# Patient Record
Sex: MALE | Race: WHITE | NOT HISPANIC OR LATINO | Employment: OTHER | URBAN - NONMETROPOLITAN AREA
[De-identification: names, ages, dates, MRNs, and addresses within clinical notes are randomized per-mention and may not be internally consistent; named-entity substitution may affect disease eponyms.]

---

## 2017-03-22 DIAGNOSIS — C61 PROSTATE CANCER (HCC): Primary | ICD-10-CM

## 2017-03-22 LAB — PSA SERPL-MCNC: <0.07 NG/ML (ref 0–4)

## 2017-03-29 ENCOUNTER — OFFICE VISIT (OUTPATIENT)
Dept: UROLOGY | Facility: CLINIC | Age: 77
End: 2017-03-29

## 2017-03-29 VITALS
DIASTOLIC BLOOD PRESSURE: 66 MMHG | BODY MASS INDEX: 18.42 KG/M2 | HEIGHT: 73 IN | SYSTOLIC BLOOD PRESSURE: 120 MMHG | TEMPERATURE: 98.6 F | WEIGHT: 139 LBS

## 2017-03-29 DIAGNOSIS — R35.1 NOCTURIA: ICD-10-CM

## 2017-03-29 DIAGNOSIS — C61 PROSTATE CANCER (HCC): Primary | ICD-10-CM

## 2017-03-29 LAB
BILIRUB BLD-MCNC: NEGATIVE MG/DL
CLARITY, POC: CLEAR
COLOR UR: YELLOW
GLUCOSE UR STRIP-MCNC: NEGATIVE MG/DL
KETONES UR QL: NEGATIVE
LEUKOCYTE EST, POC: NEGATIVE
NITRITE UR-MCNC: NEGATIVE MG/ML
PH UR: 6 [PH] (ref 5–8)
PROT UR STRIP-MCNC: NEGATIVE MG/DL
RBC # UR STRIP: NEGATIVE /UL
SP GR UR: 1.02 (ref 1–1.03)
UROBILINOGEN UR QL: NORMAL

## 2017-03-29 PROCEDURE — 81003 URINALYSIS AUTO W/O SCOPE: CPT | Performed by: UROLOGY

## 2017-03-29 PROCEDURE — 99213 OFFICE O/P EST LOW 20 MIN: CPT | Performed by: UROLOGY

## 2017-03-29 NOTE — PROGRESS NOTES
"  Mr. Urbina is 76 y.o. male    CHIEF COMPLAINT: I am here today for Prostate Cancer. My PSA is 0.070      HPI  Prostate cancer   He was initially diagnosed with prostate cancer about  6 year(s) ago. This was identified in the context of elevated psa.  Severity of the disease is best described as probable organ confined disease. Previous or current management includes robot assisted laparoscpic prostatectomy. Associated symptoms include no evidence of irritative or obstructive voiding symptoms, gross hematuria, lower extremity swelling or weight loss. . Currently his PSA is  <0.070 ng/ml.     Having some nocturia over last six months. Up to three times some nights.       The following portions of the patient's history were reviewed and updated as appropriate: allergies, current medications, past family history, past medical history, past social history, past surgical history and problem list.    Review of Systems   Constitutional: Negative for chills and fever.   Gastrointestinal: Negative for abdominal pain, anal bleeding and blood in stool.   Genitourinary: Negative for flank pain and hematuria.       No current outpatient prescriptions on file.    Past Medical History:   Diagnosis Date   • Prostate cancer 05/2011    t2c,[place] 3+3       Past Surgical History:   Procedure Laterality Date   • HERNIA REPAIR     • PROSTATECTOMY  05/2011    [facility]P       Social History     Social History   • Marital status: Single     Spouse name: N/A   • Number of children: N/A   • Years of education: N/A     Social History Main Topics   • Smoking status: Former Smoker     Types: Cigarettes     Quit date: 6/8/2002   • Smokeless tobacco: None   • Alcohol use No   • Drug use: None   • Sexual activity: Not Asked     Other Topics Concern   • None     Social History Narrative       Family History   Problem Relation Age of Onset   • Stroke Mother        /67  Temp 98.6 °F (37 °C)  Ht 73\" (185.4 cm)  Wt 139 lb (63 kg)  BMI 18.34 " kg/m2    Physical Exam  Constitutional: The patient  is oriented to person, place, and time. They  appear well-developed and well-nourished. No distress.   Pulmonary/Chest: Effort normal.   Abdominal: Soft. The patient exhibits no distension and no mass. There is no tenderness. There is no rebound and no guarding. No hernia.   Neurological: Patient is alert and oriented to person, place, and time.   Skin: Skin is warm and dry. Not diaphoretic.   Psychiatric:  normal mood and affect.   Vitals reviewed.        Results for orders placed or performed in visit on 03/29/17   POC Urinalysis Dipstick, Automated   Result Value Ref Range    Color Yellow Yellow, Straw, Dark Yellow, Page    Clarity, UA Clear Clear    Glucose, UA Negative Negative, 1000 mg/dL (3+) mg/dL    Bilirubin Negative Negative    Ketones, UA Negative Negative    Specific Gravity  1.025 1.005 - 1.030    Blood, UA Negative Negative    pH, Urine 6.0 5.0 - 8.0    Protein, POC Negative Negative mg/dL    Urobilinogen, UA Normal Normal    Leukocytes Negative Negative    Nitrite, UA Negative Negative       Imaging Results (last 7 days)     ** No results found for the last 168 hours. **          Assessment and Plan  Diagnoses and all orders for this visit:    Prostate cancer  -     POC Urinalysis Dipstick, Automated    Nocturia    The PSA is <0.07 which is undetectable suggesting no clinical evidence of prostate cancer   Nocturia is explained to the patient is a manifestation of one the following or a combination of voiding dysfunction, other medical conditions, or a sleep disorder.  Nocturia as a completely isolated symptommore often represents a non-urologic problem or medical condition.  It is explained that as patient's age, they often have a reverse Circadian rhythm voiding pattern in which up to two thirds of the urine in a 24-hour period can be excreted at night.  We also went over sleep disorders of the patient which may affect them.  Volume-related  disorders the cause mobilization of peripheral edema are also possible causes of nocturia including the medications used to treat them.  Therefore, treatment must be directed at the cause of the symptom.  I explained we will do our best to evaluate any urologic cause of symptoms, but oftentimes we find no abnormality in voiding dysfunction to correct.  Evaluation options are explained to the patient.      Yuriy Spears MD  03/29/17  12:12 PM    EMR Dragon/Transcription disclaimer:  Much of this encounter note is an electronic transcription/translation of spoken language to printed text. The electronic translation of spoken language may permit erroneous, or at times, nonsensical words or phrases to be inadvertently transcribed; although I have reviewed the note for such errors, some may still exist.       Cc:

## 2018-02-22 ENCOUNTER — OFFICE VISIT (OUTPATIENT)
Dept: RETAIL CLINIC | Facility: CLINIC | Age: 78
End: 2018-02-22

## 2018-02-22 VITALS
SYSTOLIC BLOOD PRESSURE: 136 MMHG | HEART RATE: 62 BPM | DIASTOLIC BLOOD PRESSURE: 72 MMHG | TEMPERATURE: 97.6 F | OXYGEN SATURATION: 98 %

## 2018-02-22 DIAGNOSIS — J01.90 ACUTE SINUSITIS, RECURRENCE NOT SPECIFIED, UNSPECIFIED LOCATION: Primary | ICD-10-CM

## 2018-02-22 DIAGNOSIS — R05.9 COUGHING: ICD-10-CM

## 2018-02-22 PROCEDURE — 99213 OFFICE O/P EST LOW 20 MIN: CPT | Performed by: NURSE PRACTITIONER

## 2018-02-22 RX ORDER — FLUTICASONE PROPIONATE 50 MCG
1 SPRAY, SUSPENSION (ML) NASAL DAILY
Qty: 1 BOTTLE | Refills: 0 | Status: SHIPPED | OUTPATIENT
Start: 2018-02-22 | End: 2019-06-03

## 2018-02-22 RX ORDER — AMOXICILLIN 500 MG/1
500 CAPSULE ORAL 3 TIMES DAILY
Qty: 30 CAPSULE | Refills: 0 | Status: SHIPPED | OUTPATIENT
Start: 2018-02-22 | End: 2018-03-04

## 2018-02-22 NOTE — PATIENT INSTRUCTIONS
Increase fluid intake  Warm salt water gargles as needed for sore throat  Do not over suppress cough  If no improvement over next 2-3 days or symptoms worsen, follow up with Urgent Care      Sinusitis, Adult  Sinusitis is soreness and inflammation of your sinuses. Sinuses are hollow spaces in the bones around your face. Your sinuses are located:  · Around your eyes.  · In the middle of your forehead.  · Behind your nose.  · In your cheekbones.  Your sinuses and nasal passages are lined with a stringy fluid (mucus). Mucus normally drains out of your sinuses. When your nasal tissues become inflamed or swollen, the mucus can become trapped or blocked so air cannot flow through your sinuses. This allows bacteria, viruses, and funguses to grow, which leads to infection.  Sinusitis can develop quickly and last for 7?10 days (acute) or for more than 12 weeks (chronic). Sinusitis often develops after a cold.  What are the causes?  This condition is caused by anything that creates swelling in the sinuses or stops mucus from draining, including:  · Allergies.  · Asthma.  · Bacterial or viral infection.  · Abnormally shaped bones between the nasal passages.  · Nasal growths that contain mucus (nasal polyps).  · Narrow sinus openings.  · Pollutants, such as chemicals or irritants in the air.  · A foreign object stuck in the nose.  · A fungal infection. This is rare.  What increases the risk?  The following factors may make you more likely to develop this condition:  · Having allergies or asthma.  · Having had a recent cold or respiratory tract infection.  · Having structural deformities or blockages in your nose or sinuses.  · Having a weak immune system.  · Doing a lot of swimming or diving.  · Overusing nasal sprays.  · Smoking.  What are the signs or symptoms?  The main symptoms of this condition are pain and a feeling of pressure around the affected sinuses. Other symptoms include:  · Upper  toothache.  · Earache.  · Headache.  · Bad breath.  · Decreased sense of smell and taste.  · A cough that may get worse at night.  · Fatigue.  · Fever.  · Thick drainage from your nose. The drainage is often green and it may contain pus (purulent).  · Stuffy nose or congestion.  · Postnasal drip. This is when extra mucus collects in the throat or back of the nose.  · Swelling and warmth over the affected sinuses.  · Sore throat.  · Sensitivity to light.  How is this diagnosed?  This condition is diagnosed based on symptoms, a medical history, and a physical exam. To find out if your condition is acute or chronic, your health care provider may:  · Look in your nose for signs of nasal polyps.  · Tap over the affected sinus to check for signs of infection.  · View the inside of your sinuses using an imaging device that has a light attached (endoscope).  If your health care provider suspects that you have chronic sinusitis, you may also:  · Be tested for allergies.  · Have a sample of mucus taken from your nose (nasal culture) and checked for bacteria.  · Have a mucus sample examined to see if your sinusitis is related to an allergy.  If your sinusitis does not respond to treatment and it lasts longer than 8 weeks, you may have an MRI or CT scan to check your sinuses. These scans also help to determine how severe your infection is.  In rare cases, a bone biopsy may be done to rule out more serious types of fungal sinus disease.  How is this treated?  Treatment for sinusitis depends on the cause and whether your condition is chronic or acute. If a virus is causing your sinusitis, your symptoms will go away on their own within 10 days. You may be given medicines to relieve your symptoms, including:  · Topical nasal decongestants. They shrink swollen nasal passages and let mucus drain from your sinuses.  · Antihistamines. These drugs block inflammation that is triggered by allergies. This can help to ease swelling in your  nose and sinuses.  · Topical nasal corticosteroids. These are nasal sprays that ease inflammation and swelling in your nose and sinuses.  · Nasal saline washes. These rinses can help to get rid of thick mucus in your nose.  If your condition is caused by bacteria, you will be given an antibiotic medicine. If your condition is caused by a fungus, you will be given an antifungal medicine.  Surgery may be needed to correct underlying conditions, such as narrow nasal passages. Surgery may also be needed to remove polyps.  Follow these instructions at home:  Medicines   · Take, use, or apply over-the-counter and prescription medicines only as told by your health care provider. These may include nasal sprays.  · If you were prescribed an antibiotic medicine, take it as told by your health care provider. Do not stop taking the antibiotic even if you start to feel better.  Hydrate and Humidify   · Drink enough water to keep your urine clear or pale yellow. Staying hydrated will help to thin your mucus.  · Use a cool mist humidifier to keep the humidity level in your home above 50%.  · Inhale steam for 10-15 minutes, 3-4 times a day or as told by your health care provider. You can do this in the bathroom while a hot shower is running.  · Limit your exposure to cool or dry air.  Rest   · Rest as much as possible.  · Sleep with your head raised (elevated).  · Make sure to get enough sleep each night.  General instructions   · Apply a warm, moist washcloth to your face 3-4 times a day or as told by your health care provider. This will help with discomfort.  · Wash your hands often with soap and water to reduce your exposure to viruses and other germs. If soap and water are not available, use hand .  · Do not smoke. Avoid being around people who are smoking (secondhand smoke).  · Keep all follow-up visits as told by your health care provider. This is important.  Contact a health care provider if:  · You have a  fever.  · Your symptoms get worse.  · Your symptoms do not improve within 10 days.  Get help right away if:  · You have a severe headache.  · You have persistent vomiting.  · You have pain or swelling around your face or eyes.  · You have vision problems.  · You develop confusion.  · Your neck is stiff.  · You have trouble breathing.  This information is not intended to replace advice given to you by your health care provider. Make sure you discuss any questions you have with your health care provider.  Document Released: 12/18/2006 Document Revised: 08/13/2017 Document Reviewed: 10/12/2016  [employer] Interactive Patient Education © 2017 [employer]

## 2018-02-22 NOTE — PROGRESS NOTES
Shannon Urbina is a 77 y.o. male.     URI    This is a new problem. The current episode started 1 to 4 weeks ago (2 1/2 weeks). The problem has been gradually worsening. There has been no fever. Associated symptoms include congestion (Chest), coughing and sneezing. Pertinent negatives include no diarrhea, nausea, vomiting or wheezing. He has tried decongestant for the symptoms. The treatment provided moderate (Helps him blow out mucus) relief.    Patient states he is still exercising as normal.      The following portions of the patient's history were reviewed and updated as appropriate: allergies, current medications, past family history, past medical history, past social history, past surgical history and problem list.    Review of Systems   Constitutional: Positive for fatigue (Feels lousy\). Negative for fever.   HENT: Positive for congestion (Chest), sinus pressure (Mild) and sneezing.    Respiratory: Positive for cough. Negative for chest tightness, shortness of breath and wheezing.    Gastrointestinal: Negative for diarrhea, nausea and vomiting.       Objective   Physical Exam   Constitutional: He appears well-developed and well-nourished. He does not appear ill. No distress.   HENT:   Right Ear: External ear normal. Tympanic membrane is not erythematous and not bulging.   Left Ear: External ear normal. Tympanic membrane is not erythematous and not bulging.   Nose: Right sinus exhibits no maxillary sinus tenderness and no frontal sinus tenderness. Left sinus exhibits no maxillary sinus tenderness and no frontal sinus tenderness.   Mouth/Throat: Posterior oropharyngeal erythema (Streaking with PND ) present. No oropharyngeal exudate. Tonsils are 0 on the right. Tonsils are 0 on the left.   Neck: Neck supple.   Cardiovascular: Normal rate, regular rhythm and normal heart sounds.  Exam reveals no gallop and no friction rub.    No murmur heard.  Pulmonary/Chest: Effort normal and breath sounds normal.  No respiratory distress. He has no decreased breath sounds. He has no wheezes. He has no rhonchi. He has no rales. He exhibits no tenderness.   Lymphadenopathy:     He has no cervical adenopathy.   Neurological: He is alert.   Skin: Skin is warm and dry. He is not diaphoretic.   Psychiatric: He has a normal mood and affect. His behavior is normal.       Assessment/Plan   Edy was seen today for uri.    Diagnoses and all orders for this visit:    Acute sinusitis, recurrence not specified, unspecified location  -     amoxicillin (AMOXIL) 500 MG capsule; Take 1 capsule by mouth 3 (Three) Times a Day for 10 days.  -     fluticasone (FLONASE) 50 MCG/ACT nasal spray; 1 spray into each nostril Daily.    Coughing    Discussed plain saline nasal spray to help thin mucus  Increase fluid intake  Warm salt water gargles as needed for sore throat  Do not over suppress cough  If no improvement over next 2-3 days or symptoms worsen, follow up with Urgent Care

## 2018-03-26 LAB — PSA SERPL-MCNC: <0.07 NG/ML (ref 0–4)

## 2018-03-29 ENCOUNTER — RESULTS ENCOUNTER (OUTPATIENT)
Dept: UROLOGY | Facility: CLINIC | Age: 78
End: 2018-03-29

## 2018-03-29 DIAGNOSIS — C61 PROSTATE CANCER (HCC): ICD-10-CM

## 2018-04-02 ENCOUNTER — OFFICE VISIT (OUTPATIENT)
Dept: UROLOGY | Facility: CLINIC | Age: 78
End: 2018-04-02

## 2018-04-02 VITALS — TEMPERATURE: 98 F | BODY MASS INDEX: 18.55 KG/M2 | HEIGHT: 73 IN | WEIGHT: 140 LBS

## 2018-04-02 DIAGNOSIS — C61 PROSTATE CANCER (HCC): Primary | ICD-10-CM

## 2018-04-02 DIAGNOSIS — R35.1 NOCTURIA: ICD-10-CM

## 2018-04-02 LAB
BILIRUB BLD-MCNC: NEGATIVE MG/DL
CLARITY, POC: CLEAR
COLOR UR: YELLOW
GLUCOSE UR STRIP-MCNC: NEGATIVE MG/DL
KETONES UR QL: NEGATIVE
LEUKOCYTE EST, POC: NEGATIVE
NITRITE UR-MCNC: NEGATIVE MG/ML
PH UR: 6 [PH] (ref 5–8)
PROT UR STRIP-MCNC: NEGATIVE MG/DL
RBC # UR STRIP: NEGATIVE /UL
SP GR UR: 1.01 (ref 1–1.03)
UROBILINOGEN UR QL: NORMAL

## 2018-04-02 PROCEDURE — 81003 URINALYSIS AUTO W/O SCOPE: CPT | Performed by: UROLOGY

## 2018-04-02 PROCEDURE — 99213 OFFICE O/P EST LOW 20 MIN: CPT | Performed by: UROLOGY

## 2019-03-28 ENCOUNTER — RESULTS ENCOUNTER (OUTPATIENT)
Dept: UROLOGY | Facility: CLINIC | Age: 79
End: 2019-03-28

## 2019-03-28 DIAGNOSIS — C61 PROSTATE CANCER (HCC): ICD-10-CM

## 2019-04-02 ENCOUNTER — RESULTS ENCOUNTER (OUTPATIENT)
Dept: UROLOGY | Facility: CLINIC | Age: 79
End: 2019-04-02

## 2019-04-02 DIAGNOSIS — C61 PROSTATE CANCER (HCC): Primary | ICD-10-CM

## 2019-04-02 DIAGNOSIS — C61 PROSTATE CANCER (HCC): ICD-10-CM

## 2019-04-03 ENCOUNTER — TELEPHONE (OUTPATIENT)
Dept: UROLOGY | Facility: CLINIC | Age: 79
End: 2019-04-03

## 2019-04-03 NOTE — TELEPHONE ENCOUNTER
Tried to call the pt several times to see if he had psa done and the number listed just gives a busy signal.

## 2019-04-05 LAB — PSA SERPL-MCNC: 0.03 NG/ML (ref 0–4)

## 2019-04-10 NOTE — PROGRESS NOTES
Mr. Urbina is 78 y.o. male    CHIEF COMPLAINT: I am here for follow up on prostate cancer. My PSA is 0.028    HPI  Follow up Prostate cancer  Location: Prostate gland  Quality:  Adenocarcinoma  Severity: Low risk disease  Duration: Diagnosed in 2011  Context: This was identified when he had a biopsy to evaluate elevated PSA which was obtained as part of prostate cancer screening.  Modifying factors: Robot-assisted laparoscopic prostatectomy resulted biochemical remission  Associated signs or symptoms: From a voiding standpoint 0 ppd. Good stream.   Patient denies any possible systemic symptoms of prostate cancer such as weight loss, lower extremity edema, or skeletal pain that could be worrisome for metastases.  History related to this issue:   - 05/2011: Robot Assisted Laparoscopic prostatectomy  Final Diagnosis  Amended Report  Radical prostatectomy:              A.     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       B.     Tumor involvement is seen in the posterior mid gland mainly on the  left with focal extension to the right mid gland.         C.     No capsular invasion and no extracapsular extension.         D.     All margins free of malignant infiltrate.         E.     No pathologic diagnosis, bilateral seminal vesicles.      Pathologic classification: pT2c pNX pMX. AJCC stage group IIB.      Lab Results   Component Value Date    PSA 0.028 04/05/2019    PSA <0.070 03/26/2018    PSA <0.070 03/22/2017       The following portions of the patient's history were reviewed and updated as appropriate: allergies, current medications, past family history, past medical history, past social history, past surgical history and problem list.      Review of Systems   Constitutional: Negative for chills and fever.   Gastrointestinal: Negative for abdominal pain, anal bleeding and blood in stool.   Genitourinary: Negative for dysuria, frequency,  "hematuria and urgency.           Current Outpatient Medications:   •  fluticasone (FLONASE) 50 MCG/ACT nasal spray, 1 spray into each nostril Daily., Disp: 1 bottle, Rfl: 0    Past Medical History:   Diagnosis Date   • Prostate cancer (CMS/formerly [facility]) 2011    t2c,[place] 3+3       Past Surgical History:   Procedure Laterality Date   • HERNIA REPAIR     • PROSTATECTOMY  2011    RALP       Social History     Socioeconomic History   • Marital status: Single     Spouse name: Not on file   • Number of children: Not on file   • Years of education: Not on file   • Highest education level: Not on file   Tobacco Use   • Smoking status: Former Smoker     Types: Cigarettes     Last attempt to quit: 2002     Years since quittin.8   • Smokeless tobacco: Never Used   Substance and Sexual Activity   • Alcohol use: No       Family History   Problem Relation Age of Onset   • Stroke Mother          Temp 98.3 °F (36.8 °C)   Ht 182.9 cm (72\")   Wt 64.9 kg (143 lb)   BMI 19.39 kg/m²       Physical Exam  Constitutional:  They  appear well-developed and well-nourished. There are no obvious deformities. No distress.   Pulmonary/Chest: Effort normal.   GI: Soft. The patient exhibits no distension and no mass. There is no tenderness. There is no rebound and no guarding. No hernia.   Neurological: Patient is alert and oriented to person, place, and time.   Skin: Skin is warm and dry. Not diaphoretic.   Psychiatric:  normal mood and affect. Not agitated.     r    Data  Results for orders placed or performed in visit on 19   POC Urinalysis Dipstick, Multipro   Result Value Ref Range    Color Yellow Yellow, Straw, Dark Yellow, Page    Clarity, UA Clear Clear    Glucose, UA Negative Negative, 1000 mg/dL (3+) mg/dL    Bilirubin Negative Negative    Ketones, UA Negative Negative    Specific Gravity  1.020 1.005 - 1.030    Blood, UA Trace (A) Negative    pH, Urine 5.5 5.0 - 8.0    Protein, POC Negative Negative mg/dL    " Urobilinogen, UA Normal Normal    Nitrite, UA Negative Negative    Leukocytes Negative Negative         Imaging Results (last 7 days)     ** No results found for the last 168 hours. **        Patient's Body mass index is 19.39 kg/m². BMI is within normal parameters. No follow-up required..      Assessment and Plan  Diagnoses and all orders for this visit:    Prostate cancer (CMS/[facility])  -     POC Urinalysis Dipstick, Multipro      His PSA is 0.028. Doing well now eight years post op .   Needs annual PSA done until he is ten years post op, then biannual afterwards.         (Please note that portions of this note were completed with a voice recognition program.)  Yuriy Spears MD  04/19/19  10:33 AM

## 2019-04-19 ENCOUNTER — OFFICE VISIT (OUTPATIENT)
Dept: UROLOGY | Facility: CLINIC | Age: 79
End: 2019-04-19

## 2019-04-19 VITALS — TEMPERATURE: 98.3 F | HEIGHT: 72 IN | WEIGHT: 143 LBS | BODY MASS INDEX: 19.37 KG/M2

## 2019-04-19 DIAGNOSIS — C61 PROSTATE CANCER (HCC): Primary | ICD-10-CM

## 2019-04-19 LAB
BILIRUB BLD-MCNC: NEGATIVE MG/DL
CLARITY, POC: CLEAR
COLOR UR: YELLOW
GLUCOSE UR STRIP-MCNC: NEGATIVE MG/DL
KETONES UR QL: NEGATIVE
LEUKOCYTE EST, POC: NEGATIVE
NITRITE UR-MCNC: NEGATIVE MG/ML
PH UR: 5.5 [PH] (ref 5–8)
PROT UR STRIP-MCNC: NEGATIVE MG/DL
RBC # UR STRIP: ABNORMAL /UL
SP GR UR: 1.02 (ref 1–1.03)
UROBILINOGEN UR QL: NORMAL

## 2019-04-19 PROCEDURE — 81003 URINALYSIS AUTO W/O SCOPE: CPT | Performed by: UROLOGY

## 2019-04-19 PROCEDURE — 99212 OFFICE O/P EST SF 10 MIN: CPT | Performed by: UROLOGY

## 2019-06-03 ENCOUNTER — HOSPITAL ENCOUNTER (OUTPATIENT)
Dept: ULTRASOUND IMAGING | Facility: HOSPITAL | Age: 79
Discharge: HOME | End: 2019-06-03
Admitting: NURSE PRACTITIONER

## 2019-06-03 PROCEDURE — 93971 EXTREMITY STUDY: CPT | Performed by: SURGERY

## 2019-06-03 PROCEDURE — 93971 EXTREMITY STUDY: CPT

## 2019-06-05 ENCOUNTER — OFFICE VISIT (OUTPATIENT)
Dept: INTERNAL MEDICINE | Facility: CLINIC | Age: 79
End: 2019-06-05

## 2019-06-05 ENCOUNTER — RESULTS ENCOUNTER (OUTPATIENT)
Dept: INTERNAL MEDICINE | Facility: CLINIC | Age: 79
End: 2019-06-05

## 2019-06-05 VITALS
TEMPERATURE: 97.7 F | OXYGEN SATURATION: 96 % | WEIGHT: 135.4 LBS | HEIGHT: 72 IN | HEART RATE: 65 BPM | DIASTOLIC BLOOD PRESSURE: 63 MMHG | RESPIRATION RATE: 16 BRPM | BODY MASS INDEX: 18.34 KG/M2 | SYSTOLIC BLOOD PRESSURE: 120 MMHG

## 2019-06-05 DIAGNOSIS — Z00.00 MEDICARE ANNUAL WELLNESS VISIT, INITIAL: Primary | ICD-10-CM

## 2019-06-05 DIAGNOSIS — Z12.11 SCREENING FOR COLON CANCER: ICD-10-CM

## 2019-06-05 DIAGNOSIS — Z23 NEED FOR PNEUMOCOCCAL VACCINE: ICD-10-CM

## 2019-06-05 DIAGNOSIS — I83.11 VARICOSE VEINS OF RIGHT LOWER EXTREMITY WITH INFLAMMATION: ICD-10-CM

## 2019-06-05 DIAGNOSIS — Z00.00 ANNUAL VISIT FOR GENERAL ADULT MEDICAL EXAMINATION WITHOUT ABNORMAL FINDINGS: ICD-10-CM

## 2019-06-05 PROBLEM — I83.90 VARICOSE VEIN OF LEG: Status: ACTIVE | Noted: 2019-06-05

## 2019-06-05 PROCEDURE — 96160 PT-FOCUSED HLTH RISK ASSMT: CPT | Performed by: NURSE PRACTITIONER

## 2019-06-05 PROCEDURE — 90670 PCV13 VACCINE IM: CPT | Performed by: NURSE PRACTITIONER

## 2019-06-05 PROCEDURE — G0009 ADMIN PNEUMOCOCCAL VACCINE: HCPCS | Performed by: NURSE PRACTITIONER

## 2019-06-05 PROCEDURE — G0438 PPPS, INITIAL VISIT: HCPCS | Performed by: NURSE PRACTITIONER

## 2019-06-05 NOTE — PROGRESS NOTES
QUICK REFERENCE INFORMATION:  The ABCs of the Annual Wellness Visit    Initial Medicare Wellness Visit    HEALTH RISK ASSESSMENT    : 1940    Recent Hospitalizations:  No hospitalization(s) within the last year..  ccc      Current Medical Providers:  Patient Care Team:  Torey Amato DO as PCP - General (Internal Medicine)  Yuriy Spears MD as Consulting Physician (Urology)        Smoking Status:  Social History     Tobacco Use   Smoking Status Former Smoker   • Years: 50.00   • Types: Cigarettes   • Last attempt to quit: 2002   • Years since quittin.0   Smokeless Tobacco Never Used       Alcohol Consumption:  Social History     Substance and Sexual Activity   Alcohol Use No       Depression Screen:   PHQ-2/PHQ-9 Depression Screening 2019   Little interest or pleasure in doing things 0   Feeling down, depressed, or hopeless 0   Total Score 0       Health Habits and Functional and Cognitive Screening:  Functional & Cognitive Status 2019   Do you have difficulty preparing food and eating? No   Do you have difficulty bathing yourself, getting dressed or grooming yourself? No   Do you have difficulty using the toilet? No   Do you have difficulty moving around from place to place? No   Do you have trouble with steps or getting out of a bed or a chair? No   In the past year have you fallen or experienced a near fall? No   Current Diet Well Balanced Diet   Dental Exam Not up to date   Eye Exam Up to date   Exercise (times per week) 3 times per week   Current Exercise Activities Include Cardiovasular Workout on Exercise Equipment   Do you need help using the phone?  No   Are you deaf or do you have serious difficulty hearing?  No   Do you need help with transportation? No   Do you need help shopping? No   Do you need help preparing meals?  No   Do you need help with housework?  No   Do you need help with laundry? No   Do you need help taking your medications? No   Do you need help  managing money? No   Do you ever drive or ride in a car without wearing a seat belt? No   Have you felt unusual stress, anger or loneliness in the last month? No   Who do you live with? Alone   If you need help, do you have trouble finding someone available to you? No   Have you been bothered in the last four weeks by sexual problems? No   Do you have difficulty concentrating, remembering or making decisions? No           Does the patient have evidence of cognitive impairment? No    Asiprin use counseling: Does not need ASA (and currently is not on it)      Recent Lab Results:                   Age-appropriate Screening Schedule:  Refer to the list below for future screening recommendations based on patient's age, sex and/or medical conditions. Orders for these recommended tests are listed in the plan section. The patient has been provided with a written plan.    Health Maintenance   Topic Date Due   • TDAP/TD VACCINES (1 - Tdap) 09/30/1960   • ZOSTER VACCINE (1 of 2) 09/30/1991   • PNEUMOCOCCAL VACCINES (65+ LOW/MEDIUM RISK) (1 of 2 - PCV13) 09/30/2006   • COLONOSCOPY  03/29/2017   • INFLUENZA VACCINE  08/01/2019        Subjective   History of Present Illness    Edy Urbina is a 78 y.o. male who presents for an Annual Wellness Visit.    The following portions of the patient's history were reviewed and updated as appropriate: allergies, current medications, past family history, past medical history, past social history, past surgical history and problem list.    Outpatient Medications Prior to Visit   Medication Sig Dispense Refill   • Multiple Vitamins-Minerals (MULTIVITAMIN PO) Take 1 tablet by mouth Daily.       No facility-administered medications prior to visit.        Patient Active Problem List   Diagnosis   • Varicose vein of leg   • BMI less than 19,adult       Advance Care Planning:  Patient does not have an advance directive - information provided to the patient today    Identification of Risk  "Factors:  Risk factors include: inadequate social support.  Patient lives alone though he reports his children are available to help him if he needs help.     Review of Systems   Constitutional: Negative for activity change, appetite change, fatigue, fever and unexpected weight change.   HENT: Negative.    Respiratory: Negative for cough, chest tightness, shortness of breath and wheezing.    Cardiovascular: Positive for leg swelling (right). Negative for chest pain and palpitations.   Gastrointestinal: Negative.    Endocrine: Negative.    Genitourinary: Negative.    Musculoskeletal: Positive for myalgias.   Skin: Negative.    Neurological: Negative for dizziness and headaches.   Psychiatric/Behavioral: Negative.        Compared to one year ago, the patient feels his physical health is worse.  Compared to one year ago, the patient feels his mental health is the same.    Objective     Physical Exam   Constitutional: He is oriented to person, place, and time. He appears well-developed and well-nourished.   HENT:   Head: Normocephalic and atraumatic.   Eyes: Conjunctivae and EOM are normal. Pupils are equal, round, and reactive to light.   Neck: Normal range of motion. Neck supple.   Cardiovascular: Normal rate, regular rhythm and normal heart sounds.   Pulmonary/Chest: Effort normal and breath sounds normal.   Abdominal: Soft. Bowel sounds are normal.   Musculoskeletal:        Legs:  Neurological: He is alert and oriented to person, place, and time. He has normal reflexes.   Skin: Skin is warm and dry.   Psychiatric: He has a normal mood and affect.   Vitals reviewed.      Vitals:    06/05/19 1015   BP: 120/63   BP Location: Left arm   Patient Position: Sitting   Pulse: 65   Resp: 16   Temp: 97.7 °F (36.5 °C)   TempSrc: Oral   SpO2: 96%   Weight: 61.4 kg (135 lb 6.4 oz)   Height: 182.9 cm (72\")       Patient's Body mass index is 18.36 kg/m². BMI is below normal parameters. Recommendations include: none (medical " contraindication). Patient exercises frequently and maintains regular cardiovascular exercise.       Assessment/Plan   Patient Self-Management and Personalized Health Advice  The patient has been provided with information about: diet and weight management and preventive services including:   · Colorectal cancer screening, cologuard test ordered, Pneumococcal vaccine .    Visit Diagnoses:    ICD-10-CM ICD-9-CM   1. Medicare annual wellness visit, initial Z00.00 V70.0   2. Annual visit for general adult medical examination without abnormal findings Z00.00 V70.0   3. Varicose veins of right lower extremity with inflammation I83.11 454.1   4. Need for pneumococcal vaccine Z23 V03.82   5. Screening for colon cancer Z12.11 V76.51   6. BMI less than 19,adult Z68.1 V85.0       Orders Placed This Encounter   Procedures   • Pneumococcal Conjugate Vaccine 13-Valent All (PCV13)   • CBC No Differential     Standing Status:   Future     Standing Expiration Date:   6/5/2020   • Comprehensive metabolic panel     Standing Status:   Future     Standing Expiration Date:   6/5/2020   • Lipid panel     Standing Status:   Future     Standing Expiration Date:   6/5/2020   • Cologuard - Stool, Per Rectum     Standing Status:   Future     Standing Expiration Date:   6/5/2020   • Ambulatory Referral to Vascular Surgery     Referral Priority:   Routine     Referral Type:   Consultation     Referral Reason:   Specialty Services Required     Requested Specialty:   Vascular Surgery     Number of Visits Requested:   1       Outpatient Encounter Medications as of 6/5/2019   Medication Sig Dispense Refill   • Multiple Vitamins-Minerals (MULTIVITAMIN PO) Take 1 tablet by mouth Daily.       No facility-administered encounter medications on file as of 6/5/2019.        Reviewed use of high risk medication in the elderly: not applicable  Reviewed for potential of harmful drug interactions in the elderly: not applicable    Follow Up:  Return in about 3  months (around 9/5/2019) for dr mata.     An After Visit Summary and PPPS with all of these plans were given to the patient.

## 2019-06-05 NOTE — PATIENT INSTRUCTIONS
Medicare Wellness  Personal Prevention Plan of Service     Date of Office Visit:  2019  Encounter Provider:  DARLING Meyers  Place of Service:  [facility]  Patient Name: Edy Urbina  :  1940    As part of the Medicare Wellness portion of your visit today, we are providing you with this personalized preventive plan of services (PPPS). This plan is based upon recommendations of the [place] Preventive Services Task Force (USPSTF) and the Advisory Committee on Immunization Practices (ACIP).    This lists the preventive care services that should be considered, and provides dates of when you are due. Items listed as completed are up-to-date and do not require any further intervention.    Health Maintenance   Topic Date Due   • TDAP/TD VACCINES (1 - Tdap) 1960   • ZOSTER VACCINE (1 of 2) 1991   • PNEUMOCOCCAL VACCINES (65+ LOW/MEDIUM RISK) (1 of 2 - PCV13) 2006   • MEDICARE ANNUAL WELLNESS  2017   • COLONOSCOPY  2017   • INFLUENZA VACCINE  2019       Orders Placed This Encounter   Procedures   • Pneumococcal Conjugate Vaccine 13-Valent All (PCV13)   • CBC No Differential     Standing Status:   Future     Standing Expiration Date:   2020   • Comprehensive metabolic panel     Standing Status:   Future     Standing Expiration Date:   2020   • Lipid panel     Standing Status:   Future     Standing Expiration Date:   2020   • Cologuard - Stool, Per Rectum     Standing Status:   Future     Standing Expiration Date:   2020   • Ambulatory Referral to Vascular Surgery     Referral Priority:   Routine     Referral Type:   Consultation     Referral Reason:   Specialty Services Required     Requested Specialty:   Vascular Surgery     Number of Visits Requested:   1       Return in about 3 months (around 2019) for dr mata.

## 2019-06-05 NOTE — PROGRESS NOTES
CC:establish care, varicose veins.     History:  Edy Urbina is a 78 y.o. male who presents today for evaluation of the above problems.    Patient presents today to establish care. He is referred from urgent care. He reports he has been feeling well without acute illness.  Patient scheduled to see Dr. Mendiola on 6/10 for hernia evaluation. His main concern today is the varicose veins that are present in his right leg. They extend from his thigh to his calf. He reports that they swell and with exercise become tender and cause his right leg to feel fatigue. He does not currently wear compression socks.       ROS:  Review of Systems   Constitutional: Negative for activity change, appetite change, fatigue, fever and unexpected weight change.   HENT: Negative.    Respiratory: Negative for cough, chest tightness, shortness of breath and wheezing.    Cardiovascular: Positive for leg swelling (right). Negative for chest pain and palpitations.   Gastrointestinal: Negative.    Endocrine: Negative.    Genitourinary: Negative.    Musculoskeletal: Negative.    Skin: Negative.    Neurological: Negative for dizziness and headaches.   Psychiatric/Behavioral: Negative.        No Known Allergies  Past Medical History:   Diagnosis Date   • Prostate cancer (CMS/[facility]) 05/2011    t2c,Sommer 3+3     Past Surgical History:   Procedure Laterality Date   • HERNIA REPAIR     • PROSTATECTOMY  05/2011    RALP     Family History   Problem Relation Age of Onset   • Stroke Mother       reports that he quit smoking about 17 years ago. His smoking use included cigarettes. He quit after 50.00 years of use. He has never used smokeless tobacco. He reports that he does not drink alcohol.      Current Outpatient Medications:   •  Multiple Vitamins-Minerals (MULTIVITAMIN PO), Take 1 tablet by mouth Daily., Disp: , Rfl:     OBJECTIVE:  /63 (BP Location: Left arm, Patient Position: Sitting)   Pulse 65   Temp 97.7 °F (36.5 °C) (Oral)   Resp 16    "Ht 182.9 cm (72\")   Wt 61.4 kg (135 lb 6.4 oz)   SpO2 96%   BMI 18.36 kg/m²    Physical Exam   Constitutional: He is oriented to person, place, and time. He appears well-developed and well-nourished.   HENT:   Head: Normocephalic and atraumatic.   Eyes: Conjunctivae and EOM are normal. Pupils are equal, round, and reactive to light.   Neck: Normal range of motion. Neck supple.   Cardiovascular: Normal rate, regular rhythm and normal heart sounds.   Pulmonary/Chest: Effort normal and breath sounds normal.   Abdominal: Soft. Bowel sounds are normal.   Musculoskeletal:        Right knee: He exhibits swelling (mild). He exhibits no erythema.        Legs:  Neurological: He is alert and oriented to person, place, and time. He has normal reflexes.   Skin: Skin is warm and dry.   Psychiatric: He has a normal mood and affect.   Vitals reviewed.      Assessment/Plan    Edy was seen today for \[facility] [place] [facility]\"" care.    Diagnoses and all orders for this visit:    Medicare annual wellness visit, initial  Annual visit for general adult medical examination without abnormal findings  -     CBC No Differential; Future  -     Comprehensive metabolic panel; Future  -     Lipid panel; Future  Immunizations:      - Tetanus: Unknown or >10 years ago. Recommend to have at pharmacy or on injury.      - Influenza: Recommend yearly.      - Pneumovax: Once after age 65      - Prevnar: Once after age 65      - Zostavax: Once after age 60  Colonoscopy: Discuss at 50  Prostate: Discuss at 55 or on symptoms.  Varicose veins of right lower extremity with inflammation  -     Ambulatory Referral to Vascular Surgery    Need for pneumococcal vaccine  -     Pneumococcal Conjugate Vaccine 13-Valent All (PCV13)    Screening for colon cancer  -     Cologuard - Stool, Per Rectum; Future    BMI less than 19,adult  Recommended attention to weight and ensuring frequent meals with protein rich sources.       An After Visit Summary was printed and given to the " patient at discharge.  Return in about 3 months (around 9/5/2019) for dr mata.         Shawna Cabrera APRN 6/5/2019

## 2019-06-06 ENCOUNTER — TELEPHONE (OUTPATIENT)
Dept: VASCULAR SURGERY | Facility: CLINIC | Age: 79
End: 2019-06-06

## 2019-06-20 ENCOUNTER — TELEPHONE (OUTPATIENT)
Dept: VASCULAR SURGERY | Facility: CLINIC | Age: 79
End: 2019-06-20

## 2019-06-20 NOTE — TELEPHONE ENCOUNTER
I called pt to explain appt change. Pt expressed confusion about this. pt mentioned several different providers. I tried explaining it was Dr. Montague several times. Also explained reasoning for his appt. I told pt the date and time about 5 or so times. I also told the pt I was mailing a reminder, which I completed and put in mail tray. Pt seemed extremely confused by all of this after repeating myself several times.

## 2019-06-21 ENCOUNTER — APPOINTMENT (OUTPATIENT)
Dept: PREADMISSION TESTING | Facility: HOSPITAL | Age: 79
End: 2019-06-21

## 2019-06-21 VITALS
HEART RATE: 66 BPM | BODY MASS INDEX: 18.4 KG/M2 | RESPIRATION RATE: 16 BRPM | DIASTOLIC BLOOD PRESSURE: 55 MMHG | HEIGHT: 71 IN | SYSTOLIC BLOOD PRESSURE: 121 MMHG | WEIGHT: 131.39 LBS | OXYGEN SATURATION: 97 %

## 2019-06-21 LAB
ALBUMIN SERPL-MCNC: 4.3 G/DL (ref 3.5–5)
ALBUMIN/GLOB SERPL: 1.5 G/DL (ref 1.1–2.5)
ALP SERPL-CCNC: 59 U/L (ref 24–120)
ALT SERPL W P-5'-P-CCNC: 23 U/L (ref 0–54)
ANION GAP SERPL CALCULATED.3IONS-SCNC: 9 MMOL/L (ref 4–13)
AST SERPL-CCNC: 28 U/L (ref 7–45)
BASOPHILS # BLD AUTO: 0.06 10*3/MM3 (ref 0–0.2)
BASOPHILS NFR BLD AUTO: 0.9 % (ref 0–2)
BILIRUB SERPL-MCNC: 0.8 MG/DL (ref 0.1–1)
BUN BLD-MCNC: 13 MG/DL (ref 5–21)
BUN/CREAT SERPL: 17.6 (ref 7–25)
CALCIUM SPEC-SCNC: 9 MG/DL (ref 8.4–10.4)
CHLORIDE SERPL-SCNC: 99 MMOL/L (ref 98–110)
CO2 SERPL-SCNC: 29 MMOL/L (ref 24–31)
CREAT BLD-MCNC: 0.74 MG/DL (ref 0.5–1.4)
DEPRECATED RDW RBC AUTO: 40.1 FL (ref 40–54)
EOSINOPHIL # BLD AUTO: 0.1 10*3/MM3 (ref 0–0.7)
EOSINOPHIL NFR BLD AUTO: 1.5 % (ref 0–4)
ERYTHROCYTE [DISTWIDTH] IN BLOOD BY AUTOMATED COUNT: 13.1 % (ref 12–15)
GFR SERPL CREATININE-BSD FRML MDRD: 103 ML/MIN/1.73
GLOBULIN UR ELPH-MCNC: 2.8 GM/DL
GLUCOSE BLD-MCNC: 86 MG/DL (ref 70–100)
HCT VFR BLD AUTO: 39.6 % (ref 40–52)
HGB BLD-MCNC: 13.1 G/DL (ref 14–18)
IMM GRANULOCYTES # BLD AUTO: 0.02 10*3/MM3 (ref 0–0.05)
IMM GRANULOCYTES NFR BLD AUTO: 0.3 % (ref 0–5)
LYMPHOCYTES # BLD AUTO: 1.37 10*3/MM3 (ref 0.72–4.86)
LYMPHOCYTES NFR BLD AUTO: 20.3 % (ref 15–45)
MCH RBC QN AUTO: 27.9 PG (ref 28–32)
MCHC RBC AUTO-ENTMCNC: 33.1 G/DL (ref 33–36)
MCV RBC AUTO: 84.3 FL (ref 82–95)
MONOCYTES # BLD AUTO: 0.55 10*3/MM3 (ref 0.19–1.3)
MONOCYTES NFR BLD AUTO: 8.1 % (ref 4–12)
NEUTROPHILS # BLD AUTO: 4.65 10*3/MM3 (ref 1.87–8.4)
NEUTROPHILS NFR BLD AUTO: 68.9 % (ref 39–78)
NRBC BLD AUTO-RTO: 0 /100 WBC (ref 0–0.2)
PLATELET # BLD AUTO: 213 10*3/MM3 (ref 130–400)
PMV BLD AUTO: 10 FL (ref 6–12)
POTASSIUM BLD-SCNC: 4.1 MMOL/L (ref 3.5–5.3)
PROT SERPL-MCNC: 7.1 G/DL (ref 6.3–8.7)
RBC # BLD AUTO: 4.7 10*6/MM3 (ref 4.8–5.9)
SODIUM BLD-SCNC: 137 MMOL/L (ref 135–145)
WBC NRBC COR # BLD: 6.75 10*3/MM3 (ref 4.8–10.8)

## 2019-06-21 PROCEDURE — 93005 ELECTROCARDIOGRAM TRACING: CPT

## 2019-06-21 PROCEDURE — 93010 ELECTROCARDIOGRAM REPORT: CPT | Performed by: INTERNAL MEDICINE

## 2019-06-21 PROCEDURE — 85025 COMPLETE CBC W/AUTO DIFF WBC: CPT | Performed by: SPECIALIST

## 2019-06-21 PROCEDURE — 80053 COMPREHEN METABOLIC PANEL: CPT | Performed by: SPECIALIST

## 2019-06-21 PROCEDURE — 36415 COLL VENOUS BLD VENIPUNCTURE: CPT

## 2019-06-21 RX ORDER — CHLORAL HYDRATE 500 MG
1000 CAPSULE ORAL
COMMUNITY
End: 2019-08-01 | Stop reason: ALTCHOICE

## 2019-06-21 NOTE — DISCHARGE INSTRUCTIONS
DAY OF SURGERY INSTRUCTIONS        YOUR SURGEON: ***JACOB RAGLAND'    PROCEDURE: ***OPEN RIGHT INGUINAL HERNIA REPAIR WITH MESH    DATE OF SURGERY: ***6/28/2019    ARRIVAL TIME: AS DIRECTED BY OFFICE      ALL OTHER HOME MEDICATION CHECK WITH YOUR PHYSICIAN                MANAGING PAIN AFTER SURGERY    We know you are probably wondering what your pain will be like after surgery.  Following surgery it is unrealistic to expect you will not have pain.   Pain is how our bodies let us know that something is wrong or cautions us to be careful.  That said, our goal is to make your pain tolerable.    Methods we may use to treat your pain include (oral or IV medications, PCAs, epidurals, nerve blocks, etc.)   While some procedures require IV pain medications for a short time after surgery, transitioning to pain medications by mouth allows for better management of pain.   Your nurse will encourage you to take oral pain medications whenever possible.  IV medications work almost immediately, but only last a short while.  Taking medications by mouth allows for a more constant level of medication in your blood stream for a longer period of time.      Once your pain is out of control it is harder to get back under control.  It is important you are aware when your next dose of pain medication is due.  If you are admitted, your nurse may write the time of your next dose on the white board in your room to help you remember.      We are interested in your pain and encourage you to inform us about aggravating factors during your visit.   Many times a simple repositioning every few hours can make a big difference.    If your physician says it is okay, do not let your pain prevent you from getting out of bed. Be sure to call your nurse for assistance prior to getting up so you do not fall.      Before surgery, please decide your tolerable pain goal.  These faces help describe the pain ratings we use on a 0-10 scale.   Be prepared to tell  us your goal and whether or not you take pain or anxiety medications at home.          BEFORE YOU COME TO THE HOSPITAL  (Pre-op instructions)  • Do not eat, drink, smoke or chew gum after midnight the night before surgery.  This also includes no mints.  • Morning of surgery take only the medicines you have been instructed with a sip of water unless otherwise instructed  by your physician.  • Do not shave, wear makeup or dark nail polish.  • Remove all jewelry including rings.  • Leave anything you consider valuable at home.  • Leave your suitcase in the car until after your surgery.  • Bring the following with you if applicable:  o Picture ID and insurance, Medicare or Medicaid cards  o Co-pay/deductible required by insurance (cash, check, credit card)  o Copy of advance directive, living will or power-of- documents if not brought to PAT  o CPAP or BIPAP mask and tubing  o Relaxation aids (MP3 player, book, magazine)  • On the day of surgery check in at registration located at the main entrance of the hospital.       Outpatient Surgery Guidelines, Adult  Outpatient procedures are those for which the person having the procedure is allowed to go home the same day as the procedure. Various procedures are done on an outpatient basis. You should follow some general guidelines if you will be having an outpatient procedure.  LET YOUR HEALTH CARE PROVIDER KNOW ABOUT:  · Any allergies you have.  · All medicines you are taking, including vitamins, herbs, eye drops, creams, and over-the-counter medicines.  · Previous problems you or members of your family have had with the use of anesthetics.  · Any blood disorders you have.  · Previous surgeries you have had.  · Medical conditions you have.  RISKS AND COMPLICATIONS  Your health care provider will discuss possible risks and complications with you before surgery. Common risks and complications include:    · Problems due to the use of anesthetics.  · Blood loss and  replacement (does not apply to minor surgical procedures).  · Temporary increase in pain due to surgery.  · Uncorrected pain or problems that the surgery was meant to correct.  · Infection.  · New damage.  BEFORE THE PROCEDURE  · Ask your health care provider about changing or stopping your regular medicines. You may need to stop taking certain medicines in the days or weeks before the procedure.  · Stop smoking at least 2 weeks before surgery. This lowers your risk for complications during and after surgery. Ask your health care provider for help with this if needed.  · Eat your usual meals and a light supper the day before surgery. Continue fluid intake. Do not drink alcohol.  · Do not eat or drink after midnight the night before your surgery.   · Arrange for someone to take you home and to stay with you for 24 hours after the procedure. Medicine given for your procedure may affect your ability to drive or to care for yourself.  · Call your health care provider's office if you develop an illness or problem that may prevent you from safely having your procedure.  AFTER THE PROCEDURE  After surgery, you will be taken to a recovery area, where your progress will be monitored. If there are no complications, you will be allowed to go home when you are awake, stable, and taking fluids well. You may have numbness around the surgical site. Healing will take some time. You will have tenderness at the surgical site and may have some swelling and bruising. You may also have some nausea.  HOME CARE INSTRUCTIONS  · Do not drive for 24 hours, or as directed by your health care provider. Do not drive while taking prescription pain medicines.  · Do not drink alcohol for 24 hours.  · Do not make important decisions or sign legal documents for 24 hours.  · You may resume a normal diet and activities as directed.  · Do not lift anything heavier than 10 pounds (4.5 kg) or play contact sports until your health care provider says it is  okay.  · Change your bandages (dressings) as directed.  · Only take over-the-counter or prescription medicines as directed by your health care provider.  · Follow up with your health care provider as directed.  SEEK MEDICAL CARE IF:  · You have increased bleeding (more than a small spot) from the surgical site.  · You have redness, swelling, or increasing pain in the wound.  · You see pus coming from the wound.  · You have a fever.  · You notice a bad smell coming from the wound or dressing.  · You feel lightheaded or faint.  · You develop a rash.  · You have trouble breathing.  · You develop allergies.  MAKE SURE YOU:  · Understand these instructions.  · Will watch your condition.  · Will get help right away if you are not doing well or get worse.     This information is not intended to replace advice given to you by your health care provider. Make sure you discuss any questions you have with your health care provider.     Document Released: 09/12/2002 Document Revised: 05/03/2016 Document Reviewed: 05/22/2014  [employer] Interactive Patient Education ©2016 [employer]       Fall Prevention in Hospitals, Adult  As a hospital patient, your condition and the treatments you receive can increase your risk for falls. Some additional risk factors for falls in a hospital include:  · Being in an unfamiliar environment.  · Being on bed rest.  · Your surgery.  · Taking certain medicines.  · Your tubing requirements, such as intravenous (IV) therapy or catheters.  It is important that you learn how to decrease fall risks while at the hospital. Below are important tips that can help prevent falls.  SAFETY TIPS FOR PREVENTING FALLS  Talk about your risk of falling.  · Ask your health care provider why you are at risk for falling. Is it your medicine, illness, tubing placement, or something else?  · Make a plan with your health care provider to keep you safe from falls.  · Ask your health care provider or pharmacist about side  effects of your medicines. Some medicines can make you dizzy or affect your coordination.  Ask for help.  · Ask for help before getting out of bed. You may need to press your call button.  · Ask for assistance in getting safely to the toilet.  · Ask for a walker or cane to be put at your bedside. Ask that most of the side rails on your bed be placed up before your health care provider leaves the room.  · Ask family or friends to sit with you.  · Ask for things that are out of your reach, such as your glasses, hearing aids, telephone, bedside table, or call button.  Follow these tips to avoid falling:  · Stay lying or seated, rather than standing, while waiting for help.  · Wear rubber-soled slippers or shoes whenever you walk in the hospital.  · Avoid quick, sudden movements.  ¨ Change positions slowly.  ¨ Sit on the side of your bed before standing.  ¨ Stand up slowly and wait before you start to walk.  · Let your health care provider know if there is a spill on the floor.  · Pay careful attention to the medical equipment, electrical cords, and tubes around you.  · When you need help, use your call button by your bed or in the bathroom. Wait for one of your health care providers to help you.  · If you feel dizzy or unsure of your footing, return to bed and wait for assistance.  · Avoid being distracted by the TV, telephone, or another person in your room.  · Do not lean or support yourself on rolling objects, such as IV poles or bedside tables.     This information is not intended to replace advice given to you by your health care provider. Make sure you discuss any questions you have with your health care provider.     Document Released: 12/15/2001 Document Revised: 01/08/2016 Document Reviewed: 08/25/2013  [employer] Interactive Patient Education ©2016 [employer]       Surgical Site Infections FAQs  What is a Surgical Site Infection (SSI)?  A surgical site infection is an infection that occurs after surgery in  the part of the body where the surgery took place. Most patients who have surgery do not develop an infection. However, infections develop in about 1 to 3 out of every 100 patients who have surgery.  Some of the common symptoms of a surgical site infection are:  · Redness and pain around the area where you had surgery  · Drainage of cloudy fluid from your surgical wound  · Fever  Can SSIs be treated?  Yes. Most surgical site infections can be treated with antibiotics. The antibiotic given to you depends on the bacteria (germs) causing the infection. Sometimes patients with SSIs also need another surgery to treat the infection.  What are some of the things that hospitals are doing to prevent SSIs?  To prevent SSIs, doctors, nurses, and other healthcare providers:  · Clean their hands and arms up to their elbows with an antiseptic agent just before the surgery.  · Clean their hands with soap and water or an alcohol-based hand rub before and after caring for each patient.  · May remove some of your hair immediately before your surgery using electric clippers if the hair is in the same area where the procedure will occur. They should not shave you with a razor.  · Wear special hair covers, masks, gowns, and gloves during surgery to keep the surgery area clean.  · Give you antibiotics before your surgery starts. In most cases, you should get antibiotics within 60 minutes before the surgery starts and the antibiotics should be stopped within 24 hours after surgery.  · Clean the skin at the site of your surgery with a special soap that kills germs.  What can I do to help prevent SSIs?  Before your surgery:  · Tell your doctor about other medical problems you may have. Health problems such as allergies, diabetes, and obesity could affect your surgery and your treatment.  · Quit smoking. Patients who smoke get more infections. Talk to your doctor about how you can quit before your surgery.  · Do not shave near where you will  have surgery. Shaving with a razor can irritate your skin and make it easier to develop an infection.  At the time of your surgery:  · Speak up if someone tries to shave you with a razor before surgery. Ask why you need to be shaved and talk with your surgeon if you have any concerns.  · Ask if you will get antibiotics before surgery.  After your surgery:  · Make sure that your healthcare providers clean their hands before examining you, either with soap and water or an alcohol-based hand rub.  · If you do not see your providers clean their hands, please ask them to do so.  · Family and friends who visit you should not touch the surgical wound or dressings.  · Family and friends should clean their hands with soap and water or an alcohol-based hand rub before and after visiting you. If you do not see them clean their hands, ask them to clean their hands.  What do I need to do when I go home from the hospital?  · Before you go home, your doctor or nurse should explain everything you need to know about taking care of your wound. Make sure you understand how to care for your wound before you leave the hospital.  · Always clean your hands before and after caring for your wound.  · Before you go home, make sure you know who to contact if you have questions or problems after you get home.  · If you have any symptoms of an infection, such as redness and pain at the surgery site, drainage, or fever, call your doctor immediately.  If you have additional questions, please ask your doctor or nurse.  Developed and co-sponsored by The Society for Healthcare Epidemiology of [place] (SHEA); Infectious Diseases Society of [place] (IDSA); American Hospital Association; Association for Professionals in Infection Control and Epidemiology (APIC); Centers for Disease Control and Prevention (CDC); and The Joint Commission.     This information is not intended to replace advice given to you by your health care provider. Make sure you  discuss any questions you have with your health care provider.     Document Released: 12/23/2014 Document Revised: 01/08/2016 Document Reviewed: 03/02/2016  [employer] Interactive Patient Education ©2016 [employer].       [facility]  CHG 4% Patient Instruction Sheet    Preparing the Skin Before Surgery  Preparing or “prepping” skin before surgery can reduce the risk of infection at the surgical site. To make the process easier,[facility] has chosen 4% Chlorhexidine Gluconate (CHG) antiseptic solution.   The steps below outline the prepping process and should be carefully followed.                                                                                                                                                      Prep the skin at the following time(s):                                                      We recommend you shower the night before surgery, and again the morning of surgery with the 4% CHG antiseptic solution using half of the bottle and a cloth each time.  Dress in clean clothes/sleepwear after showering.  See instructions below for application.          Do not apply any lotions or moisturizers.       Do not shave the area to be prepped for at least 2 days prior to surgery.    Clipping the hair may be done immediately prior to your surgery at the hospital    if needed.    Directions:  Thoroughly rinse your body with water.  Apply 4% CHG to a cloth and wash skin gently, paying special attention to the operative site.  Rinse again thoroughly.  Once you have begun using this product do not apply anything else to your skin. If itching or redness persists, rinse affected areas and discontinue use.    When using this product:  • Keep out of eyes, ears, and mouth.  • If solution should contact these areas, rinse out promptly and thoroughly with water.  • For external use only.  • Do not use in genital area, on your face or head.      PATIENT/FAMILY/RESPONSIBLE PARTY VERBALIZES UNDERSTANDING  OF ABOVE EDUCATION.  COPY OF PAIN SCALE GIVEN AND REVIEWED WITH VERBALIZED UNDERSTANDING.

## 2019-06-28 ENCOUNTER — ANESTHESIA EVENT (OUTPATIENT)
Dept: PERIOP | Facility: HOSPITAL | Age: 79
End: 2019-06-28

## 2019-06-28 ENCOUNTER — ANESTHESIA (OUTPATIENT)
Dept: PERIOP | Facility: HOSPITAL | Age: 79
End: 2019-06-28

## 2019-06-28 ENCOUNTER — HOSPITAL ENCOUNTER (OUTPATIENT)
Facility: HOSPITAL | Age: 79
Setting detail: HOSPITAL OUTPATIENT SURGERY
Discharge: HOME | End: 2019-06-28
Attending: SPECIALIST | Admitting: SPECIALIST

## 2019-06-28 VITALS
RESPIRATION RATE: 16 BRPM | SYSTOLIC BLOOD PRESSURE: 138 MMHG | HEART RATE: 58 BPM | TEMPERATURE: 97.5 F | DIASTOLIC BLOOD PRESSURE: 62 MMHG | OXYGEN SATURATION: 98 %

## 2019-06-28 PROCEDURE — 25010000002 ROPIVACAINE PER 1 MG: Performed by: NURSE ANESTHETIST, CERTIFIED REGISTERED

## 2019-06-28 PROCEDURE — 25010000003 CEFAZOLIN PER 500 MG: Performed by: SPECIALIST

## 2019-06-28 PROCEDURE — C1781 MESH (IMPLANTABLE): HCPCS | Performed by: SPECIALIST

## 2019-06-28 PROCEDURE — 25010000002 MIDAZOLAM PER 1 MG: Performed by: ANESTHESIOLOGY

## 2019-06-28 PROCEDURE — 25010000002 KETOROLAC TROMETHAMINE PER 15 MG: Performed by: NURSE ANESTHETIST, CERTIFIED REGISTERED

## 2019-06-28 PROCEDURE — 25010000002 ONDANSETRON PER 1 MG: Performed by: NURSE ANESTHETIST, CERTIFIED REGISTERED

## 2019-06-28 PROCEDURE — 25010000002 DEXAMETHASONE PER 1 MG: Performed by: NURSE ANESTHETIST, CERTIFIED REGISTERED

## 2019-06-28 PROCEDURE — 25010000002 PROPOFOL 10 MG/ML EMULSION: Performed by: NURSE ANESTHETIST, CERTIFIED REGISTERED

## 2019-06-28 PROCEDURE — 25010000002 FENTANYL CITRATE (PF) 100 MCG/2ML SOLUTION: Performed by: NURSE ANESTHETIST, CERTIFIED REGISTERED

## 2019-06-28 DEVICE — BARD MESH
Type: IMPLANTABLE DEVICE | Status: FUNCTIONAL
Brand: BARD MESH

## 2019-06-28 RX ORDER — KETOROLAC TROMETHAMINE 30 MG/ML
INJECTION, SOLUTION INTRAMUSCULAR; INTRAVENOUS AS NEEDED
Status: DISCONTINUED | OUTPATIENT
Start: 2019-06-28 | End: 2019-06-28 | Stop reason: SURG

## 2019-06-28 RX ORDER — LABETALOL HYDROCHLORIDE 5 MG/ML
5 INJECTION, SOLUTION INTRAVENOUS
Status: DISCONTINUED | OUTPATIENT
Start: 2019-06-28 | End: 2019-06-28 | Stop reason: HOSPADM

## 2019-06-28 RX ORDER — SODIUM CHLORIDE, SODIUM LACTATE, POTASSIUM CHLORIDE, CALCIUM CHLORIDE 600; 310; 30; 20 MG/100ML; MG/100ML; MG/100ML; MG/100ML
100 INJECTION, SOLUTION INTRAVENOUS CONTINUOUS
Status: DISCONTINUED | OUTPATIENT
Start: 2019-06-28 | End: 2019-06-28 | Stop reason: HOSPADM

## 2019-06-28 RX ORDER — SODIUM CHLORIDE 0.9 % (FLUSH) 0.9 %
3 SYRINGE (ML) INJECTION AS NEEDED
Status: DISCONTINUED | OUTPATIENT
Start: 2019-06-28 | End: 2019-06-28 | Stop reason: HOSPADM

## 2019-06-28 RX ORDER — FENTANYL CITRATE 50 UG/ML
25 INJECTION, SOLUTION INTRAMUSCULAR; INTRAVENOUS
Status: DISCONTINUED | OUTPATIENT
Start: 2019-06-28 | End: 2019-06-28 | Stop reason: HOSPADM

## 2019-06-28 RX ORDER — PHENYLEPHRINE HCL IN 0.9% NACL 0.8MG/10ML
SYRINGE (ML) INTRAVENOUS AS NEEDED
Status: DISCONTINUED | OUTPATIENT
Start: 2019-06-28 | End: 2019-06-28 | Stop reason: SURG

## 2019-06-28 RX ORDER — NALOXONE HCL 0.4 MG/ML
0.4 VIAL (ML) INJECTION AS NEEDED
Status: DISCONTINUED | OUTPATIENT
Start: 2019-06-28 | End: 2019-06-28 | Stop reason: HOSPADM

## 2019-06-28 RX ORDER — OXYCODONE HYDROCHLORIDE AND ACETAMINOPHEN 5; 325 MG/1; MG/1
1 TABLET ORAL ONCE AS NEEDED
Status: COMPLETED | OUTPATIENT
Start: 2019-06-28 | End: 2019-06-28

## 2019-06-28 RX ORDER — LIDOCAINE HYDROCHLORIDE 20 MG/ML
INJECTION, SOLUTION INFILTRATION; PERINEURAL AS NEEDED
Status: DISCONTINUED | OUTPATIENT
Start: 2019-06-28 | End: 2019-06-28 | Stop reason: SURG

## 2019-06-28 RX ORDER — HYDROCODONE BITARTRATE AND ACETAMINOPHEN 7.5; 325 MG/1; MG/1
1-2 TABLET ORAL EVERY 4 HOURS PRN
Qty: 30 TABLET | Refills: 0 | Status: SHIPPED | OUTPATIENT
Start: 2019-06-28 | End: 2019-07-12 | Stop reason: HOSPADM

## 2019-06-28 RX ORDER — FENTANYL CITRATE 50 UG/ML
25 INJECTION, SOLUTION INTRAMUSCULAR; INTRAVENOUS AS NEEDED
Status: DISCONTINUED | OUTPATIENT
Start: 2019-06-28 | End: 2019-06-28 | Stop reason: HOSPADM

## 2019-06-28 RX ORDER — METOCLOPRAMIDE HYDROCHLORIDE 5 MG/ML
5 INJECTION INTRAMUSCULAR; INTRAVENOUS
Status: DISCONTINUED | OUTPATIENT
Start: 2019-06-28 | End: 2019-06-28 | Stop reason: HOSPADM

## 2019-06-28 RX ORDER — OXYCODONE AND ACETAMINOPHEN 7.5; 325 MG/1; MG/1
1 TABLET ORAL EVERY 4 HOURS PRN
Status: DISCONTINUED | OUTPATIENT
Start: 2019-06-28 | End: 2019-06-28 | Stop reason: HOSPADM

## 2019-06-28 RX ORDER — IPRATROPIUM BROMIDE AND ALBUTEROL SULFATE 2.5; .5 MG/3ML; MG/3ML
3 SOLUTION RESPIRATORY (INHALATION) ONCE AS NEEDED
Status: DISCONTINUED | OUTPATIENT
Start: 2019-06-28 | End: 2019-06-28 | Stop reason: HOSPADM

## 2019-06-28 RX ORDER — ACETAMINOPHEN 500 MG
1000 TABLET ORAL ONCE
Status: COMPLETED | OUTPATIENT
Start: 2019-06-28 | End: 2019-06-28

## 2019-06-28 RX ORDER — SODIUM CHLORIDE 0.9 % (FLUSH) 0.9 %
3 SYRINGE (ML) INJECTION EVERY 12 HOURS SCHEDULED
Status: DISCONTINUED | OUTPATIENT
Start: 2019-06-28 | End: 2019-06-28 | Stop reason: HOSPADM

## 2019-06-28 RX ORDER — VASOPRESSIN 20 U/ML
INJECTION PARENTERAL AS NEEDED
Status: DISCONTINUED | OUTPATIENT
Start: 2019-06-28 | End: 2019-06-28 | Stop reason: SURG

## 2019-06-28 RX ORDER — SODIUM CHLORIDE, SODIUM LACTATE, POTASSIUM CHLORIDE, CALCIUM CHLORIDE 600; 310; 30; 20 MG/100ML; MG/100ML; MG/100ML; MG/100ML
1000 INJECTION, SOLUTION INTRAVENOUS CONTINUOUS
Status: DISCONTINUED | OUTPATIENT
Start: 2019-06-28 | End: 2019-06-28 | Stop reason: HOSPADM

## 2019-06-28 RX ORDER — MIDAZOLAM HYDROCHLORIDE 1 MG/ML
1 INJECTION INTRAMUSCULAR; INTRAVENOUS
Status: DISCONTINUED | OUTPATIENT
Start: 2019-06-28 | End: 2019-06-28 | Stop reason: HOSPADM

## 2019-06-28 RX ORDER — FENTANYL CITRATE 50 UG/ML
INJECTION, SOLUTION INTRAMUSCULAR; INTRAVENOUS AS NEEDED
Status: DISCONTINUED | OUTPATIENT
Start: 2019-06-28 | End: 2019-06-28 | Stop reason: SURG

## 2019-06-28 RX ORDER — MIDAZOLAM HYDROCHLORIDE 1 MG/ML
2 INJECTION INTRAMUSCULAR; INTRAVENOUS
Status: DISCONTINUED | OUTPATIENT
Start: 2019-06-28 | End: 2019-06-28 | Stop reason: HOSPADM

## 2019-06-28 RX ORDER — SODIUM CHLORIDE 0.9 % (FLUSH) 0.9 %
1-10 SYRINGE (ML) INJECTION AS NEEDED
Status: DISCONTINUED | OUTPATIENT
Start: 2019-06-28 | End: 2019-06-28 | Stop reason: HOSPADM

## 2019-06-28 RX ORDER — ROPIVACAINE HYDROCHLORIDE 5 MG/ML
INJECTION, SOLUTION EPIDURAL; INFILTRATION; PERINEURAL
Status: COMPLETED | OUTPATIENT
Start: 2019-06-28 | End: 2019-06-28

## 2019-06-28 RX ORDER — ONDANSETRON 2 MG/ML
4 INJECTION INTRAMUSCULAR; INTRAVENOUS ONCE AS NEEDED
Status: DISCONTINUED | OUTPATIENT
Start: 2019-06-28 | End: 2019-06-28 | Stop reason: HOSPADM

## 2019-06-28 RX ORDER — DEXAMETHASONE SODIUM PHOSPHATE 4 MG/ML
INJECTION, SOLUTION INTRA-ARTICULAR; INTRALESIONAL; INTRAMUSCULAR; INTRAVENOUS; SOFT TISSUE AS NEEDED
Status: DISCONTINUED | OUTPATIENT
Start: 2019-06-28 | End: 2019-06-28 | Stop reason: SURG

## 2019-06-28 RX ORDER — ONDANSETRON 2 MG/ML
INJECTION INTRAMUSCULAR; INTRAVENOUS AS NEEDED
Status: DISCONTINUED | OUTPATIENT
Start: 2019-06-28 | End: 2019-06-28 | Stop reason: SURG

## 2019-06-28 RX ADMIN — ROPIVACAINE HYDROCHLORIDE 20 ML: 5 INJECTION, SOLUTION EPIDURAL; INFILTRATION; PERINEURAL at 11:38

## 2019-06-28 RX ADMIN — ONDANSETRON HYDROCHLORIDE 4 MG: 2 SOLUTION INTRAMUSCULAR; INTRAVENOUS at 13:05

## 2019-06-28 RX ADMIN — FENTANYL CITRATE 50 MCG: 50 INJECTION, SOLUTION INTRAMUSCULAR; INTRAVENOUS at 12:11

## 2019-06-28 RX ADMIN — FENTANYL CITRATE 25 MCG: 50 INJECTION, SOLUTION INTRAMUSCULAR; INTRAVENOUS at 12:41

## 2019-06-28 RX ADMIN — KETOROLAC TROMETHAMINE 15 MG: 30 INJECTION, SOLUTION INTRAMUSCULAR at 13:05

## 2019-06-28 RX ADMIN — CEFAZOLIN 1 G: 1 INJECTION, POWDER, FOR SOLUTION INTRAMUSCULAR; INTRAVENOUS; PARENTERAL at 12:15

## 2019-06-28 RX ADMIN — PROPOFOL 150 MG: 10 INJECTION, EMULSION INTRAVENOUS at 12:13

## 2019-06-28 RX ADMIN — FENTANYL CITRATE 25 MCG: 50 INJECTION, SOLUTION INTRAMUSCULAR; INTRAVENOUS at 12:40

## 2019-06-28 RX ADMIN — ACETAMINOPHEN 1000 MG: 500 TABLET, FILM COATED ORAL at 11:30

## 2019-06-28 RX ADMIN — SODIUM CHLORIDE, POTASSIUM CHLORIDE, SODIUM LACTATE AND CALCIUM CHLORIDE 1000 ML: 600; 310; 30; 20 INJECTION, SOLUTION INTRAVENOUS at 09:33

## 2019-06-28 RX ADMIN — OXYCODONE HYDROCHLORIDE AND ACETAMINOPHEN 1 TABLET: 5; 325 TABLET ORAL at 13:35

## 2019-06-28 RX ADMIN — VASOPRESSIN 1 UNITS: 20 INJECTION INTRAVENOUS at 12:28

## 2019-06-28 RX ADMIN — MIDAZOLAM HYDROCHLORIDE 1 MG: 1 INJECTION, SOLUTION INTRAMUSCULAR; INTRAVENOUS at 11:30

## 2019-06-28 RX ADMIN — LIDOCAINE HYDROCHLORIDE 100 MG: 20 INJECTION, SOLUTION INFILTRATION; PERINEURAL at 12:13

## 2019-06-28 RX ADMIN — DEXAMETHASONE SODIUM PHOSPHATE 8 MG: 4 INJECTION, SOLUTION INTRAMUSCULAR; INTRAVENOUS at 12:38

## 2019-06-28 RX ADMIN — Medication 80 MCG: at 12:20

## 2019-06-28 NOTE — ANESTHESIA PREPROCEDURE EVALUATION
Anesthesia Evaluation     Patient summary reviewed and Nursing notes reviewed   no history of anesthetic complications:  NPO Solid Status: > 8 hours  NPO Liquid Status: > 2 hours           Airway   Mallampati: I  TM distance: >3 FB  Neck ROM: full  No difficulty expected  Dental    (+) upper dentures        Pulmonary    (+) a smoker Former,   Cardiovascular   Exercise tolerance: good (4-7 METS)    ECG reviewed    PVD: varicose veins.      Neuro/Psych  (+) TIA, tremors,     GI/Hepatic/Renal/Endo - negative ROS     Musculoskeletal     Abdominal    Substance History      OB/GYN          Other      history of cancer (prostate cancer) active                  Anesthesia Plan    ASA 2     general with block     intravenous induction   Anesthetic plan, all risks, benefits, and alternatives have been provided, discussed and informed consent has been obtained with: patient.

## 2019-06-28 NOTE — ANESTHESIA POSTPROCEDURE EVALUATION
Patient: Edy Urbina    Procedure Summary     Date:  06/28/19 Room / Location:  [facility] OR  /  [facility] OR    Anesthesia Start:  1209 Anesthesia Stop:  1313    Procedure:  OPEN RIGHT INGUINAL HERNIA REPAIR WITH MESH (Right Abdomen) Diagnosis:  (RIGHT INGUINAL HERNIA)    Surgeon:  Alesia Mora MD Provider:  Gin Krishnan CRNA    Anesthesia Type:  general with block ASA Status:  2          Anesthesia Type: general with block  Last vitals  BP   138/62 (06/28/19 1430)   Temp   97.5 °F (36.4 °C) (06/28/19 1345)   Pulse   58 (06/28/19 1430)   Resp   16 (06/28/19 1430)     SpO2   98 % (06/28/19 1430)     Post Anesthesia Care and Evaluation    Patient location during evaluation: PACU  Patient participation: complete - patient participated  Level of consciousness: awake and alert  Pain management: adequate  Airway patency: patent  Anesthetic complications: No anesthetic complications  PONV Status: none  Cardiovascular status: acceptable and hemodynamically stable  Respiratory status: acceptable  Hydration status: acceptable    Comments: Blood pressure 138/62, pulse 58, temperature 97.5 °F (36.4 °C), resp. rate 16, SpO2 98 %.    Patient discharged from PACU based upon Kt score. Please see RN notes for further details

## 2019-06-28 NOTE — ANESTHESIA PROCEDURE NOTES
Peripheral Block    Pre-sedation assessment completed: 6/28/2019 11:34 AM    Patient reassessed immediately prior to procedure    Patient location during procedure: holding area  Start time: 6/28/2019 11:35 AM  Stop time: 6/28/2019 11:38 AM  Reason for block: procedure for pain, at surgeon's request, post-op pain management and Requested by Dr. Mercado  Performed by  Anesthesiologist: Kim Hernandez MD  Preanesthetic Checklist  Completed: patient identified, site marked, surgical consent, pre-op evaluation, timeout performed, IV checked, risks and benefits discussed and monitors and equipment checked  Prep:  Pt Position: supine  Sterile barriers:gloves  Prep: ChloraPrep  Patient monitoring: blood pressure monitoring, continuous pulse oximetry and EKG  Procedure  Sedation:yes    Guidance:ultrasound guided and Fascial plane identified and local anesthetic seen dissecting between IOM and transvers abdominus  ULTRASOUND INTERPRETATION. Using ultrasound guidance a 20 G gauge needle was placed in close proximity to the nerve, at which point, under ultrasound guidance anesthetic was injected in the area of the nerve and spread of the anesthesia was seen on ultrasound in close proximity thereto.  There were no abnormalities seen on ultrasound; a digital image was taken; and the patient tolerated the procedure with no complications. Images:still images obtained, printed/placed on chart (picture printed and placed in patients chart)    Laterality:left  Block Type:ilioinguinal  Injection Technique:single-shot  Needle Type:echogenic  Needle Gauge:20 G  Resistance on Injection: none    Medications Used: ropivacaine (NAROPIN) injection 0.5 %, 20 mL  Med admintered at 6/28/2019 11:38 AM      Post Assessment  Injection Assessment: negative aspiration for heme, no paresthesia on injection and incremental injection  Patient Tolerance:comfortable throughout block  Complications:no

## 2019-06-28 NOTE — ANESTHESIA PROCEDURE NOTES
Airway  Urgency: elective    Airway not difficult    General Information and Staff    Patient location during procedure: OR  CRNA: Gin Krishnan CRNA    Indications and Patient Condition  Indications for airway management: airway protection    Preoxygenated: yes  Mask difficulty assessment: 1 - vent by mask    Final Airway Details  Final airway type: supraglottic airway      Successful airway: I-gel  Size 4    Number of attempts at approach: 1    Additional Comments  Easy, atraumatic LMA placement without disturbing dentures

## 2019-07-02 RX ORDER — PROPOFOL 10 MG/ML
VIAL (ML) INTRAVENOUS AS NEEDED
Status: DISCONTINUED | OUTPATIENT
Start: 2019-06-28 | End: 2019-07-02 | Stop reason: SURG

## 2019-07-08 ENCOUNTER — APPOINTMENT (OUTPATIENT)
Dept: CT IMAGING | Facility: HOSPITAL | Age: 79
End: 2019-07-08

## 2019-07-08 ENCOUNTER — APPOINTMENT (OUTPATIENT)
Dept: GENERAL RADIOLOGY | Facility: HOSPITAL | Age: 79
End: 2019-07-08

## 2019-07-08 ENCOUNTER — HOSPITAL ENCOUNTER (INPATIENT)
Facility: HOSPITAL | Age: 79
LOS: 4 days | Discharge: PSYCHIATRIC OR SUBSTANCE USE FACILITY | End: 2019-07-12
Attending: FAMILY MEDICINE | Admitting: INTERNAL MEDICINE

## 2019-07-08 DIAGNOSIS — Z78.9 DECREASED ACTIVITIES OF DAILY LIVING (ADL): ICD-10-CM

## 2019-07-08 DIAGNOSIS — R41.0 CONFUSION: Primary | ICD-10-CM

## 2019-07-08 DIAGNOSIS — R41.841 COGNITIVE COMMUNICATION DEFICIT: ICD-10-CM

## 2019-07-08 PROBLEM — R63.4 WEIGHT LOSS: Status: ACTIVE | Noted: 2019-07-08

## 2019-07-08 PROBLEM — K40.90 INGUINAL HERNIA: Status: ACTIVE | Noted: 2019-07-08

## 2019-07-08 PROBLEM — R63.6 UNDERWEIGHT: Status: ACTIVE | Noted: 2019-07-08

## 2019-07-08 LAB
ALBUMIN SERPL-MCNC: 4 G/DL (ref 3.5–5)
ALBUMIN/GLOB SERPL: 1.4 G/DL (ref 1.1–2.5)
ALP SERPL-CCNC: 56 U/L (ref 24–120)
ALT SERPL W P-5'-P-CCNC: 40 U/L (ref 0–54)
AMPHET+METHAMPHET UR QL: NEGATIVE
ANION GAP SERPL CALCULATED.3IONS-SCNC: 10 MMOL/L (ref 4–13)
AST SERPL-CCNC: 63 U/L (ref 7–45)
BACTERIA UR QL AUTO: ABNORMAL /HPF
BARBITURATES UR QL SCN: NEGATIVE
BASOPHILS # BLD AUTO: 0.05 10*3/MM3 (ref 0–0.2)
BASOPHILS NFR BLD AUTO: 0.8 % (ref 0–2)
BENZODIAZ UR QL SCN: POSITIVE
BILIRUB SERPL-MCNC: 0.7 MG/DL (ref 0.1–1)
BILIRUB UR QL STRIP: ABNORMAL
BUN BLD-MCNC: 25 MG/DL (ref 5–21)
BUN/CREAT SERPL: 30.5 (ref 7–25)
CALCIUM SPEC-SCNC: 9.8 MG/DL (ref 8.4–10.4)
CANNABINOIDS SERPL QL: NEGATIVE
CHLORIDE SERPL-SCNC: 108 MMOL/L (ref 98–110)
CLARITY UR: CLEAR
CO2 SERPL-SCNC: 26 MMOL/L (ref 24–31)
COCAINE UR QL: NEGATIVE
COLOR UR: YELLOW
CREAT BLD-MCNC: 0.82 MG/DL (ref 0.5–1.4)
DEPRECATED RDW RBC AUTO: 41.4 FL (ref 40–54)
EOSINOPHIL # BLD AUTO: 0.05 10*3/MM3 (ref 0–0.7)
EOSINOPHIL NFR BLD AUTO: 0.8 % (ref 0–4)
ERYTHROCYTE [DISTWIDTH] IN BLOOD BY AUTOMATED COUNT: 13.3 % (ref 12–15)
GFR SERPL CREATININE-BSD FRML MDRD: 91 ML/MIN/1.73
GLOBULIN UR ELPH-MCNC: 2.9 GM/DL
GLUCOSE BLD-MCNC: 132 MG/DL (ref 70–100)
GLUCOSE UR STRIP-MCNC: NEGATIVE MG/DL
HCT VFR BLD AUTO: 37.7 % (ref 40–52)
HGB BLD-MCNC: 12.4 G/DL (ref 14–18)
HGB UR QL STRIP.AUTO: NEGATIVE
HOLD SPECIMEN: NORMAL
HOLD SPECIMEN: NORMAL
HYALINE CASTS UR QL AUTO: ABNORMAL /LPF
IMM GRANULOCYTES # BLD AUTO: 0.01 10*3/MM3 (ref 0–0.05)
IMM GRANULOCYTES NFR BLD AUTO: 0.2 % (ref 0–5)
KETONES UR QL STRIP: ABNORMAL
LEUKOCYTE ESTERASE UR QL STRIP.AUTO: NEGATIVE
LYMPHOCYTES # BLD AUTO: 1.04 10*3/MM3 (ref 0.72–4.86)
LYMPHOCYTES NFR BLD AUTO: 16 % (ref 15–45)
MCH RBC QN AUTO: 27.7 PG (ref 28–32)
MCHC RBC AUTO-ENTMCNC: 32.9 G/DL (ref 33–36)
MCV RBC AUTO: 84.3 FL (ref 82–95)
METHADONE UR QL SCN: NEGATIVE
MONOCYTES # BLD AUTO: 0.46 10*3/MM3 (ref 0.19–1.3)
MONOCYTES NFR BLD AUTO: 7.1 % (ref 4–12)
MUCOUS THREADS URNS QL MICRO: ABNORMAL /HPF
NEUTROPHILS # BLD AUTO: 4.91 10*3/MM3 (ref 1.87–8.4)
NEUTROPHILS NFR BLD AUTO: 75.1 % (ref 39–78)
NITRITE UR QL STRIP: NEGATIVE
NRBC BLD AUTO-RTO: 0 /100 WBC (ref 0–0.2)
OPIATES UR QL: POSITIVE
PCP UR QL SCN: NEGATIVE
PH UR STRIP.AUTO: 5.5 [PH] (ref 5–8)
PLATELET # BLD AUTO: 243 10*3/MM3 (ref 130–400)
PMV BLD AUTO: 9.4 FL (ref 6–12)
POTASSIUM BLD-SCNC: 3.8 MMOL/L (ref 3.5–5.3)
PROT SERPL-MCNC: 6.9 G/DL (ref 6.3–8.7)
PROT UR QL STRIP: ABNORMAL
RBC # BLD AUTO: 4.47 10*6/MM3 (ref 4.8–5.9)
RBC # UR: ABNORMAL /HPF
REF LAB TEST METHOD: ABNORMAL
SODIUM BLD-SCNC: 144 MMOL/L (ref 135–145)
SP GR UR STRIP: >=1.03 (ref 1–1.03)
SQUAMOUS #/AREA URNS HPF: ABNORMAL /HPF
TSH SERPL DL<=0.05 MIU/L-ACNC: 1.29 MIU/ML (ref 0.47–4.68)
UROBILINOGEN UR QL STRIP: ABNORMAL
WBC NRBC COR # BLD: 6.52 10*3/MM3 (ref 4.8–10.8)
WBC UR QL AUTO: ABNORMAL /HPF
WHOLE BLOOD HOLD SPECIMEN: NORMAL
WHOLE BLOOD HOLD SPECIMEN: NORMAL

## 2019-07-08 PROCEDURE — 80307 DRUG TEST PRSMV CHEM ANLYZR: CPT | Performed by: FAMILY MEDICINE

## 2019-07-08 PROCEDURE — P9612 CATHETERIZE FOR URINE SPEC: HCPCS

## 2019-07-08 PROCEDURE — 93010 ELECTROCARDIOGRAM REPORT: CPT | Performed by: INTERNAL MEDICINE

## 2019-07-08 PROCEDURE — 25010000002 THIAMINE PER 100 MG: Performed by: INTERNAL MEDICINE

## 2019-07-08 PROCEDURE — 71045 X-RAY EXAM CHEST 1 VIEW: CPT

## 2019-07-08 PROCEDURE — 85025 COMPLETE CBC W/AUTO DIFF WBC: CPT | Performed by: FAMILY MEDICINE

## 2019-07-08 PROCEDURE — 70450 CT HEAD/BRAIN W/O DYE: CPT

## 2019-07-08 PROCEDURE — 86666 EHRLICHIA ANTIBODY: CPT | Performed by: INTERNAL MEDICINE

## 2019-07-08 PROCEDURE — 81001 URINALYSIS AUTO W/SCOPE: CPT | Performed by: FAMILY MEDICINE

## 2019-07-08 PROCEDURE — 99284 EMERGENCY DEPT VISIT MOD MDM: CPT

## 2019-07-08 PROCEDURE — 84443 ASSAY THYROID STIM HORMONE: CPT | Performed by: FAMILY MEDICINE

## 2019-07-08 PROCEDURE — 93005 ELECTROCARDIOGRAM TRACING: CPT | Performed by: FAMILY MEDICINE

## 2019-07-08 PROCEDURE — 80053 COMPREHEN METABOLIC PANEL: CPT | Performed by: FAMILY MEDICINE

## 2019-07-08 RX ORDER — SODIUM CHLORIDE 0.9 % (FLUSH) 0.9 %
3 SYRINGE (ML) INJECTION EVERY 12 HOURS SCHEDULED
Status: DISCONTINUED | OUTPATIENT
Start: 2019-07-08 | End: 2019-07-12 | Stop reason: HOSPADM

## 2019-07-08 RX ORDER — MULTIVIT,CALC,MINS/IRON/FOLIC 9MG-400MCG
1 TABLET ORAL DAILY
Status: DISCONTINUED | OUTPATIENT
Start: 2019-07-09 | End: 2019-07-09

## 2019-07-08 RX ORDER — SODIUM CHLORIDE 0.9 % (FLUSH) 0.9 %
3-10 SYRINGE (ML) INJECTION AS NEEDED
Status: DISCONTINUED | OUTPATIENT
Start: 2019-07-08 | End: 2019-07-12 | Stop reason: HOSPADM

## 2019-07-08 RX ORDER — SODIUM CHLORIDE, SODIUM LACTATE, POTASSIUM CHLORIDE, CALCIUM CHLORIDE 600; 310; 30; 20 MG/100ML; MG/100ML; MG/100ML; MG/100ML
100 INJECTION, SOLUTION INTRAVENOUS CONTINUOUS
Status: DISCONTINUED | OUTPATIENT
Start: 2019-07-08 | End: 2019-07-11

## 2019-07-08 RX ORDER — ONDANSETRON 2 MG/ML
4 INJECTION INTRAMUSCULAR; INTRAVENOUS EVERY 6 HOURS PRN
Status: DISCONTINUED | OUTPATIENT
Start: 2019-07-08 | End: 2019-07-12 | Stop reason: HOSPADM

## 2019-07-08 RX ORDER — ACETAMINOPHEN 325 MG/1
650 TABLET ORAL EVERY 4 HOURS PRN
Status: DISCONTINUED | OUTPATIENT
Start: 2019-07-08 | End: 2019-07-12 | Stop reason: HOSPADM

## 2019-07-08 RX ADMIN — FOLIC ACID 125 ML/HR: 5 INJECTION, SOLUTION INTRAMUSCULAR; INTRAVENOUS; SUBCUTANEOUS at 20:21

## 2019-07-08 RX ADMIN — SODIUM CHLORIDE, PRESERVATIVE FREE 3 ML: 5 INJECTION INTRAVENOUS at 20:20

## 2019-07-08 NOTE — ED PROVIDER NOTES
Subjective   She is a 78-year-old gentleman who is been well until recently when he had a hernia repair done little over week ago.  He has been on pain medicine since then and during the procedure he did have a No catheter.  Today he went to the drugstore, he drove himself that.  When he was there he was found to be acutely confused however and ultimately the police were called.  They called his daughter who brought him to the ED.  They say he has been a little bit confused over the last few days but has been able to take care of himself at home.  He denies fever or other symptoms.            Review of Systems   Psychiatric/Behavioral: Positive for confusion.   All other systems reviewed and are negative.      Past Medical History:   Diagnosis Date   • Cataracts, bilateral    • Occasional tremors    • Prostate cancer (CMS/[facility]) 2011    t2c,Sommer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    • Varicose veins of legs        No Known Allergies    Past Surgical History:   Procedure Laterality Date   • EYE SURGERY     • HERNIA REPAIR Left    • INGUINAL HERNIA REPAIR Right 2019    Procedure: OPEN RIGHT INGUINAL HERNIA REPAIR WITH MESH;  Surgeon: Alesia Mora MD;  Location: [facility] OR;  Service: General   • PROSTATECTOMY  2011    RALP       Family History   Problem Relation Age of Onset   • Stroke Mother        Social History     Socioeconomic History   • Marital status:      Spouse name: Not on file   • Number of children: Not on file   • Years of education: Not on file   • Highest education level: Not on file   Tobacco Use   • Smoking status: Former Smoker     Years: 50.00     Types: Cigarettes     Last attempt to quit: 2002     Years since quittin.0   • Smokeless tobacco: Never Used   Substance and Sexual Activity   • Alcohol use: No     Comment: QUIT DRINKING IN    • Drug use: No   • Sexual activity: Defer           Objective   Physical Exam   Constitutional: He appears  well-developed.   HENT:   Head: Normocephalic and atraumatic.   Mouth/Throat: Oropharynx is clear and moist.   Eyes: EOM are normal.   Neck: Normal range of motion. Neck supple.   Cardiovascular: Normal rate, regular rhythm, normal heart sounds and intact distal pulses.   Pulmonary/Chest: Effort normal and breath sounds normal.   Abdominal: Soft.       There is a hernia repair wound in his right lower quadrant which looks uninfected   Neurological: He is alert. He has normal strength. No cranial nerve deficit. GCS eye subscore is 4. GCS verbal subscore is 5. GCS motor subscore is 6.   There is no focal neurologic deficit however the patient does demonstrate some confusion.  He was eventually oriented to time but took a couple of minutes to figure out what year it was.  Once he did though he knew the exact date.  When asked about what happened to the drugstore today he started perseverating about the [facility]  elections.   Nursing note and vitals reviewed.      Procedures           ED Course                  MDM  Number of Diagnoses or Management Options     Amount and/or Complexity of Data Reviewed  Clinical lab tests: reviewed  Tests in the radiology section of CPT®: reviewed  Tests in the medicine section of CPT®: reviewed          Final diagnoses:   Confusion     Discussed the case with Dr. Landa who agreed to the admission.  We both think that perhaps the medications the patient was prescribed could be causing the confusion, the urine drug screen does show benzodiazepines and opiates on board.  In an elderly man this could well be the source.  The other alternative is that his urine although a dirty sample does show some right red blood cells and white blood cells Dr. Adams advised just observing this in the hospital rather than starting antibiotics immediately.  Patient is currently stable in the ED       Kenneth Alcocer MD  07/08/19 8954

## 2019-07-08 NOTE — H&P
[facility] Medicine Services  HISTORY AND PHYSICAL    Date of Admission: 7/8/2019  Primary Care Physician: Torey Amato DO    Subjective     Chief Complaint: Confusion    History of Present Illness  Patient is a 78-year-old  male with past medical history significant for prostate cancer and recent inguinal hernia repair the presented to the hospital today secondary to confusion.  Family at bedside provided additional history.  They report the patient went to [employer] earlier today to  some medication, was acting confused while inside the store, this was noticed by an employee, and they subsequently called police when he left [employer] and got in his car and was starting to drive away.  They were concerned about his safety.  Family at bedside reports that he is having intermittent confusion over the past couple of weeks, but is also been worse since his recent surgery.  He recently had open inguinal hernia repair performed by Dr. Mora of general surgery.  He reports that the only new medication he is taking are the pain pills prescribed from his recent surgery, in addition to an OTC sleep agent which he has been taking at night.  His urine drug screen did return positive for benzodiazepines, and patient is adamant that he does not take any of those medications.  He denies any chest pain, shortness of breath, palpitations, nausea, vomiting, diarrhea, new pain, dysuria, urinary frequency.  He does report having darker urine as compared to his norm.  His appetite is been poor recently.  He does admit to weight loss, but is unsure on the amount of weight loss he has experienced.  He has been living independently, denies any focal motor or sensory deficits.  No presyncope or syncopal symptoms.  No recent history of falls.  During questioning, patient indicates that he feels like he is doing fine, however family reports that they have noticed worsening  confusion intermittently over the course of the past week, worse since surgery.    Review of Systems     Otherwise complete ROS reviewed and negative except as mentioned in the HPI.    Past Medical History:   Past Medical History:   Diagnosis Date   • Cataracts, bilateral    • Occasional tremors    • Prostate cancer (CMS/HCC) 05/2011    t2c,Sommer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    • Varicose veins of legs      Past Surgical History:  Past Surgical History:   Procedure Laterality Date   • EYE SURGERY     • HERNIA REPAIR Left    • INGUINAL HERNIA REPAIR Right 6/28/2019    Procedure: OPEN RIGHT INGUINAL HERNIA REPAIR WITH MESH;  Surgeon: Alesia Mora MD;  Location: [facility] OR;  Service: General   • PROSTATECTOMY  05/2011    RALP     Social History:  reports that he quit smoking about 17 years ago. His smoking use included cigarettes. He quit after 50.00 years of use. He has never used smokeless tobacco. He reports that he does not drink alcohol or use drugs.    Family History: family history includes Stroke in his mother.   Heart disease and lung cancer also run in the family    Allergies:  No Known Allergies  Medications:  Prior to Admission medications    Medication Sig Start Date End Date Taking? Authorizing Provider   Cinnamon 500 MG tablet Take 500 mg by mouth Daily.    Xi Paul MD   Doxylamine Succinate, Sleep, (EQ SLEEP AID PO) Take 1 tablet by mouth As Needed.    Xi Paul MD   HYDROcodone-acetaminophen (NORCO) 7.5-325 MG per tablet Take 1-2 tablets by mouth Every 4 (Four) Hours As Needed for Moderate Pain  (Pain). 6/28/19   Alesia Mora MD   Multiple Vitamins-Minerals (MULTIVITAMIN PO) Take 1 tablet by mouth Daily.    Xi Paul MD   Omega-3 Fatty Acids (FISH OIL) 1000 MG capsule capsule Take 1,000 mg by mouth Daily With Breakfast.    Xi Paul MD     Objective     Vital Signs: /74   Pulse 84   Temp 97.6 °F (36.4 °C)   Resp  "16   Ht 182.9 cm (72\")   Wt 61.2 kg (135 lb)   SpO2 96%   BMI 18.31 kg/m²   Physical Exam   Constitutional: No distress.   Thin and cachectic appearing   HENT:   Head: Normocephalic.   Mouth/Throat: No oropharyngeal exudate (MM dry).   Eyes: Pupils are equal, round, and reactive to light. No scleral icterus.   Neck: No tracheal deviation present.   Cardiovascular: Normal rate and regular rhythm.   Pulmonary/Chest: Effort normal. No respiratory distress.   Requiring no supplemental 02   Abdominal: Soft. He exhibits no distension. There is no tenderness. There is no rebound and no guarding.   Thin; right inguinal region with incision from recent surgery that is C/D/I; no fluctuance, induration; no purulent discharge   Musculoskeletal: He exhibits no edema.   Neurological: He is alert.   Moves all extremities spontaneously; speech clear; no word finding difficulty; face symmetrical.  I did not get patient OOB to ambulate him.   Skin: Skin is warm. He is not diaphoretic.   Psychiatric: He has a normal mood and affect. His behavior is normal.   Vitals reviewed.    Results Reviewed:  Lab Results (last 24 hours)     Procedure Component Value Units Date/Time    Urine Drug Screen - Urine, Catheter In/Out [[id number]]  (Abnormal) Collected:  07/08/19 1527    Specimen:  Urine, Catheter In/Out Updated:  07/08/19 1557     Amphetamine Screen, Urine Negative     Barbiturates Screen, Urine Negative     Benzodiazepine Screen, Urine Positive     Cocaine Screen, Urine Negative     Methadone Screen, Urine Negative     Opiate Screen Positive     Phencyclidine (PCP), Urine Negative     THC, Screen, Urine Negative    Narrative:       Negative Thresholds For Drugs Screened in Urine:    Amphetamines          500 ng/ml  Barbiturates          200 ng/ml  Benzodiazepines       200 ng/ml  Cocaine               150 ng/ml  Methadone             150 ng/ml  Opiates               300 ng/ml  Phencyclidine         25 ng/ml  THC                      " 50 ng/ml    The normal value for all drugs tested is negative. This report includes final unconfirmed screening results.  A positive result by this assay can be, at your request, sent to the Reference Lab for confirmation by gas chromatography. Unconfirmed results must not be used for non-medical purposes, such as employment or legal testing. Clinical consideration should be applied to any drug of abuse test result, particularly when unconfirmed results are used.    Urinalysis, Microscopic Only - Urine, Catheter In/Out [[id number]]  (Abnormal) Collected:  07/08/19 1454    Specimen:  Urine, Catheter In/Out Updated:  07/08/19 1532     RBC, UA 3-5 /HPF      WBC, UA 3-5 /HPF      Bacteria, UA 1+ /HPF      Squamous Epithelial Cells, UA 3-6 /HPF      Hyaline Casts, UA None Seen /LPF      Mucus, UA Large/3+ /HPF      Methodology Manual Light Microscopy    Urinalysis With Culture If Indicated - Urine, Catheter In/Out [[id number]]  (Abnormal) Collected:  07/08/19 1454    Specimen:  Urine, Catheter In/Out Updated:  07/08/19 1513     Color, UA Yellow     Appearance, UA Clear     pH, UA 5.5     Specific Gravity, UA >=1.030     Glucose, UA Negative     Ketones, UA Trace     Bilirubin, UA Small (1+)     Blood, UA Negative     Protein, UA 30 mg/dL (1+)     Leuk Esterase, UA Negative     Nitrite, UA Negative     Urobilinogen, UA 1.0 E.U./dL    [place] Draw [[id number]] Collected:  07/08/19 1326    Specimen:  Blood Updated:  07/08/19 1431    Narrative:       The following orders were created for panel order [place] Draw.  Procedure                               Abnormality         Status                     ---------                               -----------         ------                     Light Blue Top[[id number]]                                   Final result               Green Top (Gel)[[id number]]                                  Final result               Lavender Top[[id number]]                                     Final result                Red Top[[id number]]                                          Final result                 Please view results for these tests on the individual orders.    Light Blue Top [[id number]] Collected:  07/08/19 1326    Specimen:  Blood Updated:  07/08/19 1431     Extra Tube hold for add-on     Comment: Auto resulted       Lavender Top [[id number]] Collected:  07/08/19 1326    Specimen:  Blood Updated:  07/08/19 1431     Extra Tube hold for add-on     Comment: Auto resulted       Green Top (Gel) [[id number]] Collected:  07/08/19 1326    Specimen:  Blood Updated:  07/08/19 1431     Extra Tube Hold for add-ons.     Comment: Auto resulted.       Red Top [[id number]] Collected:  07/08/19 1326    Specimen:  Blood Updated:  07/08/19 1431     Extra Tube Hold for add-ons.     Comment: Auto resulted.       TSH [[id number]]  (Normal) Collected:  07/08/19 1326    Specimen:  Blood Updated:  07/08/19 1424     TSH 1.290 mIU/mL     Comprehensive Metabolic Panel [[id number]]  (Abnormal) Collected:  07/08/19 1326    Specimen:  Blood Updated:  07/08/19 1410     Glucose 132 mg/dL      BUN 25 mg/dL      Creatinine 0.82 mg/dL      Sodium 144 mmol/L      Potassium 3.8 mmol/L      Chloride 108 mmol/L      CO2 26.0 mmol/L      Calcium 9.8 mg/dL      Total Protein 6.9 g/dL      Albumin 4.00 g/dL      ALT (SGPT) 40 U/L      AST (SGOT) 63 U/L      Alkaline Phosphatase 56 U/L      Total Bilirubin 0.7 mg/dL      eGFR Non African Amer 91 mL/min/1.73      Globulin 2.9 gm/dL      A/G Ratio 1.4 g/dL      BUN/Creatinine Ratio 30.5     Anion Gap 10.0 mmol/L     Narrative:       GFR Normal >60  Chronic Kidney Disease <60  Kidney Failure <15    CBC Auto Differential [[id number]]  (Abnormal) Collected:  07/08/19 1326    Specimen:  Blood Updated:  07/08/19 1359     WBC 6.52 10*3/mm3      RBC 4.47 10*6/mm3      Hemoglobin 12.4 g/dL      Hematocrit 37.7 %      MCV 84.3 fL      MCH 27.7 pg      MCHC 32.9 g/dL      RDW 13.3 %      RDW-SD 41.4 fl      MPV 9.4 fL       Platelets 243 10*3/mm3      Neutrophil % 75.1 %      Lymphocyte % 16.0 %      Monocyte % 7.1 %      Eosinophil % 0.8 %      Basophil % 0.8 %      Immature Grans % 0.2 %      Neutrophils, Absolute 4.91 10*3/mm3      Lymphocytes, Absolute 1.04 10*3/mm3      Monocytes, Absolute 0.46 10*3/mm3      Eosinophils, Absolute 0.05 10*3/mm3      Basophils, Absolute 0.05 10*3/mm3      Immature Grans, Absolute 0.01 10*3/mm3      nRBC 0.0 /100 WBC         Imaging Results (last 24 hours)     Procedure Component Value Units Date/Time    XR Chest 1 View [[id number]] Collected:  07/08/19 1433     Updated:  07/08/19 1437    Narrative:       EXAMINATION: XR CHEST 1 VW- 7/8/2019 2:33 PM CDT     HISTORY: Altered mental status, weight loss     COMPARISON: None     FINDINGS:  Heart appears normal in size. Atherosclerotic calcifications are seen in  the aorta, which appears tortuous. The lungs are hyperinflated. There is  no focal consolidation or effusion. No pneumothorax is appreciated.  Pulmonary vasculature appears grossly normal.       Impression:       Chronic lung changes with associated hyperinflation. No  evidence of acute cardiopulmonary process.  This report was finalized on 07/08/2019 14:34 by Dr. Tim Ellsworth MD.    CT Head Without Contrast [[id number]] Collected:  07/08/19 1413     Updated:  07/08/19 1418    Narrative:       EXAMINATION: CT HEAD WO CONTRAST- 7/8/2019 2:13 PM CDT     HISTORY: Confusion, altered level of consciousness     COMPARISON: None     DOSE: 635 mGy-cm     TECHNIQUE: Sequential imaging was performed from the vertex through the  base of the skull without the use of IV contrast.  Sagittal and coronal  reformations were made from the original source data and reviewed.  Automated exposure control was also utilized to decrease patient  radiation dose.     FINDINGS:   There is no evidence of acute intracranial hemorrhage, mass, or mass  effect.  The corticomedullary differentiation is normal without  evidence  of acute infarct. There are periventricular and subcortical white matter  changes, a nonspecific finding most often seen as sequela of chronic  microvascular ischemia. The ventricles appear normal in configuration.  The basilar cisterns are patent. The posterior fossa is grossly normal  in appearance. No depressed skull fracture is identified. The paranasal  sinuses and mastoid air cells appear clear. Calcifications are seen in  the cavernous carotid arteries.           Impression:       No acute intracranial findings.     This report was finalized on 07/08/2019 14:15 by Dr. Tim Ellsworth MD.        I have personally reviewed and interpreted the radiology studies and ECG obtained at time of admission.     Assessment / Plan     Assessment:   Active Hospital Problems    Diagnosis   • Confusion   • Weight loss   • Underweight   • Inguinal hernia     S/p repair by Dr. Mora 6/2019       Plan:   1.  Will d/c Norco and monitor; patient states his pain has been well controlled, however he continues to take his pain medication multiple times per day  2.  Talked with patient/family about UDS positive for benzos.  He is adamant that he has not been taking any medication in this class.    3.  CT Head with no acute findings  4.  IVFs with banana bag  5.  Nutrition consult for malnutrition severity assessment  6.  D/C Doxylamine  7.  Neuro checks  8.  PT and OT eval  9.  Telemetry  10.  TSH ok; check B12, vitamin D, iron profile, prealbumin  11.  Workup ongoing.  Dispo: patient currently lives at home independently.  Pending results of ongoing workup will determine most appropriate dispo plan moving forward  12.  Patient/family do request evaluation by Dr. Mora citing that he was supposed to have appointment with him tomorrow in the outpatient clinic for 1 week post-op evaluation.      Lenard Landa MD   07/08/19   6:01 PM

## 2019-07-09 ENCOUNTER — APPOINTMENT (OUTPATIENT)
Dept: MRI IMAGING | Facility: HOSPITAL | Age: 79
End: 2019-07-09

## 2019-07-09 PROBLEM — D64.9 ANEMIA: Status: ACTIVE | Noted: 2019-07-09

## 2019-07-09 PROBLEM — R41.82 ALTERED MENTAL STATUS: Status: ACTIVE | Noted: 2019-07-09

## 2019-07-09 LAB
25(OH)D3 SERPL-MCNC: 43.5 NG/ML (ref 30–100)
ALBUMIN SERPL-MCNC: 3.3 G/DL (ref 3.5–5)
ALBUMIN/GLOB SERPL: 1.3 G/DL (ref 1.1–2.5)
ALP SERPL-CCNC: 48 U/L (ref 24–120)
ALT SERPL W P-5'-P-CCNC: 39 U/L (ref 0–54)
ANION GAP SERPL CALCULATED.3IONS-SCNC: 3 MMOL/L (ref 4–13)
AST SERPL-CCNC: 49 U/L (ref 7–45)
BASOPHILS # BLD AUTO: 0.04 10*3/MM3 (ref 0–0.2)
BASOPHILS NFR BLD AUTO: 0.7 % (ref 0–2)
BILIRUB SERPL-MCNC: 0.5 MG/DL (ref 0.1–1)
BUN BLD-MCNC: 21 MG/DL (ref 5–21)
BUN/CREAT SERPL: 32.3 (ref 7–25)
CALCIUM SPEC-SCNC: 8.7 MG/DL (ref 8.4–10.4)
CHLORIDE SERPL-SCNC: 107 MMOL/L (ref 98–110)
CO2 SERPL-SCNC: 29 MMOL/L (ref 24–31)
CREAT BLD-MCNC: 0.65 MG/DL (ref 0.5–1.4)
DEPRECATED RDW RBC AUTO: 42.2 FL (ref 40–54)
EOSINOPHIL # BLD AUTO: 0.22 10*3/MM3 (ref 0–0.7)
EOSINOPHIL NFR BLD AUTO: 3.8 % (ref 0–4)
ERYTHROCYTE [DISTWIDTH] IN BLOOD BY AUTOMATED COUNT: 13.5 % (ref 12–15)
GFR SERPL CREATININE-BSD FRML MDRD: 119 ML/MIN/1.73
GLOBULIN UR ELPH-MCNC: 2.5 GM/DL
GLUCOSE BLD-MCNC: 90 MG/DL (ref 70–100)
HCT VFR BLD AUTO: 36.3 % (ref 40–52)
HGB BLD-MCNC: 11.8 G/DL (ref 14–18)
IMM GRANULOCYTES # BLD AUTO: 0.01 10*3/MM3 (ref 0–0.05)
IMM GRANULOCYTES NFR BLD AUTO: 0.2 % (ref 0–5)
IRON 24H UR-MRATE: 66 MCG/DL (ref 42–180)
IRON SATN MFR SERPL: 24 % (ref 20–45)
LYMPHOCYTES # BLD AUTO: 1.5 10*3/MM3 (ref 0.72–4.86)
LYMPHOCYTES NFR BLD AUTO: 25.9 % (ref 15–45)
MCH RBC QN AUTO: 27.6 PG (ref 28–32)
MCHC RBC AUTO-ENTMCNC: 32.5 G/DL (ref 33–36)
MCV RBC AUTO: 85 FL (ref 82–95)
MONOCYTES # BLD AUTO: 0.51 10*3/MM3 (ref 0.19–1.3)
MONOCYTES NFR BLD AUTO: 8.8 % (ref 4–12)
NEUTROPHILS # BLD AUTO: 3.51 10*3/MM3 (ref 1.87–8.4)
NEUTROPHILS NFR BLD AUTO: 60.6 % (ref 39–78)
NRBC BLD AUTO-RTO: 0 /100 WBC (ref 0–0.2)
PLATELET # BLD AUTO: 214 10*3/MM3 (ref 130–400)
PMV BLD AUTO: 9.4 FL (ref 6–12)
POTASSIUM BLD-SCNC: 4.1 MMOL/L (ref 3.5–5.3)
PREALB SERPL-MCNC: 14.8 MG/DL (ref 18–36)
PROT SERPL-MCNC: 5.8 G/DL (ref 6.3–8.7)
RBC # BLD AUTO: 4.27 10*6/MM3 (ref 4.8–5.9)
SODIUM BLD-SCNC: 139 MMOL/L (ref 135–145)
TIBC SERPL-MCNC: 280 MCG/DL (ref 225–420)
VIT B12 BLD-MCNC: 425 PG/ML (ref 239–931)
WBC NRBC COR # BLD: 5.79 10*3/MM3 (ref 4.8–10.8)

## 2019-07-09 PROCEDURE — 83540 ASSAY OF IRON: CPT | Performed by: INTERNAL MEDICINE

## 2019-07-09 PROCEDURE — 0 GADOBENATE DIMEGLUMINE 529 MG/ML SOLUTION: Performed by: INTERNAL MEDICINE

## 2019-07-09 PROCEDURE — 80053 COMPREHEN METABOLIC PANEL: CPT | Performed by: INTERNAL MEDICINE

## 2019-07-09 PROCEDURE — 85025 COMPLETE CBC W/AUTO DIFF WBC: CPT | Performed by: INTERNAL MEDICINE

## 2019-07-09 PROCEDURE — 25010000002 THIAMINE PER 100 MG: Performed by: INTERNAL MEDICINE

## 2019-07-09 PROCEDURE — 25010000002 LORAZEPAM PER 2 MG: Performed by: INTERNAL MEDICINE

## 2019-07-09 PROCEDURE — 70553 MRI BRAIN STEM W/O & W/DYE: CPT

## 2019-07-09 PROCEDURE — 82607 VITAMIN B-12: CPT | Performed by: INTERNAL MEDICINE

## 2019-07-09 PROCEDURE — 97166 OT EVAL MOD COMPLEX 45 MIN: CPT | Performed by: OCCUPATIONAL THERAPIST

## 2019-07-09 PROCEDURE — 84134 ASSAY OF PREALBUMIN: CPT | Performed by: INTERNAL MEDICINE

## 2019-07-09 PROCEDURE — 86757 RICKETTSIA ANTIBODY: CPT | Performed by: INTERNAL MEDICINE

## 2019-07-09 PROCEDURE — 97161 PT EVAL LOW COMPLEX 20 MIN: CPT | Performed by: PHYSICAL THERAPIST

## 2019-07-09 PROCEDURE — 82306 VITAMIN D 25 HYDROXY: CPT | Performed by: INTERNAL MEDICINE

## 2019-07-09 PROCEDURE — A9577 INJ MULTIHANCE: HCPCS | Performed by: INTERNAL MEDICINE

## 2019-07-09 PROCEDURE — 83550 IRON BINDING TEST: CPT | Performed by: INTERNAL MEDICINE

## 2019-07-09 RX ORDER — LORAZEPAM 2 MG/ML
1 INJECTION INTRAMUSCULAR ONCE
Status: COMPLETED | OUTPATIENT
Start: 2019-07-09 | End: 2019-07-09

## 2019-07-09 RX ADMIN — DOXYCYCLINE 100 MG: 100 INJECTION, POWDER, LYOPHILIZED, FOR SOLUTION INTRAVENOUS at 12:06

## 2019-07-09 RX ADMIN — GADOBENATE DIMEGLUMINE 12 ML: 529 INJECTION, SOLUTION INTRAVENOUS at 19:00

## 2019-07-09 RX ADMIN — LORAZEPAM 1 MG: 2 INJECTION INTRAMUSCULAR; INTRAVENOUS at 17:41

## 2019-07-09 RX ADMIN — FOLIC ACID 125 ML/HR: 5 INJECTION, SOLUTION INTRAMUSCULAR; INTRAVENOUS; SUBCUTANEOUS at 20:50

## 2019-07-09 RX ADMIN — SODIUM CHLORIDE, PRESERVATIVE FREE 3 ML: 5 INJECTION INTRAVENOUS at 08:15

## 2019-07-09 RX ADMIN — SODIUM CHLORIDE, PRESERVATIVE FREE 3 ML: 5 INJECTION INTRAVENOUS at 20:50

## 2019-07-09 RX ADMIN — SODIUM CHLORIDE, POTASSIUM CHLORIDE, SODIUM LACTATE AND CALCIUM CHLORIDE 100 ML/HR: 600; 310; 30; 20 INJECTION, SOLUTION INTRAVENOUS at 04:07

## 2019-07-09 NOTE — PLAN OF CARE
Problem: Patient Care Overview  Goal: Plan of Care Review  Outcome: Ongoing (interventions implemented as appropriate)   07/09/19 4561   Coping/Psychosocial   Plan of Care Reviewed With patient;family   Plan of Care Review   Progress no change   OTHER   Outcome Summary Pt confused, easily agitated at time of RD visit. Dtr reports pt eats 2 freezer meals daily, she also states she has started to give him Ensure at home. Ordered Boost BID. Noted 5lb wt loss over 3 weeks. Family reports pts UBW to be 160# unsure of the last time he weighed that. MSA submitted to MD. Will continue to follow.

## 2019-07-09 NOTE — PLAN OF CARE
Problem: Patient Care Overview  Goal: Plan of Care Review  Outcome: Ongoing (interventions implemented as appropriate)   07/09/19 9548   Coping/Psychosocial   Plan of Care Reviewed With patient   Plan of Care Review   Progress no change   OTHER   Outcome Summary Pt A&O X3, disoriented to situation. Pt frequently agitated. Refuses SCDs, Continues to put them under scrotum saying an MD told him to do that. MRI scheduled today. Will continue to monitor.        Problem: Fall Risk (Adult)  Goal: Absence of Fall  Outcome: Ongoing (interventions implemented as appropriate)      Problem: Confusion, Acute (Adult)  Goal: Cognitive/Functional Impairments Minimized  Outcome: Ongoing (interventions implemented as appropriate)    Goal: Safety  Outcome: Ongoing (interventions implemented as appropriate)

## 2019-07-09 NOTE — PLAN OF CARE
Problem: Patient Care Overview  Goal: Plan of Care Review  Outcome: Ongoing (interventions implemented as appropriate)   07/09/19 1034   Coping/Psychosocial   Plan of Care Reviewed With patient;daughter;grandchild(lakia)   Plan of Care Review   Progress improving   OTHER   Outcome Summary OT eval completed. Pt a&o x4, but conversationally confused. pt had decreased command following, processing, and completion of new tasks. Pt required min A to kylah gown d/t multiple wires present. Pt was supervision for safety concerns for all mobility. Pt's daughter reports her infant that is present in room has RSV, notified RN and MD. SKilled OT recommended to address adls, functional mobility and safety. Recommended d/c home with 24/7 assist 2' confusion.

## 2019-07-09 NOTE — CONSULTS
I was supposed to see him in the office today from his right inguinal hernia repair.  He is admitted with confusion and does appear somewhat combative and confused.  He is naked as I walk in the room and is rubbing lotion on his incision.  His wound looks good there is no swelling no other evidence of infection.  I doubt that his hernia is the cause of any of this confusion.  I will defer to the primary care team.

## 2019-07-09 NOTE — NURSING NOTE
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 Staff and I noticed a mod amount of blood on his sheets and when asked about it he showed us a quarter-sized raw spot where he stated he scratched because of a tick. Transport came to get him for MRI, ativan given on the stretcher in the room. Charge called MD for restraints.

## 2019-07-09 NOTE — PAYOR COMM NOTE
"FROM: DON GODWIN  PHONE: [phone]  FAX: [phone]    PENDIN      Edy Ambriz (78 y.o. Male)     Date of Birth Social Security Number Address Home Phone MRN    1940  [address] [facility] [address] [phone] [id number]    Nondenominational Marital Status          P[employer]        Admission Date Admission Type Admitting Provider Attending Provider Department, Room/Bed    19 Emergency Lenard Landa MD Thompson, Benjamin H, MD [facility] 3A, 349/1    Discharge Date Discharge Disposition Discharge Destination                       Attending Provider:  Lenard Landa MD    Allergies:  No Known Allergies    Isolation:  None   Infection:  None   Code Status:  CPR    Ht:  182.9 cm (72\")   Wt:  61.2 kg (135 lb)    Admission Cmt:  None   Principal Problem:  None                Active Insurance as of 2019     Primary Coverage     Payor Plan Insurance Group Employer/Plan Group    HUMANA MEDICARE REPLACEMENT HUMANA MEDICARE REPL [id number]     Payor Plan Address Payor Plan Phone Number Payor Plan Fax Number Effective Dates    [address] [address] [phone]  2018 - None Entered    [facility] [id number]       Subscriber Name Subscriber Birth Date Member ID       EDY AMBRIZ 1940 [id number]                 Emergency Contacts      (Rel.) Home Phone Work Phone Mobile Phone    OLEGARIO AMBRIZ (Son) -- -- [phone]    Ciara(Emerg Only)Jennyfer (Daughter) -- -- [phone]               History & Physical      Lenard Landa MD at 2019  5:53 PM              [facility] Medicine Services  HISTORY AND PHYSICAL    Date of Admission: 2019  Primary Care Physician: Torey Amato DO    Subjective     Chief Complaint: Confusion    History of Present Illness  Patient is a 78-year-old  male with past medical history significant for prostate cancer and recent inguinal hernia " repair the presented to the hospital today secondary to confusion.  Family at bedside provided additional history.  They report the patient went to [employer] earlier today to  some medication, was acting confused while inside the store, this was noticed by an employee, and they subsequently called police when he left [employer] and got in his car and was starting to drive away.  They were concerned about his safety.  Family at bedside reports that he is having intermittent confusion over the past couple of weeks, but is also been worse since his recent surgery.  He recently had open inguinal hernia repair performed by Dr. Mora of general surgery.  He reports that the only new medication he is taking are the pain pills prescribed from his recent surgery, in addition to an OTC sleep agent which he has been taking at night.  His urine drug screen did return positive for benzodiazepines, and patient is adamant that he does not take any of those medications.  He denies any chest pain, shortness of breath, palpitations, nausea, vomiting, diarrhea, new pain, dysuria, urinary frequency.  He does report having darker urine as compared to his norm.  His appetite is been poor recently.  He does admit to weight loss, but is unsure on the amount of weight loss he has experienced.  He has been living independently, denies any focal motor or sensory deficits.  No presyncope or syncopal symptoms.  No recent history of falls.  During questioning, patient indicates that he feels like he is doing fine, however family reports that they have noticed worsening confusion intermittently over the course of the past week, worse since surgery.    Review of Systems     Otherwise complete ROS reviewed and negative except as mentioned in the HPI.    Past Medical History:   Past Medical History:   Diagnosis Date   • Cataracts, bilateral    • Occasional tremors    • Prostate cancer (CMS/[facility]) 05/2011    t2c,[place] 3+3   • TIA (transient  "ischemic attack)     NOT DX.    • Varicose veins of legs      Past Surgical History:  Past Surgical History:   Procedure Laterality Date   • EYE SURGERY     • HERNIA REPAIR Left    • INGUINAL HERNIA REPAIR Right 6/28/2019    Procedure: OPEN RIGHT INGUINAL HERNIA REPAIR WITH MESH;  Surgeon: Alesia Mora MD;  Location: [facility];  Service: General   • PROSTATECTOMY  05/2011    RALP     Social History:  reports that he quit smoking about 17 years ago. His smoking use included cigarettes. He quit after 50.00 years of use. He has never used smokeless tobacco. He reports that he does not drink alcohol or use drugs.    Family History: family history includes Stroke in his mother.   Heart disease and lung cancer also run in the family    Allergies:  No Known Allergies  Medications:  Prior to Admission medications    Medication Sig Start Date End Date Taking? Authorizing Provider   Cinnamon 500 MG tablet Take 500 mg by mouth Daily.    ProviderXi MD   Doxylamine Succinate, Sleep, (EQ SLEEP AID PO) Take 1 tablet by mouth As Needed.    Xi Paul MD   HYDROcodone-acetaminophen (NORCO) 7.5-325 MG per tablet Take 1-2 tablets by mouth Every 4 (Four) Hours As Needed for Moderate Pain  (Pain). 6/28/19   Alesia Mora MD   Multiple Vitamins-Minerals (MULTIVITAMIN PO) Take 1 tablet by mouth Daily.    ProviderXi MD   Omega-3 Fatty Acids (FISH OIL) 1000 MG capsule capsule Take 1,000 mg by mouth Daily With Breakfast.    Xi Paul MD     Objective     Vital Signs: /74   Pulse 84   Temp 97.6 °F (36.4 °C)   Resp 16   Ht 182.9 cm (72\")   Wt 61.2 kg (135 lb)   SpO2 96%   BMI 18.31 kg/m²    Physical Exam   Constitutional: No distress.   Thin and cachectic appearing   HENT:   Head: Normocephalic.   Mouth/Throat: No oropharyngeal exudate (MM dry).   Eyes: Pupils are equal, round, and reactive to light. No scleral icterus.   Neck: No tracheal deviation present.   Cardiovascular: " Normal rate and regular rhythm.   Pulmonary/Chest: Effort normal. No respiratory distress.   Requiring no supplemental 02   Abdominal: Soft. He exhibits no distension. There is no tenderness. There is no rebound and no guarding.   Thin; right inguinal region with incision from recent surgery that is C/D/I; no fluctuance, induration; no purulent discharge   Musculoskeletal: He exhibits no edema.   Neurological: He is alert.   Moves all extremities spontaneously; speech clear; no word finding difficulty; face symmetrical.  I did not get patient OOB to ambulate him.   Skin: Skin is warm. He is not diaphoretic.   Psychiatric: He has a normal mood and affect. His behavior is normal.   Vitals reviewed.    Results Reviewed:  Lab Results (last 24 hours)     Procedure Component Value Units Date/Time    Urine Drug Screen - Urine, Catheter In/Out [[id number]]  (Abnormal) Collected:  07/08/19 1527    Specimen:  Urine, Catheter In/Out Updated:  07/08/19 1557     Amphetamine Screen, Urine Negative     Barbiturates Screen, Urine Negative     Benzodiazepine Screen, Urine Positive     Cocaine Screen, Urine Negative     Methadone Screen, Urine Negative     Opiate Screen Positive     Phencyclidine (PCP), Urine Negative     THC, Screen, Urine Negative    Narrative:       Negative Thresholds For Drugs Screened in Urine:    Amphetamines          500 ng/ml  Barbiturates          200 ng/ml  Benzodiazepines       200 ng/ml  Cocaine               150 ng/ml  Methadone             150 ng/ml  Opiates               300 ng/ml  Phencyclidine         25 ng/ml  THC                      50 ng/ml    The normal value for all drugs tested is negative. This report includes final unconfirmed screening results.  A positive result by this assay can be, at your request, sent to the Reference Lab for confirmation by gas chromatography. Unconfirmed results must not be used for non-medical purposes, such as employment or legal testing. Clinical consideration  should be applied to any drug of abuse test result, particularly when unconfirmed results are used.    Urinalysis, Microscopic Only - Urine, Catheter In/Out [[id number]]  (Abnormal) Collected:  07/08/19 1454    Specimen:  Urine, Catheter In/Out Updated:  07/08/19 1532     RBC, UA 3-5 /HPF      WBC, UA 3-5 /HPF      Bacteria, UA 1+ /HPF      Squamous Epithelial Cells, UA 3-6 /HPF      Hyaline Casts, UA None Seen /LPF      Mucus, UA Large/3+ /HPF      Methodology Manual Light Microscopy    Urinalysis With Culture If Indicated - Urine, Catheter In/Out [[id number]]  (Abnormal) Collected:  07/08/19 1454    Specimen:  Urine, Catheter In/Out Updated:  07/08/19 1513     Color, UA Yellow     Appearance, UA Clear     pH, UA 5.5     Specific Gravity, UA >=1.030     Glucose, UA Negative     Ketones, UA Trace     Bilirubin, UA Small (1+)     Blood, UA Negative     Protein, UA 30 mg/dL (1+)     Leuk Esterase, UA Negative     Nitrite, UA Negative     Urobilinogen, UA 1.0 E.U./dL    [place] Draw [[id number]] Collected:  07/08/19 1326    Specimen:  Blood Updated:  07/08/19 1431    Narrative:       The following orders were created for panel order [place] Draw.  Procedure                               Abnormality         Status                     ---------                               -----------         ------                     Light Blue Top[[id number]]                                   Final result               Green Top (Gel)[[id number]]                                  Final result               Lavender Top[[id number]]                                     Final result               Red Top[[id number]]                                          Final result                 Please view results for these tests on the individual orders.    Light Blue Top [[id number]] Collected:  07/08/19 1326    Specimen:  Blood Updated:  07/08/19 1431     Extra Tube hold for add-on     Comment: Auto resulted       Lavender Top [[id number]] Collected:   07/08/19 1326    Specimen:  Blood Updated:  07/08/19 1431     Extra Tube hold for add-on     Comment: Auto resulted       Green Top (Gel) [[id number]] Collected:  07/08/19 1326    Specimen:  Blood Updated:  07/08/19 1431     Extra Tube Hold for add-ons.     Comment: Auto resulted.       Red Top [[id number]] Collected:  07/08/19 1326    Specimen:  Blood Updated:  07/08/19 1431     Extra Tube Hold for add-ons.     Comment: Auto resulted.       TSH [[id number]]  (Normal) Collected:  07/08/19 1326    Specimen:  Blood Updated:  07/08/19 1424     TSH 1.290 mIU/mL     Comprehensive Metabolic Panel [[id number]]  (Abnormal) Collected:  07/08/19 1326    Specimen:  Blood Updated:  07/08/19 1410     Glucose 132 mg/dL      BUN 25 mg/dL      Creatinine 0.82 mg/dL      Sodium 144 mmol/L      Potassium 3.8 mmol/L      Chloride 108 mmol/L      CO2 26.0 mmol/L      Calcium 9.8 mg/dL      Total Protein 6.9 g/dL      Albumin 4.00 g/dL      ALT (SGPT) 40 U/L      AST (SGOT) 63 U/L      Alkaline Phosphatase 56 U/L      Total Bilirubin 0.7 mg/dL      eGFR Non African Amer 91 mL/min/1.73      Globulin 2.9 gm/dL      A/G Ratio 1.4 g/dL      BUN/Creatinine Ratio 30.5     Anion Gap 10.0 mmol/L     Narrative:       GFR Normal >60  Chronic Kidney Disease <60  Kidney Failure <15    CBC Auto Differential [[id number]]  (Abnormal) Collected:  07/08/19 1326    Specimen:  Blood Updated:  07/08/19 1359     WBC 6.52 10*3/mm3      RBC 4.47 10*6/mm3      Hemoglobin 12.4 g/dL      Hematocrit 37.7 %      MCV 84.3 fL      MCH 27.7 pg      MCHC 32.9 g/dL      RDW 13.3 %      RDW-SD 41.4 fl      MPV 9.4 fL      Platelets 243 10*3/mm3      Neutrophil % 75.1 %      Lymphocyte % 16.0 %      Monocyte % 7.1 %      Eosinophil % 0.8 %      Basophil % 0.8 %      Immature Grans % 0.2 %      Neutrophils, Absolute 4.91 10*3/mm3      Lymphocytes, Absolute 1.04 10*3/mm3      Monocytes, Absolute 0.46 10*3/mm3      Eosinophils, Absolute 0.05 10*3/mm3      Basophils, Absolute  0.05 10*3/mm3      Immature Grans, Absolute 0.01 10*3/mm3      nRBC 0.0 /100 WBC         Imaging Results (last 24 hours)     Procedure Component Value Units Date/Time    XR Chest 1 View [[id number]] Collected:  07/08/19 1433     Updated:  07/08/19 1437    Narrative:       EXAMINATION: XR CHEST 1 VW- 7/8/2019 2:33 PM CDT     HISTORY: Altered mental status, weight loss     COMPARISON: None     FINDINGS:  Heart appears normal in size. Atherosclerotic calcifications are seen in  the aorta, which appears tortuous. The lungs are hyperinflated. There is  no focal consolidation or effusion. No pneumothorax is appreciated.  Pulmonary vasculature appears grossly normal.       Impression:       Chronic lung changes with associated hyperinflation. No  evidence of acute cardiopulmonary process.  This report was finalized on 07/08/2019 14:34 by Dr. Tim Ellsworth MD.    CT Head Without Contrast [[id number]] Collected:  07/08/19 1413     Updated:  07/08/19 1418    Narrative:       EXAMINATION: CT HEAD WO CONTRAST- 7/8/2019 2:13 PM CDT     HISTORY: Confusion, altered level of consciousness     COMPARISON: None     DOSE: 635 mGy-cm     TECHNIQUE: Sequential imaging was performed from the vertex through the  base of the skull without the use of IV contrast.  Sagittal and coronal  reformations were made from the original source data and reviewed.  Automated exposure control was also utilized to decrease patient  radiation dose.     FINDINGS:   There is no evidence of acute intracranial hemorrhage, mass, or mass  effect.  The corticomedullary differentiation is normal without evidence  of acute infarct. There are periventricular and subcortical white matter  changes, a nonspecific finding most often seen as sequela of chronic  microvascular ischemia. The ventricles appear normal in configuration.  The basilar cisterns are patent. The posterior fossa is grossly normal  in appearance. No depressed skull fracture is identified. The  paranasal  sinuses and mastoid air cells appear clear. Calcifications are seen in  the cavernous carotid arteries.           Impression:       No acute intracranial findings.     This report was finalized on 07/08/2019 14:15 by Dr. Tim Ellsworth MD.        I have personally reviewed and interpreted the radiology studies and ECG obtained at time of admission.     Assessment / Plan     Assessment:   Active Hospital Problems    Diagnosis   • Confusion   • Weight loss   • Underweight   • Inguinal hernia     S/p repair by Dr. Mora 6/2019       Plan:   1.  Will d/c Norco and monitor; patient states his pain has been well controlled, however he continues to take his pain medication multiple times per day  2.  Talked with patient/family about UDS positive for benzos.  He is adamant that he has not been taking any medication in this class.    3.  CT Head with no acute findings  4.  IVFs with banana bag  5.  Nutrition consult for malnutrition severity assessment  6.  D/C Doxylamine  7.  Neuro checks  8.  PT and OT eval  9.  Telemetry  10.  TSH ok; check B12, vitamin D, iron profile, prealbumin  11.  Workup ongoing.  Dispo: patient currently lives at home independently.  Pending results of ongoing workup will determine most appropriate dispo plan moving forward  12.  Patient/family do request evaluation by Dr. Mora citing that he was supposed to have appointment with him tomorrow in the outpatient clinic for 1 week post-op evaluation.      Lenard Landa MD   07/08/19   6:01 PM            Electronically signed by Lenard Landa MD at 7/8/2019  6:07 PM          Emergency Department Notes      Kenneth Alcocer MD at 7/8/2019  5:47 PM          Subjective   She is a 78-year-old gentleman who is been well until recently when he had a hernia repair done little over week ago.  He has been on pain medicine since then and during the procedure he did have a No catheter.  Today he went to the drugstore, he  drove himself that.  When he was there he was found to be acutely confused however and ultimately the police were called.  They called his daughter who brought him to the ED.  They say he has been a little bit confused over the last few days but has been able to take care of himself at home.  He denies fever or other symptoms.            Review of Systems   Psychiatric/Behavioral: Positive for confusion.   All other systems reviewed and are negative.      Past Medical History:   Diagnosis Date   • Cataracts, bilateral    • Occasional tremors    • Prostate cancer (CMS/[facility]) 2011    t2c,[place]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    • Varicose veins of legs        No Known Allergies    Past Surgical History:   Procedure Laterality Date   • EYE SURGERY     • HERNIA REPAIR Left    • INGUINAL HERNIA REPAIR Right 2019    Procedure: OPEN RIGHT INGUINAL HERNIA REPAIR WITH MESH;  Surgeon: Alesia Mora MD;  Location: [facility] OR;  Service: General   • PROSTATECTOMY  2011    RALP       Family History   Problem Relation Age of Onset   • Stroke Mother        Social History     Socioeconomic History   • Marital status:      Spouse name: Not on file   • Number of children: Not on file   • Years of education: Not on file   • Highest education level: Not on file   Tobacco Use   • Smoking status: Former Smoker     Years: 50.00     Types: Cigarettes     Last attempt to quit: 2002     Years since quittin.0   • Smokeless tobacco: Never Used   Substance and Sexual Activity   • Alcohol use: No     Comment: QUIT DRINKING IN    • Drug use: No   • Sexual activity: Defer           Objective   Physical Exam   Constitutional: He appears well-developed.   HENT:   Head: Normocephalic and atraumatic.   Mouth/Throat: Oropharynx is clear and moist.   Eyes: EOM are normal.   Neck: Normal range of motion. Neck supple.   Cardiovascular: Normal rate, regular rhythm, normal heart sounds and intact distal  pulses.   Pulmonary/Chest: Effort normal and breath sounds normal.   Abdominal: Soft.       There is a hernia repair wound in his right lower quadrant which looks uninfected   Neurological: He is alert. He has normal strength. No cranial nerve deficit. GCS eye subscore is 4. GCS verbal subscore is 5. GCS motor subscore is 6.   There is no focal neurologic deficit however the patient does demonstrate some confusion.  He was eventually oriented to time but took a couple of minutes to figure out what year it was.  Once he did though he knew the exact date.  When asked about what happened to the drugstore today he started perseverating about the [facility]  elections.   Nursing note and vitals reviewed.      Procedures          ED Course                  MDM  Number of Diagnoses or Management Options     Amount and/or Complexity of Data Reviewed  Clinical lab tests: reviewed  Tests in the radiology section of CPT®:  reviewed  Tests in the medicine section of CPT®:  reviewed          Final diagnoses:   Confusion     Discussed the case with Dr. Landa who agreed to the admission.  We both think that perhaps the medications the patient was prescribed could be causing the confusion, the urine drug screen does show benzodiazepines and opiates on board.  In an elderly man this could well be the source.  The other alternative is that his urine although a dirty sample does show some right red blood cells and white blood cells Dr. Adams advised just observing this in the hospital rather than starting antibiotics immediately.  Patient is currently stable in the ED       Kenneth Alcocer MD  07/08/19 4392      Electronically signed by Kenneth Alcocer MD at 7/8/2019  5:53 PM       ICU Vital Signs     Row Name 07/09/19 1201 07/09/19 0812 07/09/19 0802 07/09/19 0327 07/09/19 0037       Vitals    Temp  98.4 °F (36.9 °C)  --  98.2 °F (36.8 °C)  97.5 °F (36.4 °C)  98 °F (36.7 °C)    Temp src  Oral  --  Oral  Oral   Oral    Pulse  64  --  85  65  65    Heart Rate Source  Monitor  --  Monitor  Monitor  Monitor    Resp  17  --  17  18  18    Resp Rate Source  Visual  --  Visual  Visual  Visual    BP  114/60  --  133/63  132/66  121/61    Noninvasive MAP (mmHg)  72  --  78  --  --    BP Location  Left arm  --  Left arm  Left arm  Left arm    BP Method  Automatic  --  Automatic  Automatic  Automatic    Patient Position  Lying  --  Lying  Lying  Lying       Oxygen Therapy    SpO2  99 %  --  99 %  98 %  94 %    Pulse Oximetry Type  Continuous  --  Continuous  Continuous  Continuous    Device (Oxygen Therapy)  room air  room air   room air  room air  room air    Row Name 07/08/19 2026 07/08/19 2018 07/08/19 1902 07/08/19 18:46:57          Vitals    Temp  --  --  98.3 °F (36.8 °C)  97.9 °F (36.6 °C)     Temp src  --  --  Oral  --     Pulse  --  --  59  76     Heart Rate Source  --  --  Monitor  --     Resp  --  --  18  17     Resp Rate Source  --  --  Visual  --     BP  --  --  125/71  119/69     BP Location  --  --  Left arm  --     BP Method  --  --  Automatic  --     Patient Position  --  --  Lying  --        Oxygen Therapy    SpO2  --  --  94 %  96 %     Pulse Oximetry Type  Continuous  --  Intermittent  --     Device (Oxygen Therapy)  room air  room air  room air  --         Hospital Medications (all)       Dose Frequency Start End    acetaminophen (TYLENOL) tablet 650 mg 650 mg Every 4 Hours PRN 7/8/2019     Sig - Route: Take 2 tablets by mouth Every 4 (Four) Hours As Needed for Mild Pain . - Oral    doxycycline (VIBRAMYCIN) 100 mg/100 mL 0.9% NS  mg Every 12 Hours 7/9/2019 7/16/2019    Sig - Route: Infuse 100 mL into a venous catheter Every 12 (Twelve) Hours. - Intravenous    lactated ringers infusion 100 mL/hr Continuous 7/8/2019     Sig - Route: Infuse 100 mL/hr into a venous catheter Continuous. - Intravenous    LORazepam (ATIVAN) injection 1 mg 1 mg Once 7/9/2019     Sig - Route: Infuse 0.5 mL into a venous  catheter 1 (One) Time. - Intravenous    multiple vitamin (M.V.I. Adult) 10 mL, thiamine (B-1) 100 mg, folic acid 1 mg in lactated ringers 1,000 mL infusion 125 mL/hr Every 24 Hours 7/8/2019     Sig - Route: Infuse 125 mL/hr into a venous catheter Daily. - Intravenous    ondansetron (ZOFRAN) injection 4 mg 4 mg Every 6 Hours PRN 7/8/2019     Sig - Route: Infuse 2 mL into a venous catheter Every 6 (Six) Hours As Needed for Nausea or Vomiting. - Intravenous    sodium chloride 0.9 % flush 3 mL 3 mL Every 12 Hours Scheduled 7/8/2019     Sig - Route: Infuse 3 mL into a venous catheter Every 12 (Twelve) Hours. - Intravenous    sodium chloride 0.9 % flush 3-10 mL 3-10 mL As Needed 7/8/2019     Sig - Route: Infuse 3-10 mL into a venous catheter As Needed for Line Care. - Intravenous    multivitamin w/minerals tablet 1 tablet (Discontinued) 1 tablet Daily 7/9/2019 7/9/2019    Sig - Route: Take 1 tablet by mouth Daily. - Oral            Lab Results (last 24 hours)     Procedure Component Value Units Date/Time    [place] Spotted Fever, IgG [[id number]] Collected:  07/09/19 0442    Specimen:  Blood Updated:  07/09/19 1123    [place] Spotted Fever, IgM [[id number]] Collected:  07/09/19 0442    Specimen:  Blood Updated:  07/09/19 1123    Ehrlichia Antibody Panel [[id number]] Collected:  07/08/19 1326    Specimen:  Blood Updated:  07/09/19 1123    Vitamin B12 [[id number]]  (Normal) Collected:  07/09/19 0442    Specimen:  Blood Updated:  07/09/19 0557     Vitamin B-12 425 pg/mL     Vitamin D 25 Hydroxy [[id number]]  (Normal) Collected:  07/09/19 0442    Specimen:  Blood Updated:  07/09/19 0525     25 Hydroxy, Vitamin D 43.5 ng/ml     Iron Profile [[id number]]  (Normal) Collected:  07/09/19 0442    Specimen:  Blood Updated:  07/09/19 0517     Iron 66 mcg/dL      TIBC 280 mcg/dL      Iron Saturation 24 %     Prealbumin [[id number]]  (Abnormal) Collected:  07/09/19 0442    Specimen:  Blood Updated:  07/09/19 0515     Prealbumin 14.8  mg/dL     Comprehensive Metabolic Panel [[id number]]  (Abnormal) Collected:  07/09/19 0442    Specimen:  Blood Updated:  07/09/19 0510     Glucose 90 mg/dL      BUN 21 mg/dL      Creatinine 0.65 mg/dL      Sodium 139 mmol/L      Potassium 4.1 mmol/L      Chloride 107 mmol/L      CO2 29.0 mmol/L      Calcium 8.7 mg/dL      Total Protein 5.8 g/dL      Albumin 3.30 g/dL      ALT (SGPT) 39 U/L      AST (SGOT) 49 U/L      Alkaline Phosphatase 48 U/L      Total Bilirubin 0.5 mg/dL      eGFR Non African Amer 119 mL/min/1.73      Globulin 2.5 gm/dL      A/G Ratio 1.3 g/dL      BUN/Creatinine Ratio 32.3     Anion Gap 3.0 mmol/L     Narrative:       GFR Normal >60  Chronic Kidney Disease <60  Kidney Failure <15    CBC & Differential [[id number]] Collected:  07/09/19 0442    Specimen:  Blood Updated:  07/09/19 0453    Narrative:       The following orders were created for panel order CBC & Differential.  Procedure                               Abnormality         Status                     ---------                               -----------         ------                     CBC Auto Differential[[id number]]        Abnormal            Final result                 Please view results for these tests on the individual orders.    CBC Auto Differential [[id number]]  (Abnormal) Collected:  07/09/19 0442    Specimen:  Blood Updated:  07/09/19 0453     WBC 5.79 10*3/mm3      RBC 4.27 10*6/mm3      Hemoglobin 11.8 g/dL      Hematocrit 36.3 %      MCV 85.0 fL      MCH 27.6 pg      MCHC 32.5 g/dL      RDW 13.5 %      RDW-SD 42.2 fl      MPV 9.4 fL      Platelets 214 10*3/mm3      Neutrophil % 60.6 %      Lymphocyte % 25.9 %      Monocyte % 8.8 %      Eosinophil % 3.8 %      Basophil % 0.7 %      Immature Grans % 0.2 %      Neutrophils, Absolute 3.51 10*3/mm3      Lymphocytes, Absolute 1.50 10*3/mm3      Monocytes, Absolute 0.51 10*3/mm3      Eosinophils, Absolute 0.22 10*3/mm3      Basophils, Absolute 0.04 10*3/mm3      Immature Grans,  Absolute 0.01 10*3/mm3      nRBC 0.0 /100 WBC     Urine Drug Screen - Urine, Catheter In/Out [[id number]]  (Abnormal) Collected:  07/08/19 1527    Specimen:  Urine, Catheter In/Out Updated:  07/08/19 1557     Amphetamine Screen, Urine Negative     Barbiturates Screen, Urine Negative     Benzodiazepine Screen, Urine Positive     Cocaine Screen, Urine Negative     Methadone Screen, Urine Negative     Opiate Screen Positive     Phencyclidine (PCP), Urine Negative     THC, Screen, Urine Negative    Narrative:       Negative Thresholds For Drugs Screened in Urine:    Amphetamines          500 ng/ml  Barbiturates          200 ng/ml  Benzodiazepines       200 ng/ml  Cocaine               150 ng/ml  Methadone             150 ng/ml  Opiates               300 ng/ml  Phencyclidine         25 ng/ml  THC                      50 ng/ml    The normal value for all drugs tested is negative. This report includes final unconfirmed screening results.  A positive result by this assay can be, at your request, sent to the Reference Lab for confirmation by gas chromatography. Unconfirmed results must not be used for non-medical purposes, such as employment or legal testing. Clinical consideration should be applied to any drug of abuse test result, particularly when unconfirmed results are used.    Urinalysis, Microscopic Only - Urine, Catheter In/Out [[id number]]  (Abnormal) Collected:  07/08/19 1454    Specimen:  Urine, Catheter In/Out Updated:  07/08/19 1532     RBC, UA 3-5 /HPF      WBC, UA 3-5 /HPF      Bacteria, UA 1+ /HPF      Squamous Epithelial Cells, UA 3-6 /HPF      Hyaline Casts, UA None Seen /LPF      Mucus, UA Large/3+ /HPF      Methodology Manual Light Microscopy    Urinalysis With Culture If Indicated - Urine, Catheter In/Out [[id number]]  (Abnormal) Collected:  07/08/19 1454    Specimen:  Urine, Catheter In/Out Updated:  07/08/19 1513     Color, UA Yellow     Appearance, UA Clear     pH, UA 5.5     Specific Gravity, UA >=1.030      Glucose, UA Negative     Ketones, UA Trace     Bilirubin, UA Small (1+)     Blood, UA Negative     Protein, UA 30 mg/dL (1+)     Leuk Esterase, UA Negative     Nitrite, UA Negative     Urobilinogen, UA 1.0 E.U./dL    [place] Draw [[id number]] Collected:  07/08/19 1326    Specimen:  Blood Updated:  07/08/19 1431    Narrative:       The following orders were created for panel order [place] Draw.  Procedure                               Abnormality         Status                     ---------                               -----------         ------                     Light Blue Top[[id number]]                                   Final result               Green Top (Gel)[[id number]]                                  Final result               Lavender Top[[id number]]                                     Final result               Red Top[[id number]]                                          Final result                 Please view results for these tests on the individual orders.    Light Blue Top [[id number]] Collected:  07/08/19 1326    Specimen:  Blood Updated:  07/08/19 1431     Extra Tube hold for add-on     Comment: Auto resulted       Lavender Top [[id number]] Collected:  07/08/19 1326    Specimen:  Blood Updated:  07/08/19 1431     Extra Tube hold for add-on     Comment: Auto resulted       Green Top (Gel) [[id number]] Collected:  07/08/19 1326    Specimen:  Blood Updated:  07/08/19 1431     Extra Tube Hold for add-ons.     Comment: Auto resulted.       Red Top [[id number]] Collected:  07/08/19 1326    Specimen:  Blood Updated:  07/08/19 1431     Extra Tube Hold for add-ons.     Comment: Auto resulted.       TSH [[id number]]  (Normal) Collected:  07/08/19 1326    Specimen:  Blood Updated:  07/08/19 1424     TSH 1.290 mIU/mL     Comprehensive Metabolic Panel [[id number]]  (Abnormal) Collected:  07/08/19 1326    Specimen:  Blood Updated:  07/08/19 1410     Glucose 132 mg/dL      BUN 25 mg/dL      Creatinine 0.82 mg/dL       Sodium 144 mmol/L      Potassium 3.8 mmol/L      Chloride 108 mmol/L      CO2 26.0 mmol/L      Calcium 9.8 mg/dL      Total Protein 6.9 g/dL      Albumin 4.00 g/dL      ALT (SGPT) 40 U/L      AST (SGOT) 63 U/L      Alkaline Phosphatase 56 U/L      Total Bilirubin 0.7 mg/dL      eGFR Non African Amer 91 mL/min/1.73      Globulin 2.9 gm/dL      A/G Ratio 1.4 g/dL      BUN/Creatinine Ratio 30.5     Anion Gap 10.0 mmol/L     Narrative:       GFR Normal >60  Chronic Kidney Disease <60  Kidney Failure <15    CBC Auto Differential [[id number]]  (Abnormal) Collected:  07/08/19 1326    Specimen:  Blood Updated:  07/08/19 1359     WBC 6.52 10*3/mm3      RBC 4.47 10*6/mm3      Hemoglobin 12.4 g/dL      Hematocrit 37.7 %      MCV 84.3 fL      MCH 27.7 pg      MCHC 32.9 g/dL      RDW 13.3 %      RDW-SD 41.4 fl      MPV 9.4 fL      Platelets 243 10*3/mm3      Neutrophil % 75.1 %      Lymphocyte % 16.0 %      Monocyte % 7.1 %      Eosinophil % 0.8 %      Basophil % 0.8 %      Immature Grans % 0.2 %      Neutrophils, Absolute 4.91 10*3/mm3      Lymphocytes, Absolute 1.04 10*3/mm3      Monocytes, Absolute 0.46 10*3/mm3      Eosinophils, Absolute 0.05 10*3/mm3      Basophils, Absolute 0.05 10*3/mm3      Immature Grans, Absolute 0.01 10*3/mm3      nRBC 0.0 /100 WBC         Imaging Results (last 24 hours)     Procedure Component Value Units Date/Time    XR Chest 1 View [[id number]] Collected:  07/08/19 1433     Updated:  07/08/19 1437    Narrative:       EXAMINATION: XR CHEST 1 VW- 7/8/2019 2:33 PM CDT     HISTORY: Altered mental status, weight loss     COMPARISON: None     FINDINGS:  Heart appears normal in size. Atherosclerotic calcifications are seen in  the aorta, which appears tortuous. The lungs are hyperinflated. There is  no focal consolidation or effusion. No pneumothorax is appreciated.  Pulmonary vasculature appears grossly normal.       Impression:       Chronic lung changes with associated hyperinflation. No  evidence of  acute cardiopulmonary process.  This report was finalized on 07/08/2019 14:34 by Dr. Tim Ellsworth MD.    CT Head Without Contrast [[id number]] Collected:  07/08/19 1413     Updated:  07/08/19 1418    Narrative:       EXAMINATION: CT HEAD WO CONTRAST- 7/8/2019 2:13 PM CDT     HISTORY: Confusion, altered level of consciousness     COMPARISON: None     DOSE: 635 mGy-cm     TECHNIQUE: Sequential imaging was performed from the vertex through the  base of the skull without the use of IV contrast.  Sagittal and coronal  reformations were made from the original source data and reviewed.  Automated exposure control was also utilized to decrease patient  radiation dose.     FINDINGS:   There is no evidence of acute intracranial hemorrhage, mass, or mass  effect.  The corticomedullary differentiation is normal without evidence  of acute infarct. There are periventricular and subcortical white matter  changes, a nonspecific finding most often seen as sequela of chronic  microvascular ischemia. The ventricles appear normal in configuration.  The basilar cisterns are patent. The posterior fossa is grossly normal  in appearance. No depressed skull fracture is identified. The paranasal  sinuses and mastoid air cells appear clear. Calcifications are seen in  the cavernous carotid arteries.           Impression:       No acute intracranial findings.     This report was finalized on 07/08/2019 14:15 by Dr. Tim Ellsworth MD.        ECG/EMG Results (last 24 hours)     Procedure Component Value Units Date/Time    ECG 12 Lead [[id number]] Collected:  07/08/19 1311     Updated:  07/08/19 1311          Orders (last 24 hrs)     Start     Ordered    07/09/19 1300  multivitamin w/minerals tablet 1 tablet  Daily,   Status:  Discontinued      07/08/19 1857    07/09/19 1213  PT Plan of Care Cert / Re-Cert  Once     Comments:  Physical Therapy Plan of Care  Initial Certification  Certification Period: 7/9/2019 - 10/7/2019    Patient Name: Edy  Ciara  : 1940    (R41.0) Confusion  (primary encounter diagnosis)  (R68.89) Decreased activities of daily living (ADL)                  Assessment  PT Assessment  Criteria for Skilled Interventions Met (PT Clinical Impression): no problems identified which require skilled intervention        Rehab Goal Summary     Row Name 19 1034             Occupational Therapy Goals    Bathing Goal Selection (OT)  bathing, OT goal 1  -MM      Dressing Goal Selection (OT)  dressing, OT goal 1  -MM         Bathing Goal 1 (OT)    Activity/Assistive Device (Bathing Goal 1, OT)  bathing skills, all  -MM        [place] Level/Cues Needed (Bathing Goal 1, OT)  independent  -MM      Time Frame (Bathing Goal 1, OT)  10 days  -MM      Progress/Outcomes (Bathing Goal 1, OT)  goal ongoing  -MM         Dressing Goal 1 (OT)    Activity/Assistive Device (Dressing Goal 1, OT)  dressing skills, all    -MM      [place]/Cues Needed (Dressing Goal 1, OT)  independent  -MM      Time Frame (Dressing Goal 1, OT)  10 days  -MM      Progress/Outcome (Dressing Goal 1, OT)  goal ongoing  -MM         Cognitive Goals (OT)    Directions Goal Selection (OT)  direction following, OT goal 1  -MM         Direction Following Goal 1 (OT)    Activity (Direction Following Goal 1, OT)  follow one step directions    -MM      [place]/Cues/Accuracy (Direction Following Goal 1, OT)    independently;with 90% accuracy  -MM      Time Frame (Direction Following Goal 1, OT)  10 days  -MM      Progress/Outcome (Direction Following Goal 1, OT)  goal ongoing  -MM        User Key  (r) = Recorded By, (t) = Taken By, (c) = Cosigned By    Initials Name Provider Type Discipline    Rick Villalobos, OTR/L Occupational Therapist OT              Plan  PT Plan  Therapy Frequency (PT Clinical Impression): evaluation only  Outcome Summary: PT evaluation completed. The patient is up with supervision due to his confusion and poor judgement making. He is  oriented x4 but is confused conversationally. He was able to perform all high level gait tasks without difficulty. No further PT warranted, but recommend continued supervision until confusion improves.         Henrietta Hooks, PT, DPT, NCS  2019            By cosigning this order, either electronically or physically, I certify that the therapy services are furnished while this patient is under my care, the services outline above are required by this patient, and will be reviewed every 90 days.        M.D.:__________________________________________ Date: ______________    19 1212    19 1200  doxycycline (VIBRAMYCIN) 100 mg/100 mL 0.9% NS MBP  Every 12 Hours      19 1105    19 1200  LORazepam (ATIVAN) injection 1 mg  Once      19 1105    19 1200  OT Plan of Care Cert / Re-Cert  Once     Comments:  Occupational Therapy Plan of Care  Initial Certification  Certification Period: 2019 - 10/7/2019    Patient Name: Edy Urbina  : 1940    (R41.0) Confusion  (primary encounter diagnosis)  (R68.89) Decreased activities of daily living (ADL)                Assessment  OT Assessment  Functional Level at Time of Evaluation (OT Eval): good/fair  OT Diagnosis: decreased adl's and confusion  Rehab Potential (OT Eval): good, to achieve stated therapy goals  Criteria for Skilled Therapeutic Interventions Met (OT Eval): yes, treatment indicated        Rehab Goal Summary     Row Name 19 1034             Occupational Therapy Goals    Bathing Goal Selection (OT)  bathing, OT goal 1  -MM      Dressing Goal Selection (OT)  dressing, OT goal 1  -MM         Bathing Goal 1 (OT)    Activity/Assistive Device (Bathing Goal 1, OT)  bathing skills, all  -MM        [place] Level/Cues Needed (Bathing Goal 1, OT)  independent  -MM      Time Frame (Bathing Goal 1, OT)  10 days  -MM      Progress/Outcomes (Bathing Goal 1, OT)  goal ongoing  -MM         Dressing Goal 1 (OT)     Activity/Assistive Device (Dressing Goal 1, OT)  dressing skills, all    -MM      [place]/Cues Needed (Dressing Goal 1, OT)  independent  -MM      Time Frame (Dressing Goal 1, OT)  10 days  -MM      Progress/Outcome (Dressing Goal 1, OT)  goal ongoing  -MM         Cognitive Goals (OT)    Directions Goal Selection (OT)  direction following, OT goal 1  -MM         Direction Following Goal 1 (OT)    Activity (Direction Following Goal 1, OT)  follow one step directions    -MM      [place]/Cues/Accuracy (Direction Following Goal 1, OT)    independently;with 90% accuracy  -MM      Time Frame (Direction Following Goal 1, OT)  10 days  -MM      Progress/Outcome (Direction Following Goal 1, OT)  goal ongoing  -MM        User Key  (r) = Recorded By, (t) = Taken By, (c) = Cosigned By    Initials Name Provider Type Discipline    Rick Villalobos, OTR/L Occupational Therapist OT        OT Goals     Row Name 07/09/19 1034          Time Calculation    OT Goal Re-Cert Due Date  07/19/19  -MM       User Key  (r) = Recorded By, (t) = Taken By, (c) = Cosigned By    Initials Name Provider Type    Rick Villalobos, OTR/L Occupational Therapist          Plan    OT Plan  Therapy Frequency (OT Eval): 3 times/wk  Predicted Duration of Therapy Intervention (Therapy Eval): until d/c  Outcome Summary: OT eval completed. Pt a&o x4, but conversationally confused. pt had decreased command following, processing, and completion of new tasks. Pt required min A to kylah gown d/t multiple wires present. Pt was supervision for safety concerns for all mobility. Pt's daughter reports her infant that is present in room has RSV, notified RN and MD. SKilled OT recommended to address adls, functional mobility and safety. Recommended d/c home with 24/7 assist 2'  confusion.      Rick Herbert OTR/L  7/9/2019        By cosigning this order, either electronically or physically, I certify that the therapy services are furnished while this  patient is under my care, the services outline above are required by this patient, and will be reviewed every 90 days.        M.D.:__________________________________________ Date: ______________    07/09/19 1159    07/09/19 1106  [place] Spotted Fever, IgG  Once      07/09/19 1105    07/09/19 1106  [place] Spotted Fever, IgM  Once      07/09/19 1105    07/09/19 1106  Ehrlichia Antibody Panel  Once      07/09/19 1105    07/09/19 1055  MRI Brain With & Without Contrast  1 Time Imaging      07/09/19 1105    07/09/19 0700  Inpatient General Surgery Consult  Once     Specialty:  General Surgery  Provider:  Alesia Mora MD    07/08/19 1857 07/09/19 0600  CBC & Differential  [place] Draw      07/08/19 1857 07/09/19 0600  Comprehensive Metabolic Panel  [place] Draw      07/08/19 1857 07/09/19 0600  Prealbumin  Morning Draw      07/08/19 1857 07/09/19 0600  Vitamin B12  Mor[place] Draw      07/08/19 1857 07/09/19 0600  Vitamin D 25 Hydroxy  Mor[place] Draw      07/08/19 1857 07/09/19 0600  Iron Profile  Mor[place] Draw      07/08/19 1857 07/09/19 0600  CBC Auto Differential  PROCEDURE ONCE      07/09/19 0001    07/08/19 2100  sodium chloride 0.9 % flush 3 mL  Every 12 Hours Scheduled      07/08/19 1856 07/08/19 2000  Vital Signs  Every 4 Hours      07/08/19 1856 07/08/19 2000  Neuro Checks  Every 4 Hours      07/08/19 1856 07/08/19 2000  multiple vitamin (M.V.I. Adult) 10 mL, thiamine (B-1) 100 mg, folic acid 1 mg in lactated ringers 1,000 mL infusion  Every 24 Hours      07/08/19 1857 07/08/19 1945  lactated ringers infusion  Continuous      07/08/19 1856 07/08/19 1857  Intake & Output  Every Shift      07/08/19 1856 07/08/19 1857  Weigh Patient  Once      07/08/19 1856 07/08/19 1857  Insert Peripheral IV  Once      07/08/19 1856 07/08/19 1857  Saline Lock & Maintain IV Access  Continuous      07/08/19 1856 07/08/19 1857  Place Sequential Compression Device  Once       07/08/19 1856 07/08/19 1857  Maintain Sequential Compression Device  Continuous      07/08/19 1856 07/08/19 1857  Cardiac Monitoring  Continuous      07/08/19 1856 07/08/19 1857  Diet Regular  Diet Effective Now      07/08/19 1856    07/08/19 1857  Nutrition Consult  Once     Provider:  (Not yet assigned)    07/08/19 1857 07/08/19 1857  PT Consult: Eval & Treat weakness  Once      07/08/19 1857 07/08/19 1857  OT Consult: Eval & Treat weakness  Once      07/08/19 1857 07/08/19 1856  sodium chloride 0.9 % flush 3-10 mL  As Needed      07/08/19 1856 07/08/19 1856  acetaminophen (TYLENOL) tablet 650 mg  Every 4 Hours PRN      07/08/19 1856 07/08/19 1856  ondansetron (ZOFRAN) injection 4 mg  Every 6 Hours PRN      07/08/19 1856 07/08/19 1753  Inpatient Admission  Once      07/08/19 1753    07/08/19 1748  Code Status and Medical Interventions:  Continuous      07/08/19 1753    07/08/19 1748  Initiate Observation Status  Once      07/08/19 1753    07/08/19 1523  Urine Drug Screen - Urine, Clean Catch  STAT      07/08/19 1522    07/08/19 1514  Urinalysis, Microscopic Only - Urine, Clean Catch  Once      07/08/19 1513    07/08/19 1352  CBC & Differential  STAT,   Status:  Canceled      07/08/19 1351    07/08/19 1352  CBC Auto Differential  STAT      07/08/19 1351    07/08/19 1352  Comprehensive Metabolic Panel  STAT      07/08/19 1351    07/08/19 1352  CBC Auto Differential  PROCEDURE ONCE,   Status:  Canceled      07/08/19 1359    07/08/19 1330  CT Head Without Contrast  1 Time Imaging      07/08/19 1330    07/08/19 1329  XR Chest 1 View  1 Time Imaging      07/08/19 1330    07/08/19 1328  Urinalysis With Culture If Indicated - Urine, Clean Catch  Once      07/08/19 1330    07/08/19 1328  TSH  Once      07/08/19 1330    07/08/19 1327  CBC & Differential  Once,   Status:  Canceled      07/08/19 1330    07/08/19 1327  Comprehensive Metabolic Panel  Once,   Status:  Canceled      07/08/19 1330     "07/08/19 1327  CBC Auto Differential  PROCEDURE ONCE,   Status:  Canceled      07/08/19 1330    07/08/19 1326  [place] Draw  STAT      07/08/19 1325    07/08/19 1326  Light Blue Top  PROCEDURE ONCE      07/08/19 1325    07/08/19 1326  Green Top (Gel)  PROCEDURE ONCE      07/08/19 1325    07/08/19 1326  Lavender Top  PROCEDURE ONCE      07/08/19 1325    07/08/19 1326  Red Top  PROCEDURE ONCE      07/08/19 1325    07/08/19 1310  ECG 12 Lead  STAT      07/08/19 1310    Unscheduled  Up With Assistance  As Needed      07/08/19 1856    --  SCANNED - TELEMETRY        07/08/19 0000             Physician Progress Notes (last 24 hours) (Notes from 7/8/2019  1:02 PM through 7/9/2019  1:02 PM)      Lenard Landa MD at 7/9/2019 10:52 AM              [facility] Medicine Services  INPATIENT PROGRESS NOTE    Patient Name: Edy Urbina  Date of Admission: 7/8/2019  Today's Date: 07/09/19  Length of Stay: 1  Primary Care Physician: Torey Amato DO    Subjective   Chief Complaint: Confusion  HPI   Confusion persists.  Nursing notes from overnight reviewed.  I did ask him if he has had tick exposure recently, and he reported yes.  He stated this occurred approximately 2 weeks ago.  He then will state that he is already treated himself for tick exposure, and holds up some body lotion that he is holding in his hand citing that this is the treatment that he has been utilizing.  At times he appears frustrated and argumentative.  He states that everything that is wrong with \"my brain\" is related to the recent hernia surgery that he had.  He repeatedly sits up and down in the bed, and appears to be able to move around without problem.  Daughter is at bedside.    Review of Systems     All pertinent negatives and positives are as above. All other systems have been reviewed and are negative unless otherwise stated.     Objective    Temp:  [97.5 °F (36.4 °C)-98.3 °F (36.8 °C)] 98.2 °F (36.8 " °C)  Heart Rate:  [59-85] 85  Resp:  [16-18] 17  BP: (119-133)/(61-74) 133/63  Physical Exam   Constitutional: No distress.   Confused; thin and cachectic appearing   HENT:   Head: Normocephalic.   Mouth/Throat: No oropharyngeal exudate.   Eyes: No scleral icterus.   Neck: No tracheal deviation present.   Pulmonary/Chest: Effort normal. No respiratory distress.   Musculoskeletal: He exhibits no edema.   Neurological:   Confused; moves all extremities and is able to sit up in bed without assistance.  Speech clear and fluent, but confusion persists - worse this afternoon as compared to when I saw him yesterday in ED.  No focal motor or sensory deficits appreciated   Skin: He is not diaphoretic.   Two areas of abrasion (about nickel-sized) on both shoulder blades; no fluctuance or purulent discharge   Psychiatric:   Appears frustrated and argumentative   Vitals reviewed.    Results Review:  I have reviewed the labs, radiology results, and diagnostic studies.    Laboratory Data:   Results from last 7 days   Lab Units 07/09/19  0442 07/08/19  1326   WBC 10*3/mm3 5.79 6.52   HEMOGLOBIN g/dL 11.8* 12.4*   HEMATOCRIT % 36.3* 37.7*   PLATELETS 10*3/mm3 214 243        Results from last 7 days   Lab Units 07/09/19  0442 07/08/19  1326   SODIUM mmol/L 139 144   POTASSIUM mmol/L 4.1 3.8   CHLORIDE mmol/L 107 108   CO2 mmol/L 29.0 26.0   BUN mg/dL 21 25*   CREATININE mg/dL 0.65 0.82   CALCIUM mg/dL 8.7 9.8   BILIRUBIN mg/dL 0.5 0.7   ALK PHOS U/L 48 56   ALT (SGPT) U/L 39 40   AST (SGOT) U/L 49* 63*   GLUCOSE mg/dL 90 132*       Culture Data:        Radiology Data:   Imaging Results (last 24 hours)     Procedure Component Value Units Date/Time    XR Chest 1 View [[id number]] Collected:  07/08/19 1433     Updated:  07/08/19 1437    Narrative:       EXAMINATION: XR CHEST 1 VW- 7/8/2019 2:33 PM CDT     HISTORY: Altered mental status, weight loss     COMPARISON: None     FINDINGS:  Heart appears normal in size. Atherosclerotic  calcifications are seen in  the aorta, which appears tortuous. The lungs are hyperinflated. There is  no focal consolidation or effusion. No pneumothorax is appreciated.  Pulmonary vasculature appears grossly normal.       Impression:       Chronic lung changes with associated hyperinflation. No  evidence of acute cardiopulmonary process.  This report was finalized on 07/08/2019 14:34 by Dr. Tim Ellsworth MD.    CT Head Without Contrast [[id number]] Collected:  07/08/19 1413     Updated:  07/08/19 1418    Narrative:       EXAMINATION: CT HEAD WO CONTRAST- 7/8/2019 2:13 PM CDT     HISTORY: Confusion, altered level of consciousness     COMPARISON: None     DOSE: 635 mGy-cm     TECHNIQUE: Sequential imaging was performed from the vertex through the  base of the skull without the use of IV contrast.  Sagittal and coronal  reformations were made from the original source data and reviewed.  Automated exposure control was also utilized to decrease patient  radiation dose.     FINDINGS:   There is no evidence of acute intracranial hemorrhage, mass, or mass  effect.  The corticomedullary differentiation is normal without evidence  of acute infarct. There are periventricular and subcortical white matter  changes, a nonspecific finding most often seen as sequela of chronic  microvascular ischemia. The ventricles appear normal in configuration.  The basilar cisterns are patent. The posterior fossa is grossly normal  in appearance. No depressed skull fracture is identified. The paranasal  sinuses and mastoid air cells appear clear. Calcifications are seen in  the cavernous carotid arteries.           Impression:       No acute intracranial findings.     This report was finalized on 07/08/2019 14:15 by Dr. Tim Ellsworth MD.          I have reviewed the patient's current medications.     Assessment/Plan     Active Hospital Problems    Diagnosis   • Anemia   • Altered mental status   • Confusion   • Weight loss   • Underweight   •  Inguinal hernia     S/p repair by Dr. Mora 6/2019       Plan:  1.  Plan for MRI Brain today  2.  Send Ehrlichia and RMSF serologies  3.  Start empiric Doxycycline  4.  Continue IVFs  5.  Received banana bag X 1 on admission  6.  Nutrition consult for malnutrition severity assessment  7.  May ultimately require Neurology consultation  8.  Norco stopped  9.  Neuro checks  10.  PT and OT assessment  11.  Labs reviewed; stable  12.  Workup continues; AMS and confusion persists, nursing notes from overnight reviewed.      Lenard Landa MD   07/09/19   11:06 AM    Electronically signed by Lenard Landa MD at 7/9/2019 11:17 AM       Consult Notes (last 24 hours) (Notes from 7/8/2019  1:02 PM through 7/9/2019  1:02 PM)     No notes of this type exist for this encounter.

## 2019-07-09 NOTE — PROGRESS NOTES
Malnutrition Severity Assessment    Patient Name:  Edy Urbina  YOB: 1940  MRN: [id number]  Admit Date:  7/8/2019    Patient meets criteria for : Moderate (non-severe) Malnutrition    Comments:  If in agreement with malnutrition assessment, please attest documentation. Thanks.     Malnutrition Severity Assessment  Malnutrition Type: Acute Disease or Injury - Related Malnutrition     Malnutrition Type (last 8 hours)      Malnutrition Severity Assessment     Row Name 07/09/19 1524       Malnutrition Severity Assessment    Malnutrition Type  Acute Disease or Injury - Related Malnutrition    Row Name 07/09/19 1524       Insufficient Energy Intake     Insufficient Energy Intake   <75% of est. energy requirement for > or equal to 1 month    Row Name 07/09/19 1524       Unintentional Weight Loss     Unintentional Weight Loss   -- 4% over 3 weeks    Row Name 07/09/19 1524       Muscle Loss    Loss of Muscle Mass Findings  Moderate    Latter-day Region  Moderate - slight depression    Clavicle Bone Region  Severe - protruding prominent bone    Acromion Bone Region  Moderate - acromion may slightly protrude    Row Name 07/09/19 1524       Fat Loss    Subcutaneous Fat Loss Findings  Moderate    Orbital Region   Moderate -  somewhat hollowness, slightly dark circles    Upper Arm Region  Moderate - some fat tissue, not ample    Thoracic & Lumbar Region  Severe - ribs visible with prominent depressions, iliac crest very prominent    Row Name 07/09/19 1524       Criteria Met (Must meet criteria for severity in at least 2 of these categories: M Wasting, Fat Loss, Fluid, Secondary Signs, Wt. Status, Intake)    Patient meets criteria for   Moderate (non-severe) Malnutrition          Electronically signed by:  TEZ Turpin RDN  07/09/19 3:31 PM

## 2019-07-09 NOTE — NURSING NOTE
Patient got very confused this morning. At 0630 had gotten him up to void. He was having a hard time voiding. Wanting water turned on. Then he said he was told by his doctor to rock back and forth on toilet. He then asked to have his shoulders pushed back. I told him if he was having trouble urinating that I need to do a bladder scan on him. Helped him back to bed. Then he proceed to take his gown off. He said he likes to be naked and that it was good to air out things. He then started to get water and rub all over him, said he was told by doctor that this was good medicine to rub on him, especially on the back of his shoulder blades. ( There is a spot on each shoulder blade, that he had said each had a tic, that doctor had put medicine on it. Almost look like friction burn.) I proceeded to put scanner gel on his lower abdomen, which he then rub his hands in it and spread it all over his body. He also took a towel and started rubbing his back with it, causing the left spot on his left shoulder blade to bleed. He refused a bandaid. Said they are to be aired out. Bladder scan was performed, 190ml. He laid in his bed, bringing his legs up, said he has to keep it aired out down there. He also was getting upset that he didn't have his paperwork to show doctor. He said he is to see Dr Alesia Mercado today. I told him that Dr Mercado will see him sometime today. I had charge nurse Nuzhat DESAI and aid Alexandru Wright in room with me.

## 2019-07-09 NOTE — THERAPY EVALUATION
Acute Care - Occupational Therapy Initial Evaluation  [facility]     Patient Name: Edy Urbina  : 1940  MRN: [id number]  Today's Date: 2019  Onset of Illness/Injury or Date of Surgery: 19  Date of Referral to OT: 19  Referring Physician: Dr. Landa    Admit Date: 2019       ICD-10-CM ICD-9-CM   1. Confusion R41.0 298.9   2. Decreased activities of daily living (ADL) R68.89 780.99     Patient Active Problem List   Diagnosis   • Varicose vein of leg   • BMI less than 19,adult   • Confusion   • Weight loss   • Underweight   • Inguinal hernia   • Anemia   • Altered mental status     Past Medical History:   Diagnosis Date   • Cataracts, bilateral    • Occasional tremors    • Prostate cancer (CMS/HCC) 2011    t2c,Sommer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    • Varicose veins of legs      Past Surgical History:   Procedure Laterality Date   • EYE SURGERY     • HERNIA REPAIR Left    • INGUINAL HERNIA REPAIR Right 2019    Procedure: OPEN RIGHT INGUINAL HERNIA REPAIR WITH MESH;  Surgeon: Alesia Mora MD;  Location: [facility] OR;  Service: General   • PROSTATECTOMY  2011    RALP          OT ASSESSMENT FLOWSHEET (last 12 hours)      Occupational Therapy Evaluation     Row Name 19 1034                   OT Evaluation Time/Intention    Subjective Information  complains of;fatigue  -MM        Document Type  evaluation  -MM        Mode of Treatment  occupational therapy  -MM           General Information    Patient Profile Reviewed?  yes  -MM        Onset of Illness/Injury or Date of Surgery  19  -MM        Referring Physician  Dr. Landa  -MM        Patient Observations  alert;cooperative;agree to therapy  -MM        Patient/Family Observations  daughter and her 2 young sons present. Daughter reports her 7 week old infant has RSV. RN and MD notified.  -MM        General Observations of Patient  fowlers, no apparent distress, IV, telemetry  -MM        Prior  Level of Function  independent:;all household mobility;community mobility;ADL's;driving  -MM        Equipment Currently Used at Home  none  -MM        Pertinent History of Current Functional Problem  increased confusion. Recent hernia repair 1 week prior  -MM        Existing Precautions/Restrictions  fall;other (see comments) confusion  -MM        Risks Reviewed  patient:;LOB;dizziness;increased discomfort  -MM        Benefits Reviewed  patient:;improve function;increase knowledge  -MM        Barriers to Rehab  cognitive status  -MM           Relationship/Environment    Primary Source of Support/Comfort  child(lakia)  -MM        Lives With  alone  -MM        Family Caregiver if Needed  child(lakia), adult  -MM           Resource/Environmental Concerns    Current Living Arrangements  home/apartment/condo  -MM           Home Main Entrance    Number of Stairs, Main Entrance  three  -MM        Stair Railings, Main Entrance  none  -MM           Cognitive Assessment/Interventions    Additional Documentation  Cognitive Assessment/Intervention (Group)  -MM           Cognitive Assessment/Intervention- PT/OT    Affect/Mental Status (Cognitive)  confused  -MM        Orientation Status (Cognition)  oriented x 4 conversationally confused  -MM        Follows Commands (Cognition)  follows one step commands;25-49% accuracy;other (see comments);verbal cues/prompting required;repetition of directions required increased difficulty with new tasks, slight improvement   -MM        Safety Deficit (Cognitive)  moderate deficit;ability to follow commands;at risk behavior observed;awareness of need for assistance;impulsivity;insight into deficits/self awareness;judgment;problem solving;safety precautions awareness;safety precautions follow-through/compliance  -MM        Personal Safety Interventions  elopement precautions initiated;fall prevention program maintained;gait belt;muscle strengthening facilitated;nonskid shoes/slippers when out of  bed;supervised activity  -MM           Safety Issues, Functional Mobility    Safety Issues Affecting Function (Mobility)  ability to follow commands;at risk behavior observed;impulsivity;judgment;problem solving  -MM        Impairments Affecting Function (Mobility)  cognition  -MM           Bed Mobility Assessment/Treatment    Bed Mobility Assessment/Treatment  bed mobility (all) activities  -MM        [place] Level (Bed Mobility)  supervision  -MM           Functional Mobility    Functional Mobility- Ind. Level  supervision required;verbal cues required  -MM        Functional Mobility- Comment  down [place].   -MM           Transfer Assessment/Treatment    Transfer Assessment/Treatment  sit-stand transfer;stand-sit transfer  -MM        Comment (Transfers)  supervision for safety  -MM           Sit-Stand Transfer    Sit-Stand Rains (Transfers)  supervision  -MM           Stand-Sit Transfer    Stand-Sit Rains (Transfers)  supervision  -MM           ADL Assessment/Intervention    BADL Assessment/Intervention  upper body dressing  -MM           Upper Body Dressing Assessment/Training    Upper Body Dressing Rains Level  don;doff;minimum assist (75% patient effort);verbal cues hospital gown  -MM        Upper Body Dressing Position  edge of bed sitting  -MM        Comment (Upper Body Dressing)  d/t multiple lines  -MM           BADL Safety/Performance    Impairments, BADL Safety/Performance  cognition  -MM           General ROM    GENERAL ROM COMMENTS  WFL  -MM           MMT (Manual Muscle Testing)    General MMT Comments  WFL 5/5 grossly  -MM           Motor Assessment/Interventions    Additional Documentation  Fine Motor Testing & Training (Group);Gross Motor Coordination (Group)  -MM           Gross Motor Coordination    Gross Motor Skill, Impairments Detail  mild impairment BUE, could be d/t impaired command following  -MM           Fine Motor Testing & Training    Comment, Fine Motor  Coordination  mild impairment BUE, could be d/t impaired command following  -MM           Sensory Assessment/Intervention    Sensory General Assessment  other (see comments) difficult to formally assess  -MM           Vision Assessment/Intervention    Vision Assessment Comment  pt reports not impairments, has glasses  -MM           Positioning and Restraints    Pre-Treatment Position  in bed  -MM        Post Treatment Position  bed  -MM        In Bed  notified nsg;fowlers;call light within reach;encouraged to call for assist;exit alarm on;with family/caregiver;side rails up x2  -MM           Pain Scale: Numbers Pre/Post-Treatment    Pain Scale: Numbers, Pretreatment  0/10 - no pain  -MM        Pain Scale: Numbers, Post-Treatment  0/10 - no pain  -MM           Plan of Care Review    Plan of Care Reviewed With  patient;daughter;grandchild(lakia)  -MM           Clinical Impression (OT)    Date of Referral to OT  07/08/19  -MM        OT Diagnosis  decreased adl's and confusion  -MM        Functional Level at Time of Evaluation (OT Eval)  good/fair  -MM        Patient/Family Goals Statement (OT Eval)  return home  -MM        Criteria for Skilled Therapeutic Interventions Met (OT Eval)  yes;treatment indicated  -MM        Rehab Potential (OT Eval)  good, to achieve stated therapy goals  -MM        Therapy Frequency (OT Eval)  3 times/wk  -MM        Predicted Duration of Therapy Intervention (Therapy Eval)  until d/c  -MM        Care Plan Review (OT)  evaluation/treatment results reviewed;care plan/treatment goals reviewed;risks/benefits reviewed;current/potential barriers reviewed;patient/other agree to care plan  -MM        Anticipated Discharge Disposition (OT)  home with 24/7 care  -MM           Planned OT Interventions    Planned Therapy Interventions (OT Eval)  activity tolerance training;adaptive equipment training;BADL retraining;cognitive/visual perception retraining;functional balance retraining;neuromuscular  control/coordination retraining;occupation/activity based interventions;patient/caregiver education/training;ROM/therapeutic exercise;strengthening exercise;transfer/mobility retraining  -MM           OT Goals    Bathing Goal Selection (OT)  bathing, OT goal 1  -MM        Dressing Goal Selection (OT)  dressing, OT goal 1  -MM           Bathing Goal 1 (OT)    Activity/Assistive Device (Bathing Goal 1, OT)  bathing skills, all  -MM        [place] Level/Cues Needed (Bathing Goal 1, OT)  independent  -MM        Time Frame (Bathing Goal 1, OT)  10 days  -MM        Progress/Outcomes (Bathing Goal 1, OT)  goal ongoing  -MM           Dressing Goal 1 (OT)    Activity/Assistive Device (Dressing Goal 1, OT)  dressing skills, all  -MM        [place]/Cues Needed (Dressing Goal 1, OT)  independent  -MM        Time Frame (Dressing Goal 1, OT)  10 days  -MM        Progress/Outcome (Dressing Goal 1, OT)  goal ongoing  -MM           OT Cognitive Goals    Directions Goal Selection (OT)  direction following, OT goal 1  -MM        Additional Documentation  Directions Goal Selection (OT) (Row)  -MM           Direction Following Goal 1 (OT)    Activity (Direction Following Goal 1, OT)  follow one step directions  -MM        [place]/Cues/Accuracy (Direction Following Goal 1, OT)  independently;with 90% accuracy  -MM        Time Frame (Direction Following Goal 1, OT)  10 days  -MM        Progress/Outcome (Direction Following Goal 1, OT)  goal ongoing  -MM           Living Environment    Home Accessibility  stairs to enter home;tub/shower is not walk in  -MM          User Key  (r) = Recorded By, (t) = Taken By, (c) = Cosigned By    Initials Name Effective Dates    MM Rick Herbert, KINGR/KURTIS 04/03/18 -          Occupational Therapy Education     Title: PT OT SLP Therapies (In Progress)     Topic: Occupational Therapy (In Progress)     Point: ADL training (Done)     Description: Instruct learner(s) on proper safety adaptation  and remediation techniques during self care or transfers.   Instruct in proper use of assistive devices.    Learning Progress Summary           Patient Acceptance, E, VU by CHRISTOS at 7/9/2019 11:52 AM    Comment:  OT role, benefits of therapy and medical care, POC   Family Acceptance, E, VU by MM at 7/9/2019 11:52 AM    Comment:  OT role, benefits of therapy and medical care, POC                   Point: Precautions (Done)     Description: Instruct learner(s) on prescribed precautions during self-care and functional transfers.    Learning Progress Summary           Patient Acceptance, E, VU by MM at 7/9/2019 11:52 AM    Comment:  OT role, benefits of therapy and medical care, POC   Family Acceptance, E, VU by MM at 7/9/2019 11:52 AM    Comment:  OT role, benefits of therapy and medical care, POC                               User Key     Initials Effective Dates Name Provider Type Discipline    CHRISTOS 04/03/18 -  Rick Herbert, OTR/L Occupational Therapist OT                  OT Recommendation and Plan  Outcome Summary/Treatment Plan (OT)  Anticipated Discharge Disposition (OT): home with 24/7 care  Planned Therapy Interventions (OT Eval): activity tolerance training, adaptive equipment training, BADL retraining, cognitive/visual perception retraining, functional balance retraining, neuromuscular control/coordination retraining, occupation/activity based interventions, patient/caregiver education/training, ROM/therapeutic exercise, strengthening exercise, transfer/mobility retraining  Therapy Frequency (OT Eval): 3 times/wk  Plan of Care Review  Plan of Care Reviewed With: patient, daughter, grandchild(lakia)  Plan of Care Reviewed With: patient, daughter, grandchild(lakia)  Outcome Summary: OT eval completed. Pt a&o x4, but conversationally confused. pt had decreased command following, processing, and completion of new tasks. Pt required min A to kylah gown d/t multiple wires present. Pt was supervision for safety concerns  for all mobility. Pt's daughter reports her infant that is present in room has RSV, notified RN and MD. SKilled OT recommended to address adls, functional mobility and safety. Recommended d/c home with 24/7 assist 2'  confusion.    Outcome Measures     Row Name 07/09/19 1100             How much help from another is currently needed...    Putting on and taking off regular lower body clothing?  3  -MM      Bathing (including washing, rinsing, and drying)  3  -MM      Toileting (which includes using toilet bed pan or urinal)  3  -MM      Putting on and taking off regular upper body clothing  3  -MM      Taking care of personal grooming (such as brushing teeth)  4  -MM      Eating meals  4  -MM      AM-PAC 6 Clicks Score (OT)  20  -MM         Functional Assessment    Outcome Measure Options  AM-PAC 6 Clicks Daily Activity (OT)  -MM        User Key  (r) = Recorded By, (t) = Taken By, (c) = Cosigned By    Initials Name Provider Type    Rick Villalobos, OTR/L Occupational Therapist          Time Calculation:   Time Calculation- OT     Row Name 07/09/19 1034             Time Calculation- OT    OT Start Time  1034  -MM      OT Stop Time  1134  -MM      OT Time Calculation (min)  60 min  -MM      OT Received On  07/09/19  -MM      OT Goal Re-Cert Due Date  07/19/19  -MM        User Key  (r) = Recorded By, (t) = Taken By, (c) = Cosigned By    Initials Name Provider Type    Rick Villalobos, OTR/L Occupational Therapist        Therapy Charges for Today     Code Description Service Date Service Provider Modifiers Qty    [id number]  OT EVAL MOD COMPLEXITY 4 7/9/2019 Rick Herbert, OTR/L GO 1               UDAY Mahoney/KURTIS  7/9/2019

## 2019-07-09 NOTE — PROGRESS NOTES
"    [facility] Medicine Services  INPATIENT PROGRESS NOTE    Patient Name: Edy Urbina  Date of Admission: 7/8/2019  Today's Date: 07/09/19  Length of Stay: 1  Primary Care Physician: Torey Amato DO    Subjective   Chief Complaint: Confusion  HPI   Confusion persists.  Nursing notes from overnight reviewed.  I did ask him if he has had tick exposure recently, and he reported yes.  He stated this occurred approximately 2 weeks ago.  He then will state that he is already treated himself for tick exposure, and holds up some body lotion that he is holding in his hand citing that this is the treatment that he has been utilizing.  At times he appears frustrated and argumentative.  He states that everything that is wrong with \"my brain\" is related to the recent hernia surgery that he had.  He repeatedly sits up and down in the bed, and appears to be able to move around without problem.  Daughter is at bedside.    Review of Systems     All pertinent negatives and positives are as above. All other systems have been reviewed and are negative unless otherwise stated.     Objective    Temp:  [97.5 °F (36.4 °C)-98.3 °F (36.8 °C)] 98.2 °F (36.8 °C)  Heart Rate:  [59-85] 85  Resp:  [16-18] 17  BP: (119-133)/(61-74) 133/63  Physical Exam   Constitutional: No distress.   Confused; thin and cachectic appearing   HENT:   Head: Normocephalic.   Mouth/Throat: No oropharyngeal exudate.   Eyes: No scleral icterus.   Neck: No tracheal deviation present.   Pulmonary/Chest: Effort normal. No respiratory distress.   Musculoskeletal: He exhibits no edema.   Neurological:   Confused; moves all extremities and is able to sit up in bed without assistance.  Speech clear and fluent, but confusion persists - worse this afternoon as compared to when I saw him yesterday in ED.  No focal motor or sensory deficits appreciated   Skin: He is not diaphoretic.   Two areas of abrasion (about nickel-sized) on both " shoulder blades; no fluctuance or purulent discharge   Psychiatric:   Appears frustrated and argumentative   Vitals reviewed.    Results Review:  I have reviewed the labs, radiology results, and diagnostic studies.    Laboratory Data:   Results from last 7 days   Lab Units 07/09/19  0442 07/08/19  1326   WBC 10*3/mm3 5.79 6.52   HEMOGLOBIN g/dL 11.8* 12.4*   HEMATOCRIT % 36.3* 37.7*   PLATELETS 10*3/mm3 214 243        Results from last 7 days   Lab Units 07/09/19  0442 07/08/19  1326   SODIUM mmol/L 139 144   POTASSIUM mmol/L 4.1 3.8   CHLORIDE mmol/L 107 108   CO2 mmol/L 29.0 26.0   BUN mg/dL 21 25*   CREATININE mg/dL 0.65 0.82   CALCIUM mg/dL 8.7 9.8   BILIRUBIN mg/dL 0.5 0.7   ALK PHOS U/L 48 56   ALT (SGPT) U/L 39 40   AST (SGOT) U/L 49* 63*   GLUCOSE mg/dL 90 132*       Culture Data:        Radiology Data:   Imaging Results (last 24 hours)     Procedure Component Value Units Date/Time    XR Chest 1 View [[id number]] Collected:  07/08/19 1433     Updated:  07/08/19 1437    Narrative:       EXAMINATION: XR CHEST 1 VW- 7/8/2019 2:33 PM CDT     HISTORY: Altered mental status, weight loss     COMPARISON: None     FINDINGS:  Heart appears normal in size. Atherosclerotic calcifications are seen in  the aorta, which appears tortuous. The lungs are hyperinflated. There is  no focal consolidation or effusion. No pneumothorax is appreciated.  Pulmonary vasculature appears grossly normal.       Impression:       Chronic lung changes with associated hyperinflation. No  evidence of acute cardiopulmonary process.  This report was finalized on 07/08/2019 14:34 by Dr. Tim Ellsworth MD.    CT Head Without Contrast [[id number]] Collected:  07/08/19 1413     Updated:  07/08/19 1418    Narrative:       EXAMINATION: CT HEAD WO CONTRAST- 7/8/2019 2:13 PM CDT     HISTORY: Confusion, altered level of consciousness     COMPARISON: None     DOSE: 635 mGy-cm     TECHNIQUE: Sequential imaging was performed from the vertex through the  base  of the skull without the use of IV contrast.  Sagittal and coronal  reformations were made from the original source data and reviewed.  Automated exposure control was also utilized to decrease patient  radiation dose.     FINDINGS:   There is no evidence of acute intracranial hemorrhage, mass, or mass  effect.  The corticomedullary differentiation is normal without evidence  of acute infarct. There are periventricular and subcortical white matter  changes, a nonspecific finding most often seen as sequela of chronic  microvascular ischemia. The ventricles appear normal in configuration.  The basilar cisterns are patent. The posterior fossa is grossly normal  in appearance. No depressed skull fracture is identified. The paranasal  sinuses and mastoid air cells appear clear. Calcifications are seen in  the cavernous carotid arteries.           Impression:       No acute intracranial findings.     This report was finalized on 07/08/2019 14:15 by Dr. Tim Ellsworth MD.          I have reviewed the patient's current medications.     Assessment/Plan     Active Hospital Problems    Diagnosis   • Anemia   • Altered mental status   • Confusion   • Weight loss   • Underweight   • Inguinal hernia     S/p repair by Dr. Mora 6/2019       Plan:  1.  Plan for MRI Brain today  2.  Send Ehrlichia and RMSF serologies  3.  Start empiric Doxycycline  4.  Continue IVFs  5.  Received banana bag X 1 on admission  6.  Nutrition consult for malnutrition severity assessment  7.  May ultimately require Neurology consultation  8.  Norco stopped  9.  Neuro checks  10.  PT and OT assessment  11.  Labs reviewed; stable  12.  Workup continues; AMS and confusion persists, nursing notes from overnight reviewed.      Lenard Landa MD   07/09/19   11:06 AM

## 2019-07-09 NOTE — PLAN OF CARE
Problem: Patient Care Overview  Goal: Plan of Care Review  Outcome: Ongoing (interventions implemented as appropriate)   07/09/19 0456   Coping/Psychosocial   Plan of Care Reviewed With patient   Plan of Care Review   Progress no change   OTHER   Outcome Summary Patient is alert and oriented x 4. At times seem confused. Unsteady gait. Denies any pain. VSS. Has healed incision to R groin ( had Inguinal hernia repair, has an outpt appointment with Dr Mercado today, but Dr Mercado is consulted. VSS. On tele- NSR. Received banana bag last night. Safety maintained.      Goal: Individualization and Mutuality  Outcome: Ongoing (interventions implemented as appropriate)      Problem: Fall Risk (Adult)  Goal: Identify Related Risk Factors and Signs and Symptoms  Outcome: Ongoing (interventions implemented as appropriate)    Goal: Absence of Fall  Outcome: Ongoing (interventions implemented as appropriate)      Problem: Confusion, Acute (Adult)  Goal: Identify Related Risk Factors and Signs and Symptoms  Outcome: Ongoing (interventions implemented as appropriate)    Goal: Cognitive/Functional Impairments Minimized  Outcome: Ongoing (interventions implemented as appropriate)    Goal: Safety  Outcome: Ongoing (interventions implemented as appropriate)

## 2019-07-09 NOTE — PLAN OF CARE
Problem: Patient Care Overview  Goal: Plan of Care Review  Outcome: Ongoing (interventions implemented as appropriate)   07/09/19 1209   Coping/Psychosocial   Plan of Care Reviewed With patient   Plan of Care Review   Progress improving   OTHER   Outcome Summary PT evaluation completed. The patient is up with supervision due to his confusion and poor judgement making. He is oriented x4 but is confused conversationally. He was able to perform all high level gait tasks without difficulty. No further PT warranted, but recommend continued supervision until confusion improves.

## 2019-07-10 PROBLEM — E44.0 PROTEIN-CALORIE MALNUTRITION, MODERATE: Status: ACTIVE | Noted: 2019-07-10

## 2019-07-10 LAB
AMMONIA BLD-SCNC: <9 UMOL/L (ref 9–33)
FOLATE SERPL-MCNC: >20 NG/ML (ref 4.78–24.2)
R RICKETTSI IGG SER QL IA: NEGATIVE

## 2019-07-10 PROCEDURE — 25010000002 THIAMINE PER 100 MG: Performed by: INTERNAL MEDICINE

## 2019-07-10 PROCEDURE — 82140 ASSAY OF AMMONIA: CPT | Performed by: PSYCHIATRY & NEUROLOGY

## 2019-07-10 PROCEDURE — 82746 ASSAY OF FOLIC ACID SERUM: CPT | Performed by: PSYCHIATRY & NEUROLOGY

## 2019-07-10 PROCEDURE — 84630 ASSAY OF ZINC: CPT | Performed by: PSYCHIATRY & NEUROLOGY

## 2019-07-10 PROCEDURE — 84425 ASSAY OF VITAMIN B-1: CPT | Performed by: PSYCHIATRY & NEUROLOGY

## 2019-07-10 PROCEDURE — 99222 1ST HOSP IP/OBS MODERATE 55: CPT | Performed by: PSYCHIATRY & NEUROLOGY

## 2019-07-10 PROCEDURE — 97535 SELF CARE MNGMENT TRAINING: CPT

## 2019-07-10 PROCEDURE — 82390 ASSAY OF CERULOPLASMIN: CPT | Performed by: PSYCHIATRY & NEUROLOGY

## 2019-07-10 RX ORDER — QUETIAPINE FUMARATE 25 MG/1
25 TABLET, FILM COATED ORAL EVERY 12 HOURS SCHEDULED
Status: DISCONTINUED | OUTPATIENT
Start: 2019-07-10 | End: 2019-07-12

## 2019-07-10 RX ADMIN — QUETIAPINE FUMARATE 25 MG: 25 TABLET, FILM COATED ORAL at 12:51

## 2019-07-10 RX ADMIN — DOXYCYCLINE 100 MG: 100 INJECTION, POWDER, LYOPHILIZED, FOR SOLUTION INTRAVENOUS at 00:33

## 2019-07-10 RX ADMIN — FOLIC ACID 125 ML/HR: 5 INJECTION, SOLUTION INTRAMUSCULAR; INTRAVENOUS; SUBCUTANEOUS at 21:21

## 2019-07-10 RX ADMIN — DOXYCYCLINE 100 MG: 100 INJECTION, POWDER, LYOPHILIZED, FOR SOLUTION INTRAVENOUS at 11:24

## 2019-07-10 RX ADMIN — SODIUM CHLORIDE, POTASSIUM CHLORIDE, SODIUM LACTATE AND CALCIUM CHLORIDE 100 ML/HR: 600; 310; 30; 20 INJECTION, SOLUTION INTRAVENOUS at 07:45

## 2019-07-10 RX ADMIN — QUETIAPINE FUMARATE 25 MG: 25 TABLET, FILM COATED ORAL at 20:16

## 2019-07-10 NOTE — PLAN OF CARE
Problem: Patient Care Overview  Goal: Plan of Care Review  Outcome: Ongoing (interventions implemented as appropriate)   07/10/19 1102   Coping/Psychosocial   Plan of Care Reviewed With patient   Plan of Care Review   Progress improving   OTHER   Outcome Summary Pt oriented to self, time and place but not oriented to situation. Pt followed all commands given during treatment. Pt transfers and ambulated with S and donned undergarment with set up. Pt plans to discharge home with family assist. Continue OT POC

## 2019-07-10 NOTE — PLAN OF CARE
Problem: Patient Care Overview  Goal: Plan of Care Review  Outcome: Ongoing (interventions implemented as appropriate)   07/10/19 0342   Coping/Psychosocial   Plan of Care Reviewed With patient   Plan of Care Review   Progress no change   OTHER   Outcome Summary VSS. No change in neuro status. Pt is a/o x 4. Then becomes very agitated/confused about where he is and what he is doing. Continue in restraints, remove q 2 and prn. Pt will try to pull out iv and scratch at self when not. Became very upset when iv restarted. Family did not stay during the night. No c/o pain voided. Safety maintained.       Problem: Fall Risk (Adult)  Goal: Identify Related Risk Factors and Signs and Symptoms  Outcome: Ongoing (interventions implemented as appropriate)    Goal: Absence of Fall  Outcome: Outcome(s) achieved Date Met: 07/10/19      Problem: Confusion, Acute (Adult)  Goal: Identify Related Risk Factors and Signs and Symptoms  Outcome: Ongoing (interventions implemented as appropriate)    Goal: Cognitive/Functional Impairments Minimized  Outcome: Ongoing (interventions implemented as appropriate)    Goal: Safety  Outcome: Ongoing (interventions implemented as appropriate)      Problem: Restraint, Nonbehavioral (Nonviolent)  Goal: Rationale and Justification  Outcome: Ongoing (interventions implemented as appropriate)    Goal: Nonbehavioral (Nonviolent) Restraint: Absence of Injury/Harm  Outcome: Ongoing (interventions implemented as appropriate)    Goal: Nonbehavioral (Nonviolent) Restraint: Achievement of Discontinuation Criteria  Outcome: Ongoing (interventions implemented as appropriate)    Goal: Nonbehavioral (Nonviolent) Restraint: Preservation of Dignity and Wellbeing  Outcome: Ongoing (interventions implemented as appropriate)

## 2019-07-10 NOTE — CONSULTS
Neurology Consult Note    Referring Provider: Dr Momo Landa  Reason for Consultation: altered mentation      History of present illness:      This is a 78-year-old male.  He is a poor historian.  His son and daughter are at the bedside and assist in the history.  They tell me that for the better part of a year they have been somewhat concerned about his mentation.  Specifically they have noted things over the phone.  They have noticed hallucinations and paranoia.  They give examples that he will see bugs on the ceiling of the wall.  He becomes fixated on objects.  He lives alone and therefore it is difficult to say the exact initiation of the symptoms.  They do give examples that even as distant as 1 year ago he was having memory issues.  He will have to make a list of things to do that day including writing down that he will need to eat.  Otherwise he would forget it.  On June 28 he had a hernia surgery.  Since that time his mentation has worsened drastically.  He was found after driving to [facility] to be extremely confused and combative.  The police were called and he was delivered to our emergency department.  He has had continued atypical behavior since admission.  He has visual and auditory hallucinations.  He has episodes of extreme confusion followed by a complete return to baseline.  The duration of the episodes is greatly varied.  He is moving all extremities.  He has no difficulties with ambulation.  He has very bizarre behavior.  An example is that yesterday he believed that some hand lotion actually was the key to internal life.  He also has attempted multiple times to shove objects up his rectum.  They have noticed an occasional resting tremor.      Past Medical History:   Diagnosis Date   • Cataracts, bilateral    • Occasional tremors    • Prostate cancer (CMS/[facility]) 05/2011    t2c,Sommer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    • Varicose veins of legs        No Known Allergies  No  current facility-administered medications on file prior to encounter.      Current Outpatient Medications on File Prior to Encounter   Medication Sig   • Cinnamon 500 MG tablet Take 500 mg by mouth Daily.   • Doxylamine Succinate, Sleep, (EQ SLEEP AID PO) Take 1 tablet by mouth As Needed.   • HYDROcodone-acetaminophen (NORCO) 7.5-325 MG per tablet Take 1-2 tablets by mouth Every 4 (Four) Hours As Needed for Moderate Pain  (Pain).   • Multiple Vitamins-Minerals (MULTIVITAMIN PO) Take 1 tablet by mouth Daily.   • Omega-3 Fatty Acids (FISH OIL) 1000 MG capsule capsule Take 1,000 mg by mouth Daily With Breakfast.       Social History     Socioeconomic History   • Marital status:      Spouse name: Not on file   • Number of children: Not on file   • Years of education: Not on file   • Highest education level: Not on file   Tobacco Use   • Smoking status: Former Smoker     Years: 50.00     Types: Cigarettes     Last attempt to quit: 2002     Years since quittin.0   • Smokeless tobacco: Never Used   Substance and Sexual Activity   • Alcohol use: No     Comment: QUIT DRINKING IN    • Drug use: No   • Sexual activity: Defer     Family History   Problem Relation Age of Onset   • Stroke Mother        Review of Systems  A 14 point review of systems was reviewed and was negative except for confusion    Vital Signs   Temp:  [97.8 °F (36.6 °C)-98.8 °F (37.1 °C)] 98.2 °F (36.8 °C)  Heart Rate:  [59-74] 74  Resp:  [16-17] 16  BP: (114-156)/(60-80) 156/80    General Exam:  Head:  Normal cephalic, atraumatic  HEENT:  Neck supple  Fundoscopic Exam:  No signs of disc edema  CVS:  Regular rate and rhythm.  No murmurs  Carotid Examination:  No bruits  Lungs:  Clear to auscultation  Abdomen:  Non-tender, Non-distended  Extremities:  No signs of peripheral edema  Skin:  No rashes    Neurologic Exam:    Mental Status:    -Awake, Alert, Oriented X 3  -No word finding difficulties  -No aphasia  -No dysarthria  -Follows  simple and complex commands    CN II:  Visual fields full.  Pupils equally reactive to light  CN III, IV, VI:  Extraocular Muscles full with no signs of nystagmus  CN V:  Facial sensory is symmetric with no asymmetries.  CN VII:  Facial motor symmetric.  Somewhat of a flat face  CN VIII:  Gross hearing intact bilaterally  CN IX:  Palate elevates symmetrically  CN X:  Palate elevates symmetrically  CN XI:  Shoulder shrug symmetric  CN XII:  Tongue is midline on protrusion    Motor: (strength out of 5:  1= minimal movement, 2 = movement in plane of gravity, 3 = movement against gravity, 4 = movement against some resistance, 5 = full strength)    -Right Upper Ext: Proximal: 5 Distal: 5  -Left Upper Ext: Proximal: 5 Distal: 5    -Right Lower Ext: Proximal: 5 Distal: 5  -Left Lower Ext: Proximal: 5 Distal: 5    With distraction on the contralateral side I noticed mild cogwheel rigidity with rotation of the wrist.    DTR:  -Right   Bicep: 2+ Tricep: 2+ Brachoradialis: 2+   Patella: 2+ Ankle: 2+ Neg Babinski  -Left   Bicep: 2+ Tricep: 2+ Brachoradialis: 2+   Patella: 2+ Ankle: 2+ Neg Babinski    Sensory:  -Intact to light touch, pinprick, temperature, pain, and proprioception    Coordination:  -Finger to nose intact  -Heel to shin intact  -No ataxia    Gait  -No signs of ataxia  -ambulates unassisted      Results Review:  Lab Results (last 24 hours)     Procedure Component Value Units Date/Time    [facility] Spotted Fever, IgG [[id number]] Collected:  07/09/19 0442    Specimen:  Blood Updated:  07/09/19 1123    [place] Spotted Fever, IgM [[id number]] Collected:  07/09/19 0442    Specimen:  Blood Updated:  07/09/19 1123    Ehrlichia Antibody Panel [[id number]] Collected:  07/08/19 1326    Specimen:  Blood Updated:  07/09/19 1123          .  Imaging Results (last 24 hours)     Procedure Component Value Units Date/Time    MRI Brain With & Without Contrast [[id number]] Collected:  07/09/19 1840     Updated:  07/09/19 1846     Narrative:       EXAMINATION:  MRI BRAIN WITHOUT AND WITH CONTRAST-  7/9/2019 6:06 PM CDT     HISTORY: Confusion/delirium, altered LOC, unexplained;  R41.0-Disorientation, unspecified; R68.89-Other general symptoms and  signs.     TECHNIQUE: Multiplanar imaging was performed in a high field magnet  before and after gadolinium contrast administration.     COMPARISON: No comparison study.     FINDINGS: There is no evidence of acute infarct on the  diffusion-weighted sequence. There are scattered areas of T2 high signal  within the hemispheric white matter. There is mild to moderate atrophy  with associated ventricular prominence. There are no structural  abnormalities. There are no areas of abnormal contrast enhancement.  There is some motion artifact on some of the sequences.       Impression:       1. No evidence of acute infarct.  2. Mild to moderate atrophy with associated ventricular prominence.  3. T2 high signal within the hemispheric white matter is nonspecific and  most likely due to chronic small vessel disease.     This report was finalized on 07/09/2019 18:43 by Dr. Esa Mcqueen MD.               MRI brain reviewed by me.  Flair sequencing is unremarkable.  There is some mild atrophy noted.  It is my opinion that the midbrain is somewhat thin best seen on the midline in the sagittal sequencing almost consistent with a hummingbird sign.    [place] spotted fever pending   iron profile normal  Vitamin D normal  Vitamin B12 normal at 425  TSH normal at 1.29  CBC reviewed and unremarkable  Comprehensive metabolic panel reviewed and unremarkable    Impression    78-year-old male with a subacute progression over the course of at least 1 year of a progressive dementia.  Concerning features is severe and rapid fluctuations in mental status along with a strong component of hallucinations.  In addition to this I believe that he has some thinning of the midbrain on the MRI along with some cogwheel rigidity on  his exam.  This would be consistent with a parkinsonism.  Most concerning would be a Lewy body dementia.  I had a prolonged discussion with the family about my concerns and shared with him that this is a diagnosis that is made over multiple visits as opposed to an initial visit in the hospital.  That being the case I do have concerns about him returning home alone.  In addition to this will need a way to control his hallucinations and fluctuations in mental status.  If this is Lewy body dementia than he may have increased sensitivity to typical antipsychotics and therefore these need to be avoided.    Plan    · Copper level  · Ceruplasmin  · B1 level  · Ammonia level  · Folate level  · Seroquel 25mg QHS  · Avoid typical antipsychotics  · Will likely start on Nuplazid at discharge ( not on inpatient formulary)  · Driving cessation  · D/C planning - not a candidate to live alone    I discussed the patients findings and my recommendations with patient and family    Bashir Patel MD  07/10/19  11:17 AM

## 2019-07-10 NOTE — THERAPY TREATMENT NOTE
Acute Care - Occupational Therapy Treatment Note  [facility]     Patient Name: Edy Urbina  : 1940  MRN: [id number]  Today's Date: 7/10/2019  Onset of Illness/Injury or Date of Surgery: 19  Date of Referral to OT: 19  Referring Physician: Dr. Landa    Admit Date: 2019       ICD-10-CM ICD-9-CM   1. Confusion R41.0 298.9   2. Decreased activities of daily living (ADL) R68.89 780.99     Patient Active Problem List   Diagnosis   • Varicose vein of leg   • BMI less than 19,adult   • Confusion   • Weight loss   • Underweight   • Inguinal hernia   • Anemia   • Altered mental status   • Protein-calorie malnutrition, moderate (CMS/HCC)     Past Medical History:   Diagnosis Date   • Cataracts, bilateral    • Occasional tremors    • Prostate cancer (CMS/HCC) 2011    t2c,Sommer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    • Varicose veins of legs      Past Surgical History:   Procedure Laterality Date   • EYE SURGERY     • HERNIA REPAIR Left    • INGUINAL HERNIA REPAIR Right 2019    Procedure: OPEN RIGHT INGUINAL HERNIA REPAIR WITH MESH;  Surgeon: Alesia Mora MD;  Location: [facility];  Service: General   • PROSTATECTOMY  2011    RALP       Therapy Treatment    Rehabilitation Treatment Summary     Row Name 07/10/19 0931             Treatment Time/Intention    Discipline  occupational therapy assistant  -TS      Document Type  therapy note (daily note)  -TS      Subjective Information  no complaints  -TS2      Patient/Family Observations  family present  -TS2      Patient Effort  good  -TS2      Comment  Pt daughter has infant that has tested positive for RSV  -TS2      Existing Precautions/Restrictions  fall  -TS3      Treatment Considerations/Comments  confusion but oriented to person, place and time  -TS3      Recorded by [TS] Tere Power ABBOTT/L 07/10/19 0934  [TS2] Tere Power ABBOTT/L 07/10/19 1054  [TS3] Tere Power ABBOTT/L 07/10/19 1100       Row Name 07/10/19 0934             Cognitive Assessment/Intervention- PT/OT    Orientation Status (Cognition)  oriented to;person;place;time;disoriented to;situation  -TS      Follows Commands (Cognition)  follows one step commands;over 90% accuracy  -TS      Personal Safety Interventions  elopement precautions initiated;fall prevention program maintained;gait belt;nonskid shoes/slippers when out of bed  -TS      Recorded by [TS] Tere Power COTA/L 07/10/19 1106      Row Name 07/10/19 0934             Bed Mobility Assessment/Treatment    Bed Mobility Assessment/Treatment  supine-sit;sit-supine  -TS      Supine-Sit [place] (Bed Mobility)  supervision  -TS      Sit-Supine [place] (Bed Mobility)  supervision  -TS      Assistive Device (Bed Mobility)  head of bed elevated;bed rails  -TS      Recorded by [TS] Tere Power COTA/L 07/10/19 1106      Row Name 07/10/19 0934             Functional Mobility    Functional Mobility- Ind. Level  supervision required  -TS      Functional Mobility- Comment  in room, in BR  -TS      Recorded by [TS] Tere Power COTA/L 07/10/19 1106      Row Name 07/10/19 0934             Transfer Assessment/Treatment    Transfer Assessment/Treatment  sit-stand transfer;stand-sit transfer;toilet transfer  -TS      Recorded by [TS] Tere Power COTA/L 07/10/19 1106      Row Name 07/10/19 0934             Sit-Stand Transfer    Sit-Stand [place] (Transfers)  supervision  -TS      Recorded by [TS] Tere Power COTA/L 07/10/19 1106      Row Name 07/10/19 0934             Stand-Sit Transfer    Stand-Sit [place] (Transfers)  supervision  -TS      Recorded by [TS] Tere Power COTA/L 07/10/19 1106      Row Name 07/10/19 0934             Toilet Transfer    Type (Toilet Transfer)  sit-stand;stand-sit  -TS      [place] Level (Toilet Transfer)  supervision  -TS      Assistive Device (Toilet Transfer)  commode  -TS      Recorded by  [TS] Tere Power COTA/L 07/10/19 1106      Row Name 07/10/19 0934             ADL Assessment/Intervention    BADL Assessment/Intervention  lower body dressing  -TS      Recorded by [TS] Tere Power COTA/L 07/10/19 1106      Row Name 07/10/19 0934             Lower Body Dressing Assessment/Training    Lower Body Dressing [place] Level  don;doff;supervision  -TS      Lower Body Dressing Position  unsupported sitting;supported standing  -TS      Recorded by [TS] Tere Power COTA/L 07/10/19 1106      Row Name 07/10/19 0934             Positioning and Restraints    Pre-Treatment Position  in bed  -TS      Post Treatment Position  bed  -TS      In Bed  fowlers;call light within reach;encouraged to call for assist;with family/caregiver;side rails up x2;exit alarm on  -TS      Recorded by [TS] Tere Power COTA/L 07/10/19 1106      Row Name 07/10/19 0934             Pain Scale: Numbers Pre/Post-Treatment    Pain Scale: Numbers, Pretreatment  0/10 - no pain  -TS      Pain Scale: Numbers, Post-Treatment  0/10 - no pain  -TS      Recorded by [TS] Tere Power COTA/L 07/10/19 1106      Row Name 07/10/19 0934             Outcome Summary/Treatment Plan (OT)    Daily Summary of Progress (OT)  progress towards functional goals is fair  -TS      Recorded by [TS] Tere Power COTA/L 07/10/19 1106        User Key  (r) = Recorded By, (t) = Taken By, (c) = Cosigned By    Initials Name Effective Dates Discipline    TS Tere Power ABBOTT/L 08/02/16 -  OT             Occupational Therapy Education     Title: PT OT SLP Therapies (In Progress)     Topic: Occupational Therapy (In Progress)     Point: ADL training (Done)     Description: Instruct learner(s) on proper safety adaptation and remediation techniques during self care or transfers.   Instruct in proper use of assistive devices.    Learning Progress Summary           Patient Acceptance, E VU by CHRISTOS at 7/9/2019 11:52  AM    Comment:  OT role, benefits of therapy and medical care, POC   Family Acceptance, E, VU by  at 7/9/2019 11:52 AM    Comment:  OT role, benefits of therapy and medical care, POC                   Point: Precautions (Done)     Description: Instruct learner(s) on prescribed precautions during self-care and functional transfers.    Learning Progress Summary           Patient Acceptance, E, VU by MM at 7/9/2019 11:52 AM    Comment:  OT role, benefits of therapy and medical care, POC   Family Acceptance, E, VU by MM at 7/9/2019 11:52 AM    Comment:  OT role, benefits of therapy and medical care, POC                               User Key     Initials Effective Dates Name Provider Type Discipline     04/03/18 -  Rick Herbert, OTR/L Occupational Therapist OT                OT Recommendation and Plan  Outcome Summary/Treatment Plan (OT)  Daily Summary of Progress (OT): progress towards functional goals is fair  Daily Summary of Progress (OT): progress towards functional goals is fair  Outcome Measures     Row Name 07/10/19 1100 07/09/19 1101 07/09/19 1100       How much help from another person do you currently need...    Turning from your back to your side while in flat bed without using bedrails?  --  4  -MS  --    Moving from lying on back to sitting on the side of a flat bed without bedrails?  --  4  -MS  --    Moving to and from a bed to a chair (including a wheelchair)?  --  4  -MS  --    Standing up from a chair using your arms (e.g., wheelchair, bedside chair)?  --  4  -MS  --    Climbing 3-5 steps with a railing?  --  3  -MS  --    To walk in hospital room?  --  4  -MS  --    AM-PAC 6 Clicks Score (PT)  --  23  -MS  --       How much help from another is currently needed...    Putting on and taking off regular lower body clothing?  3  -TS  --  3  -MM    Bathing (including washing, rinsing, and drying)  3  -TS  --  3  -MM    Toileting (which includes using toilet bed pan or urinal)  4  -TS  --  3  -MM     Putting on and taking off regular upper body clothing  4  -TS  --  3  -MM    Taking care of personal grooming (such as brushing teeth)  4  -TS  --  4  -MM    Eating meals  4  -TS  --  4  -MM    AM-PAC 6 Clicks Score (OT)  22  -TS  --  20  -MM       Functional Assessment    Outcome Measure Options  AM-PAC 6 Clicks Daily Activity (OT)  -TS  AM-PAC 6 Clicks Basic Mobility (PT)  -MS  AM-PAC 6 Clicks Daily Activity (OT)  -MM      User Key  (r) = Recorded By, (t) = Taken By, (c) = Cosigned By    Initials Name Provider Type    TS Tere Power, ABBOTT/L Occupational Therapy Assistant    MS Henrietta Hooks, PT, DPT, NCS Physical Therapist    MM Rick Herbert, OTR/L Occupational Therapist           Time Calculation:   Time Calculation- OT     Row Name 07/10/19 1107             Time Calculation- OT    OT Start Time  0934  -TS      OT Stop Time  1000  -TS      OT Time Calculation (min)  26 min  -TS      Total Timed Code Minutes- OT  26 minute(s)  -TS      OT Received On  07/10/19  -TS         Timed Charges    85808 - OT Self Care/Mgmt Minutes  26  -TS        User Key  (r) = Recorded By, (t) = Taken By, (c) = Cosigned By    Initials Name Provider Type     Tere Power COTA/L Occupational Therapy Assistant        Therapy Charges for Today     Code Description Service Date Service Provider Modifiers Qty    98978817440 HC OT SELF CARE/MGMT/TRAIN EA 15 MIN 7/10/2019 Tere Power COTA/L GO 2               Tere GOLDSMITH. DIRK Power  7/10/2019

## 2019-07-10 NOTE — PLAN OF CARE
"Problem: Patient Care Overview  Goal: Plan of Care Review  Outcome: Ongoing (interventions implemented as appropriate)   07/10/19 0516   Coping/Psychosocial   Plan of Care Reviewed With patient;family   Plan of Care Review   Progress no change   OTHER   Outcome Summary Patient can follow commands but is confused to situation, patient repetatively states he has to \"spread my legs like this\" (strateling legs to sides) and \"put lube in my rectum\" because Alesia (Dr. Mercado) told him to- and even when redirected- he does not believe staff, bed alarm on at all times, family in room till around 1300 today- out of bed to have BM today (formed stool) and void- but sitting on the toilet he always folds his penis backwards and wants to lean forward thus squishing things, fixated on putting things on his recent inguinal hernia incision (water, lotions, soap, dirty hands from sticking fingers up rectum, etc),  IVF going, started on seroquel today and had small sleeping sessions lasting about 40 minutes, will continue to monitor and notify MD of any changes         "

## 2019-07-10 NOTE — PROGRESS NOTES
[facility] Medicine Services  INPATIENT PROGRESS NOTE    Patient Name: Edy Urbina  Date of Admission: 7/8/2019  Today's Date: 07/10/19  Length of Stay: 2  Primary Care Physician: Torey Amato DO    Subjective   Chief Complaint: Confusion  HPI   Patient remains confused, and it has been waxing and waning.  I talked with family out in the hallway and they do report that he is exhibited behavior of paranoia, and even occasionally hallucinations, dating back for a couple of months.  It seems like his confusion is really accelerated since his recent hernia surgery.  He has required restraints.  He has been found climbing in the bed, and even broke off a part of the hospital bed.  He has had similar behavior when he has been in the bathroom.  He will get fixated on certain objects as described by the nursing staff as well.  No new focal deficits, however confusion and change in mental status persist.    Review of Systems     All pertinent negatives and positives are as above. All other systems have been reviewed and are negative unless otherwise stated.     Objective    Temp:  [97.8 °F (36.6 °C)-98.8 °F (37.1 °C)] 98.2 °F (36.8 °C)  Heart Rate:  [59-74] 74  Resp:  [16-17] 16  BP: (114-156)/(60-80) 156/80  Physical Exam   Constitutional: No distress.   Calm and cooperative currently; thin and cachectic appearing   HENT:   Head: Normocephalic.   Mouth/Throat: No oropharyngeal exudate.   Eyes: No scleral icterus.   Neck: No tracheal deviation present.   Pulmonary/Chest: Effort normal. No respiratory distress.   Musculoskeletal: He exhibits no edema.   Neurological:   Remains confused but more calm and cooperative this AM as compared to yesterday; no new focal deficits   Skin: He is not diaphoretic.   Psychiatric:   More calm at this time; multiple members of family at bedside   Vitals reviewed.    Results Review:  I have reviewed the labs, radiology results, and diagnostic  studies.    Laboratory Data:   Results from last 7 days   Lab Units 07/09/19  0442 07/08/19  1326   WBC 10*3/mm3 5.79 6.52   HEMOGLOBIN g/dL 11.8* 12.4*   HEMATOCRIT % 36.3* 37.7*   PLATELETS 10*3/mm3 214 243        Results from last 7 days   Lab Units 07/09/19  0442 07/08/19  1326   SODIUM mmol/L 139 144   POTASSIUM mmol/L 4.1 3.8   CHLORIDE mmol/L 107 108   CO2 mmol/L 29.0 26.0   BUN mg/dL 21 25*   CREATININE mg/dL 0.65 0.82   CALCIUM mg/dL 8.7 9.8   BILIRUBIN mg/dL 0.5 0.7   ALK PHOS U/L 48 56   ALT (SGPT) U/L 39 40   AST (SGOT) U/L 49* 63*   GLUCOSE mg/dL 90 132*       Culture Data:        Radiology Data:   Imaging Results (last 24 hours)     Procedure Component Value Units Date/Time    MRI Brain With & Without Contrast [[id number]] Collected:  07/09/19 1840     Updated:  07/09/19 1846    Narrative:       EXAMINATION:  MRI BRAIN WITHOUT AND WITH CONTRAST-  7/9/2019 6:06 PM CDT     HISTORY: Confusion/delirium, altered LOC, unexplained;  R41.0-Disorientation, unspecified; R68.89-Other general symptoms and  signs.     TECHNIQUE: Multiplanar imaging was performed in a high field magnet  before and after gadolinium contrast administration.     COMPARISON: No comparison study.     FINDINGS: There is no evidence of acute infarct on the  diffusion-weighted sequence. There are scattered areas of T2 high signal  within the hemispheric white matter. There is mild to moderate atrophy  with associated ventricular prominence. There are no structural  abnormalities. There are no areas of abnormal contrast enhancement.  There is some motion artifact on some of the sequences.       Impression:       1. No evidence of acute infarct.  2. Mild to moderate atrophy with associated ventricular prominence.  3. T2 high signal within the hemispheric white matter is nonspecific and  most likely due to chronic small vessel disease.     This report was finalized on 07/09/2019 18:43 by Dr. Esa Mcqueen MD.          I have reviewed the  patient's current medications.     Assessment/Plan     Active Hospital Problems    Diagnosis   • Protein-calorie malnutrition, moderate (CMS/HCC)   • Anemia   • Altered mental status   • Confusion   • Weight loss   • Underweight   • Inguinal hernia     S/p repair by Dr. Mora 6/2019       Plan:  1.  MRI Brain with no acute findings  2.  Ehrlichia and RMSF serologies; I have a low suspicion that tick-illness is contributing to current symptoms but have started therapy with Doxycycline for the time being  3.  Nutrition supplements  4.  Patient has been received IVFs + banana bag   5.  Norco stopped  6.  Neuro consult today; case discussed with Dr. Patel  7.  Neuro checks  8.  PT and OT assessment  9.  Labs reviewed; stable  10.  Conference with multiple members of family at bedside  11.  Dispo: anticipate patient will need placement in this condition      Lenard Landa MD   07/10/19   11:08 AM

## 2019-07-10 NOTE — NURSING NOTE
Pt out of restraint from 1921 til 1945 per family request with family in room. Pt ate dinner at this time. Son stated that he did not like his father like this and wanted to know if family stayed with him  Could restraints be left off.  During this time the son left and the daughter, daughter in law, and small baby were left in the room with pt. Pt did eat his dinner, then proceeded to become confused, agitated, and restless. Spoke with daughter about behaviors and why restraints were being used for his safety and care. Pt placed back in restraints at 1950. When son returned spoke with him also and learned that father behavior has been gradually changing over the past 2 months and has gotten worse this past week. The son will return in the am to speak to the MD.

## 2019-07-11 PROBLEM — R44.3 HALLUCINATIONS: Status: ACTIVE | Noted: 2019-07-11

## 2019-07-11 PROBLEM — F03.90 DEMENTIA: Status: ACTIVE | Noted: 2019-07-11

## 2019-07-11 LAB
A PHAGOCYTOPH IGM TITR SER IF: NEGATIVE {TITER}
CERULOPLASMIN SERPL-MCNC: 20.7 MG/DL (ref 16–31)
CONV HGE IGG TITER: NEGATIVE
E CHAFFEENSIS IGG TITR SER IF: NEGATIVE {TITER}
E. CHAFFEENSIS (HME) IGM TITER: NEGATIVE
R RICKETTSI IGM TITR SER: 0.46 INDEX (ref 0–0.89)
ZINC BLD-MCNC: 572 UG/DL (ref 440–860)

## 2019-07-11 PROCEDURE — 99233 SBSQ HOSP IP/OBS HIGH 50: CPT | Performed by: PSYCHIATRY & NEUROLOGY

## 2019-07-11 PROCEDURE — 25010000002 LORAZEPAM PER 2 MG: Performed by: PSYCHIATRY & NEUROLOGY

## 2019-07-11 RX ORDER — LORAZEPAM 2 MG/ML
2 INJECTION INTRAMUSCULAR ONCE
Status: COMPLETED | OUTPATIENT
Start: 2019-07-11 | End: 2019-07-11

## 2019-07-11 RX ADMIN — LORAZEPAM 2 MG: 2 INJECTION INTRAMUSCULAR; INTRAVENOUS at 03:06

## 2019-07-11 RX ADMIN — SODIUM CHLORIDE, PRESERVATIVE FREE 3 ML: 5 INJECTION INTRAVENOUS at 08:55

## 2019-07-11 RX ADMIN — DOXYCYCLINE 100 MG: 100 INJECTION, POWDER, LYOPHILIZED, FOR SOLUTION INTRAVENOUS at 00:48

## 2019-07-11 RX ADMIN — DOXYCYCLINE 100 MG: 100 INJECTION, POWDER, LYOPHILIZED, FOR SOLUTION INTRAVENOUS at 11:51

## 2019-07-11 RX ADMIN — QUETIAPINE FUMARATE 25 MG: 25 TABLET, FILM COATED ORAL at 08:55

## 2019-07-11 NOTE — PROGRESS NOTES
Full assessment to follow.   Received consult from Анна Douglas RN that patient needs placement. Placement is not possible for this patient at this time. Patient has a medicare replacement which requires precert. Precert can only be initiated if patient has PT/OT needs and documentation. PT has signed off. OT is seeing patient but precert cannot be started with need for only one therapy (OT). Home health is an option for this patient.

## 2019-07-11 NOTE — PLAN OF CARE
Problem: Patient Care Overview  Goal: Plan of Care Review  Outcome: Ongoing (interventions implemented as appropriate)   07/11/19 0544   Coping/Psychosocial   Plan of Care Reviewed With patient   Plan of Care Review   Progress no change   OTHER   Outcome Summary VSS. No c/o pain voiced. Pt was very confused, agitated at begininning of shift, that continued to get worse. Call made and pt placed back in restraints at beginning of shift. As shift progressed things got worse pt was talking loudly about nothing, people,MD going to [facility]. Placing his privates in urinal and twisting them. Call placed later to MD received more orders. Pt has been resting w/eyes closed quietly since around 0330.       Problem: Fall Risk (Adult)  Goal: Identify Related Risk Factors and Signs and Symptoms  Outcome: Ongoing (interventions implemented as appropriate)    Goal: Absence of Fall  Outcome: Ongoing (interventions implemented as appropriate)      Problem: Confusion, Acute (Adult)  Goal: Identify Related Risk Factors and Signs and Symptoms  Outcome: Ongoing (interventions implemented as appropriate)    Goal: Cognitive/Functional Impairments Minimized  Outcome: Ongoing (interventions implemented as appropriate)    Goal: Safety  Outcome: Ongoing (interventions implemented as appropriate)      Problem: Restraint, Nonbehavioral (Nonviolent)  Goal: Rationale and Justification  Outcome: Ongoing (interventions implemented as appropriate)    Goal: Nonbehavioral (Nonviolent) Restraint: Absence of Injury/Harm  Outcome: Ongoing (interventions implemented as appropriate)    Goal: Nonbehavioral (Nonviolent) Restraint: Achievement of Discontinuation Criteria  Outcome: Ongoing (interventions implemented as appropriate)    Goal: Nonbehavioral (Nonviolent) Restraint: Preservation of Dignity and Wellbeing  Outcome: Ongoing (interventions implemented as appropriate)

## 2019-07-11 NOTE — NURSING NOTE
"Starting at approx 0200, pt was attempting to use urinal and was twisting his penis until it was turning colors stating that this was the \"way that Dr Mercado told him to urinate.\" Pt has been awake most of shift and behaviors have been on/off most of shift. Pt in restraints but he moves down in bed. He is very agitated. Hospitalist called, he stated to call Dr. Patel. Orders obtained.  "

## 2019-07-11 NOTE — PROGRESS NOTES
Discharge Planning Assessment  [facility]     Patient Name: Edy Urbina  MRN: [id number]  Today's Date: 7/11/2019    Admit Date: 7/8/2019    Discharge Needs Assessment     Row Name 07/11/19 1110       Living Environment    Lives With  alone    Current Living Arrangements  home/apartment/condo    Primary Care Provided by  self    Provides Primary Care For  no one, unable/limited ability to care for self    Family Caregiver if Needed  child(lakia), adult    Family Caregiver Names  Jennyfer, daughter, lives in [place], [place]/  Jb,, son, lives in [place], [place]    Quality of Family Relationships  helpful;involved;supportive    Able to Return to Prior Arrangements  no       Resource/Environmental Concerns    Resource/Environmental Concerns  none    Transportation Concerns  car, none       Transition Planning    Patient/Family Anticipates Transition to  inpatient rehabilitation facility;other (see comments) possilbe need for geriatric psyhc    Patient/Family Anticipated Services at Transition  mental health services;rehabilitation services;skilled nursing    Transportation Anticipated  family or friend will provide;other (see comments) could need EMS       Discharge Needs Assessment    Readmission Within the Last 30 Days  no previous admission in last 30 days    Concerns to be Addressed  no discharge needs identified    Equipment Currently Used at Home  none    Anticipated Changes Related to Illness  inability to care for self    Equipment Needed After Discharge  none    Discharge Facility/Level of Care Needs  rehabilitation facility;nursing facility, skilled geriactric psychEl    Offered/Gave Vendor List  yes    Discharge Coordination/Progress  Plan for Geriatric Psych referral, [facility]. Family willing for referrals to be made to surrounding facilities that will assist with patient's rehab/psyhc needs. Neuralogist feels geriatric psych would benefit. SNF facility preferred was [facility]  after dede psyhc for med adjustments for behaviour control.  Will need to be out of restraints for 24 hr. Precert will also be required with bed offer. Son and daughter here this morning. Oldest daughter from [place] is to arrive Monday.  7/15/19.  WIll follow for referrals/PT/OT evals.          Discharge Plan     Row Name 07/11/19 2933       Plan    Plan  Undetermined    Patient/Family in Agreement with Plan  yes    Plan Comments  Patient is confused and in restraints. Geriatric psych may be needed d/t behaviors. SW spoke to patient's son, Jb. Jb, and other siblings, agree to geriatric psych if patient can come out of restraints. Precert will still be needed for geriatric psych but PT and OT are not needed for a geriatric psych precert.         Destination      No service coordination in this encounter.      Durable Medical Equipment      No service coordination in this encounter.      Dialysis/Infusion      No service coordination in this encounter.      Home Medical Care      No service coordination in this encounter.      Therapy      No service coordination in this encounter.      Community Resources      No service coordination in this encounter.          Demographic Summary    No documentation.       Functional Status    No documentation.       Psychosocial    No documentation.       Abuse/Neglect    No documentation.       Legal    No documentation.       Substance Abuse    No documentation.       Patient Forms    No documentation.           Mara Perry RN

## 2019-07-11 NOTE — PROGRESS NOTES
Continued Stay Note   [facility]     Patient Name: Edy Urbina  MRN: [id number]  Today's Date: 7/11/2019    Admit Date: 7/8/2019    Discharge Plan     Row Name 07/11/19 1104       Plan    Plan  Undetermined    Patient/Family in Agreement with Plan  yes    Plan Comments  Patient is confused and in restraints. Geriatric psych may be needed d/t behaviors. SW spoke to patient's son, Jb. Jb, and other siblings, agree to geriatric psych if patient can come out of restraints. Precert will still be needed for geriatric psych but PT and OT are not needed for a geriatric psych precert.                 Mary Beth Maldonado, NICKW

## 2019-07-11 NOTE — PLAN OF CARE
Problem: Patient Care Overview  Goal: Plan of Care Review  Outcome: Ongoing (interventions implemented as appropriate)  Pt confused but per dr marques, was taken off restraints at 4pm.  Pt tolerated off restraints well during shift, ate dinner, pt safety maintained, alarms on, checked often, will update with any new info   07/11/19 1492   Coping/Psychosocial   Plan of Care Reviewed With patient

## 2019-07-11 NOTE — PROGRESS NOTES
[facility] Medicine Services  INPATIENT PROGRESS NOTE    Patient Name: Edy Urbina  Date of Admission: 7/8/2019  Today's Date: 07/11/19  Length of Stay: 3  Primary Care Physician: Torey Amato DO    Subjective   Chief Complaint: Confusion  Altered Mental Status     Patient has required upper extremity restraints.  Discussed with nursing, and it sounds like patient had a rough night.  He continues to become fixated on certain things - last night he continued to pick at his surgical wound from recent hernia surgery.  He also reported had ongoing hallucinations of people entering the room and trying to harm him.  He was trying to use a urinal, and evidently began squeezing and twisting his genitals during this process.  In short, he has required upper extremity restraints.  He is currently calm.  Thinks he is leaving the hospital today.  Family is at bedside.    Review of Systems     All pertinent negatives and positives are as above. All other systems have been reviewed and are negative unless otherwise stated.     Objective    Temp:  [97.5 °F (36.4 °C)-98.3 °F (36.8 °C)] 98.1 °F (36.7 °C)  Heart Rate:  [62-82] 72  Resp:  [15-20] 16  BP: (126-160)/(63-80) 138/77  Physical Exam   Constitutional: No distress.   Resting in bed; currently calm; has required upper extremity restraints   HENT:   Head: Normocephalic.   Mouth/Throat: No oropharyngeal exudate.   Eyes: No scleral icterus.   Neck: No tracheal deviation present.   Pulmonary/Chest: Effort normal. No respiratory distress.   Musculoskeletal: He exhibits no edema.   Neurological:   Remains confused.  No active hallucinations.  No new neuro deficits   Skin: He is not diaphoretic.   Psychiatric:   Currently calm; not combative or agitated at this time   Vitals reviewed.    Results Review:  I have reviewed the labs, radiology results, and diagnostic studies.    Laboratory Data:   Results from last 7 days   Lab Units  07/09/19  0442 07/08/19  1326   WBC 10*3/mm3 5.79 6.52   HEMOGLOBIN g/dL 11.8* 12.4*   HEMATOCRIT % 36.3* 37.7*   PLATELETS 10*3/mm3 214 243        Results from last 7 days   Lab Units 07/09/19  0442 07/08/19  1326   SODIUM mmol/L 139 144   POTASSIUM mmol/L 4.1 3.8   CHLORIDE mmol/L 107 108   CO2 mmol/L 29.0 26.0   BUN mg/dL 21 25*   CREATININE mg/dL 0.65 0.82   CALCIUM mg/dL 8.7 9.8   BILIRUBIN mg/dL 0.5 0.7   ALK PHOS U/L 48 56   ALT (SGPT) U/L 39 40   AST (SGOT) U/L 49* 63*   GLUCOSE mg/dL 90 132*       Culture Data:        Radiology Data:   Imaging Results (last 24 hours)     ** No results found for the last 24 hours. **          I have reviewed the patient's current medications.     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 Mora 6/2019       Plan:  1.  MRI Brain with no acute findings  2.  Ehrlichia and RMSF serologies pending; I have a low suspicion that tick-illness is contributing to current symptoms - I will go ahead and hold additional Doxycyline  3.  Long discussion with Dr. Patel yesterday - appreciate his assistance.  Talked with Dr. Armas today; trial of Nueplazid  4.  Trial of Seroquel    5.  Nutrition supplements  6.  D/C IVFs  7.  Labs reviewed; stable  8.  Dispo: anticipate patient will need placement in this condition; even looking into dede psych placement at this time given recent symptoms  9.  Updated family at bedside      Lenard Landa MD   07/11/19   2:01 PM

## 2019-07-11 NOTE — PROGRESS NOTES
Neurology Progress Note      Date of admission: 7/8/2019  1:05 PM  Date of visit: 7/11/2019    Chief Complaint:  F/u altered mentation    Subjective     Subjective:  Patient has been at times paranoid and hallucinating and then will be fine. Still attempting to put things up rectum and also is now needing restraints as he is picking at area of recent surgery.     Background Per son: 78 year old male with cognitive impairment that has progressed  for over the past year and that he has for at least > 2 years of paranoia, and hallucinations-- both  visual and auditory hallucinations.and variable duration of episodes of extreme confusion followed by complete baseline return   He will have bizarre behavior such as him attempting multiple times to shove objects up his rectum. There is occasional resting tremor. He has been started on Seroquel yesterday.   Patient considerably worsened after his right inguinal hernia repair but had been on opioids for this. These have now been held He was on Norco 7.5/325 1 to 2 tabs every 4 hours. His UDS was positive for benzo's.and opiates  Normal ceruloplasmin.    Medications:  Current Facility-Administered Medications   Medication Dose Route Frequency Provider Last Rate Last Dose   • acetaminophen (TYLENOL) tablet 650 mg  650 mg Oral Q4H PRN Lenard Landa MD       • doxycycline (VIBRAMYCIN) 100 mg/100 mL 0.9% NS MBP  100 mg Intravenous Q12H Lenard Landa MD   100 mg at 07/11/19 0048   • lactated ringers infusion  100 mL/hr Intravenous Continuous Lenard Landa  mL/hr at 07/10/19 0745 100 mL/hr at 07/10/19 0745   • multiple vitamin (M.V.I. Adult) 10 mL, thiamine (B-1) 100 mg, folic acid 1 mg in lactated ringers 1,000 mL infusion  125 mL/hr Intravenous Q24H Lenard Landa  mL/hr at 07/10/19 2121 125 mL/hr at 07/10/19 2121   • ondansetron (ZOFRAN) injection 4 mg  4 mg Intravenous Q6H PRN Lenard Landa MD       • QUEtiapine (SEROquel)  tablet 25 mg  25 mg Oral Q12H Bashir Patel MD   25 mg at 07/11/19 0855   • sodium chloride 0.9 % flush 3 mL  3 mL Intravenous Q12H Lenard Landa MD   3 mL at 07/11/19 0855   • sodium chloride 0.9 % flush 3-10 mL  3-10 mL Intravenous PRN Lenard Landa MD           Review of Systems:   -A 14 point review of systems is completed and is negative   Objective     Objective      Vital Signs  Temp:  [97.5 °F (36.4 °C)-98.3 °F (36.8 °C)] 97.8 °F (36.6 °C)  Heart Rate:  [62-82] 62  Resp:  [15-20] 16  BP: (126-160)/(63-80) 147/75    Physical Exam:    HEENT:  Neck supple  CVS:  Regular rate and rhythm.  No murmurs  Carotid Examination:  No bruits  Lungs:  Clear to auscultation  Abdomen:  Non-tender, Non-distended  Extremities:  No signs of peripheral edema    Neurologic Exam:    -Awake, Alert, Oriented X 2.5  Thinks date is July 16 and not the 11 th  Thinks it is Wednesday and not Thursday.  Knows the floor he is on  And knows the president.  Knows recent holiday.   -No word finding difficulties  -No aphasia  -No dysarthria  -Follows simple and complex commands    Cranial nerves II through XII intact.  CN II:  Visual fields full.  Pupils equally reactive to light  CN III, IV, VI:  Extraocular Muscles full with no signs of nystagmus  CN V:  Facial sensory is symmetric with no asymmetries.  CN VII:  Facial motor symmetric but there is hypomimia.    CN VIII:  Gross hearing intact bilaterally  CN IX/X:  Palate elevates symmetrically  CN XI:  Shoulder shrug symmetric  CN XII:  Tongue is midline on protrusion      Motor: (strength out of 5:  1= minimal movement, 2 = movement in plane of gravity, 3 = movement against gravity, 4 = movement against some resistance, 5 = full strength)    No cogwheel rigidity   Moves all extremities against gravity.     DTR:  2+ throughout in all four extremities    Sensory:  -Intact to light touch, pinprick, temperature, pain, and proprioception    Coordination/Gait:  -No  ataxia     Results Review:    I reviewed the patient's new clinical results.    Lab Results (last 24 hours)     Procedure Component Value Units Date/Time    Ceruloplasmin [[id number]] Collected:  07/10/19 1228    Specimen:  Blood Updated:  07/11/19 0715     Ceruloplasmin 20.7 mg/dL     Narrative:       Performed at:  [address] [place] [address]  [id number]  : Reagan Duran PhD, Phone:  [phone]    [place] Spotted Fever, IgM [[id number]] Collected:  07/09/19 0442    Specimen:  Blood Updated:  07/11/19 0236     RMSF IgM 0.46 index      Comment:                                  Negative        <0.90                                   Equivocal 0.90 - 1.10                                   Positive        >1.10       Narrative:       Performed at:  [address] [place] [address]  [id number]  : Jenise Sandra MD, Phone:  [phone]    [place] Spotted Fever, IgG [[id number]] Collected:  07/09/19 0442    Specimen:  Blood Updated:  07/10/19 2207     RMSF IgG Negative    Narrative:       Performed at:  [address] [place] [address]  [id number]  : Jenise Sandra MD, Phone:  [phone]    Folate [[id number]]  (Normal) Collected:  07/10/19 1228    Specimen:  Blood Updated:  07/10/19 1357     Folate >20.00 ng/mL     Ammonia [[id number]]  (Abnormal) Collected:  07/10/19 1228    Specimen:  Blood Updated:  07/10/19 1247     Ammonia <9 umol/L     Vitamin B1, Whole Blood [[id number]] Collected:  07/10/19 1228    Specimen:  Blood Updated:  07/10/19 1232    Zinc, Whole Blood [[id number]] Collected:  07/10/19 1228    Specimen:  Blood Updated:  07/10/19 1232        Imaging Results (last 24 hours)     ** No results found for the last 24 hours. **          Assessment/Plan     Hospital Problem List      Confusion    Weight loss    Underweight    Inguinal hernia    Anemia    Altered mental status    Protein-calorie  malnutrition, moderate (CMS/HCC)    Impression:  1. Underlying dementia with hallucinations  He has frontal lobe release signs of glabellar, snout.   2. Was on a considerable amount of opioids (7.5 Norco 1 to 2 tabs every 4 hours) at time of his discharge from hernia repair and this may be a considerable factor in his worsening cognition and hallucinations. However he had hallucinations prior to him taking opioids.   3. Cerebral microvascular disease.       Plan:  · Begin Nuplazid 34 mg daily  · Agree with Geriatric Psych placement but Nuplazid may help and will get samples of this to the Pharmacy.   · Speech therapy to perform cognitive evaluation.    · Nursing to get patient up as tolerates    Joanne Murphy MD  07/11/19  11:05 AM

## 2019-07-12 VITALS
RESPIRATION RATE: 16 BRPM | WEIGHT: 135 LBS | HEIGHT: 72 IN | DIASTOLIC BLOOD PRESSURE: 66 MMHG | BODY MASS INDEX: 18.28 KG/M2 | TEMPERATURE: 98 F | SYSTOLIC BLOOD PRESSURE: 126 MMHG | HEART RATE: 70 BPM | OXYGEN SATURATION: 98 %

## 2019-07-12 PROCEDURE — 97535 SELF CARE MNGMENT TRAINING: CPT

## 2019-07-12 PROCEDURE — 92523 SPEECH SOUND LANG COMPREHEN: CPT | Performed by: SPEECH-LANGUAGE PATHOLOGIST

## 2019-07-12 PROCEDURE — 99233 SBSQ HOSP IP/OBS HIGH 50: CPT | Performed by: PSYCHIATRY & NEUROLOGY

## 2019-07-12 RX ORDER — QUETIAPINE FUMARATE 25 MG/1
25 TABLET, FILM COATED ORAL DAILY
Qty: 30 TABLET | Refills: 0 | OUTPATIENT
Start: 2019-07-13 | End: 2019-09-06

## 2019-07-12 RX ORDER — QUETIAPINE FUMARATE 25 MG/1
25 TABLET, FILM COATED ORAL EVERY 12 HOURS SCHEDULED
Start: 2019-07-12 | End: 2019-07-12 | Stop reason: HOSPADM

## 2019-07-12 RX ORDER — QUETIAPINE FUMARATE 50 MG/1
50 TABLET, FILM COATED ORAL NIGHTLY
Qty: 30 TABLET | Refills: 0 | Status: SHIPPED | OUTPATIENT
Start: 2019-07-12 | End: 2019-09-06

## 2019-07-12 RX ORDER — QUETIAPINE FUMARATE 25 MG/1
25 TABLET, FILM COATED ORAL DAILY
Status: DISCONTINUED | OUTPATIENT
Start: 2019-07-13 | End: 2019-07-12 | Stop reason: HOSPADM

## 2019-07-12 RX ORDER — QUETIAPINE FUMARATE 25 MG/1
50 TABLET, FILM COATED ORAL NIGHTLY
Status: DISCONTINUED | OUTPATIENT
Start: 2019-07-12 | End: 2019-07-12 | Stop reason: HOSPADM

## 2019-07-12 RX ORDER — ACETAMINOPHEN 325 MG/1
650 TABLET ORAL EVERY 4 HOURS PRN
Start: 2019-07-12 | End: 2019-09-06

## 2019-07-12 RX ADMIN — QUETIAPINE FUMARATE 25 MG: 25 TABLET, FILM COATED ORAL at 14:14

## 2019-07-12 NOTE — PLAN OF CARE
Problem: Patient Care Overview  Goal: Plan of Care Review  Outcome: Ongoing (interventions implemented as appropriate)   07/12/19 1503   Plan of Care Review   Progress no change   OTHER   Outcome Summary PO intake is good, 100% of 4 documented meals. Pt is receiving Boost Plus TID in addition to meals. Discharge planning underway for [facility]. Will continue to follow.

## 2019-07-12 NOTE — PLAN OF CARE
Problem: Patient Care Overview  Goal: Plan of Care Review  Outcome: Ongoing (interventions implemented as appropriate)   07/12/19 2933   Coping/Psychosocial   Plan of Care Reviewed With patient;other (see comments)  (LIVAN Mejia and Tracey )   Plan of Care Review   Progress no change  (Initial Evaluation)   OTHER   Outcome Summary [place] Cognitive Assessment (MOCA) completed. Patient was alert and cooperative. Oriented to person, place, month, year, and city. Not able to state date or day of week. Unable to complete visuispatial tasks with accuracy. He attempted clock drawing and was able to place all numbers on clock. He was hyperaware of spacing and place tick marks for minutes as well. In doing so he had difficulty even still with planning. He indep. utilized clock on wall to complete the task. Unable to place timed hands on the correct time. No difficulty noted with identifying animals. Immediate recall of 3/5 stated unrelated words. Delayed memory 3/5 words, increased with use of category and choice cues. Attention to tasks were appropriate but during conversation his selective attention was observed to be impaired. Self correction observed on occasion. MOCA total score of 17/30 which indicates either a mild cognitive impairment or dementia. SLP will follow and treat to provide memory strategies.

## 2019-07-12 NOTE — THERAPY DISCHARGE NOTE
Acute Care - Occupational Therapy Treatment Note/Discharge  [facility]     Patient Name: Edy Urbina  : 1940  MRN: [id number]  Today's Date: 2019  Onset of Illness/Injury or Date of Surgery: 19  Date of Referral to OT: 19  Referring Physician: Dr. Landa      Admit Date: 2019    Visit Dx:     ICD-10-CM ICD-9-CM   1. Confusion R41.0 298.9   2. Decreased activities of daily living (ADL) R68.89 780.99     Patient Active Problem List   Diagnosis   • Varicose vein of leg   • BMI less than 19,adult   • Confusion   • Weight loss   • Underweight   • Inguinal hernia   • Anemia   • Altered mental status   • Protein-calorie malnutrition, moderate (CMS/HCC)   • Dementia   • Hallucinations       Therapy Treatment    Rehabilitation Treatment Summary     Row Name 19             Treatment Time/Intention    Discipline  occupational therapy assistant  -TS      Document Type  therapy note (daily note)  -TS      Subjective Information  no complaints  -TS2      Patient Effort  good  -TS2      Existing Precautions/Restrictions  fall  -TS2      Treatment Considerations/Comments  conversationally confused but oriented to place, time person and follows all directions provided during tx  -TS2      Recorded by [TS] Tere Power COTA/L 19  [TS2] Tere Power ABBOTT/L 1956      Row Name 19             Cognitive Assessment/Intervention- PT/OT    Personal Safety Interventions  fall prevention program maintained;elopement precautions initiated;nonskid shoes/slippers when out of bed  -TS      Recorded by [TS] Tere Power COTA/L 19 0957      Row Name 19             Bed Mobility Assessment/Treatment    Bed Mobility Assessment/Treatment  supine-sit  -TS      Supine-Sit [place] (Bed Mobility)  independent  -TS      Recorded by [TS] Tere Power COTA/L 1956      Row Name 19             Functional  Mobility    Functional Mobility- Ind. Level  independent  -TS      Recorded by [TS] Tere Power ABBOTT/L 07/12/19 0957      Row Name 07/12/19 0901             Transfer Assessment/Treatment    Transfer Assessment/Treatment  sit-stand transfer;stand-sit transfer  -TS      Recorded by [TS] Tere Power ABBOTT/L 07/12/19 0957      Row Name 07/12/19 0901             Sit-Stand Transfer    Sit-Stand [place] (Transfers)  independent  -TS      Recorded by [TS] Tere Power ABBOTT/L 07/12/19 0957      Row Name 07/12/19 0901             Stand-Sit Transfer    Stand-Sit [place] (Transfers)  independent  -TS      Recorded by [TS] Tere Power ABBOTT/L 07/12/19 0957      Row Name 07/12/19 0901             ADL Assessment/Intervention    BADL Assessment/Intervention  lower body dressing;feeding  -TS      Recorded by [TS] Tere Power ABBOTT/L 07/12/19 0957      Row Name 07/12/19 0901             Lower Body Dressing Assessment/Training    Lower Body Dressing [place] Level  don;doff;socks;independent  -TS      Lower Body Dressing Position  unsupported sitting;unsupported standing  -TS      Recorded by [TS] Tere Power ABBOTT/L 07/12/19 0957      Row Name 07/12/19 0901             Self-Feeding Assessment/Training    [place] Level (Feeding)  feeding skills;liquids to mouth;independent  -TS      Position (Self-Feeding)  edge of bed sitting  -TS      Recorded by [TS] Tere Power COTA/L 07/12/19 0957      Row Name 07/12/19 0901             Positioning and Restraints    Pre-Treatment Position  in bed  -TS      Post Treatment Position  bed  -TS      In Bed  sitting EOB;call light within reach;encouraged to call for assist;exit alarm on;side rails up x2  -TS      Recorded by [TS] Tere Power ABBOTT/L 07/12/19 0957      Row Name 07/12/19 0901             Pain Scale: Numbers Pre/Post-Treatment    Pain Scale: Numbers, Pretreatment  0/10 - no pain  -TS      Pain  Scale: Numbers, Post-Treatment  0/10 - no pain  -TS      Recorded by [TS] Tere Power RUPAL ABBOTT/L 07/12/19 0957        User Key  (r) = Recorded By, (t) = Taken By, (c) = Cosigned By    Initials Name Effective Dates Discipline    Tere Martin, ABBOTT/L 08/02/16 -  OT             Rehab Goal Summary     Row Name 07/12/19 0900             Bathing Goal 1 (OT)    Activity/Assistive Device (Bathing Goal 1, OT)  bathing skills, all  -TS      [place] Level/Cues Needed (Bathing Goal 1, OT)  independent  -TS      Time Frame (Bathing Goal 1, OT)  10 days  -TS      Progress/Outcomes (Bathing Goal 1, OT)  goal not met pt requires S due to cognition  -TS         Dressing Goal 1 (OT)    Activity/Assistive Device (Dressing Goal 1, OT)  dressing skills, all  -TS      [place]/Cues Needed (Dressing Goal 1, OT)  independent  -TS      Time Frame (Dressing Goal 1, OT)  10 days  -TS      Progress/Outcome (Dressing Goal 1, OT)  goal met  -TS         Direction Following Goal 1 (OT)    Activity (Direction Following Goal 1, OT)  follow one step directions  -TS      [place]/Cues/Accuracy (Direction Following Goal 1, OT)  independently;with 90% accuracy  -TS      Time Frame (Direction Following Goal 1, OT)  10 days  -TS      Progress/Outcome (Direction Following Goal 1, OT)  goal met  -TS        User Key  (r) = Recorded By, (t) = Taken By, (c) = Cosigned By    Initials Name Provider Type Discipline    Tere Martin SCAR GOLDSMITH/L Occupational Therapy Assistant OT          Occupational Therapy Education     Title: PT OT SLP Therapies (In Progress)     Topic: Occupational Therapy (In Progress)     Point: ADL training (Done)     Description: Instruct learner(s) on proper safety adaptation and remediation techniques during self care or transfers.   Instruct in proper use of assistive devices.    Learning Progress Summary           Patient Acceptance, E, VU by CHRISTOS at 7/9/2019 11:52 AM    Comment:  OT role, benefits of  therapy and medical care, POC   Family Acceptance, E, VU by MM at 7/9/2019 11:52 AM    Comment:  OT role, benefits of therapy and medical care, POC                   Point: Precautions (Done)     Description: Instruct learner(s) on prescribed precautions during self-care and functional transfers.    Learning Progress Summary           Patient Acceptance, E, VU by MM at 7/9/2019 11:52 AM    Comment:  OT role, benefits of therapy and medical care, POC   Family Acceptance, E, VU by MM at 7/9/2019 11:52 AM    Comment:  OT role, benefits of therapy and medical care, POC                               User Key     Initials Effective Dates Name Provider Type Discipline     04/03/18 -  Rick Herbert, OTR/L Occupational Therapist OT                OT Recommendation and Plan  Outcome Summary/Treatment Plan (OT)  Daily Summary of Progress (OT): progress towards functional goals is fair  Daily Summary of Progress (OT): progress towards functional goals is fair  Plan of Care Review  Plan of Care Reviewed With: patient  Plan of Care Reviewed With: patient  Outcome Summary: Pt currently independent with all aspects of ADLs but requires S for safety due to cognition. Pt is at highest level of function from OT standpoint. OT services no longer required. Pt would benefit from discharge to [facility] for medication management and assist with behaviors.     Outcome Measures     Row Name 07/12/19 0900 07/10/19 1100 07/09/19 1101       How much help from another person do you currently need...    Turning from your back to your side while in flat bed without using bedrails?  --  --  4  -MS    Moving from lying on back to sitting on the side of a flat bed without bedrails?  --  --  4  -MS    Moving to and from a bed to a chair (including a wheelchair)?  --  --  4  -MS    Standing up from a chair using your arms (e.g., wheelchair, bedside chair)?  --  --  4  -MS    Climbing 3-5 steps with a railing?  --  --  3  -MS    To walk in  hospital room?  --  --  4  -MS    AM-PAC 6 Clicks Score (PT)  --  --  23  -MS       How much help from another is currently needed...    Putting on and taking off regular lower body clothing?  3  -TS  3  -TS  --    Bathing (including washing, rinsing, and drying)  4  -TS  3  -TS  --    Toileting (which includes using toilet bed pan or urinal)  4  -TS  4  -TS  --    Putting on and taking off regular upper body clothing  4  -TS  4  -TS  --    Taking care of personal grooming (such as brushing teeth)  4  -TS  4  -TS  --    Eating meals  4  -TS  4  -TS  --    AM-PAC 6 Clicks Score (OT)  23  -TS  22  -TS  --       Functional Assessment    Outcome Measure Options  --  AM-PAC 6 Clicks Daily Activity (OT)  -TS  AM-PAC 6 Clicks Basic Mobility (PT)  -MS    Row Name 07/09/19 1100             How much help from another is currently needed...    Putting on and taking off regular lower body clothing?  3  -MM      Bathing (including washing, rinsing, and drying)  3  -MM      Toileting (which includes using toilet bed pan or urinal)  3  -MM      Putting on and taking off regular upper body clothing  3  -MM      Taking care of personal grooming (such as brushing teeth)  4  -MM      Eating meals  4  -MM      AM-PAC 6 Clicks Score (OT)  20  -MM         Functional Assessment    Outcome Measure Options  AM-PAC 6 Clicks Daily Activity (OT)  -MM        User Key  (r) = Recorded By, (t) = Taken By, (c) = Cosigned By    Initials Name Provider Type    TS Tere Power, ABBOTT/L Occupational Therapy Assistant    MS Henrietta Hooks, PT, DPT, NCS Physical Therapist    MM Rick Herbert, OTR/L Occupational Therapist           Time Calculation:    Time Calculation- OT     Row Name 07/12/19 1000             Time Calculation- OT    OT Start Time  0901  -TS      OT Stop Time  0924  -TS      OT Time Calculation (min)  23 min  -TS      Total Timed Code Minutes- OT  23 minute(s)  -TS      OT Received On  07/12/19  -TS         Timed Charges     01849 - OT Self Care/Mgmt Minutes  23  -TS        User Key  (r) = Recorded By, (t) = Taken By, (c) = Cosigned By    Initials Name Provider Type    TS Tere Power COTA/L Occupational Therapy Assistant        Therapy Suggested Charges     Code   Minutes Charges    78167 (CPT®) Hc Ot Neuromusc Re Education Ea 15 Min      88918 (CPT®) Hc Ot Ther Proc Ea 15 Min      04863 (CPT®) Hc Ot Therapeutic Act Ea 15 Min      15127 (CPT®) Hc Ot Manual Therapy Ea 15 Min      04056 (CPT®) Hc Ot Iontophoresis Ea 15 Min      81473 (CPT®) Hc Ot Elec Stim Ea-Per 15 Min      53428 (CPT®) Hc Ot Ultrasound Ea 15 Min      02799 (CPT®) Hc Ot Self Care/Mgmt/Train Ea 15 Min 23 2    Total  23 2        Therapy Charges for Today     Code Description Service Date Service Provider Modifiers Qty    [id number] HC OT SELF CARE/MGMT/TRAIN EA 15 MIN 7/12/2019 Tere Power COTA/L GO 2               OT Discharge Summary  Reason for Discharge: Maximum functional potential achieved, Discharge from facility  Outcomes Achieved: Refer to plan of care for updates on goals achieved  Discharge Destination: other (comment)(dede psych facility )    DIRK Patel  7/12/2019

## 2019-07-12 NOTE — PROGRESS NOTES
Neurology Progress Note      Date of admission: 7/8/2019  1:05 PM  Date of visit: 7/12/2019    Chief Complaint:  F/u     Subjective     Subjective:    Security had to be called this morning as he was outside room and yelling about people being wong.  He refused his medications.     There is waxing and waning of his cooperativeness and he underwent MoCA testing by speech therapy.     Total score of 17/30 which indicates either a mild cognitive impairment or dementia  See speech note from today.       Medications:  Current Facility-Administered Medications   Medication Dose Route Frequency Provider Last Rate Last Dose   • acetaminophen (TYLENOL) tablet 650 mg  650 mg Oral Q4H PRN Lenard Landa MD       • ondansetron (ZOFRAN) injection 4 mg  4 mg Intravenous Q6H PRN Lenard Landa MD       • Pimavanserin Tartrate capsule 34 mg  34 mg Oral Daily Joanne Freeman MD   34 mg at 07/11/19 1522   • QUEtiapine (SEROquel) tablet 25 mg  25 mg Oral Q12H Bashir Patel MD   25 mg at 07/11/19 0855   • sodium chloride 0.9 % flush 3 mL  3 mL Intravenous Q12H Lenard Landa MD   3 mL at 07/11/19 0855   • sodium chloride 0.9 % flush 3-10 mL  3-10 mL Intravenous PRN Lenard Landa MD           Review of Systems:   -A 14 point review of systems is completed and is negative   Objective     Objective      Vital Signs  Temp:  [98 °F (36.7 °C)-98.2 °F (36.8 °C)] 98 °F (36.7 °C)  Heart Rate:  [64-79] 65  Resp:  [16-18] 16  BP: (128-144)/(64-75) 130/64    Physical Exam:    HEENT:  Neck supple  CVS:  Regular rate and rhythm.  No murmurs  Carotid Examination:  No bruits  Lungs:  Clear to auscultation  Abdomen:  Non-tender, Non-distended  Extremities:  No signs of peripheral edema    Neurologic Exam:    -Awake, Alert, Oriented to person and place. Date is not accurate   -No word finding difficulties  -No aphasia  -No dysarthria  -Follows simple and complex commands    Cranial nerves II through XII  intact.  CN II:  Visual fields full.  Pupils equally reactive to light  CN III, IV, VI:  Extraocular Muscles full with no signs of nystagmus  CN V:  Facial sensory is symmetric with no asymmetries.  CN VII:  Facial motor symmetric but there is hypomimia.    CN VIII:  Gross hearing intact bilaterally  CN IX/X:  Palate elevates symmetrically  CN XI:  Shoulder shrug symmetric  CN XII:  Tongue is midline on protrusion        Motor: (strength out of 5:  1= minimal movement, 2 = movement in plane of gravity, 3 = movement against gravity, 4 = movement against some resistance, 5 = full strength)    -Right Upper Ext: Proximal: 5 Distal: 5  -Left Upper Ext: Proximal: 5 Distal: 5    -Right Lower Ext: Proximal: 5 Distal: 5  -Left Lower Ext: Proximal: 5 Distal: 5    DTR:  2+ throughout in all four extremities    Sensory:  -Intact to light touch, pinprick, temperature, pain, and proprioception    Coordination/Gait:  -Broad based gait with decreased left arm swing and stooped posture.  At times he will have turn en bloc.      Results Review:    I reviewed the patient's new clinical results.    Lab Results (last 24 hours)     Procedure Component Value Units Date/Time    Zinc, Whole Blood [[id number]] Collected:  07/10/19 1228    Specimen:  Blood Updated:  07/11/19 1608     Zinc, Whole Blood 572 ug/dL      Comment: This test was developed and its performance characteristics  determined by [facility]. It has not been cleared or approved  by the Food and Drug Administration.       Narrative:       Performed at:  [address]  [id number]  : Jenise Sandra MD, Phone:  [phone]    Ehrlichia Antibody Panel [[id number]] Collected:  07/08/19 1326    Specimen:  Blood Updated:  07/11/19 1410     E. chaffeensis (HME) IgG Titer Negative     E. chaffeensis (HME) IgM Titer Negative     Comment: IgG titers if 1:64 or greater indicate exposure or  acute and  convalescent samples showing a  four-fold increase, and/or the presence  of IgM indicate recent or current infection.        HGE IgG Titer Negative     Comment: HGE IgG levels are detectable 7 to 10 days post infection and persist  approximately one year.        HGE IgM Titer Negative     Comment: Due to a reagent backorder, this test was performed using a different  assay. The reference interval for this alternate assay is:                                         Negative      <1:64                                         Positive       1:64 or greater  IgM levels usually rise 3 to 5 days post infection and fall to normal  levels in approximately 30 to 60 days.       Narrative:       Performed at:  [address] [place] [address]  [id number]  : Jenise Sandra MD, Phone:  [phone]        Imaging Results (last 24 hours)     ** No results found for the last 24 hours. **          Assessment/Plan     Hospital Problem List      Confusion    Weight loss    Underweight    Inguinal hernia    Anemia    Altered mental status    Protein-calorie malnutrition, moderate (CMS/HCC)    Dementia    Hallucinations    Impression:  1.Dementia with MoCA of 17/30, frontal lobe release signs, and  with hallucinations, paranoia, bizarre behavior and parkinsonian gait and mild hypomimia. 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    · Seroquel may be used up to 75 mg a day and perhaps up to 100 mg/day  but should be done so slowly and the lowest dose used. At lower dose there are no extrapyramidal side effects.Will increase Seroquel  50 mg at bedtime and continue 25 mg in am   · Nuplazid samples have been given to Pharmacy and he has started on that dose.  · Will need to monitor QT interval with both meds on board--Will wait until he gets this dose before EKG checked.       He does need to be walked daily and does need an exercise regimen.    Joanne Murphy MD  07/12/19  12:35 PM

## 2019-07-12 NOTE — PLAN OF CARE
Problem: Patient Care Overview  Goal: Plan of Care Review  Outcome: Ongoing (interventions implemented as appropriate)   07/12/19 0553   Coping/Psychosocial   Plan of Care Reviewed With patient   Plan of Care Review   Progress no change   OTHER   Outcome Summary VSS. No c/o pain voiced this shift. Pt very agitated at the beginning of shift, out of bed. Redirected and back to bed. Pt did refuse hs meds, attempted several times. Pt is alert, answer all approp, yet will say things inapprop, but not as confused as previous shift. Neuro check wnl. No restraints used. Safety maintained. Pt does keep stating that he is going home today.       Problem: Fall Risk (Adult)  Goal: Identify Related Risk Factors and Signs and Symptoms  Outcome: Ongoing (interventions implemented as appropriate)    Goal: Absence of Fall  Outcome: Ongoing (interventions implemented as appropriate)      Problem: Confusion, Acute (Adult)  Goal: Identify Related Risk Factors and Signs and Symptoms  Outcome: Ongoing (interventions implemented as appropriate)    Goal: Cognitive/Functional Impairments Minimized  Outcome: Ongoing (interventions implemented as appropriate)    Goal: Safety  Outcome: Ongoing (interventions implemented as appropriate)      Problem: Restraint, Nonbehavioral (Nonviolent)  Goal: Rationale and Justification  Outcome: Outcome(s) achieved Date Met: 07/12/19

## 2019-07-12 NOTE — PROGRESS NOTES
Continued Stay Note  [facility]     Patient Name: Edy Urbina  MRN: [id number]  Today's Date: 7/12/2019    Admit Date: 7/8/2019    Discharge Plan     Row Name 07/12/19 7978       Plan    Plan Comments      Final Discharge Disposition Code  65 - psychiatric hospital or unit    Final Note MICHELLE spoke with Keshia from [facility] and Dr. Gutierrez Dubose will accept pt today. RN/MD aware of plan. Pt is being discharged to [facility].  Facility requesting discharge summary to be sent to [phone] and report be called to [phone]. SW will follow and assist with any discharge needs that may arise.     Row Name 07/12/19 9004       Plan    Plan Comments  MICHELLE spoke with Raul, son, in regards to dede psych placement. Raul states that dede psych would most likely be the best option and is requesting referral be sent to [facility]. MICHELLE spoke with Paolo at [facility] and facility is aware of referral. MICHELLE has faxed appropriate information to facility. SW will follow and await bed offer.         Discharge Codes    No documentation.       Expected Discharge Date and Time     Expected Discharge Date Expected Discharge Time    Jul 12, 2019             Maki Paul

## 2019-07-12 NOTE — PLAN OF CARE
Problem: Patient Care Overview  Goal: Plan of Care Review  Outcome: Ongoing (interventions implemented as appropriate)   07/12/19 4157   Coping/Psychosocial   Plan of Care Reviewed With patient   Plan of Care Review   Progress improving   OTHER   Outcome Summary Pt currently independent with all aspects of ADLs but requires S for safety due to cognition. Pt is at highest level of function from OT standpoint. OT services no longer required. Pt would benefit from discharge to [facility] for medication management and assist with behaviors.

## 2019-07-12 NOTE — NURSING NOTE
"Despite multiple attempts, pt is refusing morning medications. States \" My Doctor didn't order this\". Despite chair alarm pt has been in the hallway yelling multiple times this morning.  Assisted pt back to bed. Daughter is now at bedside.   "

## 2019-07-12 NOTE — DISCHARGE PLACEMENT REQUEST
"Edy Ambriz (78 y.o. Male)     Date of Birth Social Security Number Address Home Phone MRN    1940  [address]  [address] [facility] [address] [phone] [id number]    Jainism Marital Status          Episcopalian        Admission Date Admission Type Admitting Provider Attending Provider Department, Room/Bed    7/8/19 Emergency Lenard Landa MD Thompson, Benjamin H, MD [facility] 3A, 349/1    Discharge Date Discharge Disposition Discharge Destination                       Attending Provider:  Lenard Landa MD    Allergies:  No Known Allergies    Isolation:  None   Infection:  None   Code Status:  CPR    Ht:  182.9 cm (72\")   Wt:  61.2 kg (135 lb)    Admission Cmt:  None   Principal Problem:  None                Active Insurance as of 7/8/2019     Primary Coverage     Payor Plan Insurance Group Employer/Plan Group    HUMANA MEDICARE REPLACEMENT HUMANA MEDICARE REPL [id number]     Payor Plan Address Payor Plan Phone Number Payor Plan Fax Number Effective Dates    [address] [address] [phone]  1/1/2018 - None Entered    [facility] [id number]       Subscriber Name Subscriber Birth Date Member ID       EDY AMBRIZ 1940 [id number]                 Emergency Contacts      (Rel.) Home Phone Work Phone Mobile Phone    OLEGARIO AMBRIZ (Son) -- -- [phone]    Ciara(Emerg Only)Jennyfer (Daughter) -- -- [phone]               History & Physical      Lenard Landa MD at 7/8/2019  5:53 PM              [facility]  HISTORY AND PHYSICAL    Date of Admission: 7/8/2019  Primary Care Physician: Torey Amato DO    Subjective     Chief Complaint: Confusion    History of Present Illness  Patient is a 78-year-old  male with past medical history significant for prostate cancer and recent inguinal hernia repair the presented to the hospital today secondary to confusion.  Family at " bedside provided additional history.  They report the patient went to [employer] earlier today to  some medication, was acting confused while inside the store, this was noticed by an employee, and they subsequently called police when he left [employer] and got in his car and was starting to drive away.  They were concerned about his safety.  Family at bedside reports that he is having intermittent confusion over the past couple of weeks, but is also been worse since his recent surgery.  He recently had open inguinal hernia repair performed by Dr. Mora of general surgery.  He reports that the only new medication he is taking are the pain pills prescribed from his recent surgery, in addition to an OTC sleep agent which he has been taking at night.  His urine drug screen did return positive for benzodiazepines, and patient is adamant that he does not take any of those medications.  He denies any chest pain, shortness of breath, palpitations, nausea, vomiting, diarrhea, new pain, dysuria, urinary frequency.  He does report having darker urine as compared to his norm.  His appetite is been poor recently.  He does admit to weight loss, but is unsure on the amount of weight loss he has experienced.  He has been living independently, denies any focal motor or sensory deficits.  No presyncope or syncopal symptoms.  No recent history of falls.  During questioning, patient indicates that he feels like he is doing fine, however family reports that they have noticed worsening confusion intermittently over the course of the past week, worse since surgery.    Review of Systems     Otherwise complete ROS reviewed and negative except as mentioned in the HPI.    Past Medical History:   Past Medical History:   Diagnosis Date   • Cataracts, bilateral    • Occasional tremors    • Prostate cancer (CMS/[facility]) 05/2011    t2c,[place]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    • Varicose veins of legs      Past Surgical  "History:  Past Surgical History:   Procedure Laterality Date   • EYE SURGERY     • HERNIA REPAIR Left    • INGUINAL HERNIA REPAIR Right 6/28/2019    Procedure: OPEN RIGHT INGUINAL HERNIA REPAIR WITH MESH;  Surgeon: Alesia Mora MD;  Location: [facility] OR;  Service: General   • PROSTATECTOMY  05/2011    RALP     Social History:  reports that he quit smoking about 17 years ago. His smoking use included cigarettes. He quit after 50.00 years of use. He has never used smokeless tobacco. He reports that he does not drink alcohol or use drugs.    Family History: family history includes Stroke in his mother.   Heart disease and lung cancer also run in the family    Allergies:  No Known Allergies  Medications:  Prior to Admission medications    Medication Sig Start Date End Date Taking? Authorizing Provider   Cinnamon 500 MG tablet Take 500 mg by mouth Daily.    Xi Paul MD   Doxylamine Succinate, Sleep, (EQ SLEEP AID PO) Take 1 tablet by mouth As Needed.    Xi Paul MD   HYDROcodone-acetaminophen (NORCO) 7.5-325 MG per tablet Take 1-2 tablets by mouth Every 4 (Four) Hours As Needed for Moderate Pain  (Pain). 6/28/19   Alesia Mora MD   Multiple Vitamins-Minerals (MULTIVITAMIN PO) Take 1 tablet by mouth Daily.    Xi Paul MD   Omega-3 Fatty Acids (FISH OIL) 1000 MG capsule capsule Take 1,000 mg by mouth Daily With Breakfast.    Xi Paul MD     Objective     Vital Signs: /74   Pulse 84   Temp 97.6 °F (36.4 °C)   Resp 16   Ht 182.9 cm (72\")   Wt 61.2 kg (135 lb)   SpO2 96%   BMI 18.31 kg/m²    Physical Exam   Constitutional: No distress.   Thin and cachectic appearing   HENT:   Head: Normocephalic.   Mouth/Throat: No oropharyngeal exudate (MM dry).   Eyes: Pupils are equal, round, and reactive to light. No scleral icterus.   Neck: No tracheal deviation present.   Cardiovascular: Normal rate and regular rhythm.   Pulmonary/Chest: Effort normal. No " respiratory distress.   Requiring no supplemental 02   Abdominal: Soft. He exhibits no distension. There is no tenderness. There is no rebound and no guarding.   Thin; right inguinal region with incision from recent surgery that is C/D/I; no fluctuance, induration; no purulent discharge   Musculoskeletal: He exhibits no edema.   Neurological: He is alert.   Moves all extremities spontaneously; speech clear; no word finding difficulty; face symmetrical.  I did not get patient OOB to ambulate him.   Skin: Skin is warm. He is not diaphoretic.   Psychiatric: He has a normal mood and affect. His behavior is normal.   Vitals reviewed.    Results Reviewed:  Lab Results (last 24 hours)     Procedure Component Value Units Date/Time    Urine Drug Screen - Urine, Catheter In/Out [[id number]]  (Abnormal) Collected:  07/08/19 1527    Specimen:  Urine, Catheter In/Out Updated:  07/08/19 1557     Amphetamine Screen, Urine Negative     Barbiturates Screen, Urine Negative     Benzodiazepine Screen, Urine Positive     Cocaine Screen, Urine Negative     Methadone Screen, Urine Negative     Opiate Screen Positive     Phencyclidine (PCP), Urine Negative     THC, Screen, Urine Negative    Narrative:       Negative Thresholds For Drugs Screened in Urine:    Amphetamines          500 ng/ml  Barbiturates          200 ng/ml  Benzodiazepines       200 ng/ml  Cocaine               150 ng/ml  Methadone             150 ng/ml  Opiates               300 ng/ml  Phencyclidine         25 ng/ml  THC                      50 ng/ml    The normal value for all drugs tested is negative. This report includes final unconfirmed screening results.  A positive result by this assay can be, at your request, sent to the Reference Lab for confirmation by gas chromatography. Unconfirmed results must not be used for non-medical purposes, such as employment or legal testing. Clinical consideration should be applied to any drug of abuse test result, particularly when  unconfirmed results are used.    Urinalysis, Microscopic Only - Urine, Catheter In/Out [[id number]]  (Abnormal) Collected:  07/08/19 1454    Specimen:  Urine, Catheter In/Out Updated:  07/08/19 1532     RBC, UA 3-5 /HPF      WBC, UA 3-5 /HPF      Bacteria, UA 1+ /HPF      Squamous Epithelial Cells, UA 3-6 /HPF      Hyaline Casts, UA None Seen /LPF      Mucus, UA Large/3+ /HPF      Methodology Manual Light Microscopy    Urinalysis With Culture If Indicated - Urine, Catheter In/Out [[id number]]  (Abnormal) Collected:  07/08/19 1454    Specimen:  Urine, Catheter In/Out Updated:  07/08/19 1513     Color, UA Yellow     Appearance, UA Clear     pH, UA 5.5     Specific Gravity, UA >=1.030     Glucose, UA Negative     Ketones, UA Trace     Bilirubin, UA Small (1+)     Blood, UA Negative     Protein, UA 30 mg/dL (1+)     Leuk Esterase, UA Negative     Nitrite, UA Negative     Urobilinogen, UA 1.0 E.U./dL    [place] Draw [[id number]] Collected:  07/08/19 1326    Specimen:  Blood Updated:  07/08/19 1431    Narrative:       The following orders were created for panel order [place] Draw.  Procedure                               Abnormality         Status                     ---------                               -----------         ------                     Light Blue Top[[id number]]                                   Final result               Green Top (Gel)[[id number]]                                  Final result               Lavender Top[[id number]]                                     Final result               Red Top[[id number]]                                          Final result                 Please view results for these tests on the individual orders.    Light Blue Top [[id number]] Collected:  07/08/19 1326    Specimen:  Blood Updated:  07/08/19 1431     Extra Tube hold for add-on     Comment: Auto resulted       Lavender Top [[id number]] Collected:  07/08/19 1326    Specimen:  Blood Updated:  07/08/19 1431     Extra Tube  hold for add-on     Comment: Auto resulted       Green Top (Gel) [[id number]] Collected:  07/08/19 1326    Specimen:  Blood Updated:  07/08/19 1431     Extra Tube Hold for add-ons.     Comment: Auto resulted.       Red Top [[id number]] Collected:  07/08/19 1326    Specimen:  Blood Updated:  07/08/19 1431     Extra Tube Hold for add-ons.     Comment: Auto resulted.       TSH [[id number]]  (Normal) Collected:  07/08/19 1326    Specimen:  Blood Updated:  07/08/19 1424     TSH 1.290 mIU/mL     Comprehensive Metabolic Panel [[id number]]  (Abnormal) Collected:  07/08/19 1326    Specimen:  Blood Updated:  07/08/19 1410     Glucose 132 mg/dL      BUN 25 mg/dL      Creatinine 0.82 mg/dL      Sodium 144 mmol/L      Potassium 3.8 mmol/L      Chloride 108 mmol/L      CO2 26.0 mmol/L      Calcium 9.8 mg/dL      Total Protein 6.9 g/dL      Albumin 4.00 g/dL      ALT (SGPT) 40 U/L      AST (SGOT) 63 U/L      Alkaline Phosphatase 56 U/L      Total Bilirubin 0.7 mg/dL      eGFR Non African Amer 91 mL/min/1.73      Globulin 2.9 gm/dL      A/G Ratio 1.4 g/dL      BUN/Creatinine Ratio 30.5     Anion Gap 10.0 mmol/L     Narrative:       GFR Normal >60  Chronic Kidney Disease <60  Kidney Failure <15    CBC Auto Differential [[id number]]  (Abnormal) Collected:  07/08/19 1326    Specimen:  Blood Updated:  07/08/19 1359     WBC 6.52 10*3/mm3      RBC 4.47 10*6/mm3      Hemoglobin 12.4 g/dL      Hematocrit 37.7 %      MCV 84.3 fL      MCH 27.7 pg      MCHC 32.9 g/dL      RDW 13.3 %      RDW-SD 41.4 fl      MPV 9.4 fL      Platelets 243 10*3/mm3      Neutrophil % 75.1 %      Lymphocyte % 16.0 %      Monocyte % 7.1 %      Eosinophil % 0.8 %      Basophil % 0.8 %      Immature Grans % 0.2 %      Neutrophils, Absolute 4.91 10*3/mm3      Lymphocytes, Absolute 1.04 10*3/mm3      Monocytes, Absolute 0.46 10*3/mm3      Eosinophils, Absolute 0.05 10*3/mm3      Basophils, Absolute 0.05 10*3/mm3      Immature Grans, Absolute 0.01 10*3/mm3      nRBC 0.0 /100  WBC         Imaging Results (last 24 hours)     Procedure Component Value Units Date/Time    XR Chest 1 View [[id number]] Collected:  07/08/19 1433     Updated:  07/08/19 1437    Narrative:       EXAMINATION: XR CHEST 1 VW- 7/8/2019 2:33 PM CDT     HISTORY: Altered mental status, weight loss     COMPARISON: None     FINDINGS:  Heart appears normal in size. Atherosclerotic calcifications are seen in  the aorta, which appears tortuous. The lungs are hyperinflated. There is  no focal consolidation or effusion. No pneumothorax is appreciated.  Pulmonary vasculature appears grossly normal.       Impression:       Chronic lung changes with associated hyperinflation. No  evidence of acute cardiopulmonary process.  This report was finalized on 07/08/2019 14:34 by Dr. Tim Ellsworth MD.    CT Head Without Contrast [[id number]] Collected:  07/08/19 1413     Updated:  07/08/19 1418    Narrative:       EXAMINATION: CT HEAD WO CONTRAST- 7/8/2019 2:13 PM CDT     HISTORY: Confusion, altered level of consciousness     COMPARISON: None     DOSE: 635 mGy-cm     TECHNIQUE: Sequential imaging was performed from the vertex through the  base of the skull without the use of IV contrast.  Sagittal and coronal  reformations were made from the original source data and reviewed.  Automated exposure control was also utilized to decrease patient  radiation dose.     FINDINGS:   There is no evidence of acute intracranial hemorrhage, mass, or mass  effect.  The corticomedullary differentiation is normal without evidence  of acute infarct. There are periventricular and subcortical white matter  changes, a nonspecific finding most often seen as sequela of chronic  microvascular ischemia. The ventricles appear normal in configuration.  The basilar cisterns are patent. The posterior fossa is grossly normal  in appearance. No depressed skull fracture is identified. The paranasal  sinuses and mastoid air cells appear clear. Calcifications are seen  in  the cavernous carotid arteries.           Impression:       No acute intracranial findings.     This report was finalized on 07/08/2019 14:15 by Dr. Tim Ellsworth MD.        I have personally reviewed and interpreted the radiology studies and ECG obtained at time of admission.     Assessment / Plan     Assessment:   Active Hospital Problems    Diagnosis   • Confusion   • Weight loss   • Underweight   • Inguinal hernia     S/p repair by Dr. Mora 6/2019       Plan:   1.  Will d/c Norco and monitor; patient states his pain has been well controlled, however he continues to take his pain medication multiple times per day  2.  Talked with patient/family about UDS positive for benzos.  He is adamant that he has not been taking any medication in this class.    3.  CT Head with no acute findings  4.  IVFs with banana bag  5.  Nutrition consult for malnutrition severity assessment  6.  D/C Doxylamine  7.  Neuro checks  8.  PT and OT eval  9.  Telemetry  10.  TSH ok; check B12, vitamin D, iron profile, prealbumin  11.  Workup ongoing.  Dispo: patient currently lives at home independently.  Pending results of ongoing workup will determine most appropriate dispo plan moving forward  12.  Patient/family do request evaluation by Dr. Mora citing that he was supposed to have appointment with him tomorrow in the outpatient clinic for 1 week post-op evaluation.      Lenard Landa MD   07/08/19   6:01 PM            Electronically signed by Lenard Landa MD at 7/8/2019  6:07 PM          Physician Progress Notes (most recent note)      Lenard Landa MD at 7/11/2019  1:25 PM              [facility]  INPATIENT PROGRESS NOTE    Patient Name: Edy Urbina  Date of Admission: 7/8/2019  Today's Date: 07/11/19  Length of Stay: 3  Primary Care Physician: Torey Amato DO    Subjective   Chief Complaint: Confusion  Altered Mental Status     Patient has  required upper extremity restraints.  Discussed with nursing, and it sounds like patient had a rough night.  He continues to become fixated on certain things - last night he continued to pick at his surgical wound from recent hernia surgery.  He also reported had ongoing hallucinations of people entering the room and trying to harm him.  He was trying to use a urinal, and evidently began squeezing and twisting his genitals during this process.  In short, he has required upper extremity restraints.  He is currently calm.  Thinks he is leaving the hospital today.  Family is at bedside.    Review of Systems     All pertinent negatives and positives are as above. All other systems have been reviewed and are negative unless otherwise stated.     Objective    Temp:  [97.5 °F (36.4 °C)-98.3 °F (36.8 °C)] 98.1 °F (36.7 °C)  Heart Rate:  [62-82] 72  Resp:  [15-20] 16  BP: (126-160)/(63-80) 138/77  Physical Exam   Constitutional: No distress.   Resting in bed; currently calm; has required upper extremity restraints   HENT:   Head: Normocephalic.   Mouth/Throat: No oropharyngeal exudate.   Eyes: No scleral icterus.   Neck: No tracheal deviation present.   Pulmonary/Chest: Effort normal. No respiratory distress.   Musculoskeletal: He exhibits no edema.   Neurological:   Remains confused.  No active hallucinations.  No new neuro deficits   Skin: He is not diaphoretic.   Psychiatric:   Currently calm; not combative or agitated at this time   Vitals reviewed.    Results Review:  I have reviewed the labs, radiology results, and diagnostic studies.    Laboratory Data:   Results from last 7 days   Lab Units 07/09/19  0442 07/08/19  1326   WBC 10*3/mm3 5.79 6.52   HEMOGLOBIN g/dL 11.8* 12.4*   HEMATOCRIT % 36.3* 37.7*   PLATELETS 10*3/mm3 214 243        Results from last 7 days   Lab Units 07/09/19  0442 07/08/19  1326   SODIUM mmol/L 139 144   POTASSIUM mmol/L 4.1 3.8   CHLORIDE mmol/L 107 108   CO2 mmol/L 29.0 26.0   BUN mg/dL 21  25*   CREATININE mg/dL 0.65 0.82   CALCIUM mg/dL 8.7 9.8   BILIRUBIN mg/dL 0.5 0.7   ALK PHOS U/L 48 56   ALT (SGPT) U/L 39 40   AST (SGOT) U/L 49* 63*   GLUCOSE mg/dL 90 132*       Culture Data:        Radiology Data:   Imaging Results (last 24 hours)     ** No results found for the last 24 hours. **          I have reviewed the patient's current medications.     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 Mora 6/2019       Plan:  1.  MRI Brain with no acute findings  2.  Ehrlichia and RMSF serologies pending; I have a low suspicion that tick-illness is contributing to current symptoms - I will go ahead and hold additional Doxycyline  3.  Long discussion with Dr. Patel yesterday - appreciate his assistance.  Talked with Dr. Armas today; trial of Nueplazid  4.  Trial of Seroquel    5.  Nutrition supplements  6.  D/C IVFs  7.  Labs reviewed; stable  8.  Dispo: anticipate patient will need placement in this condition; even looking into [facility] psych placement at this time given recent symptoms  9.  Updated family at bedside      Lenard Landa MD   07/11/19   2:01 PM    Electronically signed by Lenard Landa MD at 7/11/2019  2:01 PM          Consult Notes (most recent note)      Bashir Patel MD at 7/10/2019 11:17 AM          Neurology Consult Note    Referring Provider: Dr Momo Landa  Reason for Consultation: altered mentation      History of present illness:      This is a 78-year-old male.  He is a poor historian.  His son and daughter are at the bedside and assist in the history.  They tell me that for the better part of a year they have been somewhat concerned about his mentation.   Specifically they have noted things over the phone.  They have noticed hallucinations and paranoia.  They give examples that he will see bugs on the ceiling of the wall.  He becomes fixated on objects.  He lives alone and therefore it is difficult to say the exact initiation of the symptoms.  They do give examples that even as distant as 1 year ago he was having memory issues.  He will have to make a list of things to do that day including writing down that he will need to eat.  Otherwise he would forget it.  On June 28 he had a hernia surgery.  Since that time his mentation has worsened drastically.  He was found after driving to [place] to be extremely confused and combative.  The police were called and he was delivered to our emergency department.  He has had continued atypical behavior since admission.  He has visual and auditory hallucinations.  He has episodes of extreme confusion followed by a complete return to baseline.  The duration of the episodes is greatly varied.  He is moving all extremities.  He has no difficulties with ambulation.  He has very bizarre behavior.  An example is that yesterday he believed that some hand lotion actually was the key to internal life.  He also has attempted multiple times to shove objects up his rectum.  They have noticed an occasional resting tremor.      Past Medical History:   Diagnosis Date   • Cataracts, bilateral    • Occasional tremors    • Prostate cancer (CMS/[facility]) 05/2011    t2c,[place]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    • Varicose veins of legs        No Known Allergies  No current facility-administered medications on file prior to encounter.      Current Outpatient Medications on File Prior to Encounter   Medication Sig   • Cinnamon 500 MG tablet Take 500 mg by mouth Daily.   • Doxylamine Succinate, Sleep, (EQ SLEEP AID PO) Take 1 tablet by mouth As Needed.   • HYDROcodone-acetaminophen (NORCO) 7.5-325 MG per tablet Take 1-2 tablets by  mouth Every 4 (Four) Hours As Needed for Moderate Pain  (Pain).   • Multiple Vitamins-Minerals (MULTIVITAMIN PO) Take 1 tablet by mouth Daily.   • Omega-3 Fatty Acids (FISH OIL) 1000 MG capsule capsule Take 1,000 mg by mouth Daily With Breakfast.       Social History     Socioeconomic History   • Marital status:      Spouse name: Not on file   • Number of children: Not on file   • Years of education: Not on file   • Highest education level: Not on file   Tobacco Use   • Smoking status: Former Smoker     Years: 50.00     Types: Cigarettes     Last attempt to quit: 2002     Years since quittin.0   • Smokeless tobacco: Never Used   Substance and Sexual Activity   • Alcohol use: No     Comment: QUIT DRINKING IN    • Drug use: No   • Sexual activity: Defer     Family History   Problem Relation Age of Onset   • Stroke Mother        Review of Systems  A 14 point review of systems was reviewed and was negative except for confusion    Vital Signs   Temp:  [97.8 °F (36.6 °C)-98.8 °F (37.1 °C)] 98.2 °F (36.8 °C)  Heart Rate:  [59-74] 74  Resp:  [16-17] 16  BP: (114-156)/(60-80) 156/80    General Exam:  Head:  Normal cephalic, atraumatic  HEENT:  Neck supple  Fundoscopic Exam:  No signs of disc edema  CVS:  Regular rate and rhythm.  No murmurs  Carotid Examination:  No bruits  Lungs:  Clear to auscultation  Abdomen:  Non-tender, Non-distended  Extremities:  No signs of peripheral edema  Skin:  No rashes    Neurologic Exam:    Mental Status:    -Awake, Alert, Oriented X 3  -No word finding difficulties  -No aphasia  -No dysarthria  -Follows simple and complex commands    CN II:  Visual fields full.  Pupils equally reactive to light  CN III, IV, VI:  Extraocular Muscles full with no signs of nystagmus  CN V:  Facial sensory is symmetric with no asymmetries.  CN VII:  Facial motor symmetric.  Somewhat of a flat face  CN VIII:  Gross hearing intact bilaterally  CN IX:  Palate elevates symmetrically  CN X:   Palate elevates symmetrically  CN XI:  Shoulder shrug symmetric  CN XII:  Tongue is midline on protrusion    Motor: (strength out of 5:  1= minimal movement, 2 = movement in plane of gravity, 3 = movement against gravity, 4 = movement against some resistance, 5 = full strength)    -Right Upper Ext: Proximal: 5 Distal: 5  -Left Upper Ext: Proximal: 5 Distal: 5    -Right Lower Ext: Proximal: 5 Distal: 5  -Left Lower Ext: Proximal: 5 Distal: 5    With distraction on the contralateral side I noticed mild cogwheel rigidity with rotation of the wrist.    DTR:  -Right   Bicep: 2+ Tricep: 2+ Brachoradialis: 2+   Patella: 2+ Ankle: 2+ Neg Babinski  -Left   Bicep: 2+ Tricep: 2+ Brachoradialis: 2+   Patella: 2+ Ankle: 2+ Neg Babinski    Sensory:  -Intact to light touch, pinprick, temperature, pain, and proprioception    Coordination:  -Finger to nose intact  -Heel to shin intact  -No ataxia    Gait  -No signs of ataxia  -ambulates unassisted      Results Review:  Lab Results (last 24 hours)     Procedure Component Value Units Date/Time    [facility] Spotted Fever, IgG [[id number]] Collected:  07/09/19 0442    Specimen:  Blood Updated:  07/09/19 1123    [place] Spotted Fever, IgM [[id number]] Collected:  07/09/19 0442    Specimen:  Blood Updated:  07/09/19 1123    Ehrlichia Antibody Panel [[id number]] Collected:  07/08/19 1326    Specimen:  Blood Updated:  07/09/19 1123          .  Imaging Results (last 24 hours)     Procedure Component Value Units Date/Time    MRI Brain With & Without Contrast [[id number]] Collected:  07/09/19 1840     Updated:  07/09/19 1846    Narrative:       EXAMINATION:  MRI BRAIN WITHOUT AND WITH CONTRAST-  7/9/2019 6:06 PM CDT     HISTORY: Confusion/delirium, altered LOC, unexplained;  R41.0-Disorientation, unspecified; R68.89-Other general symptoms and  signs.     TECHNIQUE: Multiplanar imaging was performed in a high field magnet  before and after gadolinium contrast administration.     COMPARISON:  No comparison study.     FINDINGS: There is no evidence of acute infarct on the  diffusion-weighted sequence. There are scattered areas of T2 high signal  within the hemispheric white matter. There is mild to moderate atrophy  with associated ventricular prominence. There are no structural  abnormalities. There are no areas of abnormal contrast enhancement.  There is some motion artifact on some of the sequences.       Impression:       1. No evidence of acute infarct.  2. Mild to moderate atrophy with associated ventricular prominence.  3. T2 high signal within the hemispheric white matter is nonspecific and  most likely due to chronic small vessel disease.     This report was finalized on 07/09/2019 18:43 by Dr. Esa Mcqueen MD.               MRI brain reviewed by me.  Flair sequencing is unremarkable.  There is some mild atrophy noted.  It is my opinion that the midbrain is somewhat thin best seen on the midline in the sagittal sequencing almost consistent with a hummingbird sign.    [place] spotted fever pending   iron profile normal  Vitamin D normal  Vitamin B12 normal at 425  TSH normal at 1.29  CBC reviewed and unremarkable  Comprehensive metabolic panel reviewed and unremarkable    Impression    78-year-old male with a subacute progression over the course of at least 1 year of a progressive dementia.  Concerning features is severe and rapid fluctuations in mental status along with a strong component of hallucinations.  In addition to this I believe that he has some thinning of the midbrain on the MRI along with some cogwheel rigidity on his exam.  This would be consistent with a parkinsonism.  Most concerning would be a Lewy body dementia.  I had a prolonged discussion with the family about my concerns and shared with him that this is a diagnosis that is made over multiple visits as opposed to an initial visit in the hospital.  That being the case I do have concerns about him returning home alone.  In  addition to this will need a way to control his hallucinations and fluctuations in mental status.  If this is Lewy body dementia than he may have increased sensitivity to typical antipsychotics and therefore these need to be avoided.    Plan    · Copper level  · Ceruplasmin  · B1 level  · Ammonia level  · Folate level  · Seroquel 25mg QHS  · Avoid typical antipsychotics  · Will likely start on Nuplazid at discharge ( not on inpatient formulary)  · Driving cessation  · D/C planning - not a candidate to live alone    I discussed the patients findings and my recommendations with patient and family    Bashir Patel MD  07/10/19  11:17 AM        Electronically signed by Bashir Patel MD at 7/10/2019 12:11 PM          Physical Therapy Notes (most recent note)      Henrietta Hooks, PT, DPT, NCS at 2019 12:12 PM  Version 1 of 1         Acute Care - Physical Therapy Initial Eval/Discharge  [facility]     Patient Name: Edy Urbina  : 1940  MRN: [id number]  Today's Date: 2019   Onset of Illness/Injury or Date of Surgery: 19  Date of Referral to PT: 19  Referring Physician: Dr. Landa      Admit Date: 2019    Visit Dx:    ICD-10-CM ICD-9-CM   1. Confusion R41.0 298.9   2. Decreased activities of daily living (ADL) R68.89 780.99     Patient Active Problem List   Diagnosis   • Varicose vein of leg   • BMI less than 19,adult   • Confusion   • Weight loss   • Underweight   • Inguinal hernia   • Anemia   • Altered mental status     Past Medical History:   Diagnosis Date   • Cataracts, bilateral    • Occasional tremors    • Prostate cancer (CMS/HCC) 2011    t2c,Sommer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    • Varicose veins of legs      Past Surgical History:   Procedure Laterality Date   • EYE SURGERY     • HERNIA REPAIR Left    • INGUINAL HERNIA REPAIR Right 2019    Procedure: OPEN RIGHT INGUINAL HERNIA REPAIR WITH MESH;  Surgeon: Alesia Mora MD;  Location:   [facility] OR;  Service: General   • PROSTATECTOMY  2011    RALP          PT ASSESSMENT (last 12 hours)      Physical Therapy Evaluation     Row Name 19 1100          PT Evaluation Time/Intention    Subjective Information  complains of;fatigue  -MS     Document Type  discharge evaluation/summary  -MS     Mode of Treatment  physical therapy  -MS     Row Name 19 1100          General Information    Patient Profile Reviewed?  yes  -MS     Onset of Illness/Injury or Date of Surgery  19  -MS     Referring Physician  Dr. Landa  -MS     Patient Observations  alert;cooperative;agree to therapy confused at times  -MS     Patient/Family Observations  daughter in room with  infant that apparently tested positive for RSV, nursing and MD notified  -MS     General Observations of Patient  pt up in the hallway with OT and in no apparent distress  -MS     Prior Level of Function  independent:;all household mobility;community mobility;ADL's;driving  -MS     Pertinent History of Current Functional Problem  confusion with recent hernia repair  -MS     Existing Precautions/Restrictions  fall due to confusion  -MS     Risks Reviewed  patient:;LOB;dizziness;increased discomfort  -MS     Benefits Reviewed  patient:;improve function;increase knowledge  -MS     Barriers to Rehab  cognitive status  -MS     Row Name 19 1100          Cognitive Assessment/Interventions    Additional Documentation  Cognitive Assessment/Intervention (Group)  -MS     Row Name 19 1100          Cognitive Assessment/Intervention- PT/OT    Affect/Mental Status (Cognitive)  confused  -MS     Orientation Status (Cognition)  oriented x 4 conversationally confused  -MS     Follows Commands (Cognition)  follows one step commands;25-49% accuracy difficulty with new tasks  -MS     Personal Safety Interventions  fall prevention program maintained;gait belt;muscle strengthening facilitated;nonskid shoes/slippers when out of bed;supervised  activity  -MS     Row Name 07/09/19 1100          Safety Issues, Functional Mobility    Safety Issues Affecting Function (Mobility)  ability to follow commands;at risk behavior observed;impulsivity;judgment;problem solving  -MS     Impairments Affecting Function (Mobility)  cognition  -MS     Row Name 07/09/19 1100          Transfer Assessment/Treatment    Transfer Assessment/Treatment  stand-sit transfer  -MS     Comment (Transfers)  supervision for safety  -MS     Stand-Sit [place] (Transfers)  supervision  -MS     Row Name 07/09/19 1100          Gait/Stairs Assessment/Training    [place] Level (Gait)  supervision  -MS     Distance in Feet (Gait)  400ft, able to walk forward, backward, side to side, around objects, over 3 in objects with no LOB  -MS     Row Name 07/09/19 1100          General ROM    GENERAL ROM COMMENTS  all extremities WFL  -MS     Row Name 07/09/19 1100          MMT (Manual Muscle Testing)    General MMT Comments  grossly 5/5 no focal deficits seen  -MS     Row Name 07/09/19 1100          Motor Assessment/Intervention    Additional Documentation  Gross Motor Coordination (Group)  -MS     Row Name 07/09/19 1100          Gross Motor Coordination    Gross Motor Skill, Impairments Detail  ESTEBAN and accuracy intact in all extremities  -MS     Row Name 07/09/19 1100          Sensory Assessment/Intervention    Sensory General Assessment  -- difficult to test  -MS     Row Name 07/09/19 1100          Vision Assessment/Intervention    Vision Assessment Comment  no reports of impaired vision  -MS     Row Name 07/09/19 1100          Pain Assessment    Additional Documentation  Pain Scale: Numbers Pre/Post-Treatment (Group)  -MS     Row Name 07/09/19 1100          Pain Scale: Numbers Pre/Post-Treatment    Pain Scale: Numbers, Pretreatment  0/10 - no pain  -MS     Pain Scale: Numbers, Post-Treatment  0/10 - no pain  -MS     Row Name 07/09/19 1100          Plan of Care Review    Plan of Care Reviewed  With  patient  -MS     Row Name 07/09/19 1100          Physical Therapy Clinical Impression    Date of Referral to PT  07/09/19  -MS     Criteria for Skilled Interventions Met (PT Clinical Impression)  no problems identified which require skilled intervention  -MS     Row Name 07/09/19 1100          Positioning and Restraints    Post Treatment Position  bed  -MS     In Bed  sitting EOB;with OT;side rails up x2;with family/caregiver  -MS     Row Name 07/09/19 1100          Physical Therapy Discharge Summary    Additional Documentation  Discharge Summary, PT Eval (Group)  -MS     Row Name 07/09/19 1100          Discharge Summary, PT Eval    Reason for Discharge (PT Discharge Summary)  no further needs identified  -MS     Outcomes Achieved Upon Discharge (PT Discharge Summary)  evaluation only  -MS       User Key  (r) = Recorded By, (t) = Taken By, (c) = Cosigned By    Initials Name Provider Type    Henrietta Sharma R, PT, DPT, NCS Physical Therapist              PT Recommendation and Plan  Anticipated Discharge Disposition (PT): home with assist  Therapy Frequency (PT Clinical Impression): evaluation only  Outcome Summary/Treatment Plan (PT)  Anticipated Discharge Disposition (PT): home with assist  Reason for Discharge (PT Discharge Summary): no further needs identified  Plan of Care Reviewed With: patient  Progress: improving  Outcome Summary: PT evaluation completed. The patient is up with supervision due to his confusion and poor judgement making. He is oriented x4 but is confused conversationally. He was able to perform all high level gait tasks without difficulty. No further PT warranted, but recommend continued supervision until confusion improves.     Outcome Measures     Row Name 07/09/19 1101 07/09/19 1100          How much help from another person do you currently need...    Turning from your back to your side while in flat bed without using bedrails?  4  -MS  --     Moving from lying on back to sitting on  the side of a flat bed without bedrails?  4  -MS  --     Moving to and from a bed to a chair (including a wheelchair)?  4  -MS  --     Standing up from a chair using your arms (e.g., wheelchair, bedside chair)?  4  -MS  --     Climbing 3-5 steps with a railing?  3  -MS  --     To walk in hospital room?  4  -MS  --     AM-PAC 6 Clicks Score (PT)  23  -MS  --        How much help from another is currently needed...    Putting on and taking off regular lower body clothing?  --  3  -MM     Bathing (including washing, rinsing, and drying)  --  3  -MM     Toileting (which includes using toilet bed pan or urinal)  --  3  -MM     Putting on and taking off regular upper body clothing  --  3  -MM     Taking care of personal grooming (such as brushing teeth)  --  4  -MM     Eating meals  --  4  -MM     AM-PAC 6 Clicks Score (OT)  --  20  -MM        Functional Assessment    Outcome Measure Options  AM-PAC 6 Clicks Basic Mobility (PT)  -MS  AM-PAC 6 Clicks Daily Activity (OT)  -MM       User Key  (r) = Recorded By, (t) = Taken By, (c) = Cosigned By    Initials Name Provider Type    Henrietta Sharma, PT, DPT, NCS Physical Therapist    MM Rick Herbert, OTR/L Occupational Therapist           Time Calculation:   PT Charges     Row Name 07/09/19 1211             Time Calculation    Start Time  1100  -MS      Stop Time  1128  -MS      Time Calculation (min)  28 min  -MS      PT Received On  07/09/19  -MS        User Key  (r) = Recorded By, (t) = Taken By, (c) = Cosigned By    Initials Name Provider Type    Henrietta Sharma, PT, DPT, NCS Physical Therapist        Therapy Charges for Today     Code Description Service Date Service Provider Modifiers Qty    14386854841 HC PT EVAL LOW COMPLEXITY 2 7/9/2019 Henrietta Hooks, PT, DPT, NCS GP 1          PT G-Codes  Outcome Measure Options: AM-PAC 6 Clicks Basic Mobility (PT)  AM-PAC 6 Clicks Score (PT): 23  AM-PAC 6 Clicks Score (OT): 20    PT Discharge Summary  Anticipated  Discharge Disposition (PT): home with assist    Henrietta Hooks, PT, DPT, NCS  2019         Electronically signed by Henrietta Hooks, PT, DPT, NCS at 2019 12:12 PM          Occupational Therapy Notes (most recent note)      Tere Power COTA/L at 2019 10:01 AM          Acute Care - Occupational Therapy Treatment Note/Discharge   [place]     Patient Name: Edy Urbina  : 1940  MRN: [id number]  Today's Date: 2019  Onset of Illness/Injury or Date of Surgery: 19  Date of Referral to OT: 19  Referring Physician: Dr. Landa      Admit Date: 2019    Visit Dx:     ICD-10-CM ICD-9-CM   1. Confusion R41.0 298.9   2. Decreased activities of daily living (ADL) R68.89 780.99     Patient Active Problem List   Diagnosis   • Varicose vein of leg   • BMI less than 19,adult   • Confusion   • Weight loss   • Underweight   • Inguinal hernia   • Anemia   • Altered mental status   • Protein-calorie malnutrition, moderate (CMS/HCC)   • Dementia   • Hallucinations       Therapy Treatment    Rehabilitation Treatment Summary     Row Name 19             Treatment Time/Intention    Discipline  occupational therapy assistant  -TS      Document Type  therapy note (daily note)  -TS      Subjective Information  no complaints  -TS2      Patient Effort  good  -TS2      Existing Precautions/Restrictions  fall  -TS2      Treatment Considerations/Comments  conversationally confused but oriented to place, time person and follows all directions provided during tx  -TS2      Recorded by [TS] Tere Power COTA/L 19  [TS2] Tere Power COTA/L 1956      Row Name 19             Cognitive Assessment/Intervention- PT/OT    Personal Safety Interventions  fall prevention program maintained;elopement precautions initiated;nonskid shoes/slippers when out of bed  -TS      Recorded by [TS] Tere Power COTA/L 19      Row Name  07/12/19 0901             Bed Mobility Assessment/Treatment    Bed Mobility Assessment/Treatment  supine-sit  -TS      Supine-Sit [place] (Bed Mobility)  independent  -TS      Recorded by [TS] Tere Power ABBOTT/L 07/12/19 0957      Row Name 07/12/19 0901             Functional Mobility    Functional Mobility- Ind. Level  independent  -TS      Recorded by [TS] Tere Power ABBOTT/L 07/12/19 0957      Row Name 07/12/19 0901             Transfer Assessment/Treatment    Transfer Assessment/Treatment  sit-stand transfer;stand-sit transfer  -TS      Recorded by [TS] Tere Power ABBOTT/L 07/12/19 0957      Row Name 07/12/19 0901             Sit-Stand Transfer    Sit-Stand [place] (Transfers)  independent  -TS      Recorded by [TS] Tere Power ABBOTT/L 07/12/19 0957      Row Name 07/12/19 0901             Stand-Sit Transfer    Stand-Sit [place] (Transfers)  independent  -TS      Recorded by [TS] Tere Power ABBOTT/L 07/12/19 0957      Row Name 07/12/19 0901             ADL Assessment/Intervention    BADL Assessment/Intervention  lower body dressing;feeding  -TS      Recorded by [TS] Tere Power ABBOTT/L 07/12/19 0957      Row Name 07/12/19 0901             Lower Body Dressing Assessment/Training    Lower Body Dressing [place] Level  don;doff;socks;independent  -TS      Lower Body Dressing Position  unsupported sitting;unsupported standing  -TS      Recorded by [TS] Tere Power ABBOTT/L 07/12/19 0957      Row Name 07/12/19 0901             Self-Feeding Assessment/Training    [place] Level (Feeding)  feeding skills;liquids to mouth;independent  -TS      Position (Self-Feeding)  edge of bed sitting  -TS      Recorded by [TS] Tere Power ABBOTT/L 07/12/19 0957      Row Name 07/12/19 0901             Positioning and Restraints    Pre-Treatment Position  in bed  -TS      Post Treatment Position  bed  -TS      In Bed  sitting EOB;call light  within reach;encouraged to call for assist;exit alarm on;side rails up x2  -TS      Recorded by [TS] Tere Power COTA/L 07/12/19 0957      Row Name 07/12/19 0901             Pain Scale: Numbers Pre/Post-Treatment    Pain Scale: Numbers, Pretreatment  0/10 - no pain  -TS      Pain Scale: Numbers, Post-Treatment  0/10 - no pain  -TS      Recorded by [TS] Tere Power COTA/KURTIS 07/12/19 0957        User Key  (r) = Recorded By, (t) = Taken By, (c) = Cosigned By    Initials Name Effective Dates Discipline    TS Tere Power COTA/L 08/02/16 -  OT             Rehab Goal Summary     Row Name 07/12/19 0900             Bathing Goal 1 (OT)    Activity/Assistive Device (Bathing Goal 1, OT)  bathing skills, all  -TS      [place] Level/Cues Needed (Bathing Goal 1, OT)  independent  -TS      Time Frame (Bathing Goal 1, OT)  10 days  -TS      Progress/Outcomes (Bathing Goal 1, OT)  goal not met pt requires S due to cognition  -TS         Dressing Goal 1 (OT)    Activity/Assistive Device (Dressing Goal 1, OT)  dressing skills, all  -TS      [place]/Cues Needed (Dressing Goal 1, OT)  independent  -TS      Time Frame (Dressing Goal 1, OT)  10 days  -TS      Progress/Outcome (Dressing Goal 1, OT)  goal met  -TS         Direction Following Goal 1 (OT)    Activity (Direction Following Goal 1, OT)  follow one step directions  -TS      [place]/Cues/Accuracy (Direction Following Goal 1, OT)  independently;with 90% accuracy  -TS      Time Frame (Direction Following Goal 1, OT)  10 days  -TS      Progress/Outcome (Direction Following Goal 1, OT)  goal met  -TS        User Key  (r) = Recorded By, (t) = Taken By, (c) = Cosigned By    Initials Name Provider Type Discipline    MARCELINO Tere Power COTA/L Occupational Therapy Assistant OT          Occupational Therapy Education     Title: PT OT SLP Therapies (In Progress)     Topic: Occupational Therapy (In Progress)     Point: ADL training (Done)      Description: Instruct learner(s) on proper safety adaptation and remediation techniques during self care or transfers.   Instruct in proper use of assistive devices.    Learning Progress Summary           Patient Acceptance, E, VU by  at 7/9/2019 11:52 AM    Comment:  OT role, benefits of therapy and medical care, POC   Family Acceptance, E, VU by MM at 7/9/2019 11:52 AM    Comment:  OT role, benefits of therapy and medical care, POC                   Point: Precautions (Done)     Description: Instruct learner(s) on prescribed precautions during self-care and functional transfers.    Learning Progress Summary           Patient Acceptance, E, VU by MM at 7/9/2019 11:52 AM    Comment:  OT role, benefits of therapy and medical care, POC   Family Acceptance, E, VU by MM at 7/9/2019 11:52 AM    Comment:  OT role, benefits of therapy and medical care, POC                               User Key     Initials Effective Dates Name Provider Type Discipline     04/03/18 -  Rick Herbert, OTR/L Occupational Therapist OT                OT Recommendation and Plan  Outcome Summary/Treatment Plan (OT)  Daily Summary of Progress (OT): progress towards functional goals is fair  Daily Summary of Progress (OT): progress towards functional goals is fair  Plan of Care Review  Plan of Care Reviewed With: patient  Plan of Care Reviewed With: patient  Outcome Summary: Pt currently independent with all aspects of ADLs but requires S for safety due to cognition. Pt is at highest level of function from OT standpoint. OT services no longer required. Pt would [facility] to [facility] for medication management and assist with behaviors.     Outcome Measures     Row Name 07/12/19 0900 07/10/19 1100 07/09/19 1101       How much help from another person do you currently need...    Turning from your back to your side while in flat bed without using bedrails?  --  --  4  -MS    Moving from lying on back to sitting on the side of a  flat bed without bedrails?  --  --  4  -MS    Moving to and from a bed to a chair (including a wheelchair)?  --  --  4  -MS    Standing up from a chair using your arms (e.g., wheelchair, bedside chair)?  --  --  4  -MS    Climbing 3-5 steps with a railing?  --  --  3  -MS    To walk in hospital room?  --  --  4  -MS    AM-PAC 6 Clicks Score (PT)  --  --  23  -MS       How much help from another is currently needed...    Putting on and taking off regular lower body clothing?  3  -TS  3  -TS  --    Bathing (including washing, rinsing, and drying)  4  -TS  3  -TS  --    Toileting (which includes using toilet bed pan or urinal)  4  -TS  4  -TS  --    Putting on and taking off regular upper body clothing  4  -TS  4  -TS  --    Taking care of personal grooming (such as brushing teeth)  4  -TS  4  -TS  --    Eating meals  4  -TS  4  -TS  --    AM-PAC 6 Clicks Score (OT)  23  -TS  22  -TS  --       Functional Assessment    Outcome Measure Options  --  AM-PAC 6 Clicks Daily Activity (OT)  -TS  AM-PAC 6 Clicks Basic Mobility (PT)  -MS    Row Name 07/09/19 1100             How much help from another is currently needed...    Putting on and taking off regular lower body clothing?  3  -MM      Bathing (including washing, rinsing, and drying)  3  -MM      Toileting (which includes using toilet bed pan or urinal)  3  -MM      Putting on and taking off regular upper body clothing  3  -MM      Taking care of personal grooming (such as brushing teeth)  4  -MM      Eating meals  4  -MM      AM-PAC 6 Clicks Score (OT)  20  -MM         Functional Assessment    Outcome Measure Options  AM-PAC 6 Clicks Daily Activity (OT)  -MM        User Key  (r) = Recorded By, (t) = Taken By, (c) = Cosigned By    Initials Name Provider Type    TS Tere Power, ABBOTT/L Occupational Therapy Assistant    MS Henrietta Hooks, PT, DPT, NCS Physical Therapist    MM Rick Herbert, OTR/L Occupational Therapist           Time Calculation:    Time  Calculation- OT     Row Name 07/12/19 1000             Time Calculation- OT    OT Start Time  0901  -TS      OT Stop Time  0924  -TS      OT Time Calculation (min)  23 min  -TS      Total Timed Code Minutes- OT  23 minute(s)  -TS      OT Received On  07/12/19  -TS         Timed Charges    63270 - OT Self Care/Mgmt Minutes  23  -TS        User Key  (r) = Recorded By, (t) = Taken By, (c) = Cosigned By    Initials Name Provider Type    TS Tere Power COTA/L Occupational Therapy Assistant        Therapy Suggested Charges     Code   Minutes Charges    15435 (CPT®) Hc Ot Neuromusc Re Education Ea 15 Min      86092 (CPT®) Hc Ot Ther Proc Ea 15 Min      30223 (CPT®) Hc Ot Therapeutic Act Ea 15 Min      64051 (CPT®) Hc Ot Manual Therapy Ea 15 Min      94127 (CPT®) Hc Ot Iontophoresis Ea 15 Min      31595 (CPT®) Hc Ot Elec Stim Ea-Per 15 Min      20827 (CPT®) Hc Ot Ultrasound Ea 15 Min      70798 (CPT®) Hc Ot Self Care/Mgmt/Train Ea 15 Min 23 2    Total  23 2        Therapy Charges for Today     Code Description Service Date Service Provider Modifiers Qty    42411689744 HC OT SELF CARE/MGMT/TRAIN EA 15 MIN 7/12/2019 Tere Power COTA/L GO 2               OT Discharge Summary  Reason for Discharge: Maximum functional potential achieved, Discharge from facility  Outcomes Achieved: Refer to plan of care for updates on goals achieved  Discharge Destination: other (comment)(dede psych facility )    DIRK Patel  7/12/2019    Electronically signed by Jose Shirley OTR/L, CNT at 7/12/2019 10:29 AM

## 2019-07-12 NOTE — PROGRESS NOTES
[facility] Medicine Services  INPATIENT PROGRESS NOTE    Patient Name: Edy Urbina  Date of Admission: 7/8/2019  Today's Date: 07/12/19  Length of Stay: 4  Primary Care Physician: Torey Amato DO    Subjective   Chief Complaint: Confusion, agitation  Altered Mental Status     Patient is now refusing to take his medication.  I have spoken with both the son and the daughter at bedside this afternoon and they state that even with their encouragement, he continues to refuse taking medication.  Earlier today they report that he was yelling at people that were walking in the hallway.  One minute he is very agitated and the next he is more calm and cooperative.  He has just finished his lunch, and ate most of his meal.  He denies any new pain.  He voices no new complaints.  He does report a strong desire to get out of the hospital and get back home.    Review of Systems     All pertinent negatives and positives are as above. All other systems have been reviewed and are negative unless otherwise stated.     Objective    Temp:  [98 °F (36.7 °C)-98.2 °F (36.8 °C)] 98 °F (36.7 °C)  Heart Rate:  [64-79] 65  Resp:  [16-18] 16  BP: (128-144)/(64-75) 130/64  Physical Exam   Constitutional: No distress.   Sitting up on the side of the bed; just finished eating lunch   HENT:   Head: Normocephalic.   Mouth/Throat: No oropharyngeal exudate.   Eyes: No scleral icterus.   Neck: No tracheal deviation present.   Pulmonary/Chest: Effort normal. No respiratory distress.   Musculoskeletal: He exhibits no edema.   Neurological:   Remains confused.  More argumentative and agitated today.   Skin: He is not diaphoretic.   Psychiatric:   More argumentative and agitated at this time   Vitals reviewed.    Results Review:  I have reviewed the labs, radiology results, and diagnostic studies.    Laboratory Data:   Results from last 7 days   Lab Units 07/09/19  0442 07/08/19  1326   WBC 10*3/mm3 5.79 6.52    HEMOGLOBIN g/dL 11.8* 12.4*   HEMATOCRIT % 36.3* 37.7*   PLATELETS 10*3/mm3 214 243        Results from last 7 days   Lab Units 07/09/19  0442 07/08/19  1326   SODIUM mmol/L 139 144   POTASSIUM mmol/L 4.1 3.8   CHLORIDE mmol/L 107 108   CO2 mmol/L 29.0 26.0   BUN mg/dL 21 25*   CREATININE mg/dL 0.65 0.82   CALCIUM mg/dL 8.7 9.8   BILIRUBIN mg/dL 0.5 0.7   ALK PHOS U/L 48 56   ALT (SGPT) U/L 39 40   AST (SGOT) U/L 49* 63*   GLUCOSE mg/dL 90 132*       Culture Data:        Radiology Data:   Imaging Results (last 24 hours)     ** No results found for the last 24 hours. **          I have reviewed the patient's current medications.     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 Mora 6/2019       Plan:  1.  MRI Brain with no acute findings  2.  Ehrlichia and RMSF serologies negative  3.  Dr. Armas to start trial of Nueplazid  4.  Trial of Seroquel    5.  The patient is refusing to take PO medications at this time  6.  Nutrition supplements  7.  Labs studies have been stable  8.  Dispo: looking into [facility] placement   9.  Updated son and daughter at bedside today    Lenard Lanad MD   07/12/19   1:13 PM

## 2019-07-12 NOTE — PROGRESS NOTES
Continued Stay Note  [facility]     Patient Name: Edy Urbina  MRN: [id number]  Today's Date: 7/12/2019    Admit Date: 7/8/2019    Discharge Plan     Row Name 07/12/19 1205       Plan    Plan Comments  MICHELLE spoke with Raul, son, in regards to [facility] placement. Raul states that [facility] would most likely be the best option and is requesting referral be sent to [facility]. MICHELLE spoke with Paolo at [facility] and facility is aware of referral. MICHELLE has faxed appropriate information to facility. SW will follow and await bed offer.         Discharge Codes    No documentation.             Maki Paul

## 2019-07-12 NOTE — PLAN OF CARE
"Problem: Patient Care Overview  Goal: Plan of Care Review   07/12/19 1511   Coping/Psychosocial   Plan of Care Reviewed With patient   Plan of Care Review   Progress improving   OTHER   Outcome Summary Disoriented to situation. Pt is often confused, uncooperative and paranoid. Pt has scattered scratches/abrasions from \" digging the ticks out of skin\". Pt refused morning meds. Was approached by the patients son around 1400 and he stated that he had talked with him and he is now willing to take his medicine. Morning meds were given at 1414. No c/o pain or discomfort. Upx1.  D/C to [facility] today.      Goal: Individualization and Mutuality  Outcome: Ongoing (interventions implemented as appropriate)    Goal: Interprofessional Rounds/Family Conf  Outcome: Ongoing (interventions implemented as appropriate)      Problem: Fall Risk (Adult)  Goal: Identify Related Risk Factors and Signs and Symptoms  Outcome: Ongoing (interventions implemented as appropriate)    Goal: Absence of Fall  Outcome: Ongoing (interventions implemented as appropriate)      Problem: Confusion, Acute (Adult)  Goal: Identify Related Risk Factors and Signs and Symptoms  Outcome: Ongoing (interventions implemented as appropriate)    Goal: Cognitive/Functional Impairments Minimized  Outcome: Ongoing (interventions implemented as appropriate)    Goal: Safety  Outcome: Ongoing (interventions implemented as appropriate)      Problem: Restraint, Nonbehavioral (Nonviolent)  Goal: Nonbehavioral (Nonviolent) Restraint: Absence of Injury/Harm  Outcome: Ongoing (interventions implemented as appropriate)    Goal: Nonbehavioral (Nonviolent) Restraint: Achievement of Discontinuation Criteria  Outcome: Ongoing (interventions implemented as appropriate)    Goal: Nonbehavioral (Nonviolent) Restraint: Preservation of Dignity and Wellbeing  Outcome: Ongoing (interventions implemented as appropriate)        "

## 2019-07-12 NOTE — DISCHARGE SUMMARY
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 Mora 6/2019         Consults: Neurology (Dr. Patel and Dr. Armas)    Procedures Performed:   Imaging Results (last 7 days)     Procedure Component Value Units Date/Time    MRI Brain With & Without Contrast [[id number]] Collected:  07/09/19 1840     Updated:  07/09/19 1846    Narrative:       EXAMINATION:  MRI BRAIN WITHOUT AND WITH CONTRAST-  7/9/2019 6:06 PM CDT     HISTORY: Confusion/delirium, altered LOC, unexplained;  R41.0-Disorientation, unspecified; R68.89-Other general symptoms and  signs.     TECHNIQUE: Multiplanar imaging was performed in a high field magnet  before and after gadolinium contrast administration.     COMPARISON: No comparison study.     FINDINGS: There is no evidence of acute infarct on the  diffusion-weighted sequence. There are scattered areas of T2 high signal  within the hemispheric white matter. There is mild to moderate atrophy  with associated ventricular prominence. There are no structural  abnormalities. There are no areas of abnormal contrast enhancement.  There is some motion artifact on some of the sequences.       Impression:       1. No evidence of acute infarct.  2. Mild to moderate atrophy with associated ventricular prominence.  3. T2 high signal within the hemispheric white matter is nonspecific and  most likely due to chronic small  vessel disease.     This report was finalized on 07/09/2019 18:43 by Dr. Esa Mcqueen MD.    XR Chest 1 View [[id number]] Collected:  07/08/19 1433     Updated:  07/08/19 1437    Narrative:       EXAMINATION: XR CHEST 1 VW- 7/8/2019 2:33 PM CDT     HISTORY: Altered mental status, weight loss     COMPARISON: None     FINDINGS:  Heart appears normal in size. Atherosclerotic calcifications are seen in  the aorta, which appears tortuous. The lungs are hyperinflated. There is  no focal consolidation or effusion. No pneumothorax is appreciated.  Pulmonary vasculature appears grossly normal.       Impression:       Chronic lung changes with associated hyperinflation. No  evidence of acute cardiopulmonary process.  This report was finalized on 07/08/2019 14:34 by Dr. Tim Ellsworth MD.    CT Head Without Contrast [[id number]] Collected:  07/08/19 1413     Updated:  07/08/19 1418    Narrative:       EXAMINATION: CT HEAD WO CONTRAST- 7/8/2019 2:13 PM CDT     HISTORY: Confusion, altered level of consciousness     COMPARISON: None     DOSE: 635 mGy-cm     TECHNIQUE: Sequential imaging was performed from the vertex through the  base of the skull without the use of IV contrast.  Sagittal and coronal  reformations were made from the original source data and reviewed.  Automated exposure control was also utilized to decrease patient  radiation dose.     FINDINGS:   There is no evidence of acute intracranial hemorrhage, mass, or mass  effect.  The corticomedullary differentiation is normal without evidence  of acute infarct. There are periventricular and subcortical white matter  changes, a nonspecific finding most often seen as sequela of chronic  microvascular ischemia. The ventricles appear normal in configuration.  The basilar cisterns are patent. The posterior fossa is grossly normal  in appearance. No depressed skull fracture is identified. The paranasal  sinuses and mastoid air cells appear clear. Calcifications are seen  in  the cavernous carotid arteries.           Impression:       No acute intracranial findings.     This report was finalized on 07/08/2019 14:15 by Dr. Tim Ellsworth MD.          Pertinent Test Results:   Lab Results (last 7 days)     Procedure Component Value Units Date/Time    Zinc, Whole Blood [[id number]] Collected:  07/10/19 1228    Specimen:  Blood Updated:  07/11/19 1608     Zinc, Whole Blood 572 ug/dL      Comment: This test was developed and its performance characteristics  determined by [facility]. It has not been cleared or approved  by the Food and Drug Administration.       Narrative:       Performed at:   - [address]  [id number]  : Jenise Sandra MD, Phone:  [phone]    Ehrlichia Antibody Panel [[id number]] Collected:  07/08/19 1326    Specimen:  Blood Updated:  07/11/19 1410     E. chaffeensis (HME) IgG Titer Negative     E. chaffeensis (HME) IgM Titer Negative     Comment: IgG titers if 1:64 or greater indicate exposure or  acute and  convalescent samples showing a four-fold increase, and/or the presence  of IgM indicate recent or current infection.        HGE IgG Titer Negative     Comment: HGE IgG levels are detectable 7 to 10 days post infection and persist  approximately one year.        HGE IgM Titer Negative     Comment: Due to a reagent backorder, this test was performed using a different  assay. The reference interval for this alternate assay is:                                         Negative      <1:64                                         Positive       1:64 or greater  IgM levels usually rise 3 to 5 days post infection and fall to normal  levels in approximately 30 to 60 days.       Narrative:       Performed at:  [address]  [id number]  : Jenise Sandra MD, Phone:  [phone]    Ceruloplasmin [[id number]] Collected:  07/10/19 1228    Specimen:  Blood Updated:  07/11/19 0715      Ceruloplasmin 20.7 mg/dL     Narrative:       Performed at:  [address] [place] [address]  [id number]  : Reagan Duran PhD, Phone:  [phone]    [place] Spotted Fever, IgM [[id number]] Collected:  07/09/19 0442    Specimen:  Blood Updated:  07/11/19 0236     RMSF IgM 0.46 index      Comment:                                  Negative        <0.90                                   Equivocal 0.90 - 1.10                                   Positive        >1.10       Narrative:       Performed at:  [facility]  [id number]  : Jenise Sandra MD, Phone:  [phone]    [place] Spotted Fever, IgG [[id number]] Collected:  07/09/19 0442    Specimen:  Blood Updated:  07/10/19 2207     RMSF IgG Negative    Narrative:       Performed at:  [address] [place] [address]  [id number]  : Jenise Sandra MD, Phone:  [phone]    Folate [[id number]]  (Normal) Collected:  07/10/19 1228    Specimen:  Blood Updated:  07/10/19 1357     Folate >20.00 ng/mL     Ammonia [[id number]]  (Abnormal) Collected:  07/10/19 1228    Specimen:  Blood Updated:  07/10/19 1247     Ammonia <9 umol/L     Vitamin B1, Whole Blood [[id number]] Collected:  07/10/19 1228    Specimen:  Blood Updated:  07/10/19 1232    Vitamin B12 [[id number]]  (Normal) Collected:  07/09/19 0442    Specimen:  Blood Updated:  07/09/19 0557     Vitamin B-12 425 pg/mL     Vitamin D 25 Hydroxy [[id number]]  (Normal) Collected:  07/09/19 0442    Specimen:  Blood Updated:  07/09/19 0525     25 Hydroxy, Vitamin D 43.5 ng/ml     Iron Profile [[id number]]  (Normal) Collected:  07/09/19 0442    Specimen:  Blood Updated:  07/09/19 0517     Iron 66 mcg/dL      TIBC 280 mcg/dL      Iron Saturation 24 %     Prealbumin [[id number]]  (Abnormal) Collected:  07/09/19 0442    Specimen:  Blood Updated:  07/09/19 0515     Prealbumin 14.8 mg/dL     Comprehensive Metabolic  Panel [[id number]]  (Abnormal) Collected:  07/09/19 0442    Specimen:  Blood Updated:  07/09/19 0510     Glucose 90 mg/dL      BUN 21 mg/dL      Creatinine 0.65 mg/dL      Sodium 139 mmol/L      Potassium 4.1 mmol/L      Chloride 107 mmol/L      CO2 29.0 mmol/L      Calcium 8.7 mg/dL      Total Protein 5.8 g/dL      Albumin 3.30 g/dL      ALT (SGPT) 39 U/L      AST (SGOT) 49 U/L      Alkaline Phosphatase 48 U/L      Total Bilirubin 0.5 mg/dL      eGFR Non African Amer 119 mL/min/1.73      Globulin 2.5 gm/dL      A/G Ratio 1.3 g/dL      BUN/Creatinine Ratio 32.3     Anion Gap 3.0 mmol/L     Narrative:       GFR Normal >60  Chronic Kidney Disease <60  Kidney Failure <15    CBC & Differential [[id number]] Collected:  07/09/19 0442    Specimen:  Blood Updated:  07/09/19 0453    Narrative:       The following orders were created for panel order CBC & Differential.  Procedure                               Abnormality         Status                     ---------                               -----------         ------                     CBC Auto Differential[[id number]]        Abnormal            Final result                 Please view results for these tests on the individual orders.    CBC Auto Differential [[id number]]  (Abnormal) Collected:  07/09/19 0442    Specimen:  Blood Updated:  07/09/19 0453     WBC 5.79 10*3/mm3      RBC 4.27 10*6/mm3      Hemoglobin 11.8 g/dL      Hematocrit 36.3 %      MCV 85.0 fL      MCH 27.6 pg      MCHC 32.5 g/dL      RDW 13.5 %      RDW-SD 42.2 fl      MPV 9.4 fL      Platelets 214 10*3/mm3      Neutrophil % 60.6 %      Lymphocyte % 25.9 %      Monocyte % 8.8 %      Eosinophil % 3.8 %      Basophil % 0.7 %      Immature Grans % 0.2 %      Neutrophils, Absolute 3.51 10*3/mm3      Lymphocytes, Absolute 1.50 10*3/mm3      Monocytes, Absolute 0.51 10*3/mm3      Eosinophils, Absolute 0.22 10*3/mm3      Basophils, Absolute 0.04 10*3/mm3      Immature Grans, Absolute 0.01 10*3/mm3      nRBC 0.0  /100 WBC     Urine Drug Screen - Urine, Catheter In/Out [[id number]]  (Abnormal) Collected:  07/08/19 1527    Specimen:  Urine, Catheter In/Out Updated:  07/08/19 1557     Amphetamine Screen, Urine Negative     Barbiturates Screen, Urine Negative     Benzodiazepine Screen, Urine Positive     Cocaine Screen, Urine Negative     Methadone Screen, Urine Negative     Opiate Screen Positive     Phencyclidine (PCP), Urine Negative     THC, Screen, Urine Negative    Narrative:       Negative Thresholds For Drugs Screened in Urine:    Amphetamines          500 ng/ml  Barbiturates          200 ng/ml  Benzodiazepines       200 ng/ml  Cocaine               150 ng/ml  Methadone             150 ng/ml  Opiates               300 ng/ml  Phencyclidine         25 ng/ml  THC                      50 ng/ml    The normal value for all drugs tested is negative. This report includes final unconfirmed screening results.  A positive result by this assay can be, at your request, sent to the Reference Lab for confirmation by gas chromatography. Unconfirmed results must not be used for non-medical purposes, such as employment or legal testing. Clinical consideration should be applied to any drug of abuse test result, particularly when unconfirmed results are used.    Urinalysis, Microscopic Only - Urine, Catheter In/Out [[id number]]  (Abnormal) Collected:  07/08/19 1454    Specimen:  Urine, Catheter In/Out Updated:  07/08/19 1532     RBC, UA 3-5 /HPF      WBC, UA 3-5 /HPF      Bacteria, UA 1+ /HPF      Squamous Epithelial Cells, UA 3-6 /HPF      Hyaline Casts, UA None Seen /LPF      Mucus, UA Large/3+ /HPF      Methodology Manual Light Microscopy    Urinalysis With Culture If Indicated - Urine, Catheter In/Out [[id number]]  (Abnormal) Collected:  07/08/19 1454    Specimen:  Urine, Catheter In/Out Updated:  07/08/19 1513     Color, UA Yellow     Appearance, UA Clear     pH, UA 5.5     Specific Gravity, UA >=1.030     Glucose, UA Negative      Ketones, UA Trace     Bilirubin, UA Small (1+)     Blood, UA Negative     Protein, UA 30 mg/dL (1+)     Leuk Esterase, UA Negative     Nitrite, UA Negative     Urobilinogen, UA 1.0 E.U./dL    [place] Draw [[id number]] Collected:  07/08/19 1326    Specimen:  Blood Updated:  07/08/19 1431    Narrative:       The following orders were created for panel order [place] Draw.  Procedure                               Abnormality         Status                     ---------                               -----------         ------                     Light Blue Top[[id number]]                                   Final result               Green Top (Gel)[[id number]]                                  Final result               Lavender Top[[id number]]                                     Final result               Red Top[[id number]]                                          Final result                 Please view results for these tests on the individual orders.    Light Blue Top [[id number]] Collected:  07/08/19 1326    Specimen:  Blood Updated:  07/08/19 1431     Extra Tube hold for add-on     Comment: Auto resulted       Lavender Top [[id number]] Collected:  07/08/19 1326    Specimen:  Blood Updated:  07/08/19 1431     Extra Tube hold for add-on     Comment: Auto resulted       Green Top (Gel) [[id number]] Collected:  07/08/19 1326    Specimen:  Blood Updated:  07/08/19 1431     Extra Tube Hold for add-ons.     Comment: Auto resulted.       Red Top [[id number]] Collected:  07/08/19 1326    Specimen:  Blood Updated:  07/08/19 1431     Extra Tube Hold for add-ons.     Comment: Auto resulted.       TSH [[id number]]  (Normal) Collected:  07/08/19 1326    Specimen:  Blood Updated:  07/08/19 1424     TSH 1.290 mIU/mL     Comprehensive Metabolic Panel [[id number]]  (Abnormal) Collected:  07/08/19 1326    Specimen:  Blood Updated:  07/08/19 1410     Glucose 132 mg/dL      BUN 25 mg/dL      Creatinine 0.82 mg/dL      Sodium 144 mmol/L       Potassium 3.8 mmol/L      Chloride 108 mmol/L      CO2 26.0 mmol/L      Calcium 9.8 mg/dL      Total Protein 6.9 g/dL      Albumin 4.00 g/dL      ALT (SGPT) 40 U/L      AST (SGOT) 63 U/L      Alkaline Phosphatase 56 U/L      Total Bilirubin 0.7 mg/dL      eGFR Non African Amer 91 mL/min/1.73      Globulin 2.9 gm/dL      A/G Ratio 1.4 g/dL      BUN/Creatinine Ratio 30.5     Anion Gap 10.0 mmol/L     Narrative:       GFR Normal >60  Chronic Kidney Disease <60  Kidney Failure <15    CBC Auto Differential [[id number]]  (Abnormal) Collected:  07/08/19 1326    Specimen:  Blood Updated:  07/08/19 1359     WBC 6.52 10*3/mm3      RBC 4.47 10*6/mm3      Hemoglobin 12.4 g/dL      Hematocrit 37.7 %      MCV 84.3 fL      MCH 27.7 pg      MCHC 32.9 g/dL      RDW 13.3 %      RDW-SD 41.4 fl      MPV 9.4 fL      Platelets 243 10*3/mm3      Neutrophil % 75.1 %      Lymphocyte % 16.0 %      Monocyte % 7.1 %      Eosinophil % 0.8 %      Basophil % 0.8 %      Immature Grans % 0.2 %      Neutrophils, Absolute 4.91 10*3/mm3      Lymphocytes, Absolute 1.04 10*3/mm3      Monocytes, Absolute 0.46 10*3/mm3      Eosinophils, Absolute 0.05 10*3/mm3      Basophils, Absolute 0.05 10*3/mm3      Immature Grans, Absolute 0.01 10*3/mm3      nRBC 0.0 /100 WBC           Chief Complaint on Day of Discharge: see my progress note from today    Hospital Course:  The patient is a 78 y.o. male who presented to [facility] on July 8, 2019 secondary to confusion.  Per report, patient had gone to [employer] on the day leading up to this admission, where he was noticed to be acting confused while inside the store, and police were subsequently called when he left the store to get in his car and they were concerned about his safety.  He was subsequently brought to our facility for further work-up and management.  Family reports that they have seen evidence of worsening confusion dating back a number of months.  They do report that since his recent  inguinal hernia repair, that his confusion is seem to be accelerated.    Initially, we were concerned that some of the symptoms were likely medication induced.  He was taken over-the-counter sleep agent which was subsequently discontinued.  His urine drug screen was positive for benzodiazepines, however patient and family were adamant that he did not take any of those types of medications.  He was also prescribed an opiate pain medication related to his recent surgery, and admitted that he had been taking that medication even when he was not experiencing any pain.  For that reason we held that medication as well.    Unfortunately, he continued to have worsening confusion, with prominent behavioral disturbance during this hospitalization.  Laboratory studies were performed and have largely been unremarkable.  Work-up of causes of metabolic encephalopathy have also been unremarkable.  We have performed a CAT scan of the brain in addition to an MRI of the brain and those results are noted above.  In short, no acute findings were found.  Patient had even mentioned about recent tick exposure, and I went ahead and started him on empiric doxycycline therapy and sent off tick serologies.  Those have returned to normal.  Doxycycline has been discontinued.    Neurology was consulted and based upon his description of symptoms they were concerned about the possibility of a progressing dementia.  Patient also describes symptoms of paranoia which have been witnessed by the family, in addition to hallucinations.  MRI of the brain findings were reviewed by Dr. Patel, and he was concerned about the possibility of Lewy body dementia.  Patient is experienced very bizarre behavior during this hospitalization, being fixated on certain objects, and even multiple times putting objects up his rectum.  Recently, he has been yelling at people that have been walking in the hallway.  He has become fixated at certain areas on his skin, and  "will start picking at them, even including his recent surgical site from a right inguinal hernia repair.  Nursing found him trying to use a bedside urinal and witnessed him aggressively twisting and pulling at his genitals. There are times where he appears to be very argumentative and agitated, and then a short time thereafter is very calm and cooperative.  He was started on Seroquel without any meaningful improvement in his symptoms.  Unfortunately, we have also gotten to the point where he started to refuse oral medications.  There have been times during this hospitalization where he has required soft restraints, as well.  In fact, he has even broken pieces off of the hospital bed.    Neurology also wanted to start him on a trial of Nuplazid to see if this would help with some of the hallucinations that he is experiencing.  His son, who is been at the bedside, has convinced him to take his prescribed medications today.  In short, with what appears to be an underlying dementia with behavioral disturbance, complicated by hallucinations and symptoms of paranoia, poorly controlled at this time, plan is in place for discharge today to a [facility] for ongoing care.      Physical Exam on Discharge:  /64 (BP Location: Left arm, Patient Position: Sitting)   Pulse 65   Temp 98 °F (36.7 °C) (Oral)   Resp 16   Ht 182.9 cm (72\")   Wt 61.2 kg (135 lb)   SpO2 97%   BMI 18.31 kg/m²   Physical Exam  See my progress note from today    Discharge Disposition:  Short Term Hospital ([place] - External)    Discharge Medications:     Discharge Medications      New Medications      Instructions Start Date   acetaminophen 325 MG tablet  Commonly known as:  TYLENOL   650 mg, Oral, Every 4 Hours PRN      Pimavanserin Tartrate 34 MG capsule   34 mg, Oral, Daily   Start Date:  7/13/2019     QUEtiapine 25 MG tablet  Commonly known as:  SEROquel   25 mg, Oral, Every 12 Hours Scheduled         Continue These Medications      " Instructions Start Date   fish oil 1000 MG capsule capsule   1,000 mg, Oral, Daily With Breakfast      MULTIVITAMIN PO   1 tablet, Oral, Daily         Stop These Medications    Cinnamon 500 MG tablet     EQ SLEEP AID PO     HYDROcodone-acetaminophen 7.5-325 MG per tablet  Commonly known as:  NORCO            Discharge Diet:  as tolerated    Activity at Discharge: as tolerated    Follow-up Appointments:   Future Appointments   Date Time Provider Department Center   7/18/2019 10:15 AM Otf Montague DO [facility] None   9/5/2019  9:30 AM Torey Amato DO [facility] None   4/13/2020 10:30 AM [facility] None   4/20/2020 10:30 AM Dionne Price APRN [facility] None     Upon discharge from [facility], patient would benefit from follow-up with [facility] regarding suspected diagnosis of Lewy body dementia.  Also follow-up with primary care provider within 1 week of discharge from the [facility].    Test Results Pending at Discharge: none    Lenard Landa MD  07/12/19  2:51 PM    Time: 30 min

## 2019-07-12 NOTE — NURSING NOTE
Pt refused hs meds. States that he wants to go home and he does not need anything from here. The dr is in nursing home and cannot give him any medicine. Pt stated that he just has tick bites and he will be ok. Attempted to assure pt that the Dr's are doing blood work and trying to find out what is wrong and that he need to take his medicine. Attempt to give x 3.

## 2019-07-12 NOTE — NURSING NOTE
At 1955 pt set off bed alarm went in bathroom and locked himself in .  Pt was very agitated/angry wants to leave go home. Pt was urinating and very unsteady. Assisted back to bed will have to monitor for need of restraints. Charge nurse aware.

## 2019-07-13 LAB — VIT B1 BLD-SCNC: 243.8 NMOL/L (ref 66.5–200)

## 2019-07-13 NOTE — PAYOR COMM NOTE
"Dc home 7-12-19  [phone]  Ur phone   Fax   Edy Ambriz (78 y.o. Male)     Date of Birth Social Security Number Address Home Phone MRN    1940  [address]  [address] [facility] [address] [phone] [id number]    [employer] Marital Status          Faith        Admission Date Admission Type Admitting Provider Attending Provider Department, Room/Bed    7/8/19 Emergency Lenard Landa MD  [facility] 3A, 349/1    Discharge Date Discharge Disposition Discharge Destination        7/12/2019 [facility] ([place] - [place])              Attending Provider:  (none)   Allergies:  No Known Allergies    Isolation:  None   Infection:  None   Code Status:  Prior    Ht:  182.9 cm (72\")   Wt:  61.2 kg (135 lb)    Admission Cmt:  None   Principal Problem:  None                Active Insurance as of 7/8/2019     Primary Coverage     Payor Plan Insurance Group Employer/Plan Group    HUMANA MEDICARE REPLACEMENT HUMANA MEDICARE REPL [id number]     Payor Plan Address Payor Plan Phone Number Payor Plan Fax Number Effective Dates    [address] [address] [phone]  1/1/2018 - None Entered    [facility] [id number]       Subscriber Name Subscriber Birth Date Member ID       EDY AMBRIZ 1940 [id number]                 Emergency Contacts      (Rel.) Home Phone Work Phone Mobile Phone    OLEGARIO AMBRIZ (Son) -- -- [phone]    Ciara(Emerg Only)Jennyfer (Daughter) -- -- [phone]               Physician Progress Notes (last 72 hours) (Notes from 7/10/2019  3:23 PM through 7/13/2019  3:23 PM)      Lenard Landa MD at 7/12/2019  1:13 PM              [facility] Medicine Services  INPATIENT PROGRESS NOTE    Patient Name: Edy Ambriz  Date of Admission: 7/8/2019  Today's Date: 07/12/19  Length of Stay: 4  Primary Care Physician: Torey Amato,     Subjective   Chief Complaint: Confusion, " agitation  Altered Mental Status     Patient is now refusing to take his medication.  I have spoken with both the son and the daughter at bedside this afternoon and they state that even with their encouragement, he continues to refuse taking medication.  Earlier today they report that he was yelling at people that were walking in the hallway.  One minute he is very agitated and the next he is more calm and cooperative.  He has just finished his lunch, and ate most of his meal.  He denies any new pain.  He voices no new complaints.  He does report a strong desire to get out of the hospital and get back home.    Review of Systems     All pertinent negatives and positives are as above. All other systems have been reviewed and are negative unless otherwise stated.     Objective    Temp:  [98 °F (36.7 °C)-98.2 °F (36.8 °C)] 98 °F (36.7 °C)  Heart Rate:  [64-79] 65  Resp:  [16-18] 16  BP: (128-144)/(64-75) 130/64  Physical Exam   Constitutional: No distress.   Sitting up on the side of the bed; just finished eating lunch   HENT:   Head: Normocephalic.   Mouth/Throat: No oropharyngeal exudate.   Eyes: No scleral icterus.   Neck: No tracheal deviation present.   Pulmonary/Chest: Effort normal. No respiratory distress.   Musculoskeletal: He exhibits no edema.   Neurological:   Remains confused.  More argumentative and agitated today.   Skin: He is not diaphoretic.   Psychiatric:   More argumentative and agitated at this time   Vitals reviewed.    Results Review:  I have reviewed the labs, radiology results, and diagnostic studies.    Laboratory Data:   Results from last 7 days   Lab Units 07/09/19  0442 07/08/19  1326   WBC 10*3/mm3 5.79 6.52   HEMOGLOBIN g/dL 11.8* 12.4*   HEMATOCRIT % 36.3* 37.7*   PLATELETS 10*3/mm3 214 243        Results from last 7 days   Lab Units 07/09/19  0442 07/08/19  1326   SODIUM mmol/L 139 144   POTASSIUM mmol/L 4.1 3.8   CHLORIDE mmol/L 107 108   CO2 mmol/L 29.0 26.0   BUN mg/dL 21 25*    CREATININE mg/dL 0.65 0.82   CALCIUM mg/dL 8.7 9.8   BILIRUBIN mg/dL 0.5 0.7   ALK PHOS U/L 48 56   ALT (SGPT) U/L 39 40   AST (SGOT) U/L 49* 63*   GLUCOSE mg/dL 90 132*       Culture Data:        Radiology Data:   Imaging Results (last 24 hours)     ** No results found for the last 24 hours. **          I have reviewed the patient's current medications.     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 Mora 6/2019       Plan:  1.  MRI Brain with no acute findings  2.  Ehrlichia and RMSF serologies negative  3.  Dr. Armas to start trial of Nueplazid  4.  Trial of Seroquel    5.  The patient is refusing to take PO medications at this time  6.  Nutrition supplements  7.  Labs studies have been stable  8.  Dispo: looking into dede-psych placement   9.  Updated son and daughter at bedside today    Lenard Landa MD   07/12/19   1:13 PM    Electronically signed by Lenard Landa MD at 7/12/2019  1:37 PM     Joanne Freeman MD at 7/12/2019 12:35 PM            Neurology Progress Note      Date of admission: 7/8/2019  1:05 PM  Date of visit: 7/12/2019    Chief Complaint:  F/u     Subjective     Subjective:    Security had to be called this morning as he was outside room and yelling about people being wong.  He refused his medications.     There is waxing and waning of his cooperativeness and he underwent MoCA testing by speech therapy.     Total score of 17/30 which indicates either a mild cognitive impairment or dementia  See speech note from today.       Medications:  Current Facility-Administered Medications   Medication Dose Route Frequency Provider Last Rate Last Dose   • acetaminophen (TYLENOL)  tablet 650 mg  650 mg Oral Q4H PRN Lenard Landa MD       • ondansetron (ZOFRAN) injection 4 mg  4 mg Intravenous Q6H PRN Lenard Landa MD       • Pimavanserin Tartrate capsule 34 mg  34 mg Oral Daily Joanne Freeman MD   34 mg at 07/11/19 1522   • QUEtiapine (SEROquel) tablet 25 mg  25 mg Oral Q12H Bashir Patel MD   25 mg at 07/11/19 0855   • sodium chloride 0.9 % flush 3 mL  3 mL Intravenous Q12H Lenard Landa MD   3 mL at 07/11/19 0855   • sodium chloride 0.9 % flush 3-10 mL  3-10 mL Intravenous PRN Lenard Landa MD           Review of Systems:   -A 14 point review of systems is completed and is negative   Objective     Objective      Vital Signs  Temp:  [98 °F (36.7 °C)-98.2 °F (36.8 °C)] 98 °F (36.7 °C)  Heart Rate:  [64-79] 65  Resp:  [16-18] 16  BP: (128-144)/(64-75) 130/64    Physical Exam:    HEENT:  Neck supple  CVS:  Regular rate and rhythm.  No murmurs  Carotid Examination:  No bruits  Lungs:  Clear to auscultation  Abdomen:  Non-tender, Non-distended  Extremities:  No signs of peripheral edema    Neurologic Exam:    -Awake, Alert, Oriented to person and place. Date is not accurate   -No word finding difficulties  -No aphasia  -No dysarthria  -Follows simple and complex commands    Cranial nerves II through XII intact.  CN II:  Visual fields full.  Pupils equally reactive to light  CN III, IV, VI:  Extraocular Muscles full with no signs of nystagmus  CN V:  Facial sensory is symmetric with no asymmetries.  CN VII:  Facial motor symmetric but there is hypomimia.    CN VIII:  Gross hearing intact bilaterally  CN IX/X:  Palate elevates symmetrically  CN XI:  Shoulder shrug symmetric  CN XII:  Tongue is midline on protrusion        Motor: (strength out of 5:  1= minimal movement, 2 = movement in plane of gravity, 3 = movement against gravity, 4 = movement against some resistance, 5 = full strength)    -Right Upper Ext: Proximal: 5 Distal: 5  -Left Upper Ext:  Proximal: 5 Distal: 5    -Right Lower Ext: Proximal: 5 Distal: 5  -Left Lower Ext: Proximal: 5 Distal: 5    DTR:  2+ throughout in all four extremities    Sensory:  -Intact to light touch, pinprick, temperature, pain, and proprioception    Coordination/Gait:  -Broad based gait with decreased left arm swing and stooped posture.  At times he will have turn en bloc.      Results Review:    I reviewed the patient's new clinical results.    Lab Results (last 24 hours)     Procedure Component Value Units Date/Time    Zinc, Whole Blood [[id number]] Collected:  07/10/19 1228    Specimen:  Blood Updated:  07/11/19 1608     Zinc, Whole Blood 572 ug/dL      Comment: This test was developed and its performance characteristics  determined by [facility]. It has not been cleared or approved  by the Food and Drug Administration.       Narrative:       Performed at:  [address] [place] [address]  [id number]  : Jenise Sandra MD, Phone:  [phone]    Ehrlichia Antibody Panel [[id number]] Collected:  07/08/19 1326    Specimen:  Blood Updated:  07/11/19 1410     E. chaffeensis (HME) IgG Titer Negative     E. chaffeensis (HME) IgM Titer Negative     Comment: IgG titers if 1:64 or greater indicate exposure or  acute and  convalescent samples showing a four-fold increase, and/or the presence  of IgM indicate recent or current infection.        HGE IgG Titer Negative     Comment: HGE IgG levels are detectable 7 to 10 days post infection and persist  approximately one year.        HGE IgM Titer Negative     Comment: Due to a reagent backorder, this test was performed using a different  assay. The reference interval for this alternate assay is:                                         Negative      <1:64                                         Positive       1:64 or greater  IgM levels usually rise 3 to 5 days post infection and fall to normal  levels in approximately 30 to 60 days.       Narrative:        [facility]  [facility]  [address] [place], [place]  [id number]  : Jenise Sandra MD, Phone:  [phone]        Imaging Results (last 24 hours)     ** No results found for the last 24 hours. **          Assessment/Plan     Hospital Problem List      Confusion    Weight loss    Underweight    Inguinal hernia    Anemia    Altered mental status    Protein-calorie malnutrition, moderate (CMS/HCC)    Dementia    Hallucinations    Impression:  1.Dementia with MoCA of 17/30, frontal lobe release signs, and  with hallucinations, paranoia, bizarre behavior and parkinsonian gait and mild hypomimia. 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    · Seroquel may be used up to 75 mg a day and perhaps up to 100 mg/day  but should be done so slowly and the lowest dose used. At lower dose there are no extrapyramidal side effects.Will increase Seroquel  50 mg at bedtime and continue 25 mg in am   · Nuplazid samples have been given to Pharmacy and he has started on that dose.  · Will need to monitor QT interval with both meds on board--Will wait until he gets this dose before EKG checked.      He does need to be walked daily and does need an exercise regimen.    Joanne Murphy MD  07/12/19  12:35 PM        Electronically signed by Joanne Freeman MD at 7/12/2019  3:01 PM     Lenard Landa MD at 7/11/2019  1:25 PM              [facility] Medicine Services  INPATIENT PROGRESS NOTE    Patient Name: Edy Urbina  Date of Admission: 7/8/2019  Today's Date: 07/11/19  Length of Stay: 3  Primary Care Physician: Torey Amato DO    Subjective   Chief Complaint: Confusion  Altered Mental Status     Patient has required upper extremity restraints.  Discussed with nursing, and it sounds  like patient had a rough night.  He continues to become fixated on certain things - last night he continued to pick at his surgical wound from recent hernia surgery.  He also reported had ongoing hallucinations of people entering the room and trying to harm him.  He was trying to use a urinal, and evidently began squeezing and twisting his genitals during this process.  In short, he has required upper extremity restraints.  He is currently calm.  Thinks he is leaving the hospital today.  Family is at bedside.    Review of Systems     All pertinent negatives and positives are as above. All other systems have been reviewed and are negative unless otherwise stated.     Objective    Temp:  [97.5 °F (36.4 °C)-98.3 °F (36.8 °C)] 98.1 °F (36.7 °C)  Heart Rate:  [62-82] 72  Resp:  [15-20] 16  BP: (126-160)/(63-80) 138/77  Physical Exam   Constitutional: No distress.   Resting in bed; currently calm; has required upper extremity restraints   HENT:   Head: Normocephalic.   Mouth/Throat: No oropharyngeal exudate.   Eyes: No scleral icterus.   Neck: No tracheal deviation present.   Pulmonary/Chest: Effort normal. No respiratory distress.   Musculoskeletal: He exhibits no edema.   Neurological:   Remains confused.  No active hallucinations.  No new neuro deficits   Skin: He is not diaphoretic.   Psychiatric:   Currently calm; not combative or agitated at this time   Vitals reviewed.    Results Review:  I have reviewed the labs, radiology results, and diagnostic studies.    Laboratory Data:   Results from last 7 days   Lab Units 07/09/19  0442 07/08/19  1326   WBC 10*3/mm3 5.79 6.52   HEMOGLOBIN g/dL 11.8* 12.4*   HEMATOCRIT % 36.3* 37.7*   PLATELETS 10*3/mm3 214 243        Results from last 7 days   Lab Units 07/09/19  0442 07/08/19  1326   SODIUM mmol/L 139 144   POTASSIUM mmol/L 4.1 3.8   CHLORIDE mmol/L 107 108   CO2 mmol/L 29.0 26.0   BUN mg/dL 21 25*   CREATININE mg/dL 0.65 0.82   CALCIUM mg/dL 8.7 9.8   BILIRUBIN mg/dL 0.5  0.7   ALK PHOS U/L 48 56   ALT (SGPT) U/L 39 40   AST (SGOT) U/L 49* 63*   GLUCOSE mg/dL 90 132*       Culture Data:        Radiology Data:   Imaging Results (last 24 hours)     ** No results found for the last 24 hours. **          I have reviewed the patient's current medications.     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 Mora 6/2019       Plan:  1.  MRI Brain with no acute findings  2.  Ehrlichia and RMSF serologies pending; I have a low suspicion that tick-illness is contributing to current symptoms - I will go ahead and hold additional Doxycyline  3.  Long discussion with Dr. Patel yesterday - appreciate his assistance.  Talked with Dr. Armas today; trial of Nueplazid  4.  Trial of Seroquel    5.  Nutrition supplements  6.  D/C IVFs  7.  Labs reviewed; stable  8.  Dispo: anticipate patient will need placement in this condition; even looking into dede psych placement at this time given recent symptoms  9.  Updated family at bedside      Lenard Landa MD   07/11/19   2:01 PM    Electronically signed by Lenard Landa MD at 7/11/2019  2:01 PM     Joanne Freeman MD at 7/11/2019 11:05 AM            Neurology Progress Note      Date of admission: 7/8/2019  1:05 PM  Date of visit: 7/11/2019    Chief Complaint:  F/u altered mentation    Subjective     Subjective:  Patient has been at times paranoid and hallucinating and then will be fine. Still attempting to put things up rectum and also is now needing restraints as he is picking at area of recent surgery.     Background Per son: 78 year old male with cognitive impairment that has progressed  for over the past year and that he has for  at least > 2 years of paranoia, and hallucinations-- both  visual and auditory hallucinations.and variable duration of episodes of extreme confusion followed by complete baseline return   He will have bizarre behavior such as him attempting multiple times to shove objects up his rectum. There is occasional resting tremor. He has been started on Seroquel yesterday.   Patient considerably worsened after his right inguinal hernia repair but had been on opioids for this. These have now been held He was on Norco 7.5/325 1 to 2 tabs every 4 hours. His UDS was positive for benzo's.and opiates  Normal ceruloplasmin.    Medications:  Current Facility-Administered Medications   Medication Dose Route Frequency Provider Last Rate Last Dose   • acetaminophen (TYLENOL) tablet 650 mg  650 mg Oral Q4H PRN Lenard Landa MD       • doxycycline (VIBRAMYCIN) 100 mg/100 mL 0.9% NS MBP  100 mg Intravenous Q12H Lenard Landa MD   100 mg at 07/11/19 0048   • lactated ringers infusion  100 mL/hr Intravenous Continuous Lenard Landa  mL/hr at 07/10/19 0745 100 mL/hr at 07/10/19 0745   • multiple vitamin (M.V.I. Adult) 10 mL, thiamine (B-1) 100 mg, folic acid 1 mg in lactated ringers 1,000 mL infusion  125 mL/hr Intravenous Q24H Lenard Landa  mL/hr at 07/10/19 2121 125 mL/hr at 07/10/19 2121   • ondansetron (ZOFRAN) injection 4 mg  4 mg Intravenous Q6H PRN Lenard Landa MD       • QUEtiapine (SEROquel) tablet 25 mg  25 mg Oral Q12H Bashir Patel MD   25 mg at 07/11/19 0855   • sodium chloride 0.9 % flush 3 mL  3 mL Intravenous Q12H Lenard Landa MD   3 mL at 07/11/19 0855   • sodium chloride 0.9 % flush 3-10 mL  3-10 mL Intravenous PRN Lenard Landa MD           Review of Systems:   -A 14 point review of systems is completed and is negative   Objective     Objective      Vital Signs  Temp:  [97.5 °F (36.4 °C)-98.3 °F (36.8 °C)] 97.8 °F (36.6 °C)  Heart Rate:  [62-82]  62  Resp:  [15-20] 16  BP: (126-160)/(63-80) 147/75    Physical Exam:    HEENT:  Neck supple  CVS:  Regular rate and rhythm.  No murmurs  Carotid Examination:  No bruits  Lungs:  Clear to auscultation  Abdomen:  Non-tender, Non-distended  Extremities:  No signs of peripheral edema    Neurologic Exam:    -Awake, Alert, Oriented X 2.5  Thinks date is July 16 and not the 11 th  Thinks it is Wednesday and not Thursday.  Knows the floor he is on  And knows the president.  Knows recent holiday.   -No word finding difficulties  -No aphasia  -No dysarthria  -Follows simple and complex commands    Cranial nerves II through XII intact.  CN II:  Visual fields full.  Pupils equally reactive to light  CN III, IV, VI:  Extraocular Muscles full with no signs of nystagmus  CN V:  Facial sensory is symmetric with no asymmetries.  CN VII:  Facial motor symmetric but there is hypomimia.    CN VIII:  Gross hearing intact bilaterally  CN IX/X:  Palate elevates symmetrically  CN XI:  Shoulder shrug symmetric  CN XII:  Tongue is midline on protrusion      Motor: (strength out of 5:  1= minimal movement, 2 = movement in plane of gravity, 3 = movement against gravity, 4 = movement against some resistance, 5 = full strength)    No cogwheel rigidity   Moves all extremities against gravity.     DTR:  2+ throughout in all four extremities    Sensory:  -Intact to light touch, pinprick, temperature, pain, and proprioception    Coordination/Gait:  -No ataxia     Results Review:    I reviewed the patient's new clinical results.    Lab Results (last 24 hours)     Procedure Component Value Units Date/Time    Ceruloplasmin [[id number]] Collected:  07/10/19 1228    Specimen:  Blood Updated:  07/11/19 0715     Ceruloplasmin 20.7 mg/dL     Narrative:       Performed at:  [address]  [id number]  : Reagan Duran PhD, Phone:  [phone]    [place] Spotted Fever, IgM [[id number]] Collected:  07/09/19  0442    Specimen:  Blood Updated:  07/11/19 0236     RMSF IgM 0.46 index      Comment:                                  Negative        <0.90                                   Equivocal 0.90 - 1.10                                   Positive        >1.10       Narrative:       Performed at:  [address]  [id number]  : Jenise Sandra MD, Phone:  [phone]    [facility] Spotted Fever, IgG [[id number]] Collected:  07/09/19 0442    Specimen:  Blood Updated:  07/10/19 2207     RMSF IgG Negative    Narrative:       [facility] at:  [facility]  [address] [place] [address]  [id number]  : Jenise Sandra MD, Phone:  [phone]    Folate [[id number]]  (Normal) Collected:  07/10/19 1228    Specimen:  Blood Updated:  07/10/19 1357     Folate >20.00 ng/mL     Ammonia [[id number]]  (Abnormal) Collected:  07/10/19 1228    Specimen:  Blood Updated:  07/10/19 1247     Ammonia <9 umol/L     Vitamin B1, Whole Blood [[id number]] Collected:  07/10/19 1228    Specimen:  Blood Updated:  07/10/19 1232    Zinc, Whole Blood [[id number]] Collected:  07/10/19 1228    Specimen:  Blood Updated:  07/10/19 1232        Imaging Results (last 24 hours)     ** No results found for the last 24 hours. **          Assessment/Plan     Hospital Problem List      Confusion    Weight loss    Underweight    Inguinal hernia    Anemia    Altered mental status    Protein-calorie malnutrition, moderate (CMS/HCC)    Impression:  1. Underlying dementia with hallucinations  He has frontal lobe release signs of glabellar, snout.   2. Was on a considerable amount of opioids (7.5 Norco 1 to 2 tabs every 4 hours) at time of his discharge from hernia repair and this may be a considerable factor in his worsening cognition and hallucinations. However he had hallucinations prior to him taking opioids.   3. Cerebral microvascular disease.       Plan:  · Begin Nuplazid 34 mg daily  · Agree with  Geriatric Psych placement but Nuplazid may help and will get samples of this to the Pharmacy.   · Speech therapy to perform cognitive evaluation.    · Nursing to get patient up as tolerates    Joanne Murphy MD  07/11/19  11:05 AM        Electronically signed by Joanne Freeman MD at 7/12/2019 12:37 PM       Consult Notes (last 72 hours) (Notes from 7/10/2019  3:23 PM through 7/13/2019  3:23 PM)     No notes of this type exist for this encounter.           Discharge Summary      Lenard Landa MD at 7/12/2019  2:51 PM              [facility] Medicine Services  DISCHARGE SUMMARY       Date of Admission: 7/8/2019  Date of Discharge:  7/12/2019  Primary Care Physician: Torey Amato DO    Presenting Problem/History of Present Illness:  Confusion [R41.0]  Confusion [R41.0]     Final Discharge Diagnoses:  Active Hospital Problems    Diagnosis   • Dementia     Concern for possible Lewy-Body dementia with behavioral disturbance     • Hallucinations   • Protein-calorie malnutrition, moderate (CMS/HCC)   • Anemia   • Altered mental status   • Confusion   • Weight loss   • Underweight   • Inguinal hernia     S/p repair by Dr. Mora 6/2019         Consults: Neurology (Dr. Patel and Dr. Armas)    Procedures Performed:   Imaging Results (last 7 days)     Procedure Component Value Units Date/Time    MRI Brain With & Without Contrast [[id number]] Collected:  07/09/19 1840     Updated:  07/09/19 1846    Narrative:       EXAMINATION:  MRI BRAIN WITHOUT AND WITH CONTRAST-  7/9/2019 6:06 PM CDT     HISTORY: Confusion/delirium, altered LOC, unexplained;  R41.0-Disorientation, unspecified; R68.89-Other general symptoms and  signs.     TECHNIQUE: Multiplanar imaging was performed in a high field magnet  before and after gadolinium contrast administration.     COMPARISON: No comparison study.     FINDINGS: There is no evidence of acute infarct on  the  diffusion-weighted sequence. There are scattered areas of T2 high signal  within the hemispheric white matter. There is mild to moderate atrophy  with associated ventricular prominence. There are no structural  abnormalities. There are no areas of abnormal contrast enhancement.  There is some motion artifact on some of the sequences.       Impression:       1. No evidence of acute infarct.  2. Mild to moderate atrophy with associated ventricular prominence.  3. T2 high signal within the hemispheric white matter is nonspecific and  most likely due to chronic small vessel disease.     This report was finalized on 07/09/2019 18:43 by Dr. Esa Mcqueen MD.    XR Chest 1 View [[id number]] Collected:  07/08/19 1433     Updated:  07/08/19 1437    Narrative:       EXAMINATION: XR CHEST 1 VW- 7/8/2019 2:33 PM CDT     HISTORY: Altered mental status, weight loss     COMPARISON: None     FINDINGS:  Heart appears normal in size. Atherosclerotic calcifications are seen in  the aorta, which appears tortuous. The lungs are hyperinflated. There is  no focal consolidation or effusion. No pneumothorax is appreciated.  Pulmonary vasculature appears grossly normal.       Impression:       Chronic lung changes with associated hyperinflation. No  evidence of acute cardiopulmonary process.  This report was finalized on 07/08/2019 14:34 by Dr. Tim Ellsworth MD.    CT Head Without Contrast [[id number]] Collected:  07/08/19 1413     Updated:  07/08/19 1418    Narrative:       EXAMINATION: CT HEAD WO CONTRAST- 7/8/2019 2:13 PM CDT     HISTORY: Confusion, altered level of consciousness     COMPARISON: None     DOSE: 635 mGy-cm     TECHNIQUE: Sequential imaging was performed from the vertex through the  base of the skull without the use of IV contrast.  Sagittal and coronal  reformations were made from the original source data and reviewed.  Automated exposure control was also utilized to decrease patient  radiation dose.     FINDINGS:    There is no evidence of acute intracranial hemorrhage, mass, or mass  effect.  The corticomedullary differentiation is normal without evidence  of acute infarct. There are periventricular and subcortical white matter  changes, a nonspecific finding most often seen as sequela of chronic  microvascular ischemia. The ventricles appear normal in configuration.  The basilar cisterns are patent. The posterior fossa is grossly normal  in appearance. No depressed skull fracture is identified. The paranasal  sinuses and mastoid air cells appear clear. Calcifications are seen in  the cavernous carotid arteries.           Impression:       No acute intracranial findings.     This report was finalized on 07/08/2019 14:15 by Dr. Tim Ellsworth MD.          Pertinent Test Results:   Lab Results (last 7 days)     Procedure Component Value Units Date/Time    Zinc, Whole Blood [[id number]] Collected:  07/10/19 1228    Specimen:  Blood Updated:  07/11/19 1608     Zinc, Whole Blood 572 ug/dL      Comment: This test was developed and its performance characteristics  determined by [facility]. It has not been cleared or approved  by the Food and Drug Administration.       Narrative:       Performed at:  [address] [place] [address]  [id number]  : Jenies Sandra MD, Phone:  [phone]    Ehrlichia Antibody Panel [[id number]] Collected:  07/08/19 1326    Specimen:  Blood Updated:  07/11/19 1410     E. chaffeensis (HME) IgG Titer Negative     E. chaffeensis (HME) IgM Titer Negative     Comment: IgG titers if 1:64 or greater indicate exposure or  acute and  convalescent samples showing a four-fold increase, and/or the presence  of IgM indicate recent or current infection.        HGE IgG Titer Negative     Comment: HGE IgG levels are detectable 7 to 10 days post infection and persist  approximately one year.        HGE IgM Titer Negative     Comment: Due to a reagent backorder, this test was performed  using a different  assay. The reference interval for this alternate assay is:                                         Negative      <1:64                                         Positive       1:64 or greater  IgM levels usually rise 3 to 5 days post infection and fall to normal  levels in approximately 30 to 60 days.       Narrative:       Performed at:  [address] [place] [address]  [id number]  : Jenise Sandra MD, Phone:  [phone]    Ceruloplasmin [[id number]] Collected:  07/10/19 1228    Specimen:  Blood Updated:  07/11/19 0715     Ceruloplasmin 20.7 mg/dL     Narrative:       Performed at:  [address] [place] [address]  [id number]  : Reagan Duran PhD, Phone:  [phone]    Redbird Smith Spotted Fever, IgM [[id number]] Collected:  07/09/19 0442    Specimen:  Blood Updated:  07/11/19 0236     RMSF IgM 0.46 index      Comment:                                  Negative        <0.90                                   Equivocal 0.90 - 1.10                                   Positive        >1.10       Narrative:       Performed at:  [address] [place] [address]  [id number]  : Jenise Sandra MD, Phone:  [phone]    Jeet Mt Spotted Fever, IgG [[id number]] Collected:  07/09/19 0442    Specimen:  Blood Updated:  07/10/19 2207     RMSF IgG Negative    Narrative:       Performed at:  [address] [place] [address]  [id number]  : Jenise Sandra MD, Phone:  [phone]    Folate [[id number]]  (Normal) Collected:  07/10/19 1228    Specimen:  Blood Updated:  07/10/19 1357     Folate >20.00 ng/mL     Ammonia [[id number]]  (Abnormal) Collected:  07/10/19 1228    Specimen:  Blood Updated:  07/10/19 1247     Ammonia <9 umol/L     Vitamin B1, Whole Blood [[id number]] Collected:  07/10/19 1228    Specimen:  Blood Updated:  07/10/19 1232    Vitamin B12 [[id number]]  (Normal) Collected:   07/09/19 0442    Specimen:  Blood Updated:  07/09/19 0557     Vitamin B-12 425 pg/mL     Vitamin D 25 Hydroxy [[id number]]  (Normal) Collected:  07/09/19 0442    Specimen:  Blood Updated:  07/09/19 0525     25 Hydroxy, Vitamin D 43.5 ng/ml     Iron Profile [[id number]]  (Normal) Collected:  07/09/19 0442    Specimen:  Blood Updated:  07/09/19 0517     Iron 66 mcg/dL      TIBC 280 mcg/dL      Iron Saturation 24 %     Prealbumin [[id number]]  (Abnormal) Collected:  07/09/19 0442    Specimen:  Blood Updated:  07/09/19 0515     Prealbumin 14.8 mg/dL     Comprehensive Metabolic Panel [[id number]]  (Abnormal) Collected:  07/09/19 0442    Specimen:  Blood Updated:  07/09/19 0510     Glucose 90 mg/dL      BUN 21 mg/dL      Creatinine 0.65 mg/dL      Sodium 139 mmol/L      Potassium 4.1 mmol/L      Chloride 107 mmol/L      CO2 29.0 mmol/L      Calcium 8.7 mg/dL      Total Protein 5.8 g/dL      Albumin 3.30 g/dL      ALT (SGPT) 39 U/L      AST (SGOT) 49 U/L      Alkaline Phosphatase 48 U/L      Total Bilirubin 0.5 mg/dL      eGFR Non African Amer 119 mL/min/1.73      Globulin 2.5 gm/dL      A/G Ratio 1.3 g/dL      BUN/Creatinine Ratio 32.3     Anion Gap 3.0 mmol/L     Narrative:       GFR Normal >60  Chronic Kidney Disease <60  Kidney Failure <15    CBC & Differential [[id number]] Collected:  07/09/19 0442    Specimen:  Blood Updated:  07/09/19 0453    Narrative:       The following orders were created for panel order CBC & Differential.  Procedure                               Abnormality         Status                     ---------                               -----------         ------                     CBC Auto Differential[[id number]]        Abnormal            Final result                 Please view results for these tests on the individual orders.    CBC Auto Differential [[id number]]  (Abnormal) Collected:  07/09/19 0442    Specimen:  Blood Updated:  07/09/19 0453     WBC 5.79 10*3/mm3      RBC 4.27 10*6/mm3       Hemoglobin 11.8 g/dL      Hematocrit 36.3 %      MCV 85.0 fL      MCH 27.6 pg      MCHC 32.5 g/dL      RDW 13.5 %      RDW-SD 42.2 fl      MPV 9.4 fL      Platelets 214 10*3/mm3      Neutrophil % 60.6 %      Lymphocyte % 25.9 %      Monocyte % 8.8 %      Eosinophil % 3.8 %      Basophil % 0.7 %      Immature Grans % 0.2 %      Neutrophils, Absolute 3.51 10*3/mm3      Lymphocytes, Absolute 1.50 10*3/mm3      Monocytes, Absolute 0.51 10*3/mm3      Eosinophils, Absolute 0.22 10*3/mm3      Basophils, Absolute 0.04 10*3/mm3      Immature Grans, Absolute 0.01 10*3/mm3      nRBC 0.0 /100 WBC     Urine Drug Screen - Urine, Catheter In/Out [[id number]]  (Abnormal) Collected:  07/08/19 1527    Specimen:  Urine, Catheter In/Out Updated:  07/08/19 1557     Amphetamine Screen, Urine Negative     Barbiturates Screen, Urine Negative     Benzodiazepine Screen, Urine Positive     Cocaine Screen, Urine Negative     Methadone Screen, Urine Negative     Opiate Screen Positive     Phencyclidine (PCP), Urine Negative     THC, Screen, Urine Negative    Narrative:       Negative Thresholds For Drugs Screened in Urine:    Amphetamines          500 ng/ml  Barbiturates          200 ng/ml  Benzodiazepines       200 ng/ml  Cocaine               150 ng/ml  Methadone             150 ng/ml  Opiates               300 ng/ml  Phencyclidine         25 ng/ml  THC                      50 ng/ml    The normal value for all drugs tested is negative. This report includes final unconfirmed screening results.  A positive result by this assay can be, at your request, sent to the Reference Lab for confirmation by gas chromatography. Unconfirmed results must not be used for non-medical purposes, such as employment or legal testing. Clinical consideration should be applied to any drug of abuse test result, particularly when unconfirmed results are used.    Urinalysis, Microscopic Only - Urine, Catheter In/Out [[id number]]  (Abnormal) Collected:  07/08/19 3496     Specimen:  Urine, Catheter In/Out Updated:  07/08/19 1532     RBC, UA 3-5 /HPF      WBC, UA 3-5 /HPF      Bacteria, UA 1+ /HPF      Squamous Epithelial Cells, UA 3-6 /HPF      Hyaline Casts, UA None Seen /LPF      Mucus, UA Large/3+ /HPF      Methodology Manual Light Microscopy    Urinalysis With Culture If Indicated - Urine, Catheter In/Out [[id number]]  (Abnormal) Collected:  07/08/19 1454    Specimen:  Urine, Catheter In/Out Updated:  07/08/19 1513     Color, UA Yellow     Appearance, UA Clear     pH, UA 5.5     Specific Gravity, UA >=1.030     Glucose, UA Negative     Ketones, UA Trace     Bilirubin, UA Small (1+)     Blood, UA Negative     Protein, UA 30 mg/dL (1+)     Leuk Esterase, UA Negative     Nitrite, UA Negative     Urobilinogen, UA 1.0 E.U./dL    [place] Draw [[id number]] Collected:  07/08/19 1326    Specimen:  Blood Updated:  07/08/19 1431    Narrative:       The following orders were created for panel order [place] Draw.  Procedure                               Abnormality         Status                     ---------                               -----------         ------                     Light Blue Top[[id number]]                                   Final result               Green Top (Gel)[[id number]]                                  Final result               Lavender Top[[id number]]                                     Final result               Red Top[[id number]]                                          Final result                 Please view results for these tests on the individual orders.    Light Blue Top [[id number]] Collected:  07/08/19 1326    Specimen:  Blood Updated:  07/08/19 1431     Extra Tube hold for add-on     Comment: Auto resulted       Lavender Top [[id number]] Collected:  07/08/19 1326    Specimen:  Blood Updated:  07/08/19 1431     Extra Tube hold for add-on     Comment: Auto resulted       Green Top (Gel) [[id number]] Collected:  07/08/19 1326    Specimen:  Blood Updated:   07/08/19 1431     Extra Tube Hold for add-ons.     Comment: Auto resulted.       Red Top [[id number]] Collected:  07/08/19 1326    Specimen:  Blood Updated:  07/08/19 1431     Extra Tube Hold for add-ons.     Comment: Auto resulted.       TSH [[id number]]  (Normal) Collected:  07/08/19 1326    Specimen:  Blood Updated:  07/08/19 1424     TSH 1.290 mIU/mL     Comprehensive Metabolic Panel [[id number]]  (Abnormal) Collected:  07/08/19 1326    Specimen:  Blood Updated:  07/08/19 1410     Glucose 132 mg/dL      BUN 25 mg/dL      Creatinine 0.82 mg/dL      Sodium 144 mmol/L      Potassium 3.8 mmol/L      Chloride 108 mmol/L      CO2 26.0 mmol/L      Calcium 9.8 mg/dL      Total Protein 6.9 g/dL      Albumin 4.00 g/dL      ALT (SGPT) 40 U/L      AST (SGOT) 63 U/L      Alkaline Phosphatase 56 U/L      Total Bilirubin 0.7 mg/dL      eGFR Non African Amer 91 mL/min/1.73      Globulin 2.9 gm/dL      A/G Ratio 1.4 g/dL      BUN/Creatinine Ratio 30.5     Anion Gap 10.0 mmol/L     Narrative:       GFR Normal >60  Chronic Kidney Disease <60  Kidney Failure <15    CBC Auto Differential [[id number]]  (Abnormal) Collected:  07/08/19 1326    Specimen:  Blood Updated:  07/08/19 1359     WBC 6.52 10*3/mm3      RBC 4.47 10*6/mm3      Hemoglobin 12.4 g/dL      Hematocrit 37.7 %      MCV 84.3 fL      MCH 27.7 pg      MCHC 32.9 g/dL      RDW 13.3 %      RDW-SD 41.4 fl      MPV 9.4 fL      Platelets 243 10*3/mm3      Neutrophil % 75.1 %      Lymphocyte % 16.0 %      Monocyte % 7.1 %      Eosinophil % 0.8 %      Basophil % 0.8 %      Immature Grans % 0.2 %      Neutrophils, Absolute 4.91 10*3/mm3      Lymphocytes, Absolute 1.04 10*3/mm3      Monocytes, Absolute 0.46 10*3/mm3      Eosinophils, Absolute 0.05 10*3/mm3      Basophils, Absolute 0.05 10*3/mm3      Immature Grans, Absolute 0.01 10*3/mm3      nRBC 0.0 /100 WBC           Chief Complaint on Day of Discharge: see my progress note from today    Hospital Course:  The patient is a 78 y.o.  male who presented to [facility] on July 8, 2019 secondary to confusion.  Per report, patient had gone to [employer] on the day leading up to this admission, where he was noticed to be acting confused while inside the store, and police were subsequently called when he left the store to get in his car and they were concerned about his safety.  He was subsequently brought to our facility for further work-up and management.  Family reports that they have seen evidence of worsening confusion dating back a number of months.  They do report that since his recent inguinal hernia repair, that his confusion is seem to be accelerated.    Initially, we were concerned that some of the symptoms were likely medication induced.  He was taken over-the-counter sleep agent which was subsequently discontinued.  His urine drug screen was positive for benzodiazepines, however patient and family were adamant that he did not take any of those types of medications.  He was also prescribed an opiate pain medication related to his recent surgery, and admitted that he had been taking that medication even when he was not experiencing any pain.  For that reason we held that medication as well.    Unfortunately, he continued to have worsening confusion, with prominent behavioral disturbance during this hospitalization.  Laboratory studies were performed and have largely been unremarkable.  Work-up of causes of metabolic encephalopathy have also been unremarkable.  We have performed a CAT scan of the brain in addition to an MRI of the brain and those results are noted above.  In short, no acute findings were found.  Patient had even mentioned about recent tick exposure, and I went ahead and started him on empiric doxycycline therapy and sent off tick serologies.  Those have returned to normal.  Doxycycline has been discontinued.    Neurology was consulted and based upon his description of symptoms they were concerned about the  "possibility of a progressing dementia.  Patient also describes symptoms of paranoia which have been witnessed by the family, in addition to hallucinations.  MRI of the brain findings were reviewed by Dr. Patel, and he was concerned about the possibility of Lewy body dementia.  Patient is experienced very bizarre behavior during this hospitalization, being fixated on certain objects, and even multiple times putting objects up his rectum.  Recently, he has been yelling at people that have been walking in the hallway.  He has become fixated at certain areas on his skin, and will start picking at them, even including his recent surgical site from a right inguinal hernia repair.  Nursing found him trying to use a bedside urinal and witnessed him aggressively twisting and pulling at his genitals. There are times where he appears to be very argumentative and agitated, and then a short time thereafter is very calm and cooperative.  He was started on Seroquel without any meaningful improvement in his symptoms.  Unfortunately, we have also gotten to the point where he started to refuse oral medications.  There have been times during this hospitalization where he has required soft restraints, as well.  In fact, he has even broken pieces off of the hospital bed.    Neurology also wanted to start him on a trial of Nuplazid to see if this would help with some of the hallucinations that he is experiencing.  His son, who is been at the bedside, has convinced him to take his prescribed medications today.  In short, with what appears to be an underlying dementia with behavioral disturbance, complicated by hallucinations and symptoms of paranoia, poorly controlled at this time, plan is in place for discharge today to a [facility] for ongoing care.      Physical Exam on Discharge:  /64 (BP Location: Left arm, Patient Position: Sitting)   Pulse 65   Temp 98 °F (36.7 °C) (Oral)   Resp 16   Ht 182.9 cm (72\")   Wt " 61.2 kg (135 lb)   SpO2 97%   BMI 18.31 kg/m²    Physical Exam  See my progress note from today    Discharge Disposition:  Short Term Hospital (DC - External)    Discharge Medications:     Discharge Medications      New Medications      Instructions Start Date   acetaminophen 325 MG tablet  Commonly known as:  TYLENOL   650 mg, Oral, Every 4 Hours PRN      Pimavanserin Tartrate 34 MG capsule   34 mg, Oral, Daily   Start Date:  7/13/2019     QUEtiapine 25 MG tablet  Commonly known as:  SEROquel   25 mg, Oral, Every 12 Hours Scheduled         Continue These Medications      Instructions Start Date   fish oil 1000 MG capsule capsule   1,000 mg, Oral, Daily With Breakfast      MULTIVITAMIN PO   1 tablet, Oral, Daily         Stop These Medications    Cinnamon 500 MG tablet     EQ SLEEP AID PO     HYDROcodone-acetaminophen 7.5-325 MG per tablet  Commonly known as:  NORCO            Discharge Diet:  as tolerated    Activity at Discharge: as tolerated    Follow-up Appointments:   Future Appointments   Date Time Provider Department Center   7/18/2019 10:15 AM Otf Montague DO [facility] None   9/5/2019  9:30 AM Torey Amato DO [facility] None   4/13/2020 10:30 AM J[facility] None   4/20/2020 10:30 AM Dionne Price APRN [facility] None     Upon discharge from [facility], patient would benefit from follow-up with [facility] regarding suspected diagnosis of Lewy body dementia.  Also follow-up with primary care provider within 1 week of discharge from the [facility] facility.    Test Results Pending at Discharge: none    Lenard Landa MD  07/12/19  2:51 PM    Time: 30 min        Electronically signed by Lenard Landa MD at 7/12/2019  3:06 PM

## 2019-07-14 NOTE — THERAPY DISCHARGE NOTE
Acute Care - Speech Language Pathology Discharge Summary  [facility]       Patient Name: Edy Urbina  : 1940  MRN: [id number]    Today's Date: 2019  Onset of Illness/Injury or Date of Surgery: 19       Referring Physician: Dr. Landa        Admit Date: 2019      SLP Recommendation and Plan    Visit Dx:    ICD-10-CM ICD-9-CM   1. Confusion R41.0 298.9   2. Decreased activities of daily living (ADL) R68.89 780.99   3. Cognitive communication deficit R41.841 799.52               SLP GOALS     Row Name 19 1300 19 0935          Memory Skills Goal 1 (SLP)    Improve Memory Skills Through Goal 1 (SLP)  recalling unrelated word lists immediately;recalling unrelated word lists with an imposed delay;visual memory task;listen to a paragraph and answer questions;use external memory aid;use written schedule;use memory strategies;recall details of the day;with minimal cues (75-90%)  -KW  recalling unrelated word lists immediately;recalling unrelated word lists with an imposed delay;visual memory task;listen to a paragraph and answer questions;use external memory aid;use written schedule;use memory strategies;recall details of the day;with minimal cues (75-90%)  -MM     Time Frame (Memory Skills Goal 1, SLP)  by discharge  -KW  by discharge  -MM     Barriers (Memory Skills Goal 1, SLP)  n/a  -KW  n/a  -MM     Progress/Outcomes (Memory Skills Goal 1, SLP)  goal not met  -KW  goal ongoing  -MM        Pragmatic Skills Goal 1 (SLP)    Improve Pragmatic Skills Goal 1 (SLP)  give/attend to non-verbal signals;demonstrate topic initiation;maintain topic;demonstrate turn-taking;independently (over 90% accuracy)  -KW  give/attend to non-verbal signals;demonstrate topic initiation;maintain topic;demonstrate turn-taking;independently (over 90% accuracy)  -MM     Time Frame (Pragmatic Skills Goal 1, SLP)  by discharge  -KW  by discharge  -MM     Barriers (Pragmatic Skills Goal 1, SLP)  n/a  -KW  n/a  -MM      Progress/Outcomes (Pragmatic Skills Goal 1, SLP)  goal not met  -KW  goal ongoing  -MM        Executive Functional Skills Goal 1 (SLP)    Improve Executive Function Skills Goal 1 (SLP)  demonstrate awareness of deficit;identify strategies, strengths, limitations;identify anticipated needs;organization/planning activity;home management activity;perform self-correction;independently (over 90% accuracy)  -KW  demonstrate awareness of deficit;identify strategies, strengths, limitations;identify anticipated needs;organization/planning activity;home management activity;perform self-correction;independently (over 90% accuracy)  -MM     Time Frame (Executive Function Skills Goal 1, SLP)  by discharge  -KW  by discharge  -MM     Barriers (Executive Function Skills Goal 1, SLP)  n/a  -KW  n/a  -MM     Progress/Outcomes (Executive Function Skills Goal 1, SLP)  goal not met  -KW  goal ongoing  -MM       User Key  (r) = Recorded By, (t) = Taken By, (c) = Cosigned By    Initials Name Provider Type    Felicity Calero MS CCC-SLP Speech and Language Pathologist    Henrietta Pedraza MS CCC-SLP Speech and Language Pathologist                  SLP Discharge Summary  Anticipated Dischage Disposition: unknown  Reason for Discharge: discharge from this facility  Progress Toward Achieving Short/long Term Goals: goals not met within established timelines  Discharge Destination: other (see comments)(short term facility/psych)      Felicity Mosley MS CCC-SLP  7/14/2019

## 2019-07-18 ENCOUNTER — TELEPHONE (OUTPATIENT)
Dept: VASCULAR SURGERY | Facility: CLINIC | Age: 79
End: 2019-07-18

## 2019-08-01 ENCOUNTER — OFFICE VISIT (OUTPATIENT)
Dept: INTERNAL MEDICINE | Facility: CLINIC | Age: 79
End: 2019-08-01

## 2019-08-01 VITALS
SYSTOLIC BLOOD PRESSURE: 102 MMHG | OXYGEN SATURATION: 97 % | TEMPERATURE: 97.6 F | DIASTOLIC BLOOD PRESSURE: 50 MMHG | BODY MASS INDEX: 17.64 KG/M2 | HEIGHT: 72 IN | HEART RATE: 62 BPM | WEIGHT: 130.2 LBS | RESPIRATION RATE: 16 BRPM

## 2019-08-01 DIAGNOSIS — R41.0 CONFUSION: ICD-10-CM

## 2019-08-01 DIAGNOSIS — F22 PARANOID BEHAVIOR (HCC): ICD-10-CM

## 2019-08-01 DIAGNOSIS — R44.3 HALLUCINATIONS: ICD-10-CM

## 2019-08-01 DIAGNOSIS — F02.818 DEMENTIA ASSOCIATED WITH OTHER UNDERLYING DISEASE WITH BEHAVIORAL DISTURBANCE (HCC): ICD-10-CM

## 2019-08-01 DIAGNOSIS — Z09 HOSPITAL DISCHARGE FOLLOW-UP: Primary | ICD-10-CM

## 2019-08-01 PROCEDURE — 99213 OFFICE O/P EST LOW 20 MIN: CPT | Performed by: NURSE PRACTITIONER

## 2019-08-01 RX ORDER — CHLORHEXIDINE GLUCONATE 4 G/100ML
SOLUTION TOPICAL DAILY PRN
COMMUNITY
End: 2019-09-06

## 2019-08-01 RX ORDER — RISPERIDONE 0.5 MG/1
0.5 TABLET ORAL 2 TIMES DAILY
Qty: 60 TABLET | Refills: 3 | Status: SHIPPED | OUTPATIENT
Start: 2019-08-01 | End: 2019-08-01 | Stop reason: SDUPTHER

## 2019-08-01 RX ORDER — RISPERIDONE 0.5 MG/1
0.5 TABLET ORAL 2 TIMES DAILY
Qty: 60 TABLET | Refills: 3 | Status: SHIPPED | OUTPATIENT
Start: 2019-08-01 | End: 2019-12-02

## 2019-08-01 RX ORDER — RISPERIDONE 0.5 MG/1
TABLET ORAL
Refills: 0 | COMMUNITY
Start: 2019-07-18 | End: 2019-08-01 | Stop reason: SDUPTHER
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
## 2019-09-04 ENCOUNTER — OFFICE VISIT (OUTPATIENT)
Dept: NEUROLOGY | Facility: CLINIC | Age: 79
End: 2019-09-04

## 2019-09-04 VITALS
DIASTOLIC BLOOD PRESSURE: 80 MMHG | HEIGHT: 72 IN | WEIGHT: 136 LBS | HEART RATE: 82 BPM | SYSTOLIC BLOOD PRESSURE: 110 MMHG | BODY MASS INDEX: 18.42 KG/M2 | RESPIRATION RATE: 18 BRPM

## 2019-09-04 DIAGNOSIS — R25.1 TREMOR: ICD-10-CM

## 2019-09-04 DIAGNOSIS — R41.3 MEMORY DIFFICULTY: Primary | ICD-10-CM

## 2019-09-04 DIAGNOSIS — R26.89 IMBALANCE: ICD-10-CM

## 2019-09-04 PROCEDURE — 99215 OFFICE O/P EST HI 40 MIN: CPT | Performed by: PSYCHIATRY & NEUROLOGY
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
## 2019-09-04 NOTE — PATIENT INSTRUCTIONS
Patient not to be driving until released.  Patient not to be climbing or working with sharp objects or sharp cutting tools.  Patient to be safety conscious around hot fire/stove/water.  Patient to get to the hospital immediately of increased agitation occurs or stroke symptoms occur.

## 2019-09-06 ENCOUNTER — OFFICE VISIT (OUTPATIENT)
Dept: INTERNAL MEDICINE | Facility: CLINIC | Age: 79
End: 2019-09-06

## 2019-09-06 VITALS
OXYGEN SATURATION: 97 % | DIASTOLIC BLOOD PRESSURE: 60 MMHG | RESPIRATION RATE: 16 BRPM | WEIGHT: 136.5 LBS | SYSTOLIC BLOOD PRESSURE: 112 MMHG | HEART RATE: 59 BPM | HEIGHT: 72 IN | BODY MASS INDEX: 18.49 KG/M2

## 2019-09-06 DIAGNOSIS — Z23 NEED FOR VACCINATION: ICD-10-CM

## 2019-09-06 DIAGNOSIS — E44.0 PROTEIN-CALORIE MALNUTRITION, MODERATE (HCC): ICD-10-CM

## 2019-09-06 DIAGNOSIS — F02.818 DEMENTIA ASSOCIATED WITH OTHER UNDERLYING DISEASE WITH BEHAVIORAL DISTURBANCE (HCC): Primary | ICD-10-CM

## 2019-09-06 DIAGNOSIS — R63.6 UNDERWEIGHT: ICD-10-CM

## 2019-09-06 PROBLEM — R44.3 HALLUCINATIONS: Status: RESOLVED | Noted: 2019-07-11 | Resolved: 2019-09-06

## 2019-09-06 PROBLEM — R41.82 ALTERED MENTAL STATUS: Status: RESOLVED | Noted: 2019-07-09 | Resolved: 2019-09-06

## 2019-09-06 PROBLEM — K40.90 INGUINAL HERNIA: Status: RESOLVED | Noted: 2019-07-08 | Resolved: 2019-09-06

## 2019-09-06 PROBLEM — R63.4 WEIGHT LOSS: Status: RESOLVED | Noted: 2019-07-08 | Resolved: 2019-09-06

## 2019-09-06 PROCEDURE — G0008 ADMIN INFLUENZA VIRUS VAC: HCPCS | Performed by: INTERNAL MEDICINE

## 2019-09-06 PROCEDURE — 90653 IIV ADJUVANT VACCINE IM: CPT | Performed by: INTERNAL MEDICINE

## 2019-09-06 PROCEDURE — 99213 OFFICE O/P EST LOW 20 MIN: CPT | Performed by: INTERNAL MEDICINE

## 2019-09-06 NOTE — PROGRESS NOTES
"CC:f/u hallucinations    History:  Edy Urbina is a 78 y.o. male   He presents today with his daughter and notes he has been doing reasonably well.  He continues on Risperdal and has no hallucinations.  He does have moments of confusion, which he states are worse when he is in a rush.  His daughter feels these have not necessarily been related to being in a rush, but he does get agitated with certain situations.  He is living alone, but his children are checking in on him regularly.  He carries his cell phone with him and has not had any falls recently.  He is not driving and does most of his cooking in the microwave.    ROS:  Review of Systems   Respiratory: Negative for cough and shortness of breath.    Cardiovascular: Negative for chest pain and palpitations.   Psychiatric/Behavioral: Positive for agitation and confusion. Negative for dysphoric mood and hallucinations.        reports that he quit smoking about 17 years ago. His smoking use included cigarettes. He quit after 50.00 years of use. He has never used smokeless tobacco. He reports that he does not drink alcohol or use drugs.      Current Outpatient Medications:   •  Ethyl Alcohol, Skin Cleanser, (EQL HAND /ALOE EX), Apply  topically Daily., Disp: , Rfl:   •  Multiple Vitamins-Minerals (MULTIVITAMIN MEN 50+) tablet, Take 1 tablet by mouth., Disp: , Rfl:   •  risperiDONE (risperDAL) 0.5 MG tablet, Take 1 tablet by mouth 2 (Two) Times a Day., Disp: 60 tablet, Rfl: 3    OBJECTIVE:  /60 (BP Location: Left arm, Patient Position: Sitting, Cuff Size: Adult)   Pulse 59   Resp 16   Ht 182.9 cm (72\")   Wt 61.9 kg (136 lb 8 oz)   SpO2 97%   BMI 18.51 kg/m²    Physical Exam   Constitutional: He is oriented to person, place, and time.   Cardiovascular: Normal rate, regular rhythm and normal heart sounds.   No murmur heard.  Pulmonary/Chest: Effort normal and breath sounds normal. No respiratory distress. He has no wheezes.   Neurological: He is " alert and oriented to person, place, and time.   Psychiatric: He has a normal mood and affect.       Assessment/Plan    Diagnoses and all orders for this visit:    Dementia associated with other underlying disease with behavioral disturbance  Ongoing work-up per neurology.  He has not yet been scheduled for these exams, though they were just ordered 2 days ago.  We discussed that safety at home is 1 of the biggest concerns.  He does most of his cooking in the microwave, has not had any falls, and his children check in on him with regularity on a daily basis.    Underweight  Protein-calorie malnutrition, moderate (CMS/HCC)  He has gained some weight over the past month.  He notes his appetite is intact and he does his own cooking in the microwave and enjoys TV dinners.  He is encouraged to maintain regular meals.    Need for vaccination  -     Fluad Tri 65yr ([id number])    An After Visit Summary was printed and given to the patient at discharge.  Return in about 6 months (around 3/6/2020) for Recheck.         Torey Amato D.O. 9/6/2019   Electronically signed.

## 2019-09-16 ENCOUNTER — OFFICE VISIT (OUTPATIENT)
Dept: PHYSICAL THERAPY | Facility: CLINIC | Age: 79
End: 2019-09-16

## 2019-09-16 DIAGNOSIS — R41.89 OTHER SYMPTOMS AND SIGNS INVOLVING COGNITIVE FUNCTIONS AND AWARENESS: Primary | ICD-10-CM

## 2019-09-16 PROCEDURE — 96125 COGNITIVE TEST BY HC PRO: CPT | Performed by: SPEECH-LANGUAGE PATHOLOGIST
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
## 2019-09-20 ENCOUNTER — LAB (OUTPATIENT)
Dept: LAB | Facility: HOSPITAL | Age: 79
End: 2019-09-20

## 2019-09-20 ENCOUNTER — HOSPITAL ENCOUNTER (OUTPATIENT)
Dept: NEUROLOGY | Facility: HOSPITAL | Age: 79
Discharge: HOME | End: 2019-09-20
Admitting: PSYCHIATRY & NEUROLOGY

## 2019-09-20 ENCOUNTER — HOSPITAL ENCOUNTER (OUTPATIENT)
Dept: MRI IMAGING | Facility: HOSPITAL | Age: 79
Discharge: HOME | End: 2019-09-20

## 2019-09-20 ENCOUNTER — HOSPITAL ENCOUNTER (OUTPATIENT)
Dept: ULTRASOUND IMAGING | Facility: HOSPITAL | Age: 79
Discharge: HOME | End: 2019-09-20

## 2019-09-20 DIAGNOSIS — R41.3 MEMORY DIFFICULTY: ICD-10-CM

## 2019-09-20 DIAGNOSIS — R26.89 IMBALANCE: ICD-10-CM

## 2019-09-20 LAB
ALBUMIN SERPL-MCNC: 4.3 G/DL (ref 3.5–5.2)
ALBUMIN/GLOB SERPL: 1.9 G/DL
ALP SERPL-CCNC: 64 U/L (ref 39–117)
ALT SERPL W P-5'-P-CCNC: 10 U/L (ref 1–41)
ANION GAP SERPL CALCULATED.3IONS-SCNC: 10.2 MMOL/L (ref 5–15)
AST SERPL-CCNC: 18 U/L (ref 1–40)
BASOPHILS # BLD AUTO: 0.07 10*3/MM3 (ref 0–0.2)
BASOPHILS NFR BLD AUTO: 1.1 % (ref 0–1.5)
BILIRUB SERPL-MCNC: 0.4 MG/DL (ref 0.2–1.2)
BUN BLD-MCNC: 11 MG/DL (ref 8–23)
BUN/CREAT SERPL: 12.6 (ref 7–25)
CALCIUM SPEC-SCNC: 9.1 MG/DL (ref 8.6–10.5)
CHLORIDE SERPL-SCNC: 100 MMOL/L (ref 98–107)
CO2 SERPL-SCNC: 28.8 MMOL/L (ref 22–29)
CREAT BLD-MCNC: 0.87 MG/DL (ref 0.76–1.27)
DEPRECATED RDW RBC AUTO: 41.2 FL (ref 37–54)
EOSINOPHIL # BLD AUTO: 0.29 10*3/MM3 (ref 0–0.4)
EOSINOPHIL NFR BLD AUTO: 4.6 % (ref 0.3–6.2)
ERYTHROCYTE [DISTWIDTH] IN BLOOD BY AUTOMATED COUNT: 13.1 % (ref 12.3–15.4)
ERYTHROCYTE [SEDIMENTATION RATE] IN BLOOD: 5 MM/HR (ref 0–20)
GFR SERPL CREATININE-BSD FRML MDRD: 85 ML/MIN/1.73
GLOBULIN UR ELPH-MCNC: 2.3 GM/DL
GLUCOSE BLD-MCNC: 66 MG/DL (ref 65–99)
HCT VFR BLD AUTO: 38 % (ref 37.5–51)
HGB BLD-MCNC: 12.4 G/DL (ref 13–17.7)
IMM GRANULOCYTES # BLD AUTO: 0.01 10*3/MM3 (ref 0–0.05)
IMM GRANULOCYTES NFR BLD AUTO: 0.2 % (ref 0–0.5)
LYMPHOCYTES # BLD AUTO: 1.55 10*3/MM3 (ref 0.7–3.1)
LYMPHOCYTES NFR BLD AUTO: 24.5 % (ref 19.6–45.3)
MAGNESIUM SERPL-MCNC: 2.2 MG/DL (ref 1.6–2.4)
MCH RBC QN AUTO: 27.8 PG (ref 26.6–33)
MCHC RBC AUTO-ENTMCNC: 32.6 G/DL (ref 31.5–35.7)
MCV RBC AUTO: 85.2 FL (ref 79–97)
MONOCYTES # BLD AUTO: 0.67 10*3/MM3 (ref 0.1–0.9)
MONOCYTES NFR BLD AUTO: 10.6 % (ref 5–12)
NEUTROPHILS # BLD AUTO: 3.74 10*3/MM3 (ref 1.7–7)
NEUTROPHILS NFR BLD AUTO: 59 % (ref 42.7–76)
NRBC BLD AUTO-RTO: 0 /100 WBC (ref 0–0.2)
PLATELET # BLD AUTO: 228 10*3/MM3 (ref 140–450)
PMV BLD AUTO: 10.2 FL (ref 6–12)
POTASSIUM BLD-SCNC: 4.5 MMOL/L (ref 3.5–5.2)
PROT SERPL-MCNC: 6.6 G/DL (ref 6–8.5)
RBC # BLD AUTO: 4.46 10*6/MM3 (ref 4.14–5.8)
SODIUM BLD-SCNC: 139 MMOL/L (ref 136–145)
WBC NRBC COR # BLD: 6.33 10*3/MM3 (ref 3.4–10.8)

## 2019-09-20 PROCEDURE — 70544 MR ANGIOGRAPHY HEAD W/O DYE: CPT

## 2019-09-20 PROCEDURE — 36415 COLL VENOUS BLD VENIPUNCTURE: CPT

## 2019-09-20 PROCEDURE — 93880 EXTRACRANIAL BILAT STUDY: CPT

## 2019-09-20 PROCEDURE — 95816 EEG AWAKE AND DROWSY: CPT

## 2019-09-20 PROCEDURE — 80053 COMPREHEN METABOLIC PANEL: CPT | Performed by: PSYCHIATRY & NEUROLOGY

## 2019-09-20 PROCEDURE — 95816 EEG AWAKE AND DROWSY: CPT | Performed by: PSYCHIATRY & NEUROLOGY

## 2019-09-20 PROCEDURE — 83735 ASSAY OF MAGNESIUM: CPT | Performed by: PSYCHIATRY & NEUROLOGY

## 2019-09-20 PROCEDURE — 85025 COMPLETE CBC W/AUTO DIFF WBC: CPT | Performed by: PSYCHIATRY & NEUROLOGY

## 2019-09-20 PROCEDURE — 93880 EXTRACRANIAL BILAT STUDY: CPT | Performed by: SURGERY

## 2019-09-20 PROCEDURE — 82525 ASSAY OF COPPER: CPT | Performed by: PSYCHIATRY & NEUROLOGY

## 2019-09-20 PROCEDURE — 85652 RBC SED RATE AUTOMATED: CPT | Performed by: PSYCHIATRY & NEUROLOGY

## 2019-09-24 LAB — COPPER SERPL-MCNC: 111 UG/DL (ref 72–166)

## 2019-10-01 ENCOUNTER — OFFICE VISIT (OUTPATIENT)
Dept: NEUROLOGY | Facility: CLINIC | Age: 79
End: 2019-10-01

## 2019-10-01 VITALS
BODY MASS INDEX: 18.69 KG/M2 | DIASTOLIC BLOOD PRESSURE: 80 MMHG | HEART RATE: 62 BPM | HEIGHT: 72 IN | RESPIRATION RATE: 18 BRPM | WEIGHT: 138 LBS | SYSTOLIC BLOOD PRESSURE: 130 MMHG

## 2019-10-01 DIAGNOSIS — R25.1 TREMOR: ICD-10-CM

## 2019-10-01 DIAGNOSIS — R41.3 MEMORY DIFFICULTY: Primary | ICD-10-CM

## 2019-10-01 PROCEDURE — 99214 OFFICE O/P EST MOD 30 MIN: CPT | Performed by: PSYCHIATRY & NEUROLOGY

## 2019-10-01 RX ORDER — MEMANTINE HYDROCHLORIDE 5 MG/1
TABLET ORAL
Qty: 30 TABLET | Refills: 2 | Status: SHIPPED | OUTPATIENT
Start: 2019-10-01 | End: 2019-12-10 | Stop reason: SDUPTHER

## 2019-10-01 NOTE — PATIENT INSTRUCTIONS
Patient to get to emergency room immediately if significant side effects from the Namenda.  Patient not to be driving and not to be riding a bike and safety precautions as previously discussed.  Patient to start Namenda at a half of a 5 mg tablet p.o. daily for 2 weeks and then increase as tolerated 1 p.o. daily

## 2019-10-01 NOTE — PROGRESS NOTES
Subjective   Edy Urbina, 1940, is a male who is being seen today for   Chief Complaint   Patient presents with   • Memory Loss     hallucinations       HISTORY OF PRESENT ILLNESS: Extended follow-up.  Patient has improved dramatically with his hallucinations and memory over the last several weeks.  Patient still taking the low-dose Risperdal.  Patient had his speech evaluation which showed borderline findings on immediate and delayed memory.  Otherwise low average.  Patient MMSE today is 30 of 30.  Patient's MRA brain noncontributory.  Patient's noninvasive carotids normal.  EEG noncontributory.  Lab work stable.  With these findings I think the patient is less likely to have Lewy body dementia and rather probably some mild cognitive difficulties.  Patient also as per speech was noted to have significant hearing difficulties which may be affecting his memory.  I encouraged the family to get hearing evaluation.    REVIEW OF SYSTEMS:   GENERAL: Today's blood pressure 130/80 left arm seated in same standing with pulse 62.  PULMONARY: No acute respiratory difficulties  CVS: No acute chest pain or palpitation  GASTROINTESTINAL: No acute GI distress  GENITOURINARY: No acute  distress  GYN: Not applicable  MUSCULOSKELETAL: No acute musculoskeletal symptoms  HEENT: No acute vision problems  ENDOCRINE: No acute endocrine symptoms  PSYCHIATRIC: As above  HEMATOLOGY: Borderline anemic  SKIN: No acute skin changes  Family history reviewed and otherwise noncontributory  Social history: Patient denies smoking or drug or alcohol use    PHYSICAL EXAMINATION:    GENERAL: Patient has mild rest tremor of the both upper extremities/ is also some intentional tremor and head neck tremor.  Patient has no cogwheeling or rigidity  CRANIUM: Normocephalic/atraumatic  HEENT:        EYES: No acute fundic abnormalities.  Pupils equal round reactive to light.  EOMs intact without nystagmus and fields full to confrontation       EARS:  Tympanic membranes normal and hears tuning fork bilaterally       THROAT: No oropharynx abnormalities       NECK: No bruit/no lymphadenopathy  CHEST: No acute cardiopulmonary abnormalities by auscultation  ABDOMEN: Nondistended  EXTREMITIES: Pulses symmetrical  NEURO: Patient alert and follows commands fairly well with difficulty hearing the commands  SPEECH: Normal    CRANIAL NERVES: Motor/sensory about the face normal symmetric    MOTOR STRENGTH: Motor strength upper and lower extremities normal  STATION AND GAIT: Gait shows mildly hyperkyphotic but no significant festination and Romberg negative  CEREBELLAR: Finger-nose and heel-to-shin normal  SENSORY: Some decrease in pin and vibration distal proximal lower extremities to the ankles bilaterally otherwise normal pin and vibration throughout  REFLEXES: Decreased ankle jerks bilaterally with otherwise normal knee jerks and biceps with no clonus.  Toes equivocal      ASSESSMENT AND PLAN: Patient with memory difficulty as above and we are starting the low-dose Namenda warning in detail and side effects to the patient and family.  I spent 25 minutes with this patient with 15 minutes counseling.  Patient to continue no driving and safety precautions as previously discussed.      Edy was seen today for memory loss.    Diagnoses and all orders for this visit:    Memory difficulty    Tremor    Other orders  -     memantine (NAMENDA) 5 MG tablet; One half p.o. daily for 2 weeks and increase as tolerated to 1 p.o. daily        Dictated utilizing Dragon voice recognition software

## 2019-12-02 RX ORDER — RISPERIDONE 0.5 MG/1
TABLET ORAL
Qty: 60 TABLET | Refills: 3 | Status: SHIPPED | OUTPATIENT
Start: 2019-12-02 | End: 2019-12-04 | Stop reason: SDUPTHER

## 2019-12-04 RX ORDER — RISPERIDONE 0.5 MG/1
0.5 TABLET ORAL 2 TIMES DAILY
Qty: 60 TABLET | Refills: 3 | Status: SHIPPED | OUTPATIENT
Start: 2019-12-04 | End: 2020-03-06 | Stop reason: SDUPTHER

## 2019-12-10 ENCOUNTER — OFFICE VISIT (OUTPATIENT)
Dept: NEUROLOGY | Facility: CLINIC | Age: 79
End: 2019-12-10

## 2019-12-10 VITALS
SYSTOLIC BLOOD PRESSURE: 144 MMHG | BODY MASS INDEX: 19.77 KG/M2 | HEIGHT: 72 IN | WEIGHT: 146 LBS | RESPIRATION RATE: 18 BRPM | DIASTOLIC BLOOD PRESSURE: 70 MMHG | HEART RATE: 70 BPM

## 2019-12-10 DIAGNOSIS — R41.3 MEMORY DIFFICULTY: Primary | ICD-10-CM

## 2019-12-10 DIAGNOSIS — R25.1 TREMOR: ICD-10-CM

## 2019-12-10 PROCEDURE — 99214 OFFICE O/P EST MOD 30 MIN: CPT | Performed by: PSYCHIATRY & NEUROLOGY

## 2019-12-10 RX ORDER — MEMANTINE HYDROCHLORIDE 5 MG/1
TABLET ORAL
Qty: 60 TABLET | Refills: 2 | Status: SHIPPED | OUTPATIENT
Start: 2019-12-10 | End: 2020-03-09

## 2019-12-10 NOTE — PATIENT INSTRUCTIONS
Patient not released as of this point to drive her bicycle.  Patient have safety precautions of no work over hot fire/stove/water or with sharp cutting tools or work at heights or climbing.  Patient to get to emergency room immediately if increased side effects from the Namenda.

## 2019-12-10 NOTE — PROGRESS NOTES
Subjective   Edy Urbina, 1940, is a male who is being seen today for   Chief Complaint   Patient presents with   • Memory Loss       HISTORY OF PRESENT ILLNESS: Extended follow-up.  Patient hallucinations are stable as per PCP on the Risperdal.  Patient has done well getting up to 5 mg Namenda without side effects and is to increase slowly to 7.5 mg daily for 2 weeks and then 10 mg daily and 5 mg p.o. every morning and 5 mg p.o. nightly watching for side effects which are discussed.  Blood work reviewed.  Patient is not to be driving or using his bicycle at this time until released.    REVIEW OF SYSTEMS:   GENERAL: Blood pressure today 144/70 left arm seated in same standing with pulse 70.  MMSE is 30 of 30.  PULMONARY: No acute respiratory difficulty  CVS: No chest pain or palpitation  GASTROINTESTINAL: No acute GI distress  GENITOURINARY: No acute  distress  GYN: Not applicable  MUSCULOSKELETAL: Patient has baseline tremor  HEENT: No acute vision or hearing change  ENDOCRINE: No acute endocrine symptoms  PSYCHIATRIC: No acute psychiatric symptoms  HEMATOLOGY: Borderline anemia  SKIN: No acute skin changes  Family history reviewed and otherwise noncontributory  Social history: Patient denies smoking or drug or alcohol use    PHYSICAL EXAMINATION:    GENERAL: Patient has no hearing aid  CRANIUM: Normocephalic/atraumatic and no falls recently  HEENT:        EYES: No acute fundic abnormalities.  Pupils equal round reactive to light.  EOMs intact without nystagmus and fields full to confrontation       EARS: Tympanic membranes normal and hears tuning fork bilaterally       THROAT: No oropharynx abnormalities       NECK: No bruits/no lymphadenopathy  CHEST: No acute cardiopulmonary abnormalities by auscultation  ABDOMEN: Nondistended  EXTREMITIES: Pulses symmetrical at dorsalis pedis  NEURO: Patient alert and follows commands with minimal difficulty  SPEECH: Normal    CRANIAL NERVES: Motor/sensory about the  face normal and symmetric    MOTOR STRENGTH: Motor strength upper and lower extremities normal.  Patient has some minimal rest tremor and some intentional tremor without cogwheeling or rigidity  STATION AND GAIT: Mildly hyperkyphotic gait without festination.  No tendency to fall.  Romberg negative  CEREBELLAR: Finger-nose and heel-to-shin normal  SENSORY: Decreased pin vibration distal to proximal lower extremities ankles bilaterally otherwise normal pinna vibration throughout  REFLEXES: Decreased ankle jerk bilaterally are present knee jerk and biceps bilaterally toes equivocal no clonus      ASSESSMENT AND PLAN: Patient with history of memory difficulty increasing Namenda as above.  Patient to get to emergency immediately if symptoms of side effects occur.  I spent 25 minutes with this patient with 15 minutes counseling.Patient's Body mass index is 19.8 kg/m². BMI is within normal parameters. No follow-up required..      Edy was seen today for memory loss.    Diagnoses and all orders for this visit:    Memory difficulty    Tremor    Other orders  -     memantine (NAMENDA) 5 MG tablet; Take 1 tab in the morning and 1/2 tab at night for 2 weeks and then increase to 1 tab po bid        Dictated utilizing Dragon voice recognition software

## 2020-02-17 RX ORDER — MEMANTINE HYDROCHLORIDE 5 MG/1
TABLET ORAL
Qty: 30 TABLET | Refills: 0 | OUTPATIENT
Start: 2020-02-17

## 2020-03-06 ENCOUNTER — OFFICE VISIT (OUTPATIENT)
Dept: INTERNAL MEDICINE | Facility: CLINIC | Age: 80
End: 2020-03-06

## 2020-03-06 ENCOUNTER — LAB (OUTPATIENT)
Dept: LAB | Facility: HOSPITAL | Age: 80
End: 2020-03-06

## 2020-03-06 VITALS
HEART RATE: 58 BPM | OXYGEN SATURATION: 98 % | WEIGHT: 147.9 LBS | RESPIRATION RATE: 18 BRPM | HEIGHT: 72 IN | SYSTOLIC BLOOD PRESSURE: 110 MMHG | DIASTOLIC BLOOD PRESSURE: 68 MMHG | BODY MASS INDEX: 20.03 KG/M2 | TEMPERATURE: 97.9 F

## 2020-03-06 DIAGNOSIS — F02.818 DEMENTIA ASSOCIATED WITH OTHER UNDERLYING DISEASE WITH BEHAVIORAL DISTURBANCE (HCC): Primary | ICD-10-CM

## 2020-03-06 DIAGNOSIS — Z00.00 ENCOUNTER FOR PREVENTIVE CARE: ICD-10-CM

## 2020-03-06 DIAGNOSIS — Z00.00 ANNUAL VISIT FOR GENERAL ADULT MEDICAL EXAMINATION WITHOUT ABNORMAL FINDINGS: ICD-10-CM

## 2020-03-06 PROBLEM — R63.6 UNDERWEIGHT: Status: RESOLVED | Noted: 2019-07-08 | Resolved: 2020-03-06

## 2020-03-06 LAB
ALBUMIN SERPL-MCNC: 4.1 G/DL (ref 3.5–5.2)
ALBUMIN/GLOB SERPL: 1.6 G/DL
ALP SERPL-CCNC: 59 U/L (ref 39–117)
ALT SERPL W P-5'-P-CCNC: 13 U/L (ref 1–41)
ANION GAP SERPL CALCULATED.3IONS-SCNC: 11.6 MMOL/L (ref 5–15)
AST SERPL-CCNC: 18 U/L (ref 1–40)
BASOPHILS # BLD AUTO: 0.06 10*3/MM3 (ref 0–0.2)
BASOPHILS NFR BLD AUTO: 0.9 % (ref 0–1.5)
BILIRUB SERPL-MCNC: 0.4 MG/DL (ref 0.2–1.2)
BUN BLD-MCNC: 11 MG/DL (ref 8–23)
BUN/CREAT SERPL: 12.5 (ref 7–25)
CALCIUM SPEC-SCNC: 9.3 MG/DL (ref 8.6–10.5)
CHLORIDE SERPL-SCNC: 100 MMOL/L (ref 98–107)
CHOLEST SERPL-MCNC: 191 MG/DL (ref 0–200)
CO2 SERPL-SCNC: 27.4 MMOL/L (ref 22–29)
CREAT BLD-MCNC: 0.88 MG/DL (ref 0.76–1.27)
DEPRECATED RDW RBC AUTO: 42.3 FL (ref 37–54)
EOSINOPHIL # BLD AUTO: 0.13 10*3/MM3 (ref 0–0.4)
EOSINOPHIL NFR BLD AUTO: 2 % (ref 0.3–6.2)
ERYTHROCYTE [DISTWIDTH] IN BLOOD BY AUTOMATED COUNT: 13.9 % (ref 12.3–15.4)
GFR SERPL CREATININE-BSD FRML MDRD: 84 ML/MIN/1.73
GLOBULIN UR ELPH-MCNC: 2.5 GM/DL
GLUCOSE BLD-MCNC: 94 MG/DL (ref 65–99)
HCT VFR BLD AUTO: 39.3 % (ref 37.5–51)
HDLC SERPL-MCNC: 61 MG/DL (ref 40–60)
HGB BLD-MCNC: 13 G/DL (ref 13–17.7)
IMM GRANULOCYTES # BLD AUTO: 0.02 10*3/MM3 (ref 0–0.05)
IMM GRANULOCYTES NFR BLD AUTO: 0.3 % (ref 0–0.5)
LDLC SERPL CALC-MCNC: 117 MG/DL (ref 0–100)
LDLC/HDLC SERPL: 1.92 {RATIO}
LYMPHOCYTES # BLD AUTO: 1.74 10*3/MM3 (ref 0.7–3.1)
LYMPHOCYTES NFR BLD AUTO: 26.6 % (ref 19.6–45.3)
MCH RBC QN AUTO: 27.6 PG (ref 26.6–33)
MCHC RBC AUTO-ENTMCNC: 33.1 G/DL (ref 31.5–35.7)
MCV RBC AUTO: 83.4 FL (ref 79–97)
MONOCYTES # BLD AUTO: 0.61 10*3/MM3 (ref 0.1–0.9)
MONOCYTES NFR BLD AUTO: 9.3 % (ref 5–12)
NEUTROPHILS # BLD AUTO: 3.98 10*3/MM3 (ref 1.7–7)
NEUTROPHILS NFR BLD AUTO: 60.9 % (ref 42.7–76)
NRBC BLD AUTO-RTO: 0 /100 WBC (ref 0–0.2)
PLATELET # BLD AUTO: 225 10*3/MM3 (ref 140–450)
PMV BLD AUTO: 10.2 FL (ref 6–12)
POTASSIUM BLD-SCNC: 4.8 MMOL/L (ref 3.5–5.2)
PROT SERPL-MCNC: 6.6 G/DL (ref 6–8.5)
RBC # BLD AUTO: 4.71 10*6/MM3 (ref 4.14–5.8)
SODIUM BLD-SCNC: 139 MMOL/L (ref 136–145)
TRIGL SERPL-MCNC: 65 MG/DL (ref 0–150)
VLDLC SERPL-MCNC: 13 MG/DL (ref 5–40)
WBC NRBC COR # BLD: 6.54 10*3/MM3 (ref 3.4–10.8)

## 2020-03-06 PROCEDURE — 80053 COMPREHEN METABOLIC PANEL: CPT | Performed by: NURSE PRACTITIONER

## 2020-03-06 PROCEDURE — 99214 OFFICE O/P EST MOD 30 MIN: CPT | Performed by: NURSE PRACTITIONER

## 2020-03-06 PROCEDURE — 80061 LIPID PANEL: CPT | Performed by: NURSE PRACTITIONER

## 2020-03-06 PROCEDURE — 85025 COMPLETE CBC W/AUTO DIFF WBC: CPT | Performed by: NURSE PRACTITIONER

## 2020-03-06 PROCEDURE — 36415 COLL VENOUS BLD VENIPUNCTURE: CPT

## 2020-03-06 RX ORDER — RISPERIDONE 0.5 MG/1
0.5 TABLET ORAL 2 TIMES DAILY
Qty: 60 TABLET | Refills: 3 | Status: SHIPPED | OUTPATIENT
Start: 2020-03-06 | End: 2020-07-08

## 2020-03-06 NOTE — PROGRESS NOTES
CC: f/u dementia    History:  Edy Urbina is a 79 y.o. male who presents today for follow-up for evaluation of the above:    He presents today with his son and notes he has been doing reasonably well.  He continues on Risperdal and has no hallucinations.  He does have moments of confusion, but reports he feels like this is improving since starting Namenda.  He continues to see Neurology.  He is living alone, but his children are checking in on him regularly.   His weight has increased some since his last visit. He reports that he is eating.       ROS:  Review of Systems   Constitutional: Negative for activity change, appetite change, fatigue, fever and unexpected weight change.   HENT: Negative.    Respiratory: Negative for cough, chest tightness, shortness of breath and wheezing.    Cardiovascular: Negative for chest pain, palpitations and leg swelling.   Gastrointestinal: Negative.    Endocrine: Negative.    Genitourinary: Negative.    Musculoskeletal: Negative.    Skin: Negative.    Neurological: Negative for dizziness and headaches.   Psychiatric/Behavioral: Negative.        Mr. Urbina  reports that he quit smoking about 17 years ago. His smoking use included cigarettes. He quit after 50.00 years of use. He has never used smokeless tobacco. He reports that he does not drink alcohol or use drugs.      Current Outpatient Medications:   •  Ethyl Alcohol, Skin Cleanser, (EQL HAND /ALOE EX), Apply  topically Daily., Disp: , Rfl:   •  memantine (NAMENDA) 5 MG tablet, Take 1 tab in the morning and 1/2 tab at night for 2 weeks and then increase to 1 tab po bid, Disp: 60 tablet, Rfl: 2  •  Multiple Vitamins-Minerals (MULTIVITAMIN MEN 50+) tablet, Take 1 tablet by mouth., Disp: , Rfl:   •  risperiDONE (risperDAL) 0.5 MG tablet, Take 1 tablet by mouth 2 (Two) Times a Day., Disp: 60 tablet, Rfl: 3      OBJECTIVE:  /68 (BP Location: Left arm, Patient Position: Sitting, Cuff Size: Adult)   Pulse 58   Temp  "97.9 °F (36.6 °C) (Oral)   Resp 18   Ht 182.9 cm (72\")   Wt 67.1 kg (147 lb 14.4 oz)   SpO2 98%   BMI 20.06 kg/m²    Physical Exam   Constitutional: He is oriented to person, place, and time. He appears well-developed and well-nourished.   HENT:   Head: Normocephalic and atraumatic.   Eyes: Pupils are equal, round, and reactive to light. Conjunctivae and EOM are normal.   Neck: Normal range of motion. Neck supple.   Cardiovascular: Normal rate, regular rhythm and normal heart sounds.   Pulmonary/Chest: Effort normal and breath sounds normal.   Abdominal: Soft. Bowel sounds are normal.   Neurological: He is alert and oriented to person, place, and time. He has normal reflexes.   Skin: Skin is warm and dry.   Psychiatric: He has a normal mood and affect.   Vitals reviewed.      Assessment/Plan    Edy was seen today for follow-up.    Diagnoses and all orders for this visit:    Dementia associated with other underlying disease with behavioral disturbance (CMS/HCC)  -     risperiDONE (risperDAL) 0.5 MG tablet; Take 1 tablet by mouth 2 (Two) Times a Day.  Improving slightly per patient's report.    Encounter for preventive care  -     Comprehensive metabolic panel; Future  -     CBC w AUTO Differential; Future  -     Lipid panel; Future    BMI 20.0-20.9, adult  Patient reports improvement in diet.       An After Visit Summary was printed and given to the patient at discharge.  Return in about 6 months (around 9/6/2020) for Medicare Wellness. Sooner if problems arise.         Shawna HASITNGS. 3/6/2020     "

## 2020-03-09 RX ORDER — MEMANTINE HYDROCHLORIDE 5 MG/1
5 TABLET ORAL 2 TIMES DAILY
Qty: 60 TABLET | Refills: 2 | Status: SHIPPED | OUTPATIENT
Start: 2020-03-09 | End: 2020-03-26 | Stop reason: SDUPTHER

## 2020-03-13 ENCOUNTER — TELEPHONE (OUTPATIENT)
Dept: INTERNAL MEDICINE | Facility: CLINIC | Age: 80
End: 2020-03-13

## 2020-03-13 NOTE — TELEPHONE ENCOUNTER
Patient son was informed    ----- Message from DARLING Meyers sent at 3/11/2020 12:12 PM CDT -----  Please send patient a letter notifying him that his labs are normal and no changes indicated at this time.

## 2020-03-26 ENCOUNTER — TELEPHONE (OUTPATIENT)
Dept: NEUROLOGY | Facility: CLINIC | Age: 80
End: 2020-03-26

## 2020-03-26 RX ORDER — MEMANTINE HYDROCHLORIDE 5 MG/1
5 TABLET ORAL 2 TIMES DAILY
Qty: 60 TABLET | Refills: 2 | Status: SHIPPED | OUTPATIENT
Start: 2020-03-26 | End: 2020-09-01 | Stop reason: SDUPTHER

## 2020-03-26 NOTE — TELEPHONE ENCOUNTER
I spoke with the patient's daughter and care giver about the patient's upcoming appointment with .  The daughter states she did speak with the patient and he does not want to come in right now. States he will need his Namenda refilled.  I did offer to do a video visit with them and she states they would like to do that.  I have given them the information on how to set up My Chart for the patient and once that is done we can do the visit.  She has requested I mail her the information for My Chart and she will get it set up. Jo with our  will mail the information out to Jennyfer Urbina at [address] [place] [address], [place] [address]. I will let  know the patient needs refills for his Namenda.

## 2020-03-27 ENCOUNTER — TELEPHONE (OUTPATIENT)
Dept: NEUROLOGY | Facility: CLINIC | Age: 80
End: 2020-03-27

## 2020-03-27 NOTE — TELEPHONE ENCOUNTER
Patient son calls stating he would like for his Dad to be set up for a Virtual appointment with .  I did let him know that I spoke with his sister, Jennyfer, yesterday and we are in the process of getting his MyChart set up and once that is accomplished we can set up the appointment.  He is in agreement to do this.

## 2020-04-06 ENCOUNTER — TELEPHONE (OUTPATIENT)
Dept: UROLOGY | Facility: CLINIC | Age: 80
End: 2020-04-06

## 2020-04-06 DIAGNOSIS — C61 PROSTATE CANCER (HCC): Primary | ICD-10-CM

## 2020-05-20 DIAGNOSIS — C61 PROSTATE CANCER (HCC): ICD-10-CM

## 2020-07-08 DIAGNOSIS — F02.818 DEMENTIA ASSOCIATED WITH OTHER UNDERLYING DISEASE WITH BEHAVIORAL DISTURBANCE (HCC): ICD-10-CM

## 2020-07-08 RX ORDER — RISPERIDONE 0.5 MG/1
0.5 TABLET ORAL 2 TIMES DAILY
Qty: 60 TABLET | Refills: 5 | Status: SHIPPED | OUTPATIENT
Start: 2020-07-08 | End: 2020-12-31

## 2020-08-18 NOTE — THERAPY DISCHARGE NOTE
Acute Care - Physical Therapy Initial Eval/Discharge  [facility]     Patient Name: Edy Urbina  : 1940  MRN: [id number]  Today's Date: 2019   Onset of Illness/Injury or Date of Surgery: 19  Date of Referral to PT: 19  Referring Physician: Dr. Landa      Admit Date: 2019    Visit Dx:    ICD-10-CM ICD-9-CM   1. Confusion R41.0 298.9   2. Decreased activities of daily living (ADL) R68.89 780.99     Patient Active Problem List   Diagnosis   • Varicose vein of leg   • BMI less than 19,adult   • Confusion   • Weight loss   • Underweight   • Inguinal hernia   • Anemia   • Altered mental status     Past Medical History:   Diagnosis Date   • Cataracts, bilateral    • Occasional tremors    • Prostate cancer (CMS/HCC) 2011    t2c,Sommer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    • Varicose veins of legs      Past Surgical History:   Procedure Laterality Date   • EYE SURGERY     • HERNIA REPAIR Left    • INGUINAL HERNIA REPAIR Right 2019    Procedure: OPEN RIGHT INGUINAL HERNIA REPAIR WITH MESH;  Surgeon: Alesia Mora MD;  Location: [facility];  Service: General   • PROSTATECTOMY  2011    RALP          PT ASSESSMENT (last 12 hours)      Physical Therapy Evaluation     Row Name 19 1100          PT Evaluation Time/Intention    Subjective Information  complains of;fatigue  -MS     Document Type  discharge evaluation/summary  -MS     Mode of Treatment  physical therapy  -MS     Row Name 19 1100          General Information    Patient Profile Reviewed?  yes  -MS     Onset of Illness/Injury or Date of Surgery  19  -MS     Referring Physician  Dr. Landa  -MS     Patient Observations  alert;cooperative;agree to therapy confused at times  -MS     Patient/Family Observations  daughter in room with  infant that apparently tested positive for RSV, nursing and MD notified  -MS     General Observations of Patient  pt up in the hallway with OT and in no  apparent distress  -MS     Prior Level of Function  independent:;all household mobility;community mobility;ADL's;driving  -MS     Pertinent History of Current Functional Problem  confusion with recent hernia repair  -MS     Existing Precautions/Restrictions  fall due to confusion  -MS     Risks Reviewed  patient:;LOB;dizziness;increased discomfort  -MS     Benefits Reviewed  patient:;improve function;increase knowledge  -MS     Barriers to Rehab  cognitive status  -MS     Row Name 07/09/19 1100          Cognitive Assessment/Interventions    Additional Documentation  Cognitive Assessment/Intervention (Group)  -MS     Row Name 07/09/19 1100          Cognitive Assessment/Intervention- PT/OT    Affect/Mental Status (Cognitive)  confused  -MS     Orientation Status (Cognition)  oriented x 4 conversationally confused  -MS     Follows Commands (Cognition)  follows one step commands;25-49% accuracy difficulty with new tasks  -MS     Personal Safety Interventions  fall prevention program maintained;gait belt;muscle strengthening facilitated;nonskid shoes/slippers when out of bed;supervised activity  -MS     Row Name 07/09/19 1100          Safety Issues, Functional Mobility    Safety Issues Affecting Function (Mobility)  ability to follow commands;at risk behavior observed;impulsivity;judgment;problem solving  -MS     Impairments Affecting Function (Mobility)  cognition  -MS     Row Name 07/09/19 1100          Transfer Assessment/Treatment    Transfer Assessment/Treatment  stand-sit transfer  -MS     Comment (Transfers)  supervision for safety  -MS     Stand-Sit [place] (Transfers)  supervision  -MS     Row Name 07/09/19 1100          Gait/Stairs Assessment/Training    [place] Level (Gait)  supervision  -MS     Distance in Feet (Gait)  400ft, able to walk forward, backward, side to side, around objects, over 3 in objects with no LOB  -MS     Row Name 07/09/19 1100          General ROM    GENERAL ROM COMMENTS  all  extremities WFL  -MS     Row Name 07/09/19 1100          MMT (Manual Muscle Testing)    General MMT Comments  grossly 5/5 no focal deficits seen  -MS     Row Name 07/09/19 1100          Motor Assessment/Intervention    Additional Documentation  Gross Motor Coordination (Group)  -MS     Maggie Name 07/09/19 1100          Gross Motor Coordination    Gross Motor Skill, Impairments Detail  ESTEBAN and accuracy intact in all extremities  -MS     Maggie Name 07/09/19 1100          Sensory Assessment/Intervention    Sensory General Assessment  -- difficult to test  -MS     Maggie Name 07/09/19 1100          Vision Assessment/Intervention    Vision Assessment Comment  no reports of impaired vision  -MS     Maggie Name 07/09/19 1100          Pain Assessment    Additional Documentation  Pain Scale: Numbers Pre/Post-Treatment (Group)  -MS     Maggie Name 07/09/19 1100          Pain Scale: Numbers Pre/Post-Treatment    Pain Scale: Numbers, Pretreatment  0/10 - no pain  -MS     Pain Scale: Numbers, Post-Treatment  0/10 - no pain  -MS     Maggie Name 07/09/19 1100          Plan of Care Review    Plan of Care Reviewed With  patient  -MS     Maggie Name 07/09/19 1100          Physical Therapy Clinical Impression    Date of Referral to PT  07/09/19  -MS     Criteria for Skilled Interventions Met (PT Clinical Impression)  no problems identified which require skilled intervention  -MS     Maggie Name 07/09/19 1100          Positioning and Restraints    Post Treatment Position  bed  -MS     In Bed  sitting EOB;with OT;side rails up x2;with family/caregiver  -MS     Maggie Name 07/09/19 1100          Physical Therapy Discharge Summary    Additional Documentation  Discharge Summary, PT Eval (Group)  -MS     Maggie Name 07/09/19 1100          Discharge Summary, PT Eval    Reason for Discharge (PT Discharge Summary)  no further needs identified  -MS     Outcomes Achieved Upon Discharge (PT Discharge Summary)  evaluation only  -MS       User Key  (r) = Recorded By, (t) =  Taken By, (c) = Cosigned By    Initials Name Provider Type    Henrietta Sharma R, PT, DPT, NCS Physical Therapist              PT Recommendation and Plan  Anticipated Discharge Disposition (PT): home with assist  Therapy Frequency (PT Clinical Impression): evaluation only  Outcome Summary/Treatment Plan (PT)  Anticipated Discharge Disposition (PT): home with assist  Reason for Discharge (PT Discharge Summary): no further needs identified  Plan of Care Reviewed With: patient  Progress: improving  Outcome Summary: PT evaluation completed. The patient is up with supervision due to his confusion and poor judgement making. He is oriented x4 but is confused conversationally. He was able to perform all high level gait tasks without difficulty. No further PT warranted, but recommend continued supervision until confusion improves.     Outcome Measures     Row Name 07/09/19 1101 07/09/19 1100          How much help from another person do you currently need...    Turning from your back to your side while in flat bed without using bedrails?  4  -MS  --     Moving from lying on back to sitting on the side of a flat bed without bedrails?  4  -MS  --     Moving to and from a bed to a chair (including a wheelchair)?  4  -MS  --     Standing up from a chair using your arms (e.g., wheelchair, bedside chair)?  4  -MS  --     Climbing 3-5 steps with a railing?  3  -MS  --     To walk in hospital room?  4  -MS  --     AM-PAC 6 Clicks Score (PT)  23  -MS  --        How much help from another is currently needed...    Putting on and taking off regular lower body clothing?  --  3  -MM     Bathing (including washing, rinsing, and drying)  --  3  -MM     Toileting (which includes using toilet bed pan or urinal)  --  3  -MM     Putting on and taking off regular upper body clothing  --  3  -MM     Taking care of personal grooming (such as brushing teeth)  --  4  -MM     Eating meals  --  4  -MM     AM-PAC 6 Clicks Score (OT)  --  20  -MM         Functional Assessment    Outcome Measure Options  AM-PAC 6 Clicks Basic Mobility (PT)  -MS  AM-PAC 6 Clicks Daily Activity (OT)  -MM       User Key  (r) = Recorded By, (t) = Taken By, (c) = Cosigned By    Initials Name Provider Type    MS Henrietta Hooks, PT, DPT, NCS Physical Therapist    MM Rick Herbert, OTR/L Occupational Therapist           Time Calculation:   PT Charges     Row Name 07/09/19 1211             Time Calculation    Start Time  1100  -MS      Stop Time  1128  -MS      Time Calculation (min)  28 min  -MS      PT Received On  07/09/19  -MS        User Key  (r) = Recorded By, (t) = Taken By, (c) = Cosigned By    Initials Name Provider Type    MS Henrietta Hooks, PT, DPT, NCS Physical Therapist        Therapy Charges for Today     Code Description Service Date Service Provider Modifiers Qty    77495491731 HC PT EVAL LOW COMPLEXITY 2 7/9/2019 Henrietta Hooks PT, DPT, NCS GP 1          PT G-Codes  Outcome Measure Options: AM-PAC 6 Clicks Basic Mobility (PT)  AM-PAC 6 Clicks Score (PT): 23  AM-PAC 6 Clicks Score (OT): 20    PT Discharge Summary  Anticipated Discharge Disposition (PT): home with assist    Henrietta Hooks PT, DPSAMANTHA, DARVIN  7/9/2019        done

## 2020-09-01 DIAGNOSIS — R41.3 MEMORY DIFFICULTY: Primary | ICD-10-CM

## 2020-09-01 RX ORDER — MEMANTINE HYDROCHLORIDE 5 MG/1
5 TABLET ORAL 2 TIMES DAILY
Qty: 180 TABLET | Refills: 0 | Status: SHIPPED | OUTPATIENT
Start: 2020-09-01 | End: 2020-11-04 | Stop reason: SDUPTHER

## 2020-09-25 ENCOUNTER — OFFICE VISIT (OUTPATIENT)
Dept: INTERNAL MEDICINE | Facility: CLINIC | Age: 80
End: 2020-09-25

## 2020-09-25 VITALS
HEIGHT: 72 IN | WEIGHT: 135.5 LBS | TEMPERATURE: 97.7 F | RESPIRATION RATE: 16 BRPM | DIASTOLIC BLOOD PRESSURE: 80 MMHG | OXYGEN SATURATION: 98 % | HEART RATE: 69 BPM | BODY MASS INDEX: 18.35 KG/M2 | SYSTOLIC BLOOD PRESSURE: 124 MMHG

## 2020-09-25 DIAGNOSIS — C61 PROSTATE CANCER (HCC): ICD-10-CM

## 2020-09-25 DIAGNOSIS — M79.89 SWELLING OF RIGHT LOWER EXTREMITY: ICD-10-CM

## 2020-09-25 DIAGNOSIS — I83.811 VARICOSE VEINS OF RIGHT LOWER EXTREMITY WITH PAIN: ICD-10-CM

## 2020-09-25 DIAGNOSIS — F02.818 DEMENTIA ASSOCIATED WITH OTHER UNDERLYING DISEASE WITH BEHAVIORAL DISTURBANCE (HCC): Primary | ICD-10-CM

## 2020-09-25 PROCEDURE — G0439 PPPS, SUBSEQ VISIT: HCPCS | Performed by: INTERNAL MEDICINE

## 2020-09-25 PROCEDURE — 99214 OFFICE O/P EST MOD 30 MIN: CPT | Performed by: INTERNAL MEDICINE

## 2020-09-25 PROCEDURE — 96160 PT-FOCUSED HLTH RISK ASSMT: CPT | Performed by: INTERNAL MEDICINE

## 2020-09-25 NOTE — PROGRESS NOTES
"CC: Right leg swelling    History:  Edy Urbina is a 79 y.o. male   He notes he has been doing reasonably well, but has had some swelling and associated stretching pain of the right lower extremity.  He notes it is present worse when he walks long distances, but is also present even at rest.  He does have varicose veins that occasionally cause some discomfort, though this is not severe.    He notes no issues with hallucinations and notes his memory has been stable.  He has not seen neurology in some time, but notes no changes.    He has lost weight, but notes his appetite has been adequate and he does participate with [employer] every day.     ROS:  Review of Systems   Constitutional: Negative for chills and fever.   Respiratory: Negative for cough and shortness of breath.    Cardiovascular: Positive for leg swelling. Negative for chest pain and palpitations.   Gastrointestinal: Negative for abdominal pain and constipation.   Genitourinary: Negative for difficulty urinating and dysuria.        reports that he quit smoking about 18 years ago. His smoking use included cigarettes. He quit after 50.00 years of use. He has never used smokeless tobacco. He reports that he does not drink alcohol or use drugs.      Current Outpatient Medications:   •  Ethyl Alcohol, Skin Cleanser, (EQL HAND /ALOE EX), Apply  topically Daily., Disp: , Rfl:   •  memantine (NAMENDA) 5 MG tablet, Take 1 tablet by mouth 2 (Two) Times a Day., Disp: 180 tablet, Rfl: 0  •  Multiple Vitamins-Minerals (MULTIVITAMIN MEN 50+) tablet, Take 1 tablet by mouth., Disp: , Rfl:   •  risperiDONE (risperDAL) 0.5 MG tablet, Take 1 tablet by mouth 2 (Two) Times a Day., Disp: 60 tablet, Rfl: 5    OBJECTIVE:  /80 (BP Location: Left arm, Patient Position: Sitting, Cuff Size: Adult)   Pulse 69   Temp 97.7 °F (36.5 °C)   Resp 16   Ht 182.9 cm (72\")   Wt 61.5 kg (135 lb 8 oz)   SpO2 98%   BMI 18.38 kg/m²    Physical Exam  Constitutional:       " General: He is not in acute distress.  Pulmonary:      Effort: Pulmonary effort is normal. No respiratory distress.   Skin:     Comments: Mild erythema consistent with stasis dermatitis in the right lower extremity.  There are bulky, but nonthrombosed varicose veins in the area of the medial calf and knee on the right.   Neurological:      Mental Status: He is alert and oriented to person, place, and time.   Psychiatric:         Mood and Affect: Mood normal.         Behavior: Behavior normal.         Assessment/Plan     Diagnoses and all orders for this visit:    Dementia associated with other underlying disease with behavioral disturbance (CMS/HCC)  Well-controlled with no exacerbation on amantadine and Risperdal.  He notes no hallucinations.    Swelling of right lower extremity   Varicose veins of right lower extremity with pain  -     US Venous Doppler Lower Extremity Right (duplex)  suspect swelling related to varicose veins, but we will perform duplex to rule out DVT.  Given symptoms, we could consider referral to vascular.    BMI less than 19,adult  Recommend high-protein diet.    Prostate cancer (CMS/HCC)  His appointment with urology was put off due to pandemic.  This note will be copied to their office.      An After Visit Summary was printed and given to the patient at discharge.  Return in about 6 months (around 3/25/2021) for Recheck.          Torey Amato D.O. 9/25/2020   Electronically signed.

## 2020-09-25 NOTE — PATIENT INSTRUCTIONS
Advance Care Planning and Advance Directives     You make decisions on a daily basis - decisions about where you want to live, your career, your home, your life. Perhaps one of the most important decisions you face is your choice for future medical care. Take time to talk with your family and your healthcare team and start planning today.  Advance Care Planning is a process that can help you:  · Understand possible future healthcare decisions in light of your own experiences  · Reflect on those decision in light of your goals and values  · Discuss your decisions with those closest to you and the healthcare professionals that care for you  · Make a plan by creating a document that reflects your wishes    Surrogate Decision Maker  In the event of a medical emergency, which has left you unable to communicate or to make your own decisions, you would need someone to make decisions for you.  It is important to discuss your preferences for medical treatment with this person while you are in good health.     Qualities of a surrogate decision maker:  • Willing to take on this role and responsibility  • Knows what you want for future medical care  • Willing to follow your wishes even if they don't agree with them  • Able to make difficult medical decisions under stressful circumstances    Advance Directives  These are legal documents you can create that will guide your healthcare team and decision maker(s) when needed. These documents can be stored in the electronic medical record.    · Living Will - a legal document to guide your care if you have a terminal condition or a serious illness and are unable to communicate. States vary by statute in document names/types, but most forms may include one or more of the following:        -  Directions regarding life-prolonging treatments        -  Directions regarding artificially provided nutrition/hydration        -  Choosing a healthcare decision maker        -  Direction  regarding organ/tissue donation    · Durable Power of  for Healthcare - this document names an -in-fact to make medical decisions for you, but it may also allow this person to make personal and financial decisions for you. Please seek the advice of an  if you need this type of document.    **Advance Directives are not required and no one may discriminate against you if you do not sign one.    Medical Orders  Many states allow specific forms/orders signed by your physician to record your wishes for medical treatment in your current state of health. This form, signed in personal communication with your physician, addresses resuscitation and other medical interventions that you may or may not want.      For more information or to schedule a time with a [facility] Advance Care Planning Facilitator contact: [facility] or call [phone] and someone will contact you directly.    Low Back Sprain or Strain Rehab  Ask your health care provider which exercises are safe for you. Do exercises exactly as told by your health care provider and adjust them as directed. It is normal to feel mild stretching, pulling, tightness, or discomfort as you do these exercises. Stop right away if you feel sudden pain or your pain gets worse. Do not begin these exercises until told by your health care provider.  Stretching and range-of-motion exercises  These exercises warm up your muscles and joints and improve the movement and flexibility of your back. These exercises also help to relieve pain, numbness, and tingling.  Lumbar rotation    1. Lie on your back on a firm surface and bend your knees.  2. Straighten your arms out to your sides so each arm forms a 90-degree angle (right angle) with a side of your body.  3. Slowly move (rotate) both of your knees to one side of your body until you feel a stretch in your lower back (lumbar). Try not to let your shoulders lift off the floor.  4. Hold this  position for __________ seconds.  5. Tense your abdominal muscles and slowly move your knees back to the starting position.  6. Repeat this exercise on the other side of your body.  Repeat __________ times. Complete this exercise __________ times a day.  Single knee to chest    1. Lie on your back on a firm surface with both legs straight.  2. Bend one of your knees. Use your hands to move your knee up toward your chest until you feel a gentle stretch in your lower back and buttock.  ? Hold your leg in this position by holding on to the front of your knee.  ? Keep your other leg as straight as possible.  3. Hold this position for __________ seconds.  4. Slowly return to the starting position.  5. Repeat with your other leg.  Repeat __________ times. Complete this exercise __________ times a day.  Prone extension on elbows    1. Lie on your abdomen on a firm surface (prone position).  2. Prop yourself up on your elbows.  3. Use your arms to help lift your chest up until you feel a gentle stretch in your abdomen and your lower back.  ? This will place some of your body weight on your elbows. If this is uncomfortable, try stacking pillows under your chest.  ? Your hips should stay down, against the surface that you are lying on. Keep your hip and back muscles relaxed.  4. Hold this position for __________ seconds.  5. Slowly relax your upper body and return to the starting position.  Repeat __________ times. Complete this exercise __________ times a day.  Strengthening exercises  These exercises build strength and endurance in your back. Endurance is the ability to use your muscles for a long time, even after they get tired.  Pelvic tilt  This exercise strengthens the muscles that lie deep in the abdomen.  1. Lie on your back on a firm surface. Bend your knees and keep your feet flat on the floor.  2. Tense your abdominal muscles. Tip your pelvis up toward the ceiling and flatten your lower back into the floor.  ? To  help with this exercise, you may place a small towel under your lower back and try to push your back into the towel.  3. Hold this position for __________ seconds.  4. Let your muscles relax completely before you repeat this exercise.  Repeat __________ times. Complete this exercise __________ times a day.  Alternating arm and leg raises    1. Get on your hands and knees on a firm surface. If you are on a hard floor, you may want to use padding, such as an exercise mat, to cushion your knees.  2. Line up your arms and legs. Your hands should be directly below your shoulders, and your knees should be directly below your hips.  3. Lift your left leg behind you. At the same time, raise your right arm and straighten it in front of you.  ? Do not lift your leg higher than your hip.  ? Do not lift your arm higher than your shoulder.  ? Keep your abdominal and back muscles tight.  ? Keep your hips facing the ground.  ? Do not arch your back.  ? Keep your balance carefully, and do not hold your breath.  4. Hold this position for __________ seconds.  5. Slowly return to the starting position.  6. Repeat with your right leg and your left arm.  Repeat __________ times. Complete this exercise __________ times a day.  Abdominal set with straight leg raise    1. Lie on your back on a firm surface.  2. Bend one of your knees and keep your other leg straight.  3. Tense your abdominal muscles and lift your straight leg up, 4-6 inches (10-15 cm) off the ground.  4. Keep your abdominal muscles tight and hold this position for __________ seconds.  ? Do not hold your breath.  ? Do not arch your back. Keep it flat against the ground.  5. Keep your abdominal muscles tense as you slowly lower your leg back to the starting position.  6. Repeat with your other leg.  Repeat __________ times. Complete this exercise __________ times a day.  Single leg lower with bent knees  1. Lie on your back on a firm surface.  2. Tense your abdominal muscles  and lift your feet off the floor, one foot at a time, so your knees and hips are bent in 90-degree angles (right angles).  ? Your knees should be over your hips and your lower legs should be parallel to the floor.  3. Keeping your abdominal muscles tense and your knee bent, slowly lower one of your legs so your toe touches the ground.  4. Lift your leg back up to return to the starting position.  ? Do not hold your breath.  ? Do not let your back arch. Keep your back flat against the ground.  5. Repeat with your other leg.  Repeat __________ times. Complete this exercise __________ times a day.  Posture and body mechanics  Good posture and healthy body mechanics can help to relieve stress in your body's tissues and joints. Body mechanics refers to the movements and positions of your body while you do your daily activities. Posture is part of body mechanics. Good posture means:  · Your spine is in its natural S-curve position (neutral).  · Your shoulders are pulled back slightly.  · Your head is not tipped forward.  Follow these guidelines to improve your posture and body mechanics in your everyday activities.  Standing    · When standing, keep your spine neutral and your feet about hip width apart. Keep a slight bend in your knees. Your ears, shoulders, and hips should line up.  · When you do a task in which you  one place for a long time, place one foot up on a stable object that is 2-4 inches (5-10 cm) high, such as a footstool. This helps keep your spine neutral.  Sitting    · When sitting, keep your spine neutral and keep your feet flat on the floor. Use a footrest, if necessary, and keep your thighs parallel to the floor. Avoid rounding your shoulders, and avoid tilting your head forward.  · When working at a desk or a computer, keep your desk at a height where your hands are slightly lower than your elbows. Slide your chair under your desk so you are close enough to maintain good posture.  · When  working at a computer, place your monitor at a height where you are looking straight ahead and you do not have to tilt your head forward or downward to look at the screen.  Resting  · When lying down and resting, avoid positions that are most painful for you.  · If you have pain with activities such as sitting, bending, stooping, or squatting, lie in a position in which your body does not bend very much. For example, avoid curling up on your side with your arms and knees near your chest (fetal position).  · If you have pain with activities such as standing for a long time or reaching with your arms, lie with your spine in a neutral position and bend your knees slightly. Try the following positions:  ? Lying on your side with a pillow between your knees.  ? Lying on your back with a pillow under your knees.  Lifting    · When lifting objects, keep your feet at least shoulder width apart and tighten your abdominal muscles.  · Bend your knees and hips and keep your spine neutral. It is important to lift using the strength of your legs, not your back. Do not lock your knees straight out.  · Always ask for help to lift heavy or awkward objects.  This information is not intended to replace advice given to you by your health care provider. Make sure you discuss any questions you have with your health care provider.  Document Released: 12/18/2006 Document Revised: 04/10/2020 Document Reviewed: 01/09/2020  [employer] Patient Education © 2020 [employer]

## 2020-09-25 NOTE — PROGRESS NOTES
The ABCs of the Annual Wellness Visit  Subsequent Medicare Wellness Visit    No chief complaint on file.  See  note    Subjective   History of Present Illness:  Edy Urbina is a 79 y.o. male who presents for a Subsequent Medicare Wellness Visit.    HEALTH RISK ASSESSMENT    Recent Hospitalizations:  No hospitalization(s) within the last year.    Current Medical Providers:  Patient Care Team:  Torey Amato DO as PCP - General (Internal Medicine)  Yuriy Spears MD as Consulting Physician (Urology)    Smoking Status:  Social History     Tobacco Use   Smoking Status Former Smoker   • Years: 50.00   • Types: Cigarettes   • Quit date: 2002   • Years since quittin.3   Smokeless Tobacco Never Used       Alcohol Consumption:  Social History     Substance and Sexual Activity   Alcohol Use No    Comment: QUIT DRINKING IN        Depression Screen:   PHQ-2/PHQ-9 Depression Screening 2020   Little interest or pleasure in doing things 0   Feeling down, depressed, or hopeless 0   Total Score 0       Fall Risk Screen:  KWADWO Fall Risk Assessment was completed, and patient is at LOW risk for falls.Assessment completed on:2020    Health Habits and Functional and Cognitive Screening:  Functional & Cognitive Status 2020   Do you have difficulty preparing food and eating? No   Do you have difficulty bathing yourself, getting dressed or grooming yourself? No   Do you have difficulty using the toilet? No   Do you have difficulty moving around from place to place? No   Do you have trouble with steps or getting out of a bed or a chair? No   Current Diet Unhealthy Diet   Dental Exam Not up to date   Eye Exam Up to date   Exercise (times per week) 4 times per week   Current Exercise Activities Include No Regular Exercise   Do you need help using the phone?  No   Are you deaf or do you have serious difficulty hearing?  Yes   Do you need help with transportation? Yes   Do you need help  shopping? No   Do you need help preparing meals?  No   Do you need help with housework?  No   Do you need help with laundry? No   Do you need help taking your medications? No   Do you need help managing money? No   Do you ever drive or ride in a car without wearing a seat belt? No   Have you felt unusual stress, anger or loneliness in the last month? No   Who do you live with? Alone   If you need help, do you have trouble finding someone available to you? No   Have you been bothered in the last four weeks by sexual problems? No   Do you have difficulty concentrating, remembering or making decisions? No         Does the patient have evidence of cognitive impairment? No    Asprin use counseling:Does not need ASA (and currently is not on it)    Age-appropriate Screening Schedule:  Refer to the list below for future screening recommendations based on patient's age, sex and/or medical conditions. Orders for these recommended tests are listed in the plan section. The patient has been provided with a written plan.    Health Maintenance   Topic Date Due   • TDAP/TD VACCINES (1 - Tdap) 09/30/1960   • ZOSTER VACCINE (1 of 2) 09/30/1991   • INFLUENZA VACCINE  Completed          The following portions of the patient's history were reviewed and updated as appropriate: allergies, current medications, past family history, past medical history, past social history, past surgical history and problem list.    Outpatient Medications Prior to Visit   Medication Sig Dispense Refill   • Ethyl Alcohol, Skin Cleanser, (EQL HAND /ALOE EX) Apply  topically Daily.     • memantine (NAMENDA) 5 MG tablet Take 1 tablet by mouth 2 (Two) Times a Day. 180 tablet 0   • Multiple Vitamins-Minerals (MULTIVITAMIN MEN 50+) tablet Take 1 tablet by mouth.     • risperiDONE (risperDAL) 0.5 MG tablet Take 1 tablet by mouth 2 (Two) Times a Day. 60 tablet 5     No facility-administered medications prior to visit.        Patient Active Problem List  "  Diagnosis   • Varicose vein of leg   • BMI less than 19,adult   • Confusion   • Anemia   • Protein-calorie malnutrition, moderate (CMS/[facility])   • Dementia (CMS/[facility])   • Prostate cancer (CMS/[facility])       Advanced Care Planning:  ACP discussion was held with the patient during this visit. Patient has an advance directive (not in EMR), copy requested.    Review of Systems See  note    Compared to one year ago, the patient feels his physical health is better.  Compared to one year ago, the patient feels his mental health is better.    Reviewed chart for potential of high risk medication in the elderly: yes  Reviewed chart for potential of harmful drug interactions in the elderly:yes    Objective         Vitals:    09/25/20 1041   BP: 124/80   BP Location: Left arm   Patient Position: Sitting   Cuff Size: Adult   Pulse: 69   Resp: 16   Temp: 97.7 °F (36.5 °C)   SpO2: 98%   Weight: 61.5 kg (135 lb 8 oz)   Height: 182.9 cm (72\")       Body mass index is 18.38 kg/m².  Discussed the patient's BMI with him. The BMI is below average; BMI management plan is completed.    Physical Exam See  note          Assessment/Plan   Medicare Risks and Personalized Health Plan  CMS Preventative Services Quick Reference  Advance Directive Discussion  Cardiovascular risk  Dementia/Memory   Fall Risk  Immunizations Discussed/Encouraged (specific immunizations; adacel Tdap and Shingrix )    The above risks/problems have been discussed with the patient.  Pertinent information has been shared with the patient in the After Visit Summary.  Follow up plans and orders are seen below in the Assessment/Plan Section.    Diagnoses and all orders for this visit:    1. Dementia associated with other underlying disease with behavioral disturbance (CMS/[facility]) (Primary)    2. Swelling of right lower extremity  -     US Venous Doppler Lower Extremity Right (duplex)    3. Varicose veins of right lower extremity with pain  -     US Venous Doppler " Lower Extremity Right (duplex)    4. BMI less than 19,adult    5. Prostate cancer (CMS/HCC)      Follow Up:  Return in about 6 months (around 3/25/2021) for Recheck.     An After Visit Summary and PPPS were given to the patient.

## 2020-10-08 ENCOUNTER — HOSPITAL ENCOUNTER (OUTPATIENT)
Dept: ULTRASOUND IMAGING | Facility: HOSPITAL | Age: 80
Discharge: HOME | End: 2020-10-08
Admitting: INTERNAL MEDICINE

## 2020-10-08 ENCOUNTER — APPOINTMENT (OUTPATIENT)
Dept: ULTRASOUND IMAGING | Facility: HOSPITAL | Age: 80
End: 2020-10-08

## 2020-10-08 PROCEDURE — 93971 EXTREMITY STUDY: CPT

## 2020-10-08 PROCEDURE — 93971 EXTREMITY STUDY: CPT | Performed by: SURGERY

## 2020-10-09 ENCOUNTER — TELEPHONE (OUTPATIENT)
Dept: VASCULAR SURGERY | Facility: CLINIC | Age: 80
End: 2020-10-09

## 2020-10-09 DIAGNOSIS — M79.89 SWELLING OF RIGHT LOWER EXTREMITY: ICD-10-CM

## 2020-10-09 DIAGNOSIS — I83.813 VARICOSE VEINS OF BOTH LOWER EXTREMITIES WITH PAIN: Primary | ICD-10-CM

## 2020-10-16 ENCOUNTER — TELEPHONE (OUTPATIENT)
Dept: VASCULAR SURGERY | Facility: CLINIC | Age: 80
End: 2020-10-16

## 2020-10-16 NOTE — TELEPHONE ENCOUNTER
Called the patient to remind him of his appt on Monday with Dr. Lewis.  Pt is aware and confirmed.

## 2020-10-19 ENCOUNTER — OFFICE VISIT (OUTPATIENT)
Dept: VASCULAR SURGERY | Facility: CLINIC | Age: 80
End: 2020-10-19

## 2020-10-19 VITALS
OXYGEN SATURATION: 99 % | WEIGHT: 141 LBS | HEIGHT: 72 IN | DIASTOLIC BLOOD PRESSURE: 60 MMHG | BODY MASS INDEX: 19.1 KG/M2 | HEART RATE: 63 BPM | SYSTOLIC BLOOD PRESSURE: 102 MMHG

## 2020-10-19 DIAGNOSIS — I83.11 VARICOSE VEINS OF RIGHT LOWER EXTREMITY WITH INFLAMMATION: ICD-10-CM

## 2020-10-19 DIAGNOSIS — I87.2 VENOUS INSUFFICIENCY OF BOTH LOWER EXTREMITIES: Primary | ICD-10-CM

## 2020-10-19 DIAGNOSIS — I87.8 VENOUS STASIS: ICD-10-CM

## 2020-10-19 PROCEDURE — 99204 OFFICE O/P NEW MOD 45 MIN: CPT | Performed by: SURGERY

## 2020-10-19 NOTE — PROGRESS NOTES
10/19/2020      No referring provider defined for this encounter.    Edy Urbina  1940    Chief Complaint   Patient presents with   • Establish Care     RLE swelling.  Pt states that it has been going on since January.  Pt had a venous duplex of the right leg on 10/8/20.  Pt denies any pain.  Pt denies wearing compression. Pt referred by DARLING Meyers   • Med Management     Verbally went over the patient's medications with him.       Dear No ref. provider found:      HPI  I had the pleasure of seeing your patient Edy Urbina in the office today.  Thank you kindly for this consultation.  As you recall, Edy Urbina is a 80 y.o.  male who you are currently following for general medical care.  He is referred to the vascular surgery office today for evaluation of right lower extremity edema and varicosities and concern for possible venous insufficiency.  He reports a longstanding history of varicosities mainly to the right lower extremity but also some small ones to the left lower extremity.  He notes over the last several months he has also been having some increased edema of the right lower extremity.  This is slightly worse with prolonged standing and somewhat improved with leg elevation and lying supine.  He also notes that with prolonged standing his varicosities become more prominent.  He denies any prior history of DVT.  He does report a prior history of a right inguinal hernia repair and also has a history of prostate cancer status post robot-assisted laparoscopic prostatectomy by Dr. Spears in 2011.  He has not worn compression in the past.  He did have a recent venous duplex of the right lower extremity which I did review.  It showed no evidence of any DVT in the right lower extremity.  He denies any arterial symptoms with no history of claudication or ischemic rest pain.  He otherwise has no acute complaints today.    Past Medical History:   Diagnosis Date   • Cataracts, bilateral    •  Occasional tremors    • Prostate cancer (CMS/[facility]) 2011    t2c,Coulter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    • Varicose veins of legs        Past Surgical History:   Procedure Laterality Date   • EYE SURGERY     • HERNIA REPAIR Left    • INGUINAL HERNIA REPAIR Right 2019    Procedure: OPEN RIGHT INGUINAL HERNIA REPAIR WITH MESH;  Surgeon: Alesia Mora MD;  Location: [facility] OR;  Service: General   • PROSTATECTOMY  2011    RALP       Family History   Problem Relation Age of Onset   • Stroke Mother        Social History     Socioeconomic History   • Marital status:      Spouse name: Not on file   • Number of children: Not on file   • Years of education: Not on file   • Highest education level: Not on file   Tobacco Use   • Smoking status: Former Smoker     Years: 50.00     Types: Cigarettes     Quit date: 2002     Years since quittin.3   • Smokeless tobacco: Never Used   Substance and Sexual Activity   • Alcohol use: No     Comment: QUIT DRINKING IN    • Drug use: No   • Sexual activity: Defer       No Known Allergies    Prior to Admission medications    Medication Sig Start Date End Date Taking? Authorizing Provider   Ethyl Alcohol, Skin Cleanser, (EQL HAND /ALOE EX) Apply  topically Daily.   Yes Xi Paul MD   memantine (NAMENDA) 5 MG tablet Take 1 tablet by mouth 2 (Two) Times a Day. 20  Yes Trevin Izaguirre PA   Multiple Vitamins-Minerals (MULTIVITAMIN MEN 50+) tablet Take 1 tablet by mouth.   Yes Xi Paul MD   risperiDONE (risperDAL) 0.5 MG tablet Take 1 tablet by mouth 2 (Two) Times a Day. 20  Yes Shawna Cabrera APRN       Review of Systems   Constitutional: Negative.  Negative for activity change, appetite change, chills, diaphoresis, fatigue and fever.   HENT: Negative.  Negative for congestion, sneezing, sore throat and trouble swallowing.    Eyes: Negative.  Negative for visual disturbance.   Respiratory:  "Negative.  Negative for chest tightness and shortness of breath.    Cardiovascular: Positive for leg swelling. Negative for chest pain and palpitations.   Gastrointestinal: Negative.  Negative for abdominal distention, abdominal pain, nausea and vomiting.   Endocrine: Negative.    Genitourinary: Negative.    Musculoskeletal: Negative.    Skin: Negative.         Varicose veins more so on the right than the left lower leg.   Allergic/Immunologic: Negative.    Neurological: Negative.    Hematological: Negative.    Psychiatric/Behavioral: Negative.        /60   Pulse 63   Ht 182.9 cm (72\")   Wt 64 kg (141 lb)   SpO2 99%   BMI 19.12 kg/m²   Physical Exam  Vitals signs reviewed.   Constitutional:       Appearance: Normal appearance.   HENT:      Head: Normocephalic and atraumatic.      Nose: Nose normal.      Mouth/Throat:      Mouth: Mucous membranes are moist.   Eyes:      Extraocular Movements: Extraocular movements intact.      Pupils: Pupils are equal, round, and reactive to light.   Neck:      Musculoskeletal: Normal range of motion and neck supple.   Cardiovascular:      Rate and Rhythm: Normal rate and regular rhythm.      Pulses: Normal pulses.           Carotid pulses are 2+ on the right side and 2+ on the left side.       Radial pulses are 2+ on the right side and 2+ on the left side.        Femoral pulses are 2+ on the right side and 2+ on the left side.       Popliteal pulses are 2+ on the right side and 2+ on the left side.        Dorsalis pedis pulses are 2+ on the right side and 2+ on the left side.        Posterior tibial pulses are 2+ on the right side and 2+ on the left side.      Comments: He does have multiple small varicosities and telangiectasias to the bilateral lower legs.  These are more noticeable in the right lower leg.  He also has a large varicosity that is at the medial aspect of the left knee.  This is nontender to palpation.  Pulmonary:      Effort: Pulmonary effort is normal. " No respiratory distress.   Abdominal:      General: There is no distension.      Palpations: Abdomen is soft. There is no mass.      Tenderness: There is no abdominal tenderness.   Musculoskeletal: Normal range of motion.         General: Swelling present.      Right lower leg: Edema present.      Left lower leg: Edema present.   Skin:     General: Skin is warm and dry.      Capillary Refill: Capillary refill takes less than 2 seconds.   Neurological:      General: No focal deficit present.      Mental Status: He is alert and oriented to person, place, and time.   Psychiatric:         Mood and Affect: Mood normal.         Behavior: Behavior normal.         Thought Content: Thought content normal.         Judgment: Judgment normal.         Us Venous Doppler Lower Extremity Right (duplex)    Result Date: 10/8/2020  Narrative: History: Right lower extremity pain, swelling      Impression: Impression: There is no evidence of deep venous thrombosis or superficial thrombophlebitis of the right lower extremity.  Comments: Right lower extremity venous duplex exam was performed using color Doppler flow, Doppler wave form analysis, and grayscale imaging, with and without compression. There is no evidence of deep venous thrombosis of the common femoral, superficial femoral, popliteal, posterior tibial, and peroneal veins. There is no thrombus identified in the saphenofemoral junction or the greater saphenous vein.  This report was finalized on 10/08/2020 14:33 by Dr. Bright Lewis MD.      Patient Active Problem List   Diagnosis   • Varicose vein of leg   • BMI less than 19,adult   • Confusion   • Anemia   • Protein-calorie malnutrition, moderate (CMS/HCC)   • Dementia (CMS/HCC)   • Prostate cancer (CMS/HCC)         ICD-10-CM ICD-9-CM   1. Venous insufficiency of both lower extremities  I87.2 459.81   2. Venous stasis  I87.8 459.81   3. Varicose veins of right lower extremity with inflammation  I83.11 454.1       Lab  Frequency Next Occurrence   US Venous Doppler Lower Extremity Bilateral (duplex) Once 01/17/2021       Plan: After thoroughly evaluating Edy Urbina, I believe the best course of action is to initially remain conservative from a vascular standpoint.  Clinically he has evidence of venous insufficiency of the bilateral lower legs, somewhat worse on the right than the left. I will give the patient a prescription for compression stockings in the 20-30 mm pressure gradient range.  I did instruct the patient on how to wear these on a daily basis.  I would like him to keep his legs elevated when he is not on them, and keep his legs well moisturized.  We will see the patient back in 3 months with a venous valvular insufficiency study. If the testing does show significant venous reflux, the patient may be a great candidate for endovenous closure as the patient's symptoms have significantly impacted their activities of daily living.  The patient can continue taking their current medication regimen as previously planned. These factors remain stable. Patient's Body mass index is 19.12 kg/m². BMI is within normal parameters. No follow-up required. This was all discussed in full with complete understanding.    Thank you for allowing me to participate in the care of your patient.  Please do not hesitate with any questions or concerns.  I will keep you aware of any further encounters with Edy Urbina.        Sincerely yours,         Bright Lewis MD

## 2020-11-04 ENCOUNTER — LAB (OUTPATIENT)
Dept: LAB | Facility: HOSPITAL | Age: 80
End: 2020-11-04

## 2020-11-04 ENCOUNTER — OFFICE VISIT (OUTPATIENT)
Dept: NEUROLOGY | Facility: CLINIC | Age: 80
End: 2020-11-04

## 2020-11-04 VITALS
WEIGHT: 142 LBS | BODY MASS INDEX: 19.23 KG/M2 | HEART RATE: 68 BPM | SYSTOLIC BLOOD PRESSURE: 132 MMHG | DIASTOLIC BLOOD PRESSURE: 74 MMHG | HEIGHT: 72 IN | RESPIRATION RATE: 18 BRPM

## 2020-11-04 DIAGNOSIS — R25.1 TREMOR: Primary | ICD-10-CM

## 2020-11-04 DIAGNOSIS — R41.3 MEMORY DIFFICULTY: ICD-10-CM

## 2020-11-04 LAB
ALBUMIN SERPL-MCNC: 4.4 G/DL (ref 3.5–5.2)
ALBUMIN/GLOB SERPL: 1.9 G/DL
ALP SERPL-CCNC: 61 U/L (ref 39–117)
ALT SERPL W P-5'-P-CCNC: 13 U/L (ref 1–41)
ANION GAP SERPL CALCULATED.3IONS-SCNC: 7 MMOL/L (ref 5–15)
AST SERPL-CCNC: 21 U/L (ref 1–40)
BASOPHILS # BLD AUTO: 0.06 10*3/MM3 (ref 0–0.2)
BASOPHILS NFR BLD AUTO: 0.9 % (ref 0–1.5)
BILIRUB SERPL-MCNC: 0.5 MG/DL (ref 0–1.2)
BUN SERPL-MCNC: 18 MG/DL (ref 8–23)
BUN/CREAT SERPL: 22.8 (ref 7–25)
CALCIUM SPEC-SCNC: 9.4 MG/DL (ref 8.6–10.5)
CHLORIDE SERPL-SCNC: 103 MMOL/L (ref 98–107)
CO2 SERPL-SCNC: 32 MMOL/L (ref 22–29)
CREAT SERPL-MCNC: 0.79 MG/DL (ref 0.76–1.27)
DEPRECATED RDW RBC AUTO: 42.4 FL (ref 37–54)
EOSINOPHIL # BLD AUTO: 0.2 10*3/MM3 (ref 0–0.4)
EOSINOPHIL NFR BLD AUTO: 2.8 % (ref 0.3–6.2)
ERYTHROCYTE [DISTWIDTH] IN BLOOD BY AUTOMATED COUNT: 13.5 % (ref 12.3–15.4)
GFR SERPL CREATININE-BSD FRML MDRD: 95 ML/MIN/1.73
GLOBULIN UR ELPH-MCNC: 2.3 GM/DL
GLUCOSE SERPL-MCNC: 92 MG/DL (ref 65–99)
HCT VFR BLD AUTO: 39.2 % (ref 37.5–51)
HGB BLD-MCNC: 12.8 G/DL (ref 13–17.7)
IMM GRANULOCYTES # BLD AUTO: 0.02 10*3/MM3 (ref 0–0.05)
IMM GRANULOCYTES NFR BLD AUTO: 0.3 % (ref 0–0.5)
LYMPHOCYTES # BLD AUTO: 1.65 10*3/MM3 (ref 0.7–3.1)
LYMPHOCYTES NFR BLD AUTO: 23.4 % (ref 19.6–45.3)
MCH RBC QN AUTO: 27.9 PG (ref 26.6–33)
MCHC RBC AUTO-ENTMCNC: 32.7 G/DL (ref 31.5–35.7)
MCV RBC AUTO: 85.4 FL (ref 79–97)
MONOCYTES # BLD AUTO: 0.76 10*3/MM3 (ref 0.1–0.9)
MONOCYTES NFR BLD AUTO: 10.8 % (ref 5–12)
NEUTROPHILS NFR BLD AUTO: 4.35 10*3/MM3 (ref 1.7–7)
NEUTROPHILS NFR BLD AUTO: 61.8 % (ref 42.7–76)
NRBC BLD AUTO-RTO: 0 /100 WBC (ref 0–0.2)
PLATELET # BLD AUTO: 199 10*3/MM3 (ref 140–450)
PMV BLD AUTO: 10 FL (ref 6–12)
POTASSIUM SERPL-SCNC: 4.3 MMOL/L (ref 3.5–5.2)
PROT SERPL-MCNC: 6.7 G/DL (ref 6–8.5)
RBC # BLD AUTO: 4.59 10*6/MM3 (ref 4.14–5.8)
SODIUM SERPL-SCNC: 142 MMOL/L (ref 136–145)
WBC # BLD AUTO: 7.04 10*3/MM3 (ref 3.4–10.8)

## 2020-11-04 PROCEDURE — 85025 COMPLETE CBC W/AUTO DIFF WBC: CPT | Performed by: PSYCHIATRY & NEUROLOGY

## 2020-11-04 PROCEDURE — 36415 COLL VENOUS BLD VENIPUNCTURE: CPT | Performed by: PSYCHIATRY & NEUROLOGY

## 2020-11-04 PROCEDURE — 80053 COMPREHEN METABOLIC PANEL: CPT | Performed by: PSYCHIATRY & NEUROLOGY

## 2020-11-04 PROCEDURE — 99214 OFFICE O/P EST MOD 30 MIN: CPT | Performed by: PSYCHIATRY & NEUROLOGY

## 2020-11-04 RX ORDER — MEMANTINE HYDROCHLORIDE 5 MG/1
TABLET ORAL
Qty: 270 TABLET | Refills: 0 | Status: SHIPPED | OUTPATIENT
Start: 2020-11-04 | End: 2021-02-04 | Stop reason: SDUPTHER

## 2020-11-04 NOTE — PROGRESS NOTES
Subjective   Edy Urbina, 1940, is a male who is being seen today for   Chief Complaint   Patient presents with   • Memory Loss       HISTORY OF PRESENT ILLNESS: Extended follow-up.  Patient has tolerated Namenda 5 mg p.o. twice daily without significant / just small change in memory according to the daughter with him.  MMSE is 29 of 30 today.  Patient not had any other significant medical problems since last seen.  Patient is to go up on the dosage of the Namenda to 5 mg once in the morning and 2 nightly.  Side effects are discussed and patient to get to emergency room immediately if significant side effects occur.  Patient not to be driving.  Safety precautions reviewed.    REVIEW OF SYSTEMS:   GENERAL: Blood pressure 128/72 left arm sitting and 132/74 left arm standing with pulse 68  PULMONARY: No acute respiratory difficulty  CVS: No chest pain or palpitation  GASTROINTESTINAL: No acute GI distress  GENITOURINARY: No acute  distress  GYN: Not applicable  MUSCULOSKELETAL: Patient has had problems with the right lower extremity swelling and is wearing compression hose.  Patient followed by PCP for varicosities  HEENT: No acute vision or hearing change  ENDOCRINE: Not diabetic   PSYCHIATRIC: No acute psychiatric symptoms except for the fact that patient takes Risperdal for hallucinations which apparently are not significant at this point  HEMATOLOGY: No recent blood work and to get CBC and CMP today  SKIN: No acute skin changes  Family history reviewed and otherwise noncontributory to this problem  Social history: Patient denies smoking or drug or alcohol use    PHYSICAL EXAMINATION:    GENERAL: Patient has resting tremor bilateral upper extremities left greater than right with mild cogwheeling rigidity  CRANIUM: Normal cephalic/atraumatic and apparently no recent falls  HEENT:        EYES: EOMs intact without nystagmus and fields full to confrontation.  No acute fundic abnormalities.  Pupils equal round  reactive to light.       EARS: Tympanic membranes normal and hears tuning fork bilaterally       THROAT: No acute oropharynx abnormalities and no apparent swallowing difficulties by history       NECK: No bruits/no lymphadenopathy  CHEST: No acute cardiopulmonary abnormalities by auscultation  ABDOMEN: Nondistended  EXTREMITIES: Dorsalis pedis pulses symmetrical  NEURO: Patient alert but has some difficulty following commands secondary to questionable hearing or memory difficulties  SPEECH: Normal    CRANIAL NERVES: Motor/sensory about the face normal and symmetric    MOTOR STRENGTH: Motor strength upper and lower extremities normal  STATION AND GAIT: Gait slightly wide-based but no tendency to fall and Romberg negative  CEREBELLAR: Finger-nose and heel shin normal  SENSORY: Slight decrease in pin and vibration distal to proximal lower extremities to ankle bilaterally  REFLEXES: Decreased ankle jerk bilaterally otherwise normal knee jerk and biceps bilateral without clonus or Babinski      ASSESSMENT AND PLAN: Patient with a history of memory difficulty increasing Namenda as above.  Also discussed patient's tremor with the patient's daughter.  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  We will follow.  Patient to get to emergency room immediately if side effects occur from increasing dosage of Namenda.  I spent 25 minutes with patient with 15 minutes counseling.Patient's Body mass index is 19.26 kg/m². BMI is within normal parameters. No follow-up required..      Diagnoses and all orders for this visit:    1. Memory difficulty  -     memantine (NAMENDA) 5 MG tablet; Take 1 tab po in the am and 2 tabs at bedtime.  Dispense: 270 tablet; Refill: 0  -     CBC & Differential  -     Comprehensive Metabolic Panel        Dictated utilizing Dragon voice recognition software

## 2020-11-04 NOTE — PATIENT INSTRUCTIONS
Patient not to be driving until released.  Patient to get to emergency room immediately if increased side effects occur from the increased dose of Namenda.

## 2020-11-15 DIAGNOSIS — R41.3 MEMORY DIFFICULTY: ICD-10-CM

## 2020-11-16 RX ORDER — MEMANTINE HYDROCHLORIDE 5 MG/1
TABLET ORAL
Qty: 60 TABLET | Refills: 0 | OUTPATIENT
Start: 2020-11-16

## 2020-11-16 NOTE — TELEPHONE ENCOUNTER
Kaylan said Namenda was shipped today, he should receive it this week.  Unable to leave a message for Edy or Jennyfer.

## 2020-12-31 DIAGNOSIS — F02.818 DEMENTIA ASSOCIATED WITH OTHER UNDERLYING DISEASE WITH BEHAVIORAL DISTURBANCE (HCC): ICD-10-CM

## 2020-12-31 RX ORDER — RISPERIDONE 0.5 MG/1
0.5 TABLET ORAL 2 TIMES DAILY
Qty: 60 TABLET | Refills: 5 | Status: SHIPPED | OUTPATIENT
Start: 2020-12-31 | End: 2021-06-29

## 2021-01-15 ENCOUNTER — TELEPHONE (OUTPATIENT)
Dept: VASCULAR SURGERY | Facility: CLINIC | Age: 81
End: 2021-01-15

## 2021-01-15 NOTE — TELEPHONE ENCOUNTER
Called the patient to remind him of his testing and appt with Dr. Lewis on Monday.  Pt took the info and confirmed.

## 2021-01-15 NOTE — TELEPHONE ENCOUNTER
Was unable to reach patient because there was no VM.  Reached son and advised [facility] at [facility] and afternoon appointment with Dr. Lewis.  Patient son stated that he would advised patient.

## 2021-01-18 ENCOUNTER — HOSPITAL ENCOUNTER (OUTPATIENT)
Dept: ULTRASOUND IMAGING | Facility: HOSPITAL | Age: 81
Discharge: HOME | End: 2021-01-18
Admitting: SURGERY

## 2021-01-18 ENCOUNTER — OFFICE VISIT (OUTPATIENT)
Dept: VASCULAR SURGERY | Facility: CLINIC | Age: 81
End: 2021-01-18

## 2021-01-18 VITALS
WEIGHT: 140 LBS | BODY MASS INDEX: 18.96 KG/M2 | HEART RATE: 64 BPM | HEIGHT: 72 IN | SYSTOLIC BLOOD PRESSURE: 120 MMHG | OXYGEN SATURATION: 96 % | DIASTOLIC BLOOD PRESSURE: 66 MMHG

## 2021-01-18 DIAGNOSIS — I87.8 VENOUS STASIS: ICD-10-CM

## 2021-01-18 DIAGNOSIS — I87.2 VENOUS INSUFFICIENCY OF BOTH LOWER EXTREMITIES: ICD-10-CM

## 2021-01-18 DIAGNOSIS — I83.11 VARICOSE VEINS OF BOTH LOWER EXTREMITIES WITH INFLAMMATION: ICD-10-CM

## 2021-01-18 DIAGNOSIS — I83.12 VARICOSE VEINS OF BOTH LOWER EXTREMITIES WITH INFLAMMATION: ICD-10-CM

## 2021-01-18 DIAGNOSIS — I83.11 VARICOSE VEINS OF RIGHT LOWER EXTREMITY WITH INFLAMMATION: ICD-10-CM

## 2021-01-18 DIAGNOSIS — I87.2 VENOUS INSUFFICIENCY OF BOTH LOWER EXTREMITIES: Primary | ICD-10-CM

## 2021-01-18 PROCEDURE — 99214 OFFICE O/P EST MOD 30 MIN: CPT | Performed by: SURGERY

## 2021-01-18 PROCEDURE — 93970 EXTREMITY STUDY: CPT

## 2021-01-18 PROCEDURE — 93970 EXTREMITY STUDY: CPT | Performed by: SURGERY

## 2021-01-18 NOTE — PROGRESS NOTES
01/18/2021      Torey Amato, DO  [address]   [place] [address]        Edy Urbina  1940    Chief Complaint   Patient presents with   • Follow-up     3 Month Follow Up For Venous Insufficiency. Test [id number]  pad venous lower ext bilat. Patient denies any stroke like symptoms.    • Former Smoker     Patient is a Former Smoker - Quit 2002   • Med Management     Verbally verified medications with patient        Dear Torey Amato, :    HPI     I had the pleasure of seeing your patient in the office today for follow up.  As you recall, the patient is a 80 y.o. male who we are currently following for lower extremity edema, varicose veins, and venous insufficiency.  At our last visit he noted worsening lower extremity edema with prolonged standing that was somewhat improved with leg elevation and lying supine.  He had also noted more noticeable varicosities with prolonged standing.  He was recommended to start wearing compression stockings to the bilateral lower extremities on a daily basis and provided a prescription for this.  He has been wearing them on a daily basis since her last visit and notes good results.  He notes that his lower extremity edema is significantly improved and he notices the varicosities much less in his lower extremities.  He denies any significant feelings of heaviness or discomfort.  He did have a venous duplex with insufficiency study done today which I did personally review in the office.  It does show evidence of reflux in the bilateral greater saphenous veins.  There was no evidence of DVT in the bilateral lower extremities.  Otherwise overall he feels well with no new acute complaints today.      Review of Systems   Constitutional: Negative.  Negative for activity change, appetite change, chills, diaphoresis, fatigue and fever.   HENT: Negative.  Negative for congestion, sneezing, sore throat and trouble swallowing.    Eyes: Negative.   "Negative for visual disturbance.   Respiratory: Negative.  Negative for chest tightness and shortness of breath.    Cardiovascular: Positive for leg swelling (Significantly improved with the continued use of compression.). Negative for chest pain and palpitations.   Gastrointestinal: Negative.  Negative for abdominal distention, abdominal pain, nausea and vomiting.   Endocrine: Negative.    Genitourinary: Negative.    Musculoskeletal: Negative.    Skin: Negative.         Varicose veins more so on the right than the left lower leg.   Allergic/Immunologic: Negative.    Neurological: Negative.    Hematological: Negative.    Psychiatric/Behavioral: Negative.        /66 (BP Location: Right arm, Patient Position: Sitting, Cuff Size: Adult)   Pulse 64   Ht 182.9 cm (72\")   Wt 63.5 kg (140 lb)   SpO2 96%   BMI 18.99 kg/m²   Physical Exam  Vitals signs reviewed.   Constitutional:       Appearance: Normal appearance.   HENT:      Head: Normocephalic and atraumatic.      Nose: Nose normal.      Mouth/Throat:      Mouth: Mucous membranes are moist.   Eyes:      Extraocular Movements: Extraocular movements intact.      Pupils: Pupils are equal, round, and reactive to light.   Neck:      Musculoskeletal: Normal range of motion and neck supple.   Cardiovascular:      Rate and Rhythm: Normal rate and regular rhythm.      Pulses: Normal pulses.           Carotid pulses are 2+ on the right side and 2+ on the left side.       Radial pulses are 2+ on the right side and 2+ on the left side.        Femoral pulses are 2+ on the right side and 2+ on the left side.       Popliteal pulses are 2+ on the right side and 2+ on the left side.        Dorsalis pedis pulses are 2+ on the right side and 2+ on the left side.        Posterior tibial pulses are 2+ on the right side and 2+ on the left side.      Comments: He does have multiple small varicosities and telangiectasias to the bilateral lower legs.  Pulmonary:      Effort: " Pulmonary effort is normal. No respiratory distress.   Abdominal:      General: There is no distension.      Palpations: Abdomen is soft. There is no mass.      Tenderness: There is no abdominal tenderness.   Musculoskeletal: Normal range of motion.         General: No swelling.      Right lower leg: Edema present.      Left lower leg: Edema present.      Comments: Minimal bilateral lower extremity edema from ankle to knee.   Skin:     General: Skin is warm and dry.      Capillary Refill: Capillary refill takes less than 2 seconds.   Neurological:      General: No focal deficit present.      Mental Status: He is alert and oriented to person, place, and time.   Psychiatric:         Mood and Affect: Mood normal.         Behavior: Behavior normal.         Thought Content: Thought content normal.         Judgment: Judgment normal.         DIAGNOSTIC DATA:        Patient Active Problem List   Diagnosis   • Varicose vein of leg   • BMI less than 19,adult   • Confusion   • Anemia   • Protein-calorie malnutrition, moderate (CMS/HCC)   • Dementia (CMS/HCC)   • Prostate cancer (CMS/HCC)         ICD-10-CM ICD-9-CM   1. Venous insufficiency of both lower extremities  I87.2 459.81   2. Venous stasis  I87.8 459.81   3. Varicose veins of both lower extremities with inflammation  I83.11 454.1    I83.12            PLAN: After thoroughly evaluating Edy Urbina, I believe the best course of action is to remain conservative from a vascular standpoint.  He returns today after having undergone venous duplex with insufficiency study for further evaluation of his lower extremity edema and reflux symptoms.  I did personally review the study in the office today and it does show evidence of reflux in the bilateral greater saphenous veins, with no evidence of DVT in the lower extremities.  Since our last visit he has been wearing compression stockings on a daily basis as instructed and is actually doing quite well.  He notes that his  reflux symptoms are significantly improved and he has only very minimal edema and no other significant symptoms.  He also notes that his varicosities are much less noticeable and not bothering him.  As such at this point I recommended we continue with conservative management he continues with compression to the lower extremities on a daily basis as well as with leg elevation and exercise to help further reduce his symptoms.  I will plan to see him back in 6 months for continued surveillance.  If he should have worsening of his reflux symptoms despite the continued use of compression and leg elevation then at that time we could reconsider potential saphenous vein radiofrequency ablation if needed.  The patient is to continue taking their medications as previously discussed. Patient's Body mass index is 18.99 kg/m². BMI is within normal parameters. No follow-up required. This was all discussed in full with complete understanding.  Thank you for allowing me to participate in the care of your patient.  Please do not hesitate to call with any questions or concerns.  We will keep you aware of any further encounters with Edy Urbina.      Sincerely Yours,      Bright Lewis MD

## 2021-02-04 ENCOUNTER — OFFICE VISIT (OUTPATIENT)
Dept: NEUROLOGY | Facility: CLINIC | Age: 81
End: 2021-02-04

## 2021-02-04 ENCOUNTER — DOCUMENTATION (OUTPATIENT)
Dept: NEUROLOGY | Facility: CLINIC | Age: 81
End: 2021-02-04

## 2021-02-04 VITALS
HEIGHT: 72 IN | HEART RATE: 68 BPM | SYSTOLIC BLOOD PRESSURE: 130 MMHG | RESPIRATION RATE: 18 BRPM | WEIGHT: 143 LBS | BODY MASS INDEX: 19.37 KG/M2 | DIASTOLIC BLOOD PRESSURE: 82 MMHG

## 2021-02-04 DIAGNOSIS — R41.3 MEMORY DIFFICULTY: ICD-10-CM

## 2021-02-04 PROCEDURE — 99214 OFFICE O/P EST MOD 30 MIN: CPT | Performed by: PSYCHIATRY & NEUROLOGY

## 2021-02-04 RX ORDER — MEMANTINE HYDROCHLORIDE 10 MG/1
TABLET ORAL
Qty: 180 TABLET | Refills: 0 | Status: SHIPPED | OUTPATIENT
Start: 2021-02-04 | End: 2021-03-23 | Stop reason: SDUPTHER

## 2021-02-04 NOTE — PROGRESS NOTES
Subjective   Edy Urbina, 1940, is a male who is being seen today for   Chief Complaint   Patient presents with   • Memory Loss       HISTORY OF PRESENT ILLNESS: Extended follow-up.  Patient seen for memory difficulty and tremor.  Patient has increase the Namenda to 5 mg p.o. every morning and 10 mg p.o. nightly without side effects.  Patient is to increase the dosage to 10 mg p.o. twice daily.  Most recent CMP shows GFR 90+.  Patient's tremor about the same.  Patient was dropped off by her daughter today and we discussed the situation with her daughter in detail.  Patient get to emergency room immediately if increased side effects occur.  Patient MMSE today is 30 of 30.  Patient denies any headaches    REVIEW OF SYSTEMS:   GENERAL: Blood pressure today 130/80 left arm seated and 130/82 left arm standing with pulse of 68.  PULMONARY: No acute respiratory difficulty  CVS: No chest pain or palpitation  GASTROINTESTINAL: No acute GI distress  GENITOURINARY: No acute  distress  GYN: Not applicable  MUSCULOSKELETAL: No acute musculoskeletal symptoms  HEENT: According to the patient the patient was diagnosed as having hearing difficulty left ear more than right.  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  ENDOCRINE No: acute endocrine problems  PSYCHIATRIC: No acute psychiatric symptoms  HEMATOLOGY: Borderline anemia  SKIN: No acute skin changes  Family history reviewed and otherwise noncontributory to this problem  Social history: Patient denies smoking or drug or alcohol use  PHYSICAL EXAMINATION:    GENERAL: Patient has tremor at rest bilateral upper extremities with some intentional tremor and head neck tremor.  Patient has frequent eye blinking when not concentrating on task  CRANIUM: Normal cephalic/atraumatic and denies any recent falls  HEENT:       EYES: No acute fundic abnormalities.  Pupils equal round reactive to light.  EOMs intact without nystagmus and fields full to confrontation       EARS:  Tympanic membranes normal bilaterally and hears tuning fork bilaterally       THROAT: No acute oropharynx difficulty no swallowing difficulties by history       NECK: No bruits/no lymphadenopathy  CHEST: No acute cardiopulmonary abnormalities by auscultation  ABDOMEN: Nondistended  EXTREMITIES: Dorsalis pedis pulse symmetrical  NEURO: Patient alert and follows commands with minimal difficulty but some of this may be his hearing  SPEECH: Normal    CRANIAL NERVES: Motor/sensory about the face normal and symmetric  MOTOR STRENGTH: Motor strength upper and lower extremities normal  STATION AND GAIT: Gait slightly wide-based but no tendency to fall and Romberg negative.  No festination  CEREBELLAR: Finger-nose and heel-to-shin normal  SENSORY: Decreased pin and vibration distal to proximal lower extremities to ankles bilaterally but normal  otherwise in the upper extremities throughout  REFLEXES: Reflexes decreased at the ankle jerk but otherwise normal without clonus or Babinski      ASSESSMENT AND PLAN: Patient with memory difficulty as above and tremor.  Patient increasing the dose of Namenda as above and not to be driving until released.  Patient to get to emergency room immediately if side effects occur from increasing Namenda.  I spent 30 minutes with this patient counseling and exam and reviewing records.Patient's Body mass index is 19.39 kg/m². BMI is within normal parameters. No follow-up required..      Diagnoses and all orders for this visit:    1. Memory difficulty        Dictated utilizing Dragon voice recognition software

## 2021-02-04 NOTE — PROGRESS NOTES
I did speak with the patient's daughter, Jennyfer, after the patient's visit with Dr. Laurent.  I did get verbal consent from the patient.  I let her know that  did increase the Namenda 5 mg to 2 tabs bid until he finishes the 5 mg tabs.  His prescription when he gets it will be for a 10mg tablet to be taken twice a day.  She voices understanding. She will monitor the patient for a few days to make sure he is doing okay with the increase dose.  She is to take him to the ER if he complains with any heart symptoms.  He is to continue not driving until his next visit with Dr. Laurent.

## 2021-02-04 NOTE — PATIENT INSTRUCTIONS
Patient not to be driving until released.  Patient get to emergency room immediately if increased side effects from increasing dosage of Namenda.  Patient to discuss with daughter possible replacement of hearing aid in left ear.

## 2021-03-23 ENCOUNTER — DOCUMENTATION (OUTPATIENT)
Dept: NEUROLOGY | Facility: CLINIC | Age: 81
End: 2021-03-23

## 2021-03-23 ENCOUNTER — OFFICE VISIT (OUTPATIENT)
Dept: NEUROLOGY | Facility: CLINIC | Age: 81
End: 2021-03-23

## 2021-03-23 VITALS
SYSTOLIC BLOOD PRESSURE: 128 MMHG | DIASTOLIC BLOOD PRESSURE: 80 MMHG | HEIGHT: 72 IN | HEART RATE: 70 BPM | WEIGHT: 145 LBS | RESPIRATION RATE: 18 BRPM | BODY MASS INDEX: 19.64 KG/M2

## 2021-03-23 DIAGNOSIS — R41.3 MEMORY DIFFICULTY: ICD-10-CM

## 2021-03-23 PROCEDURE — 99213 OFFICE O/P EST LOW 20 MIN: CPT | Performed by: PSYCHIATRY & NEUROLOGY

## 2021-03-23 RX ORDER — MEMANTINE HYDROCHLORIDE 10 MG/1
TABLET ORAL
Qty: 180 TABLET | Refills: 0 | Status: SHIPPED | OUTPATIENT
Start: 2021-03-23 | End: 2021-07-15

## 2021-03-23 NOTE — PROGRESS NOTES
Spoke with the daughter at  request to let her know that the patient is not to be driving at this time.  Voices understanding.

## 2021-03-23 NOTE — PROGRESS NOTES
Subjective   Edy Urbina, 1940, is a male who is being seen today for   Chief Complaint   Patient presents with   • Memory Loss       HISTORY OF PRESENT ILLNESS: Extended follow-up.  Patient Namenda has been increased to 10 mg p.o. twice daily which in discussion with the patient's daughter who does not accompany patient at the time but is in the car in the parking lot with kids he is doing very well on the Namenda at this dosage and not having any significant side effects not any significant memory deterioration.  Patient's last MMSE is 29, today is 28/30.  Patient is to get appropriate blood work.  Patient still not to be driving until released and safety precautions fall precautions reviewed.  Apparently the patient is not having hallucinations.  Note that patient previously seen by speech therapy for memory evaluation in 2019 showing borderline findings.  REVIEW OF SYSTEMS:   GENERAL: Blood pressure today is 122/78 left arm seated 128/80 left arm standing pulse 70  PULMONARY: No acute respiratory difficulties  CVS: No acute chest pain or palpitation  GASTROINTESTINAL: No acute GI distress  GENITOURINARY: No acute  distress  GYN: Not applicable  MUSCULOSKELETAL: No acute musculoskeletal symptoms  HEENT: No acute vision or hearing change.  Patient has chronic hoarseness  ENDOCRINE: Not diabetic  PSYCHIATRIC: No acute psychiatric symptoms other than as above  HEMATOLOGY: No recent blood work for review except for November 2020 when the hemoglobin was slightly below normal  SKIN: No acute skin abnormalities  Family history reviewed and otherwise noncontributory to this problem  Social history: Patient denies smoking or drug or alcohol use    PHYSICAL EXAMINATION:    GENERAL: Patient has been mild festination and hyperkyphotic gait but no tendency to fall.  Patient has slight rest tremor and cogwheeling/rigidity both upper extremities  CRANIUM: Normal cephalic/atraumatic  HEENT:       EYES: EOMs intact  without nystagmus and fields full to confrontation.  No acute fundic abnormalities.  Pupils equal round reactive to light.       EARS: No tympanic membrane abnormalities and hears tuning fork bilaterally       THROAT: No acute oropharynx abnormalities       NECK: No bruits/no lymphadenopathy  CHEST: No acute cardiopulmonary abnormalities by auscultation  ABDOMEN: Nondistended  EXTREMITIES: Dorsalis pedis pulse symmetrical.  Patient insistent on keeping his support hose on  NEURO: Patient alert and follows commands without difficulty  SPEECH: Normal    CRANIAL NERVES: Motor/sensory about the face normal and symmetric    MOTOR STRENGTH: Motor strength upper and lower extremities normal  STATION AND GAIT: Gait as above.  Romberg negative  CEREBELLAR: Finger-nose and heel shin normal  SENSORY: Decreased pin vibration distal approximately lower extremities to the ankles bilaterally but otherwise normal pin and vibration throughout  REFLEXES: Reflexes decreased at the ankle jerk bilaterally present symmetric at the knee jerk and biceps without clonus or Babinski      ASSESSMENT AND PLAN: Patient with memory difficulties stable on the Namenda 10 mg p.o. twice daily which he is to continue to get appropriate blood work.  Patient not to be driving until released.  I spent 20 minutes with this patient reviewing records and counseling and exam.  Patient has some Parkinson features with tremor and stiffness but that may be is also secondary to the Risperdal.Patient's Body mass index is 19.67 kg/m². BMI is within normal parameters. No follow-up required..      Diagnoses and all orders for this visit:    1. Memory difficulty  -     memantine (NAMENDA) 10 MG tablet; Take 1 tab po twice a day  Dispense: 180 tablet; Refill: 0  -     CBC & Differential; Future  -     Comprehensive Metabolic Panel; Future        Dictated utilizing Dragon voice recognition software

## 2021-03-26 ENCOUNTER — LAB (OUTPATIENT)
Dept: LAB | Facility: HOSPITAL | Age: 81
End: 2021-03-26

## 2021-03-26 ENCOUNTER — OFFICE VISIT (OUTPATIENT)
Dept: INTERNAL MEDICINE | Facility: CLINIC | Age: 81
End: 2021-03-26

## 2021-03-26 VITALS
HEART RATE: 65 BPM | TEMPERATURE: 97.4 F | WEIGHT: 145 LBS | OXYGEN SATURATION: 96 % | SYSTOLIC BLOOD PRESSURE: 112 MMHG | DIASTOLIC BLOOD PRESSURE: 60 MMHG | RESPIRATION RATE: 16 BRPM | HEIGHT: 72 IN | BODY MASS INDEX: 19.64 KG/M2

## 2021-03-26 DIAGNOSIS — Z00.00 PREVENTATIVE HEALTH CARE: ICD-10-CM

## 2021-03-26 DIAGNOSIS — R41.3 MEMORY DIFFICULTY: ICD-10-CM

## 2021-03-26 DIAGNOSIS — F02.818 DEMENTIA ASSOCIATED WITH OTHER UNDERLYING DISEASE WITH BEHAVIORAL DISTURBANCE (HCC): Primary | ICD-10-CM

## 2021-03-26 DIAGNOSIS — R41.0 CONFUSION: ICD-10-CM

## 2021-03-26 DIAGNOSIS — D64.9 ANEMIA, UNSPECIFIED TYPE: ICD-10-CM

## 2021-03-26 LAB
ALBUMIN SERPL-MCNC: 3.9 G/DL (ref 3.5–5.2)
ALBUMIN/GLOB SERPL: 1.7 G/DL
ALP SERPL-CCNC: 54 U/L (ref 39–117)
ALT SERPL W P-5'-P-CCNC: 13 U/L (ref 1–41)
ANION GAP SERPL CALCULATED.3IONS-SCNC: 7.8 MMOL/L (ref 5–15)
AST SERPL-CCNC: 20 U/L (ref 1–40)
BASOPHILS # BLD AUTO: 0.07 10*3/MM3 (ref 0–0.2)
BASOPHILS NFR BLD AUTO: 1.3 % (ref 0–1.5)
BILIRUB SERPL-MCNC: 0.4 MG/DL (ref 0–1.2)
BUN SERPL-MCNC: 14 MG/DL (ref 8–23)
BUN/CREAT SERPL: 15.7 (ref 7–25)
CALCIUM SPEC-SCNC: 8.8 MG/DL (ref 8.6–10.5)
CHLORIDE SERPL-SCNC: 102 MMOL/L (ref 98–107)
CHOLEST SERPL-MCNC: 179 MG/DL (ref 0–200)
CO2 SERPL-SCNC: 30.2 MMOL/L (ref 22–29)
CREAT SERPL-MCNC: 0.89 MG/DL (ref 0.76–1.27)
DEPRECATED RDW RBC AUTO: 40.8 FL (ref 37–54)
EOSINOPHIL # BLD AUTO: 0.17 10*3/MM3 (ref 0–0.4)
EOSINOPHIL NFR BLD AUTO: 3.1 % (ref 0.3–6.2)
ERYTHROCYTE [DISTWIDTH] IN BLOOD BY AUTOMATED COUNT: 13.4 % (ref 12.3–15.4)
GFR SERPL CREATININE-BSD FRML MDRD: 82 ML/MIN/1.73
GLOBULIN UR ELPH-MCNC: 2.3 GM/DL
GLUCOSE SERPL-MCNC: 61 MG/DL (ref 65–99)
HCT VFR BLD AUTO: 38 % (ref 37.5–51)
HDLC SERPL-MCNC: 61 MG/DL (ref 40–60)
HGB BLD-MCNC: 12.9 G/DL (ref 13–17.7)
IMM GRANULOCYTES # BLD AUTO: 0.03 10*3/MM3 (ref 0–0.05)
IMM GRANULOCYTES NFR BLD AUTO: 0.5 % (ref 0–0.5)
LDLC SERPL CALC-MCNC: 104 MG/DL (ref 0–100)
LDLC/HDLC SERPL: 1.69 {RATIO}
LYMPHOCYTES # BLD AUTO: 1.15 10*3/MM3 (ref 0.7–3.1)
LYMPHOCYTES NFR BLD AUTO: 20.9 % (ref 19.6–45.3)
MCH RBC QN AUTO: 28.5 PG (ref 26.6–33)
MCHC RBC AUTO-ENTMCNC: 33.9 G/DL (ref 31.5–35.7)
MCV RBC AUTO: 84.1 FL (ref 79–97)
MONOCYTES # BLD AUTO: 0.62 10*3/MM3 (ref 0.1–0.9)
MONOCYTES NFR BLD AUTO: 11.3 % (ref 5–12)
NEUTROPHILS NFR BLD AUTO: 3.46 10*3/MM3 (ref 1.7–7)
NEUTROPHILS NFR BLD AUTO: 62.9 % (ref 42.7–76)
NRBC BLD AUTO-RTO: 0 /100 WBC (ref 0–0.2)
PLATELET # BLD AUTO: 217 10*3/MM3 (ref 140–450)
PMV BLD AUTO: 10.1 FL (ref 6–12)
POTASSIUM SERPL-SCNC: 4.5 MMOL/L (ref 3.5–5.2)
PROT SERPL-MCNC: 6.2 G/DL (ref 6–8.5)
PSA SERPL-MCNC: 0.01 NG/ML (ref 0–4)
RBC # BLD AUTO: 4.52 10*6/MM3 (ref 4.14–5.8)
SODIUM SERPL-SCNC: 140 MMOL/L (ref 136–145)
TRIGL SERPL-MCNC: 74 MG/DL (ref 0–150)
VLDLC SERPL-MCNC: 14 MG/DL (ref 5–40)
WBC # BLD AUTO: 5.5 10*3/MM3 (ref 3.4–10.8)

## 2021-03-26 PROCEDURE — 85025 COMPLETE CBC W/AUTO DIFF WBC: CPT

## 2021-03-26 PROCEDURE — 36415 COLL VENOUS BLD VENIPUNCTURE: CPT

## 2021-03-26 PROCEDURE — 80053 COMPREHEN METABOLIC PANEL: CPT

## 2021-03-26 PROCEDURE — 84153 ASSAY OF PSA TOTAL: CPT | Performed by: NURSE PRACTITIONER

## 2021-03-26 PROCEDURE — 80061 LIPID PANEL: CPT | Performed by: NURSE PRACTITIONER

## 2021-03-26 PROCEDURE — 99213 OFFICE O/P EST LOW 20 MIN: CPT | Performed by: NURSE PRACTITIONER

## 2021-03-26 NOTE — PROGRESS NOTES
"Chief Complaint   Patient presents with   • Follow-up     no complaint given       History:  Edy Urbina is a 80 y.o. male who presents today for follow-up for evaluation of the above:    HPI     Patient presents today for f/u dementia.   He reports he has been feeling well without acute illness and without worsening symptoms at this time.   He is not accompanied in the exam room by family.  Labs for monitoring prostate and anemia pending.         ROS:  Review of Systems   Constitutional: Negative for activity change, appetite change, fatigue, fever and unexpected weight change.   HENT: Negative.    Respiratory: Negative for cough, chest tightness, shortness of breath and wheezing.    Cardiovascular: Negative for chest pain, palpitations and leg swelling.   Gastrointestinal: Negative.    Endocrine: Negative.    Genitourinary: Negative.    Musculoskeletal: Negative.    Skin: Negative.    Neurological: Negative for dizziness and headaches.   Psychiatric/Behavioral: Negative.        Mr. Urbina  reports that he quit smoking about 18 years ago. His smoking use included cigarettes. He quit after 50.00 years of use. He has never used smokeless tobacco. He reports that he does not drink alcohol and does not use drugs.      Current Outpatient Medications:   •  Ethyl Alcohol, Skin Cleanser, (EQL HAND /ALOE EX), Apply  topically Daily., Disp: , Rfl:   •  memantine (NAMENDA) 10 MG tablet, Take 1 tab po twice a day, Disp: 180 tablet, Rfl: 0  •  Multiple Vitamins-Minerals (MULTIVITAMIN MEN 50+) tablet, Take 1 tablet by mouth., Disp: , Rfl:   •  risperiDONE (risperDAL) 0.5 MG tablet, Take 1 tablet by mouth 2 (Two) Times a Day., Disp: 60 tablet, Rfl: 5      OBJECTIVE:  /60 (BP Location: Right arm, Patient Position: Sitting, Cuff Size: Adult)   Pulse 65   Temp 97.4 °F (36.3 °C) (Temporal)   Resp 16   Ht 182.9 cm (72\")   Wt 65.8 kg (145 lb)   SpO2 96%   BMI 19.67 kg/m²    Physical Exam  Vitals reviewed. "   Constitutional:       Appearance: He is well-developed.   HENT:      Head: Normocephalic and atraumatic.   Eyes:      Conjunctiva/sclera: Conjunctivae normal.      Pupils: Pupils are equal, round, and reactive to light.   Cardiovascular:      Rate and Rhythm: Normal rate and regular rhythm.      Heart sounds: Normal heart sounds.   Pulmonary:      Effort: Pulmonary effort is normal.      Breath sounds: Normal breath sounds.   Abdominal:      General: Bowel sounds are normal.      Palpations: Abdomen is soft.   Musculoskeletal:      Cervical back: Normal range of motion and neck supple.   Skin:     General: Skin is warm and dry.   Neurological:      Mental Status: He is alert and oriented to person, place, and time.      Deep Tendon Reflexes: Reflexes are normal and symmetric.         Assessment/Plan    Diagnoses and all orders for this visit:    1. Dementia associated with other underlying disease with behavioral disturbance (CMS/HCC) (Primary)  Stable on current therapy    2. Preventative health care  -     Lipid panel; Future  -     Lipid panel  -     Cancel: Lipid Panel    3. Confusion  Stable     4. Anemia, unspecified type  Labs to further evaluate status.          An After Visit Summary was printed and given to the patient at discharge.  Return in about 6 months (around 9/26/2021) for Medicare Wellness. Sooner if problems arise.          Shawna Cabrera APRN. 3/26/2021   Electronically Signed

## 2021-06-10 NOTE — PAYOR COMM NOTE
"FROM: DON GODWIN  PHONE: [phone]  FAX: [phone]    PENDIN    Edy Ambriz (78 y.o. Male)     Date of Birth Social Security Number Address Home Phone MRN    1940  [address] [facility] [address] [phone] [id number]    [employer]oration[employer] Marital Status          Scientology        Admission Date Admission Type Admitting Provider Attending Provider Department, Room/Bed    19 Emergency Lenard Landa MD Thompson, Benjamin H, MD [facility] 3A, 349/1    Discharge Date Discharge Disposition Discharge Destination                       Attending Provider:  Lenard Landa MD    Allergies:  No Known Allergies    Isolation:  None   Infection:  None   Code Status:  CPR    Ht:  182.9 cm (72\")   Wt:  61.2 kg (135 lb)    Admission Cmt:  None   Principal Problem:  None                Active Insurance as of 2019     Primary Coverage     Payor Plan Insurance Group Employer/Plan Group    HUMANA MEDICARE REPLACEMENT HUMANA MEDICARE REPL [id number]     Payor Plan Address Payor Plan Phone Number Payor Plan Fax Number Effective Dates    [address] [address] [phone]  2018 - None Entered    [facility] [id number]       Subscriber Name Subscriber Birth Date Member ID       EDY AMBRIZ 1940 [id number]                 Emergency Contacts      (Rel.) Home Phone Work Phone Mobile Phone    OLEGARIO AMBRIZ (Son) -- -- [phone]    Ciara(Emerg Only)Jennyfer (Daughter) -- -- [phone]               Physician Progress Notes (last 24 hours) (Notes from 2019  2:52 PM through 7/10/2019  2:52 PM)      Lenard Landa MD at 7/10/2019 11:05 AM              [facility] Medicine Services  INPATIENT PROGRESS NOTE    Patient Name: Edy Ambriz  Date of Admission: 2019  Today's Date: 07/10/19  Length of Stay: 2  Primary Care Physician: Torey Amato DO    Subjective   Chief Complaint: Confusion  HPI "   Patient remains confused, and it has been waxing and waning.  I talked with family out in the hallway and they do report that he is exhibited behavior of paranoia, and even occasionally hallucinations, dating back for a couple of months.  It seems like his confusion is really accelerated since his recent hernia surgery.  He has required restraints.  He has been found climbing in the bed, and even broke off a part of the hospital bed.  He has had similar behavior when he has been in the bathroom.  He will get fixated on certain objects as described by the nursing staff as well.  No new focal deficits, however confusion and change in mental status persist.    Review of Systems     All pertinent negatives and positives are as above. All other systems have been reviewed and are negative unless otherwise stated.     Objective    Temp:  [97.8 °F (36.6 °C)-98.8 °F (37.1 °C)] 98.2 °F (36.8 °C)  Heart Rate:  [59-74] 74  Resp:  [16-17] 16  BP: (114-156)/(60-80) 156/80  Physical Exam   Constitutional: No distress.   Calm and cooperative currently; thin and cachectic appearing   HENT:   Head: Normocephalic.   Mouth/Throat: No oropharyngeal exudate.   Eyes: No scleral icterus.   Neck: No tracheal deviation present.   Pulmonary/Chest: Effort normal. No respiratory distress.   Musculoskeletal: He exhibits no edema.   Neurological:   Remains confused but more calm and cooperative this AM as compared to yesterday; no new focal deficits   Skin: He is not diaphoretic.   Psychiatric:   More calm at this time; multiple members of family at bedside   Vitals reviewed.    Results Review:  I have reviewed the labs, radiology results, and diagnostic studies.    Laboratory Data:   Results from last 7 days   Lab Units 07/09/19  0442 07/08/19  1326   WBC 10*3/mm3 5.79 6.52   HEMOGLOBIN g/dL 11.8* 12.4*   HEMATOCRIT % 36.3* 37.7*   PLATELETS 10*3/mm3 214 243        Results from last 7 days   Lab Units 07/09/19  0442 07/08/19  1326   SODIUM  mmol/L 139 144   POTASSIUM mmol/L 4.1 3.8   CHLORIDE mmol/L 107 108   CO2 mmol/L 29.0 26.0   BUN mg/dL 21 25*   CREATININE mg/dL 0.65 0.82   CALCIUM mg/dL 8.7 9.8   BILIRUBIN mg/dL 0.5 0.7   ALK PHOS U/L 48 56   ALT (SGPT) U/L 39 40   AST (SGOT) U/L 49* 63*   GLUCOSE mg/dL 90 132*       Culture Data:        Radiology Data:   Imaging Results (last 24 hours)     Procedure Component Value Units Date/Time    MRI Brain With & Without Contrast [[id number]] Collected:  07/09/19 1840     Updated:  07/09/19 1846    Narrative:       EXAMINATION:  MRI BRAIN WITHOUT AND WITH CONTRAST-  7/9/2019 6:06 PM CDT     HISTORY: Confusion/delirium, altered LOC, unexplained;  R41.0-Disorientation, unspecified; R68.89-Other general symptoms and  signs.     TECHNIQUE: Multiplanar imaging was performed in a high field magnet  before and after gadolinium contrast administration.     COMPARISON: No comparison study.     FINDINGS: There is no evidence of acute infarct on the  diffusion-weighted sequence. There are scattered areas of T2 high signal  within the hemispheric white matter. There is mild to moderate atrophy  with associated ventricular prominence. There are no structural  abnormalities. There are no areas of abnormal contrast enhancement.  There is some motion artifact on some of the sequences.       Impression:       1. No evidence of acute infarct.  2. Mild to moderate atrophy with associated ventricular prominence.  3. T2 high signal within the hemispheric white matter is nonspecific and  most likely due to chronic small vessel disease.     This report was finalized on 07/09/2019 18:43 by Dr. Esa Mcqueen MD.          I have reviewed the patient's current medications.     Assessment/Plan     Active Hospital Problems    Diagnosis   • Protein-calorie malnutrition, moderate (CMS/HCC)   • Anemia   • Altered mental status   • Confusion   • Weight loss   • Underweight   • Inguinal hernia     S/p repair by Dr. Mora 6/2019        Plan:  1.  MRI Brain with no acute findings  2.  Ehrlichia and RMSF serologies; I have a low suspicion that tick-illness is contributing to current symptoms but have started therapy with Doxycycline for the time being  3.  Nutrition supplements  4.  Patient has been received IVFs + banana bag   5.  Norco stopped  6.  Neuro consult today; case discussed with Dr. Patel  7.  Neuro checks  8.  PT and OT assessment  9.  Labs reviewed; stable  10.  Conference with multiple members of family at bedside  11.  Dispo: anticipate patient will need placement in this condition      Lenard Landa MD   07/10/19   11:08 AM    Electronically signed by Lenard Landa MD at 7/10/2019 11:20 AM          Consult Notes (last 24 hours) (Notes from 7/9/2019  2:52 PM through 7/10/2019  2:52 PM)      Bashir Patel MD at 7/10/2019 11:17 AM          Neurology Consult Note    Referring Provider: Dr Momo Landa  Reason for Consultation: altered mentation      History of present illness:      This is a 78-year-old male.  He is a poor historian.  His son and daughter are at the bedside and assist in the history.  They tell me that for the better part of a year they have been somewhat concerned about his mentation.  Specifically they have noted things over the phone.  They have noticed hallucinations and paranoia.  They give examples that he will see bugs on the ceiling of the wall.  He becomes fixated on objects.  He lives alone and therefore it is difficult to say the exact initiation of the symptoms.  They do give examples that even as distant as 1 year ago he was having memory issues.  He will have to make a list of things to do that day including writing down that he will need to eat.  Otherwise he would forget it.  On June 28 he had a hernia surgery.  Since that time his mentation has worsened drastically.  He was found after driving to [place] to be extremely confused and combative.  The police were called and he  was delivered to our emergency department.  He has had continued atypical behavior since admission.  He has visual and auditory hallucinations.  He has episodes of extreme confusion followed by a complete return to baseline.  The duration of the episodes is greatly varied.  He is moving all extremities.  He has no difficulties with ambulation.  He has very bizarre behavior.  An example is that yesterday he believed that some hand lotion actually was the key to internal life.  He also has attempted multiple times to shove objects up his rectum.  They have noticed an occasional resting tremor.      Past Medical History:   Diagnosis Date   • Cataracts, bilateral    • Occasional tremors    • Prostate cancer (CMS/[facility]) 2011    t2c,[place]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    • Varicose veins of legs        No Known Allergies  No current facility-administered medications on file prior to encounter.      Current Outpatient Medications on File Prior to Encounter   Medication Sig   • Cinnamon 500 MG tablet Take 500 mg by mouth Daily.   • Doxylamine Succinate, Sleep, (EQ SLEEP AID PO) Take 1 tablet by mouth As Needed.   • HYDROcodone-acetaminophen (NORCO) 7.5-325 MG per tablet Take 1-2 tablets by mouth Every 4 (Four) Hours As Needed for Moderate Pain  (Pain).   • Multiple Vitamins-Minerals (MULTIVITAMIN PO) Take 1 tablet by mouth Daily.   • Omega-3 Fatty Acids (FISH OIL) 1000 MG capsule capsule Take 1,000 mg by mouth Daily With Breakfast.       Social History     Socioeconomic History   • Marital status:      Spouse name: Not on file   • Number of children: Not on file   • Years of education: Not on file   • Highest education level: Not on file   Tobacco Use   • Smoking status: Former Smoker     Years: 50.00     Types: Cigarettes     Last attempt to quit: 2002     Years since quittin.0   • Smokeless tobacco: Never Used   Substance and Sexual Activity   • Alcohol use: No     Comment: QUIT  DRINKING IN 1995   • Drug use: No   • Sexual activity: Defer     Family History   Problem Relation Age of Onset   • Stroke Mother        Review of Systems  A 14 point review of systems was reviewed and was negative except for confusion    Vital Signs   Temp:  [97.8 °F (36.6 °C)-98.8 °F (37.1 °C)] 98.2 °F (36.8 °C)  Heart Rate:  [59-74] 74  Resp:  [16-17] 16  BP: (114-156)/(60-80) 156/80    General Exam:  Head:  Normal cephalic, atraumatic  HEENT:  Neck supple  Fundoscopic Exam:  No signs of disc edema  CVS:  Regular rate and rhythm.  No murmurs  Carotid Examination:  No bruits  Lungs:  Clear to auscultation  Abdomen:  Non-tender, Non-distended  Extremities:  No signs of peripheral edema  Skin:  No rashes    Neurologic Exam:    Mental Status:    -Awake, Alert, Oriented X 3  -No word finding difficulties  -No aphasia  -No dysarthria  -Follows simple and complex commands    CN II:  Visual fields full.  Pupils equally reactive to light  CN III, IV, VI:  Extraocular Muscles full with no signs of nystagmus  CN V:  Facial sensory is symmetric with no asymmetries.  CN VII:  Facial motor symmetric.  Somewhat of a flat face  CN VIII:  Gross hearing intact bilaterally  CN IX:  Palate elevates symmetrically  CN X:  Palate elevates symmetrically  CN XI:  Shoulder shrug symmetric  CN XII:  Tongue is midline on protrusion    Motor: (strength out of 5:  1= minimal movement, 2 = movement in plane of gravity, 3 = movement against gravity, 4 = movement against some resistance, 5 = full strength)    -Right Upper Ext: Proximal: 5 Distal: 5  -Left Upper Ext: Proximal: 5 Distal: 5    -Right Lower Ext: Proximal: 5 Distal: 5  -Left Lower Ext: Proximal: 5 Distal: 5    With distraction on the contralateral side I noticed mild cogwheel rigidity with rotation of the wrist.    DTR:  -Right   Bicep: 2+ Tricep: 2+ Brachoradialis: 2+   Patella: 2+ Ankle: 2+ Neg Babinski  -Left   Bicep: 2+ Tricep: 2+ Brachoradialis: 2+   Patella: 2+ Ankle: 2+ Neg  Babinski    Sensory:  -Intact to light touch, pinprick, temperature, pain, and proprioception    Coordination:  -Finger to nose intact  -Heel to shin intact  -No ataxia    Gait  -No signs of ataxia  -ambulates unassisted      Results Review:  Lab Results (last 24 hours)     Procedure Component Value Units Date/Time    [place] Spotted Fever, IgG [[id number]] Collected:  07/09/19 0442    Specimen:  Blood Updated:  07/09/19 1123    [place] Spotted Fever, IgM [[id number]] Collected:  07/09/19 0442    Specimen:  Blood Updated:  07/09/19 1123    Ehrlichia Antibody Panel [[id number]] Collected:  07/08/19 1326    Specimen:  Blood Updated:  07/09/19 1123          .  Imaging Results (last 24 hours)     Procedure Component Value Units Date/Time    MRI Brain With & Without Contrast [[id number]] Collected:  07/09/19 1840     Updated:  07/09/19 1846    Narrative:       EXAMINATION:  MRI BRAIN WITHOUT AND WITH CONTRAST-  7/9/2019 6:06 PM CDT     HISTORY: Confusion/delirium, altered LOC, unexplained;  R41.0-Disorientation, unspecified; R68.89-Other general symptoms and  signs.     TECHNIQUE: Multiplanar imaging was performed in a high field magnet  before and after gadolinium contrast administration.     COMPARISON: No comparison study.     FINDINGS: There is no evidence of acute infarct on the  diffusion-weighted sequence. There are scattered areas of T2 high signal  within the hemispheric white matter. There is mild to moderate atrophy  with associated ventricular prominence. There are no structural  abnormalities. There are no areas of abnormal contrast enhancement.  There is some motion artifact on some of the sequences.       Impression:       1. No evidence of acute infarct.  2. Mild to moderate atrophy with associated ventricular prominence.  3. T2 high signal within the hemispheric white matter is nonspecific and  most likely due to chronic small vessel disease.     This report was finalized on 07/09/2019 18:43 by   Esa Mcqueen MD.               MRI brain reviewed by me.  Flair sequencing is unremarkable.  There is some mild atrophy noted.  It is my opinion that the midbrain is somewhat thin best seen on the midline in the sagittal sequencing almost consistent with a hummingbird sign.    [place] spotted fever pending   iron profile normal  Vitamin D normal  Vitamin B12 normal at 425  TSH normal at 1.29  CBC reviewed and unremarkable  Comprehensive metabolic panel reviewed and unremarkable    Impression    78-year-old male with a subacute progression over the course of at least 1 year of a progressive dementia.  Concerning features is severe and rapid fluctuations in mental status along with a strong component of hallucinations.  In addition to this I believe that he has some thinning of the midbrain on the MRI along with some cogwheel rigidity on his exam.  This would be consistent with a parkinsonism.  Most concerning would be a Lewy body dementia.  I had a prolonged discussion with the family about my concerns and shared with him that this is a diagnosis that is made over multiple visits as opposed to an initial visit in the hospital.  That being the case I do have concerns about him returning home alone.  In addition to this will need a way to control his hallucinations and fluctuations in mental status.  If this is Lewy body dementia than he may have increased sensitivity to typical antipsychotics and therefore these need to be avoided.    Plan    · Copper level  · Ceruplasmin  · B1 level  · Ammonia level  · Folate level  · Seroquel 25mg QHS  · Avoid typical antipsychotics  · Will likely start on Nuplazid at discharge ( not on inpatient formulary)  · Driving cessation  · D/C planning - not a candidate to live alone    I discussed the patients findings and my recommendations with patient and family    Bashir Patel MD  07/10/19  11:17 AM        Electronically signed by Bashir Patel MD at 7/10/2019 12:11  PM     Alesia Mora MD at 7/9/2019  5:40 PM        I was supposed to see him in the office today from his right inguinal hernia repair.  He is admitted with confusion and does appear somewhat combative and confused.  He is naked as I walk in the room and is rubbing lotion on his incision.  His wound looks good there is no swelling no other evidence of infection.  I doubt that his hernia is the cause of any of this confusion.  I will defer to the primary care team.    Electronically signed by Alesia Mora MD at 7/9/2019  5:41 PM        Ivonne Whelan

## 2021-06-29 DIAGNOSIS — F02.818 DEMENTIA ASSOCIATED WITH OTHER UNDERLYING DISEASE WITH BEHAVIORAL DISTURBANCE (HCC): ICD-10-CM

## 2021-06-29 RX ORDER — RISPERIDONE 0.5 MG/1
TABLET ORAL
Qty: 60 TABLET | Refills: 0 | Status: SHIPPED | OUTPATIENT
Start: 2021-06-29 | End: 2021-07-26

## 2021-07-14 DIAGNOSIS — R41.3 MEMORY DIFFICULTY: ICD-10-CM

## 2021-07-15 ENCOUNTER — TELEPHONE (OUTPATIENT)
Dept: VASCULAR SURGERY | Facility: CLINIC | Age: 81
End: 2021-07-15

## 2021-07-15 RX ORDER — MEMANTINE HYDROCHLORIDE 10 MG/1
TABLET ORAL
Qty: 180 TABLET | Refills: 1 | Status: SHIPPED | OUTPATIENT
Start: 2021-07-15 | End: 2021-08-16 | Stop reason: SDUPTHER

## 2021-07-19 ENCOUNTER — TELEPHONE (OUTPATIENT)
Dept: VASCULAR SURGERY | Facility: CLINIC | Age: 81
End: 2021-07-19

## 2021-07-19 NOTE — TELEPHONE ENCOUNTER
Attempted to reach patient daughter to advise that patient needed to be moved to the morning due to Dr. Lewis being in surgery.  Every time I called it stated that phone was unreachable. Contacted patient and advised that his appointment had been moved to 3 pm on Wednesday.  Patient stated that he had the same problem with his daughters phone and expressed understanding for all that was discussed.

## 2021-07-19 NOTE — TELEPHONE ENCOUNTER
Attempted to contact patient and daughter to advise that we wished for them to come in this morning.  Patient stated that I needed to speak with his daughter, when I tried to reach her the call would not go through.  Contacted patient and advised that I was not able to reach his daughter.

## 2021-07-26 DIAGNOSIS — F02.818 DEMENTIA ASSOCIATED WITH OTHER UNDERLYING DISEASE WITH BEHAVIORAL DISTURBANCE (HCC): ICD-10-CM

## 2021-07-26 RX ORDER — RISPERIDONE 0.5 MG/1
0.5 TABLET ORAL 2 TIMES DAILY
Qty: 60 TABLET | Refills: 0 | Status: SHIPPED | OUTPATIENT
Start: 2021-07-26 | End: 2021-08-30

## 2021-07-29 ENCOUNTER — TELEPHONE (OUTPATIENT)
Dept: VASCULAR SURGERY | Facility: CLINIC | Age: 81
End: 2021-07-29

## 2021-07-30 ENCOUNTER — OFFICE VISIT (OUTPATIENT)
Dept: VASCULAR SURGERY | Facility: CLINIC | Age: 81
End: 2021-07-30

## 2021-07-30 VITALS
OXYGEN SATURATION: 97 % | SYSTOLIC BLOOD PRESSURE: 118 MMHG | WEIGHT: 143 LBS | HEART RATE: 72 BPM | DIASTOLIC BLOOD PRESSURE: 62 MMHG | HEIGHT: 73 IN | BODY MASS INDEX: 18.95 KG/M2

## 2021-07-30 DIAGNOSIS — I83.893 VARICOSE VEINS OF BILATERAL LOWER EXTREMITIES WITH OTHER COMPLICATIONS: ICD-10-CM

## 2021-07-30 DIAGNOSIS — I87.2 VENOUS INSUFFICIENCY OF BOTH LOWER EXTREMITIES: Primary | ICD-10-CM

## 2021-07-30 PROCEDURE — 99213 OFFICE O/P EST LOW 20 MIN: CPT | Performed by: SURGERY

## 2021-07-30 NOTE — PROGRESS NOTES
07/30/2021      Torey Amato, DO  [address]   [facility] [address]        Edy Urbina  1940    Chief Complaint   Patient presents with   • Follow-up     6 month follow up. venous insufficiency of both legs.   • Med Management     verbally reviewed medication with pt.   • Nicotine Dependence     pt states he does not smoke.       Dear Torey Amato, DO:    Nicotine Dependence  Symptoms are negative for fatigue and sore throat.        I had the pleasure of seeing your patient in the office today for follow up.  As you recall, the patient is a 80 y.o. male who we are currently following for lower extremity edema, varicose veins, and venous insufficiency.  Since our last visit he has been wearing compression stockings to the bilateral lower extremities on a daily basis and has noted continued excellent results.  He notes that his lower extremity edema is significantly improved and he notices the varicosities much less in his lower extremities.  He denies any significant feelings of heaviness or discomfort.  He otherwise denies any arterial symptoms with no claudication or ischemic rest pain.  His only other complaint to me today is that he is noting some rectal fullness and is unsure why.  He denies any blood per rectum.    Review of Systems   Constitutional: Negative.  Negative for activity change, appetite change, chills, diaphoresis, fatigue and fever.   HENT: Negative.  Negative for congestion, sneezing, sore throat and trouble swallowing.    Eyes: Negative.  Negative for visual disturbance.   Respiratory: Negative.  Negative for chest tightness and shortness of breath.    Cardiovascular: Positive for leg swelling (Significantly improved with the continued use of compression.). Negative for chest pain and palpitations.   Gastrointestinal: Negative.  Negative for abdominal distention, abdominal pain, nausea and vomiting.   Endocrine: Negative.    Genitourinary: Negative.   "  Musculoskeletal: Negative.    Skin: Negative.         Varicose veins more so on the right than the left lower leg.   Allergic/Immunologic: Negative.    Neurological: Negative.    Hematological: Negative.    Psychiatric/Behavioral: Negative.        /62 (BP Location: Right arm, Patient Position: Sitting, Cuff Size: Adult)   Pulse 72   Ht 185.4 cm (73\")   Wt 64.9 kg (143 lb)   SpO2 97%   BMI 18.87 kg/m²   Physical Exam  Vitals reviewed.   Constitutional:       Appearance: Normal appearance.   HENT:      Head: Normocephalic and atraumatic.      Nose: Nose normal.      Mouth/Throat:      Mouth: Mucous membranes are moist.   Eyes:      Extraocular Movements: Extraocular movements intact.      Pupils: Pupils are equal, round, and reactive to light.   Cardiovascular:      Rate and Rhythm: Normal rate and regular rhythm.      Pulses: Normal pulses.           Carotid pulses are 2+ on the right side and 2+ on the left side.       Radial pulses are 2+ on the right side and 2+ on the left side.        Femoral pulses are 2+ on the right side and 2+ on the left side.       Popliteal pulses are 2+ on the right side and 2+ on the left side.        Dorsalis pedis pulses are 2+ on the right side and 2+ on the left side.        Posterior tibial pulses are 2+ on the right side and 2+ on the left side.      Comments: He does have multiple small varicosities and telangiectasias to the bilateral lower legs.  Pulmonary:      Effort: Pulmonary effort is normal. No respiratory distress.   Abdominal:      General: There is no distension.      Palpations: Abdomen is soft. There is no mass.      Tenderness: There is no abdominal tenderness.   Musculoskeletal:         General: No swelling. Normal range of motion.      Cervical back: Normal range of motion and neck supple.      Right lower leg: Edema present.      Left lower leg: Edema present.      Comments: Minimal bilateral lower extremity edema from ankle to knee.   Skin:     " General: Skin is warm and dry.      Capillary Refill: Capillary refill takes less than 2 seconds.   Neurological:      General: No focal deficit present.      Mental Status: He is alert and oriented to person, place, and time.   Psychiatric:         Mood and Affect: Mood normal.         Behavior: Behavior normal.         Thought Content: Thought content normal.         Judgment: Judgment normal.         DIAGNOSTIC DATA:        Patient Active Problem List   Diagnosis   • Varicose vein of leg   • Confusion   • Anemia   • Protein-calorie malnutrition, moderate (CMS/HCC)   • Dementia (CMS/HCC)   • Prostate cancer (CMS/HCC)         ICD-10-CM ICD-9-CM   1. Venous insufficiency of both lower extremities  I87.2 459.81   2. Varicose veins of bilateral lower extremities with other complications  I83.893 454.8           PLAN: After thoroughly evaluating Edy Urbina, I believe the best course of action is to remain conservative from a vascular standpoint.  Since our last visit he has been wearing compression stockings on a daily basis as instructed and is actually doing quite well.  He notes that his reflux symptoms are significantly improved and he has only very minimal edema and no other significant symptoms.  He also notes that his varicosities are much less noticeable and not bothering him.  As such at this point I recommended we continue with conservative management and he continues with compression to the lower extremities on a daily basis as well as with leg elevation and exercise to help further reduce his symptoms.  I will plan to see him back in 1 year for continued surveillance.  Otherwise as far as his feelings of rectal fullness I have recommended that he follow-up with his primary care physician in my office did help him to make an appointment with their office today. The patient is to continue taking their medications as previously discussed. Patient's Body mass index is 18.87 kg/m². BMI is within normal  parameters. No follow-up required. This was all discussed in full with complete understanding.  Thank you for allowing me to participate in the care of your patient.  Please do not hesitate to call with any questions or concerns.  We will keep you aware of any further encounters with Edy Urbina.      Sincerely Yours,      Bright Leiws MD

## 2021-08-16 ENCOUNTER — OFFICE VISIT (OUTPATIENT)
Dept: NEUROLOGY | Facility: CLINIC | Age: 81
End: 2021-08-16

## 2021-08-16 VITALS
HEIGHT: 73 IN | HEART RATE: 68 BPM | WEIGHT: 126 LBS | BODY MASS INDEX: 16.7 KG/M2 | RESPIRATION RATE: 18 BRPM | DIASTOLIC BLOOD PRESSURE: 50 MMHG | SYSTOLIC BLOOD PRESSURE: 90 MMHG

## 2021-08-16 DIAGNOSIS — R41.3 MEMORY DIFFICULTY: ICD-10-CM

## 2021-08-16 DIAGNOSIS — R25.1 TREMOR: Primary | ICD-10-CM

## 2021-08-16 PROCEDURE — 99215 OFFICE O/P EST HI 40 MIN: CPT | Performed by: PSYCHIATRY & NEUROLOGY

## 2021-08-16 RX ORDER — MEMANTINE HYDROCHLORIDE 10 MG/1
TABLET ORAL
Qty: 180 TABLET | Refills: 1 | Status: SHIPPED | OUTPATIENT
Start: 2021-08-16 | End: 2021-10-28 | Stop reason: SDUPTHER

## 2021-08-16 NOTE — PROGRESS NOTES
Subjective   Edy Urbina, 1940, is a male who is being seen today for   Chief Complaint   Patient presents with   • Memory Loss   • Tremors       HISTORY OF PRESENT ILLNESS: Extended follow-up.  Patient has tolerated the Namenda increased to now 10 mg twice daily.  According to the patient's daughter is with him he still has memory issues at short-term memory worse.  Patient has had about 11 pound weight loss in the last several weeks.  He lives by himself and does not eat well.  Has not had any blood work done recently patient's tremor about the same.  Patient's MMSE is 20 of 30 where the last MMSE done in February was 30 of 30.  Patient has not been tried on Aricept and I discussed this with the family however with his weight loss and significant peripheral edema today we are doing blood work first and he is to see his PCP prior to making further adjustments.  Patient apparently according to the daughter has had some increased difficulty with his finances.  Patient is not driving.  Patient continues on Risperdal as per PCP.  Patient denies any headaches.  Patient apparently followed by vascular surgery for lower extremity circulation problems    REVIEW OF SYSTEMS:   GENERAL: Blood pressure today is 110/60 left arm seated and 90/50 left arm standing pulse 68  PULMONARY: No acute respiratory difficulty  CVS: No acute chest pain or palpitation  GASTROINTESTINAL: No acute GI distress other than as above  GENITOURINARY: No acute  distress  GYN: Not applicable  MUSCULOSKELETAL: Patient has tremor  HEENT: No acute vision or hearing change  ENDOCRINE: No acute endocrine symptoms  PSYCHIATRIC: No acute psychiatric symptoms  HEMATOLOGY: No recent blood work for review  SKIN: No acute skin changes  Family history reviewed and otherwise noncontributory to this problem  Social history: Patient denies smoking or drug or alcohol use      PHYSICAL EXAMINATION:    GENERAL: No acute distress but patient has less elevated  affect than previously  CRANIUM: Normal cephalic/atraumatic and patient's not had any falls  HEENT:       EYES: EOMs intact without nystagmus and fields full to confrontation.  No acute fundic abnormalities.  Pupils equal round reactive to light.       EARS: Tympanic membranes normal and hears tuning fork bilaterally but is hard of hearing       THROAT: No acute oropharynx abnormalities except for tongue tremor though I could not rule out fasciculations       NECK: Some head neck tremor.  No bruits/no lymphadenopathy  CHEST: No acute cardiopulmonary abnormalities by auscultation  ABDOMEN: Nondistended  EXTREMITIES: Dorsalis pedis pulses symmetrical.  Patient has 2+ pitting edema in the lower extremities  NEURO: Patient alert but has some difficulty following commands  SPEECH: Patient is hoarse but this is not significant change from previous exam    CRANIAL NERVES: Motor/sensory about the face normal    MOTOR STRENGTH: Motor strength upper and lower extremities normal.  Patient has rest tremor with some intentional tremor bilaterally upper extremities.  Minimal cogwheeling or rigidity  STATION AND GAIT: Gait is slightly wide-based and mild festination but no tendency to fall and Romberg negative  CEREBELLAR: Finger-nose and heel-to-shin normal  SENSORY: Decreased pin vibration distal approximately lower extremities ankles bilaterally otherwise normal pin and vibration throughout  REFLEXES: Patient has decreased ankle jerk bilaterally but present and symmetric knee jerk and biceps without clonus or Babinski      ASSESSMENT AND PLAN: Patient with history of increasing memory difficulty with decreased MMSE.  I discussed with the daughter possibly starting Aricept but with his peripheral edema and weight loss I feel that patient needs to have reviewed by his PCP and further blood work before making that decision.  I spent 40 minutes with the patient with counseling and exam and review of records.      Diagnoses and all  orders for this visit:    1. Memory difficulty  -     memantine (NAMENDA) 10 MG tablet; TAKE 1 TABLET TWICE DAILY  Dispense: 180 tablet; Refill: 1  -     CBC & Differential; Future  -     Comprehensive Metabolic Panel; Future  -     Magnesium; Future        Dictated utilizing Dragon voice recognition software

## 2021-08-16 NOTE — PATIENT INSTRUCTIONS
Patient not to be driving until released.  Patient to get with PCP about weight loss low BMI.  Continue safety precautions as discussed

## 2021-08-27 DIAGNOSIS — F02.818 DEMENTIA ASSOCIATED WITH OTHER UNDERLYING DISEASE WITH BEHAVIORAL DISTURBANCE (HCC): ICD-10-CM

## 2021-08-30 RX ORDER — RISPERIDONE 0.5 MG/1
TABLET ORAL
Qty: 60 TABLET | Refills: 3 | Status: SHIPPED | OUTPATIENT
Start: 2021-08-30 | End: 2021-10-18 | Stop reason: SDUPTHER

## 2021-09-08 ENCOUNTER — OFFICE VISIT (OUTPATIENT)
Dept: INTERNAL MEDICINE | Facility: CLINIC | Age: 81
End: 2021-09-08

## 2021-09-08 VITALS
TEMPERATURE: 98.2 F | HEART RATE: 61 BPM | HEIGHT: 73 IN | BODY MASS INDEX: 17.15 KG/M2 | DIASTOLIC BLOOD PRESSURE: 60 MMHG | OXYGEN SATURATION: 96 % | WEIGHT: 129.4 LBS | RESPIRATION RATE: 16 BRPM | SYSTOLIC BLOOD PRESSURE: 90 MMHG

## 2021-09-08 DIAGNOSIS — I83.90 VARICOSE VEINS OF LOWER EXTREMITY, UNSPECIFIED LATERALITY, UNSPECIFIED WHETHER COMPLICATED: ICD-10-CM

## 2021-09-08 DIAGNOSIS — F02.818 DEMENTIA ASSOCIATED WITH OTHER UNDERLYING DISEASE WITH BEHAVIORAL DISTURBANCE (HCC): Primary | ICD-10-CM

## 2021-09-08 PROCEDURE — 99213 OFFICE O/P EST LOW 20 MIN: CPT | Performed by: INTERNAL MEDICINE

## 2021-09-09 NOTE — PROGRESS NOTES
"CC: f/u dementia    History:  Edy Urbina is a 80 y.o. male   He notes he has been doing well and no longer has rectal fullness that he had discussed with Dr. Lewis. He has no worsening of memory and lives alone with frequent checks from his daughter who has had no concerns for his safety.        ROS:  Review of Systems   Constitutional: Negative for chills and fever.   Respiratory: Negative for cough and shortness of breath.    Cardiovascular: Negative for chest pain and palpitations.   Gastrointestinal: Negative for abdominal pain and constipation.   Genitourinary: Negative for difficulty urinating and dysuria.        reports that he quit smoking about 19 years ago. His smoking use included cigarettes. He quit after 50.00 years of use. He has never used smokeless tobacco. He reports that he does not drink alcohol and does not use drugs.      Current Outpatient Medications:   •  Ethyl Alcohol, Skin Cleanser, (EQL HAND /ALOE EX), Apply  topically Daily., Disp: , Rfl:   •  memantine (NAMENDA) 10 MG tablet, TAKE 1 TABLET TWICE DAILY, Disp: 180 tablet, Rfl: 1  •  Multiple Vitamins-Minerals (MULTIVITAMIN MEN 50+) tablet, Take 1 tablet by mouth., Disp: , Rfl:   •  risperiDONE (risperDAL) 0.5 MG tablet, Take 1 tablet by mouth twice daily, Disp: 60 tablet, Rfl: 3    OBJECTIVE:  BP 90/60 (BP Location: Left arm, Patient Position: Sitting, Cuff Size: Adult)   Pulse 61   Temp 98.2 °F (36.8 °C) (Temporal)   Resp 16   Ht 185.4 cm (73\")   Wt 58.7 kg (129 lb 6.4 oz)   SpO2 96%   BMI 17.07 kg/m²    Physical Exam  Constitutional:       General: He is not in acute distress.  Pulmonary:      Effort: Pulmonary effort is normal. No respiratory distress.   Neurological:      Mental Status: He is alert and oriented to person, place, and time.   Psychiatric:         Mood and Affect: Mood normal.         Behavior: Behavior normal.         Assessment/Plan     Diagnoses and all orders for this visit:    1. Dementia " associated with other underlying disease with behavioral disturbance (CMS/[facility]) (Primary)  He has had no further behavioral issues on antipsychotic and has no concerns for safety per his daughter. Continue Namenda.     2. Varicose veins of lower extremity, unspecified laterality, unspecified whether complicated  Well controlled with compression socks with no further pain nor complications apparent.       An After Visit Summary was printed and given to the patient at discharge.  Return in about 4 months (around 1/8/2022) for Medicare Wellness; cancel september.          Torey Amato D.O. 9/8/2021   Electronically signed.

## 2021-09-21 ENCOUNTER — LAB (OUTPATIENT)
Dept: LAB | Facility: HOSPITAL | Age: 81
End: 2021-09-21

## 2021-09-21 DIAGNOSIS — R41.3 MEMORY DIFFICULTY: ICD-10-CM

## 2021-09-21 LAB
ALBUMIN SERPL-MCNC: 4.1 G/DL (ref 3.5–5.2)
ALBUMIN/GLOB SERPL: 1.8 G/DL
ALP SERPL-CCNC: 57 U/L (ref 39–117)
ALT SERPL W P-5'-P-CCNC: 26 U/L (ref 1–41)
ANION GAP SERPL CALCULATED.3IONS-SCNC: 12.1 MMOL/L (ref 5–15)
AST SERPL-CCNC: 30 U/L (ref 1–40)
BASOPHILS # BLD AUTO: 0.06 10*3/MM3 (ref 0–0.2)
BASOPHILS NFR BLD AUTO: 0.6 % (ref 0–1.5)
BILIRUB SERPL-MCNC: 0.3 MG/DL (ref 0–1.2)
BUN SERPL-MCNC: 20 MG/DL (ref 8–23)
BUN/CREAT SERPL: 25.6 (ref 7–25)
CALCIUM SPEC-SCNC: 9 MG/DL (ref 8.6–10.5)
CHLORIDE SERPL-SCNC: 87 MMOL/L (ref 98–107)
CO2 SERPL-SCNC: 25.9 MMOL/L (ref 22–29)
CREAT SERPL-MCNC: 0.78 MG/DL (ref 0.76–1.27)
DEPRECATED RDW RBC AUTO: 43.7 FL (ref 37–54)
EOSINOPHIL # BLD AUTO: 0.06 10*3/MM3 (ref 0–0.4)
EOSINOPHIL NFR BLD AUTO: 0.6 % (ref 0.3–6.2)
ERYTHROCYTE [DISTWIDTH] IN BLOOD BY AUTOMATED COUNT: 13.9 % (ref 12.3–15.4)
GFR SERPL CREATININE-BSD FRML MDRD: 96 ML/MIN/1.73
GLOBULIN UR ELPH-MCNC: 2.3 GM/DL
GLUCOSE SERPL-MCNC: 79 MG/DL (ref 65–99)
HCT VFR BLD AUTO: 34.2 % (ref 37.5–51)
HGB BLD-MCNC: 11.3 G/DL (ref 13–17.7)
IMM GRANULOCYTES # BLD AUTO: 0.03 10*3/MM3 (ref 0–0.05)
IMM GRANULOCYTES NFR BLD AUTO: 0.3 % (ref 0–0.5)
LYMPHOCYTES # BLD AUTO: 1.2 10*3/MM3 (ref 0.7–3.1)
LYMPHOCYTES NFR BLD AUTO: 12.5 % (ref 19.6–45.3)
MAGNESIUM SERPL-MCNC: 2.1 MG/DL (ref 1.6–2.4)
MCH RBC QN AUTO: 28.3 PG (ref 26.6–33)
MCHC RBC AUTO-ENTMCNC: 33 G/DL (ref 31.5–35.7)
MCV RBC AUTO: 85.7 FL (ref 79–97)
MONOCYTES # BLD AUTO: 0.82 10*3/MM3 (ref 0.1–0.9)
MONOCYTES NFR BLD AUTO: 8.5 % (ref 5–12)
NEUTROPHILS NFR BLD AUTO: 7.46 10*3/MM3 (ref 1.7–7)
NEUTROPHILS NFR BLD AUTO: 77.5 % (ref 42.7–76)
NRBC BLD AUTO-RTO: 0 /100 WBC (ref 0–0.2)
PLATELET # BLD AUTO: 222 10*3/MM3 (ref 140–450)
PMV BLD AUTO: 9.8 FL (ref 6–12)
POTASSIUM SERPL-SCNC: 4.8 MMOL/L (ref 3.5–5.2)
PROT SERPL-MCNC: 6.4 G/DL (ref 6–8.5)
RBC # BLD AUTO: 3.99 10*6/MM3 (ref 4.14–5.8)
SODIUM SERPL-SCNC: 125 MMOL/L (ref 136–145)
WBC # BLD AUTO: 9.63 10*3/MM3 (ref 3.4–10.8)

## 2021-09-21 PROCEDURE — 85025 COMPLETE CBC W/AUTO DIFF WBC: CPT

## 2021-09-21 PROCEDURE — 83735 ASSAY OF MAGNESIUM: CPT

## 2021-09-21 PROCEDURE — 36415 COLL VENOUS BLD VENIPUNCTURE: CPT

## 2021-09-21 PROCEDURE — 80053 COMPREHEN METABOLIC PANEL: CPT

## 2021-09-22 ENCOUNTER — DOCUMENTATION (OUTPATIENT)
Dept: NEUROLOGY | Facility: CLINIC | Age: 81
End: 2021-09-22

## 2021-09-22 DIAGNOSIS — E87.1 HYPONATREMIA: Primary | ICD-10-CM

## 2021-09-22 NOTE — PROGRESS NOTES
I did contact patient's son about the patient's low sodium level of 125.  I did ask that he contact 's office in regards to further orders.  I do see where  ordered another BMP to be done and that his staff did contact the patient's daughter.  The son states the patient is losing weight and is not doing well at home.  The son states they will be placing the patient in an assistive living  Center.  THe son states he will follow up with his sister about her call with Dr. Amato.

## 2021-09-29 ENCOUNTER — APPOINTMENT (OUTPATIENT)
Dept: GENERAL RADIOLOGY | Facility: HOSPITAL | Age: 81
End: 2021-09-29

## 2021-09-29 ENCOUNTER — APPOINTMENT (OUTPATIENT)
Dept: CT IMAGING | Facility: HOSPITAL | Age: 81
End: 2021-09-29

## 2021-09-29 ENCOUNTER — TELEPHONE (OUTPATIENT)
Dept: INTERNAL MEDICINE | Facility: CLINIC | Age: 81
End: 2021-09-29

## 2021-09-29 ENCOUNTER — HOSPITAL ENCOUNTER (INPATIENT)
Facility: HOSPITAL | Age: 81
LOS: 2 days | Discharge: HOME | End: 2021-10-01
Attending: FAMILY MEDICINE | Admitting: FAMILY MEDICINE

## 2021-09-29 DIAGNOSIS — R13.10 DYSPHAGIA, UNSPECIFIED TYPE: ICD-10-CM

## 2021-09-29 DIAGNOSIS — Z74.09 IMPAIRED MOBILITY: ICD-10-CM

## 2021-09-29 DIAGNOSIS — G31.83 LEWY BODY DEMENTIA WITH BEHAVIORAL DISTURBANCE (HCC): ICD-10-CM

## 2021-09-29 DIAGNOSIS — E87.1 HYPONATREMIA: Primary | ICD-10-CM

## 2021-09-29 DIAGNOSIS — F02.818 LEWY BODY DEMENTIA WITH BEHAVIORAL DISTURBANCE (HCC): ICD-10-CM

## 2021-09-29 PROBLEM — E46 PROTEIN CALORIE MALNUTRITION: Status: ACTIVE | Noted: 2019-07-10

## 2021-09-29 PROBLEM — R64 CACHEXIA: Status: ACTIVE | Noted: 2021-09-29

## 2021-09-29 LAB
ALBUMIN SERPL-MCNC: 3.8 G/DL (ref 3.5–5.2)
ALBUMIN/GLOB SERPL: 1.7 G/DL
ALP SERPL-CCNC: 52 U/L (ref 39–117)
ALT SERPL W P-5'-P-CCNC: 22 U/L (ref 1–41)
ANION GAP SERPL CALCULATED.3IONS-SCNC: 6 MMOL/L (ref 5–15)
AST SERPL-CCNC: 26 U/L (ref 1–40)
BASOPHILS # BLD AUTO: 0.04 10*3/MM3 (ref 0–0.2)
BASOPHILS # BLD AUTO: 0.05 10*3/MM3 (ref 0–0.2)
BASOPHILS NFR BLD AUTO: 0.6 % (ref 0–1.5)
BASOPHILS NFR BLD AUTO: 0.8 % (ref 0–1.5)
BILIRUB SERPL-MCNC: 0.5 MG/DL (ref 0–1.2)
BILIRUB UR QL STRIP: NEGATIVE
BUN SERPL-MCNC: 25 MG/DL (ref 8–23)
BUN/CREAT SERPL: 30.9 (ref 7–25)
CALCIUM SPEC-SCNC: 8.2 MG/DL (ref 8.6–10.5)
CHLORIDE SERPL-SCNC: 82 MMOL/L (ref 98–107)
CLARITY UR: CLEAR
CO2 SERPL-SCNC: 28 MMOL/L (ref 22–29)
COLOR UR: YELLOW
CREAT SERPL-MCNC: 0.81 MG/DL (ref 0.76–1.27)
D-LACTATE SERPL-SCNC: 1.1 MMOL/L (ref 0.5–2)
DEPRECATED RDW RBC AUTO: 40 FL (ref 37–54)
DEPRECATED RDW RBC AUTO: 40.1 FL (ref 37–54)
EOSINOPHIL # BLD AUTO: 0.06 10*3/MM3 (ref 0–0.4)
EOSINOPHIL # BLD AUTO: 0.07 10*3/MM3 (ref 0–0.4)
EOSINOPHIL NFR BLD AUTO: 1 % (ref 0.3–6.2)
EOSINOPHIL NFR BLD AUTO: 1.1 % (ref 0.3–6.2)
ERYTHROCYTE [DISTWIDTH] IN BLOOD BY AUTOMATED COUNT: 13.2 % (ref 12.3–15.4)
ERYTHROCYTE [DISTWIDTH] IN BLOOD BY AUTOMATED COUNT: 13.4 % (ref 12.3–15.4)
GFR SERPL CREATININE-BSD FRML MDRD: 92 ML/MIN/1.73
GLOBULIN UR ELPH-MCNC: 2.2 GM/DL
GLUCOSE SERPL-MCNC: 117 MG/DL (ref 65–99)
GLUCOSE UR STRIP-MCNC: NEGATIVE MG/DL
HCT VFR BLD AUTO: 29.6 % (ref 37.5–51)
HCT VFR BLD AUTO: 31.2 % (ref 37.5–51)
HGB BLD-MCNC: 10.1 G/DL (ref 13–17.7)
HGB BLD-MCNC: 10.3 G/DL (ref 13–17.7)
HGB UR QL STRIP.AUTO: NEGATIVE
IMM GRANULOCYTES # BLD AUTO: 0.04 10*3/MM3 (ref 0–0.05)
IMM GRANULOCYTES # BLD AUTO: 0.07 10*3/MM3 (ref 0–0.05)
IMM GRANULOCYTES NFR BLD AUTO: 0.7 % (ref 0–0.5)
IMM GRANULOCYTES NFR BLD AUTO: 1.1 % (ref 0–0.5)
INR PPP: 1.14 (ref 0.91–1.09)
KETONES UR QL STRIP: NEGATIVE
LEUKOCYTE ESTERASE UR QL STRIP.AUTO: NEGATIVE
LYMPHOCYTES # BLD AUTO: 1.18 10*3/MM3 (ref 0.7–3.1)
LYMPHOCYTES # BLD AUTO: 1.35 10*3/MM3 (ref 0.7–3.1)
LYMPHOCYTES NFR BLD AUTO: 19.5 % (ref 19.6–45.3)
LYMPHOCYTES NFR BLD AUTO: 20.4 % (ref 19.6–45.3)
MAGNESIUM SERPL-MCNC: 1.9 MG/DL (ref 1.6–2.4)
MCH RBC QN AUTO: 27.5 PG (ref 26.6–33)
MCH RBC QN AUTO: 27.9 PG (ref 26.6–33)
MCHC RBC AUTO-ENTMCNC: 33 G/DL (ref 31.5–35.7)
MCHC RBC AUTO-ENTMCNC: 34.1 G/DL (ref 31.5–35.7)
MCV RBC AUTO: 81.8 FL (ref 79–97)
MCV RBC AUTO: 83.2 FL (ref 79–97)
MONOCYTES # BLD AUTO: 0.53 10*3/MM3 (ref 0.1–0.9)
MONOCYTES # BLD AUTO: 0.53 10*3/MM3 (ref 0.1–0.9)
MONOCYTES NFR BLD AUTO: 8 % (ref 5–12)
MONOCYTES NFR BLD AUTO: 8.7 % (ref 5–12)
NEUTROPHILS NFR BLD AUTO: 4.2 10*3/MM3 (ref 1.7–7)
NEUTROPHILS NFR BLD AUTO: 4.56 10*3/MM3 (ref 1.7–7)
NEUTROPHILS NFR BLD AUTO: 68.8 % (ref 42.7–76)
NEUTROPHILS NFR BLD AUTO: 69.3 % (ref 42.7–76)
NITRITE UR QL STRIP: NEGATIVE
NRBC BLD AUTO-RTO: 0 /100 WBC (ref 0–0.2)
NRBC BLD AUTO-RTO: 0 /100 WBC (ref 0–0.2)
OSMOLALITY SERPL: 248 MOSM/KG (ref 280–301)
OSMOLALITY UR: 176 MOSM/KG (ref 50–1400)
PH UR STRIP.AUTO: 6.5 [PH] (ref 5–8)
PHOSPHATE SERPL-MCNC: 2.9 MG/DL (ref 2.5–4.5)
PLATELET # BLD AUTO: 218 10*3/MM3 (ref 140–450)
PLATELET # BLD AUTO: 224 10*3/MM3 (ref 140–450)
PMV BLD AUTO: 8.6 FL (ref 6–12)
PMV BLD AUTO: 8.9 FL (ref 6–12)
POTASSIUM SERPL-SCNC: 4.5 MMOL/L (ref 3.5–5.2)
PROCALCITONIN SERPL-MCNC: 0.16 NG/ML (ref 0–0.25)
PROT SERPL-MCNC: 6 G/DL (ref 6–8.5)
PROT UR QL STRIP: NEGATIVE
PROTHROMBIN TIME: 14.2 SECONDS (ref 11.9–14.6)
RBC # BLD AUTO: 3.62 10*6/MM3 (ref 4.14–5.8)
RBC # BLD AUTO: 3.75 10*6/MM3 (ref 4.14–5.8)
SARS-COV-2 RNA PNL SPEC NAA+PROBE: NOT DETECTED
SODIUM SERPL-SCNC: 116 MMOL/L (ref 136–145)
SP GR UR STRIP: 1.01 (ref 1–1.03)
TROPONIN T SERPL-MCNC: <0.01 NG/ML (ref 0–0.03)
TROPONIN T SERPL-MCNC: <0.01 NG/ML (ref 0–0.03)
UROBILINOGEN UR QL STRIP: NORMAL
WBC # BLD AUTO: 6.06 10*3/MM3 (ref 3.4–10.8)
WBC # BLD AUTO: 6.62 10*3/MM3 (ref 3.4–10.8)

## 2021-09-29 PROCEDURE — 83935 ASSAY OF URINE OSMOLALITY: CPT | Performed by: FAMILY MEDICINE

## 2021-09-29 PROCEDURE — 83735 ASSAY OF MAGNESIUM: CPT | Performed by: FAMILY MEDICINE

## 2021-09-29 PROCEDURE — 84484 ASSAY OF TROPONIN QUANT: CPT | Performed by: NURSE PRACTITIONER

## 2021-09-29 PROCEDURE — 83930 ASSAY OF BLOOD OSMOLALITY: CPT | Performed by: FAMILY MEDICINE

## 2021-09-29 PROCEDURE — 83605 ASSAY OF LACTIC ACID: CPT | Performed by: NURSE PRACTITIONER

## 2021-09-29 PROCEDURE — 87635 SARS-COV-2 COVID-19 AMP PRB: CPT | Performed by: NURSE PRACTITIONER

## 2021-09-29 PROCEDURE — 84484 ASSAY OF TROPONIN QUANT: CPT | Performed by: INTERNAL MEDICINE

## 2021-09-29 PROCEDURE — 84145 PROCALCITONIN (PCT): CPT | Performed by: NURSE PRACTITIONER

## 2021-09-29 PROCEDURE — 85025 COMPLETE CBC W/AUTO DIFF WBC: CPT | Performed by: FAMILY MEDICINE

## 2021-09-29 PROCEDURE — 82533 TOTAL CORTISOL: CPT | Performed by: FAMILY MEDICINE

## 2021-09-29 PROCEDURE — 25010000002 ENOXAPARIN PER 10 MG: Performed by: FAMILY MEDICINE

## 2021-09-29 PROCEDURE — 80053 COMPREHEN METABOLIC PANEL: CPT | Performed by: NURSE PRACTITIONER

## 2021-09-29 PROCEDURE — 93010 ELECTROCARDIOGRAM REPORT: CPT | Performed by: INTERNAL MEDICINE

## 2021-09-29 PROCEDURE — 85610 PROTHROMBIN TIME: CPT | Performed by: NURSE PRACTITIONER

## 2021-09-29 PROCEDURE — 85025 COMPLETE CBC W/AUTO DIFF WBC: CPT | Performed by: NURSE PRACTITIONER

## 2021-09-29 PROCEDURE — 81003 URINALYSIS AUTO W/O SCOPE: CPT | Performed by: NURSE PRACTITIONER

## 2021-09-29 PROCEDURE — 87040 BLOOD CULTURE FOR BACTERIA: CPT | Performed by: NURSE PRACTITIONER

## 2021-09-29 PROCEDURE — 99284 EMERGENCY DEPT VISIT MOD MDM: CPT

## 2021-09-29 PROCEDURE — 74176 CT ABD & PELVIS W/O CONTRAST: CPT

## 2021-09-29 PROCEDURE — 71045 X-RAY EXAM CHEST 1 VIEW: CPT

## 2021-09-29 PROCEDURE — 25010000002 ZIPRASIDONE MESYLATE PER 10 MG: Performed by: INTERNAL MEDICINE

## 2021-09-29 PROCEDURE — 84100 ASSAY OF PHOSPHORUS: CPT | Performed by: FAMILY MEDICINE

## 2021-09-29 PROCEDURE — 84300 ASSAY OF URINE SODIUM: CPT | Performed by: FAMILY MEDICINE

## 2021-09-29 PROCEDURE — 70450 CT HEAD/BRAIN W/O DYE: CPT

## 2021-09-29 PROCEDURE — 93005 ELECTROCARDIOGRAM TRACING: CPT | Performed by: INTERNAL MEDICINE

## 2021-09-29 PROCEDURE — 93005 ELECTROCARDIOGRAM TRACING: CPT | Performed by: NURSE PRACTITIONER

## 2021-09-29 RX ORDER — AMOXICILLIN 250 MG
2 CAPSULE ORAL 2 TIMES DAILY
Status: DISCONTINUED | OUTPATIENT
Start: 2021-09-29 | End: 2021-10-01 | Stop reason: HOSPADM

## 2021-09-29 RX ORDER — BISACODYL 5 MG/1
5 TABLET, DELAYED RELEASE ORAL DAILY PRN
Status: DISCONTINUED | OUTPATIENT
Start: 2021-09-29 | End: 2021-10-01 | Stop reason: HOSPADM

## 2021-09-29 RX ORDER — ONDANSETRON 2 MG/ML
4 INJECTION INTRAMUSCULAR; INTRAVENOUS EVERY 6 HOURS PRN
Status: DISCONTINUED | OUTPATIENT
Start: 2021-09-29 | End: 2021-10-01 | Stop reason: HOSPADM

## 2021-09-29 RX ORDER — SODIUM CHLORIDE 9 MG/ML
100 INJECTION, SOLUTION INTRAVENOUS CONTINUOUS
Status: DISCONTINUED | OUTPATIENT
Start: 2021-09-29 | End: 2021-10-01

## 2021-09-29 RX ORDER — ACETAMINOPHEN 325 MG/1
650 TABLET ORAL EVERY 4 HOURS PRN
Status: DISCONTINUED | OUTPATIENT
Start: 2021-09-29 | End: 2021-10-01 | Stop reason: HOSPADM

## 2021-09-29 RX ORDER — SODIUM CHLORIDE 0.9 % (FLUSH) 0.9 %
10 SYRINGE (ML) INJECTION AS NEEDED
Status: DISCONTINUED | OUTPATIENT
Start: 2021-09-29 | End: 2021-10-01 | Stop reason: HOSPADM

## 2021-09-29 RX ORDER — POLYETHYLENE GLYCOL 3350 17 G/17G
17 POWDER, FOR SOLUTION ORAL DAILY PRN
Status: DISCONTINUED | OUTPATIENT
Start: 2021-09-29 | End: 2021-10-01 | Stop reason: HOSPADM

## 2021-09-29 RX ORDER — ALUMINA, MAGNESIA, AND SIMETHICONE 2400; 2400; 240 MG/30ML; MG/30ML; MG/30ML
15 SUSPENSION ORAL EVERY 6 HOURS PRN
Status: DISCONTINUED | OUTPATIENT
Start: 2021-09-29 | End: 2021-10-01 | Stop reason: HOSPADM

## 2021-09-29 RX ORDER — SODIUM CHLORIDE 0.9 % (FLUSH) 0.9 %
10 SYRINGE (ML) INJECTION EVERY 12 HOURS SCHEDULED
Status: DISCONTINUED | OUTPATIENT
Start: 2021-09-29 | End: 2021-10-01 | Stop reason: HOSPADM

## 2021-09-29 RX ORDER — BISACODYL 10 MG
10 SUPPOSITORY, RECTAL RECTAL DAILY PRN
Status: DISCONTINUED | OUTPATIENT
Start: 2021-09-29 | End: 2021-10-01 | Stop reason: HOSPADM

## 2021-09-29 RX ORDER — ZIPRASIDONE MESYLATE 20 MG/ML
20 INJECTION, POWDER, LYOPHILIZED, FOR SOLUTION INTRAMUSCULAR ONCE
Status: COMPLETED | OUTPATIENT
Start: 2021-09-29 | End: 2021-09-29

## 2021-09-29 RX ADMIN — SODIUM CHLORIDE, PRESERVATIVE FREE 10 ML: 5 INJECTION INTRAVENOUS at 20:18

## 2021-09-29 RX ADMIN — SODIUM CHLORIDE, POTASSIUM CHLORIDE, SODIUM LACTATE AND CALCIUM CHLORIDE 1000 ML: 600; 310; 30; 20 INJECTION, SOLUTION INTRAVENOUS at 16:32

## 2021-09-29 RX ADMIN — SODIUM CHLORIDE 75 ML/HR: 9 INJECTION, SOLUTION INTRAVENOUS at 20:15

## 2021-09-29 RX ADMIN — ZIPRASIDONE MESYLATE 20 MG: 20 INJECTION, POWDER, LYOPHILIZED, FOR SOLUTION INTRAMUSCULAR at 21:17

## 2021-09-29 RX ADMIN — ENOXAPARIN SODIUM 40 MG: 40 INJECTION SUBCUTANEOUS at 20:44

## 2021-09-29 RX ADMIN — DOCUSATE SODIUM 50 MG AND SENNOSIDES 8.6 MG 2 TABLET: 8.6; 5 TABLET, FILM COATED ORAL at 20:44

## 2021-09-30 LAB
ANION GAP SERPL CALCULATED.3IONS-SCNC: 5 MMOL/L (ref 5–15)
ANION GAP SERPL CALCULATED.3IONS-SCNC: 7 MMOL/L (ref 5–15)
ANION GAP SERPL CALCULATED.3IONS-SCNC: 8 MMOL/L (ref 5–15)
ANION GAP SERPL CALCULATED.3IONS-SCNC: 8 MMOL/L (ref 5–15)
BASOPHILS # BLD AUTO: 0.04 10*3/MM3 (ref 0–0.2)
BASOPHILS NFR BLD AUTO: 0.7 % (ref 0–1.5)
BUN SERPL-MCNC: 16 MG/DL (ref 8–23)
BUN SERPL-MCNC: 16 MG/DL (ref 8–23)
BUN SERPL-MCNC: 17 MG/DL (ref 8–23)
BUN SERPL-MCNC: 18 MG/DL (ref 8–23)
BUN/CREAT SERPL: 22.2 (ref 7–25)
BUN/CREAT SERPL: 24.6 (ref 7–25)
BUN/CREAT SERPL: 26.6 (ref 7–25)
BUN/CREAT SERPL: 28.1 (ref 7–25)
CALCIUM SPEC-SCNC: 7.8 MG/DL (ref 8.6–10.5)
CALCIUM SPEC-SCNC: 7.9 MG/DL (ref 8.6–10.5)
CALCIUM SPEC-SCNC: 7.9 MG/DL (ref 8.6–10.5)
CALCIUM SPEC-SCNC: 8.1 MG/DL (ref 8.6–10.5)
CHLORIDE SERPL-SCNC: 84 MMOL/L (ref 98–107)
CHLORIDE SERPL-SCNC: 85 MMOL/L (ref 98–107)
CHLORIDE SERPL-SCNC: 89 MMOL/L (ref 98–107)
CHLORIDE SERPL-SCNC: 94 MMOL/L (ref 98–107)
CO2 SERPL-SCNC: 26 MMOL/L (ref 22–29)
CO2 SERPL-SCNC: 26 MMOL/L (ref 22–29)
CO2 SERPL-SCNC: 27 MMOL/L (ref 22–29)
CO2 SERPL-SCNC: 27 MMOL/L (ref 22–29)
CORTIS SERPL-MCNC: 20.06 MCG/DL
CREAT SERPL-MCNC: 0.64 MG/DL (ref 0.76–1.27)
CREAT SERPL-MCNC: 0.64 MG/DL (ref 0.76–1.27)
CREAT SERPL-MCNC: 0.65 MG/DL (ref 0.76–1.27)
CREAT SERPL-MCNC: 0.72 MG/DL (ref 0.76–1.27)
DEPRECATED RDW RBC AUTO: 39.8 FL (ref 37–54)
EOSINOPHIL # BLD AUTO: 0.05 10*3/MM3 (ref 0–0.4)
EOSINOPHIL NFR BLD AUTO: 0.8 % (ref 0.3–6.2)
ERYTHROCYTE [DISTWIDTH] IN BLOOD BY AUTOMATED COUNT: 13.2 % (ref 12.3–15.4)
GFR SERPL CREATININE-BSD FRML MDRD: 105 ML/MIN/1.73
GFR SERPL CREATININE-BSD FRML MDRD: 118 ML/MIN/1.73
GFR SERPL CREATININE-BSD FRML MDRD: 120 ML/MIN/1.73
GFR SERPL CREATININE-BSD FRML MDRD: 120 ML/MIN/1.73
GLUCOSE SERPL-MCNC: 108 MG/DL (ref 65–99)
GLUCOSE SERPL-MCNC: 112 MG/DL (ref 65–99)
GLUCOSE SERPL-MCNC: 127 MG/DL (ref 65–99)
GLUCOSE SERPL-MCNC: 99 MG/DL (ref 65–99)
HCT VFR BLD AUTO: 29.7 % (ref 37.5–51)
HGB BLD-MCNC: 10.1 G/DL (ref 13–17.7)
IMM GRANULOCYTES # BLD AUTO: 0.04 10*3/MM3 (ref 0–0.05)
IMM GRANULOCYTES NFR BLD AUTO: 0.7 % (ref 0–0.5)
LYMPHOCYTES # BLD AUTO: 1.39 10*3/MM3 (ref 0.7–3.1)
LYMPHOCYTES NFR BLD AUTO: 23.5 % (ref 19.6–45.3)
MCH RBC QN AUTO: 27.7 PG (ref 26.6–33)
MCHC RBC AUTO-ENTMCNC: 34 G/DL (ref 31.5–35.7)
MCV RBC AUTO: 81.6 FL (ref 79–97)
MONOCYTES # BLD AUTO: 0.52 10*3/MM3 (ref 0.1–0.9)
MONOCYTES NFR BLD AUTO: 8.8 % (ref 5–12)
NEUTROPHILS NFR BLD AUTO: 3.88 10*3/MM3 (ref 1.7–7)
NEUTROPHILS NFR BLD AUTO: 65.5 % (ref 42.7–76)
NRBC BLD AUTO-RTO: 0 /100 WBC (ref 0–0.2)
PLATELET # BLD AUTO: 202 10*3/MM3 (ref 140–450)
PMV BLD AUTO: 8.6 FL (ref 6–12)
POTASSIUM SERPL-SCNC: 4 MMOL/L (ref 3.5–5.2)
POTASSIUM SERPL-SCNC: 4.1 MMOL/L (ref 3.5–5.2)
POTASSIUM SERPL-SCNC: 4.2 MMOL/L (ref 3.5–5.2)
POTASSIUM SERPL-SCNC: 4.7 MMOL/L (ref 3.5–5.2)
QT INTERVAL: 358 MS
QT INTERVAL: 416 MS
QTC INTERVAL: 397 MS
QTC INTERVAL: 505 MS
RBC # BLD AUTO: 3.64 10*6/MM3 (ref 4.14–5.8)
SODIUM SERPL-SCNC: 119 MMOL/L (ref 136–145)
SODIUM SERPL-SCNC: 119 MMOL/L (ref 136–145)
SODIUM SERPL-SCNC: 120 MMOL/L (ref 136–145)
SODIUM SERPL-SCNC: 128 MMOL/L (ref 136–145)
SODIUM UR-SCNC: <20 MMOL/L
WBC # BLD AUTO: 5.92 10*3/MM3 (ref 3.4–10.8)

## 2021-09-30 PROCEDURE — 80048 BASIC METABOLIC PNL TOTAL CA: CPT | Performed by: FAMILY MEDICINE

## 2021-09-30 PROCEDURE — 25010000002 ENOXAPARIN PER 10 MG: Performed by: FAMILY MEDICINE

## 2021-09-30 PROCEDURE — 85025 COMPLETE CBC W/AUTO DIFF WBC: CPT | Performed by: FAMILY MEDICINE

## 2021-09-30 PROCEDURE — 92610 EVALUATE SWALLOWING FUNCTION: CPT | Performed by: SPEECH-LANGUAGE PATHOLOGIST

## 2021-09-30 RX ADMIN — DOCUSATE SODIUM 50 MG AND SENNOSIDES 8.6 MG 2 TABLET: 8.6; 5 TABLET, FILM COATED ORAL at 23:02

## 2021-09-30 RX ADMIN — ENOXAPARIN SODIUM 40 MG: 40 INJECTION SUBCUTANEOUS at 21:14

## 2021-09-30 RX ADMIN — SODIUM CHLORIDE 100 ML/HR: 9 INJECTION, SOLUTION INTRAVENOUS at 18:46

## 2021-09-30 RX ADMIN — SODIUM CHLORIDE, PRESERVATIVE FREE 10 ML: 5 INJECTION INTRAVENOUS at 21:15

## 2021-09-30 RX ADMIN — SODIUM CHLORIDE 500 ML: 9 INJECTION, SOLUTION INTRAVENOUS at 10:45

## 2021-09-30 RX ADMIN — DOCUSATE SODIUM 50 MG AND SENNOSIDES 8.6 MG 2 TABLET: 8.6; 5 TABLET, FILM COATED ORAL at 14:31

## 2021-10-01 ENCOUNTER — READMISSION MANAGEMENT (OUTPATIENT)
Dept: CALL CENTER | Facility: HOSPITAL | Age: 81
End: 2021-10-01

## 2021-10-01 VITALS
HEIGHT: 73 IN | RESPIRATION RATE: 18 BRPM | HEART RATE: 63 BPM | BODY MASS INDEX: 17.64 KG/M2 | TEMPERATURE: 98.6 F | OXYGEN SATURATION: 98 % | SYSTOLIC BLOOD PRESSURE: 116 MMHG | WEIGHT: 133.1 LBS | DIASTOLIC BLOOD PRESSURE: 50 MMHG

## 2021-10-01 PROBLEM — E43 SEVERE MALNUTRITION (HCC): Status: ACTIVE | Noted: 2019-07-10

## 2021-10-01 LAB
ANION GAP SERPL CALCULATED.3IONS-SCNC: 7 MMOL/L (ref 5–15)
BASOPHILS # BLD AUTO: 0.04 10*3/MM3 (ref 0–0.2)
BASOPHILS NFR BLD AUTO: 0.7 % (ref 0–1.5)
BUN SERPL-MCNC: 15 MG/DL (ref 8–23)
BUN/CREAT SERPL: 25 (ref 7–25)
CALCIUM SPEC-SCNC: 8 MG/DL (ref 8.6–10.5)
CHLORIDE SERPL-SCNC: 97 MMOL/L (ref 98–107)
CO2 SERPL-SCNC: 27 MMOL/L (ref 22–29)
CREAT SERPL-MCNC: 0.6 MG/DL (ref 0.76–1.27)
DEPRECATED RDW RBC AUTO: 42.7 FL (ref 37–54)
EOSINOPHIL # BLD AUTO: 0.07 10*3/MM3 (ref 0–0.4)
EOSINOPHIL NFR BLD AUTO: 1.3 % (ref 0.3–6.2)
ERYTHROCYTE [DISTWIDTH] IN BLOOD BY AUTOMATED COUNT: 13.8 % (ref 12.3–15.4)
GFR SERPL CREATININE-BSD FRML MDRD: 130 ML/MIN/1.73
GLUCOSE SERPL-MCNC: 78 MG/DL (ref 65–99)
HCT VFR BLD AUTO: 32.4 % (ref 37.5–51)
HGB BLD-MCNC: 10.6 G/DL (ref 13–17.7)
IMM GRANULOCYTES # BLD AUTO: 0.03 10*3/MM3 (ref 0–0.05)
IMM GRANULOCYTES NFR BLD AUTO: 0.6 % (ref 0–0.5)
LYMPHOCYTES # BLD AUTO: 1.09 10*3/MM3 (ref 0.7–3.1)
LYMPHOCYTES NFR BLD AUTO: 20.2 % (ref 19.6–45.3)
MCH RBC QN AUTO: 27.5 PG (ref 26.6–33)
MCHC RBC AUTO-ENTMCNC: 32.7 G/DL (ref 31.5–35.7)
MCV RBC AUTO: 83.9 FL (ref 79–97)
MONOCYTES # BLD AUTO: 0.49 10*3/MM3 (ref 0.1–0.9)
MONOCYTES NFR BLD AUTO: 9.1 % (ref 5–12)
NEUTROPHILS NFR BLD AUTO: 3.68 10*3/MM3 (ref 1.7–7)
NEUTROPHILS NFR BLD AUTO: 68.1 % (ref 42.7–76)
NRBC BLD AUTO-RTO: 0 /100 WBC (ref 0–0.2)
PLATELET # BLD AUTO: 217 10*3/MM3 (ref 140–450)
PMV BLD AUTO: 9.3 FL (ref 6–12)
POTASSIUM SERPL-SCNC: 4.1 MMOL/L (ref 3.5–5.2)
RBC # BLD AUTO: 3.86 10*6/MM3 (ref 4.14–5.8)
SODIUM SERPL-SCNC: 131 MMOL/L (ref 136–145)
WBC # BLD AUTO: 5.4 10*3/MM3 (ref 3.4–10.8)

## 2021-10-01 PROCEDURE — 97165 OT EVAL LOW COMPLEX 30 MIN: CPT

## 2021-10-01 PROCEDURE — 80048 BASIC METABOLIC PNL TOTAL CA: CPT | Performed by: FAMILY MEDICINE

## 2021-10-01 PROCEDURE — 97161 PT EVAL LOW COMPLEX 20 MIN: CPT

## 2021-10-01 PROCEDURE — 85025 COMPLETE CBC W/AUTO DIFF WBC: CPT | Performed by: FAMILY MEDICINE

## 2021-10-01 RX ADMIN — SODIUM CHLORIDE, PRESERVATIVE FREE 10 ML: 5 INJECTION INTRAVENOUS at 10:49

## 2021-10-01 RX ADMIN — SODIUM CHLORIDE 100 ML/HR: 9 INJECTION, SOLUTION INTRAVENOUS at 04:36

## 2021-10-01 RX ADMIN — DOCUSATE SODIUM 50 MG AND SENNOSIDES 8.6 MG 2 TABLET: 8.6; 5 TABLET, FILM COATED ORAL at 10:49

## 2021-10-01 NOTE — THERAPY DISCHARGE NOTE
Acute Care - Occupational Therapy Initial Evaluation/Discharge  [facility]     Patient Name: Edy Urbina  : 1940  MRN: [id number]  Today's Date: 10/1/2021  Onset of Illness/Injury or Date of Surgery: 10/29/21  Date of Referral to OT: 10/30/21  Referring Physician: Rajan Adams DO       Admit Date: 2021       ICD-10-CM ICD-9-CM   1. Hyponatremia  E87.1 276.1   2. Lewy body dementia with behavioral disturbance (HCC)  G31.83 331.82    F02.81 294.11   3. Dysphagia, unspecified type  R13.10 787.20   4. Impaired mobility  Z74.09 799.89     Patient Active Problem List   Diagnosis   • Anemia   • Severe malnutrition (HCC)   • Lewy body dementia with behavioral disturbance (HCC)   • Prostate cancer (HCC)   • Hyponatremia   • Cachexia (HCC)     Past Medical History:   Diagnosis Date   • Cataracts, bilateral    • Occasional tremors    • Prostate cancer (CMS/HCC) 2011    t2c,Sommer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    • Varicose vein of leg 2019   • Varicose veins of legs      Past Surgical History:   Procedure Laterality Date   • EYE SURGERY     • HERNIA REPAIR Left    • INGUINAL HERNIA REPAIR Right 2019    Procedure: OPEN RIGHT INGUINAL HERNIA REPAIR WITH MESH;  Surgeon: Alesia Mora MD;  Location: [facility];  Service: General   • PROSTATECTOMY  2011    RALP          OT ASSESSMENT FLOWSHEET (last 12 hours)      OT Evaluation and Treatment     Row Name 10/01/21 0750                   OT Time and Intention    Subjective Information  no complaints  -AC (r) PF (t) AC (c)        Document Type  evaluation  -AC (r) PF (t) AC (c)        Mode of Treatment  occupational therapy  -AC (r) PF (t) AC (c)        Patient Effort  good  -AC (r) PF (t) AC (c)           General Information    Patient Profile Reviewed  yes  -AC (r) PF (t) AC (c)        Onset of Illness/Injury or Date of Surgery  10/29/21  -AC (r) PF (t) AC (c)        Referring Physician  Rajan Adams DO   -AC  (r) PF (t) AC (c)        Prior Level of Function  independent:;all household mobility;community mobility;ADL's;feeding;grooming;dressing;bathing;home management;cooking;cleaning;dependent:;driving  -AC (r) PF (t) AC (c)        Equipment Currently Used at Home  none  -AC (r) PF (t) AC (c)        Pertinent History of Current Functional Problem  Increasing confusion, gradual weight loss, CXR clear, Head CT with no acute findings, decreased awareness of self need, Dx: hyponatremia, PMH: concerns of progressive dementia   -AC (r) PF (t) AC (c)        Existing Precautions/Restrictions  fall  -AC (r) PF (t) AC (c)        Barriers to Rehab  none identified  -AC (r) PF (t) AC (c)           Living Environment    Current Living Arrangements  home/apartment/condo tub shower   -AC (r) PF (t) AC (c)        Home Accessibility  stairs to enter home  -AC (r) PF (t) AC (c)        Lives With  alone  -AC (r) PF (t) AC (c)           Home Main Entrance    Number of Stairs, Main Entrance  two  -AC (r) PF (t) AC (c)        Stair Railings, Main Entrance  none  -AC (r) PF (t) AC (c)           Sensory    Additional Documentation  Sensory Assessment (Somatosensory) (Group)  -AC (r) PF (t) AC (c)           Sensory Assessment (Somatosensory)    Sensory Assessment (Somatosensory)  bilateral UE;sensation intact  -AC (r) PF (t) AC (c)        Bilateral UE Sensory Assessment  deep pressure awareness;light touch awareness;light touch localization;intact  -AC (r) PF (t) AC (c)           Cognition    Orientation Status (Cognition)  oriented to;person;place;time;disoriented to;situation  -AC (r) PF (t) AC (c)           Pain Assessment    Additional Documentation  Pain Scale: Numbers Pre/Post-Treatment (Group)  -AC (r) PF (t) AC (c)           Pain Scale: Numbers Pre/Post-Treatment    Pretreatment Pain Rating  0/10 - no pain  -AC (r) PF (t) AC (c)        Posttreatment Pain Rating  0/10 - no pain  -AC (r) PF (t) AC (c)           Range of Motion  Comprehensive    Comment, General Range of Motion  BUE AROM WFL   -AC (r) PF (t) AC (c)           Strength Comprehensive (MMT)    Comment, General Manual Muscle Testing (MMT) Assessment  BUE strength 4+/5   -AC (r) PF (t) AC (c)           Bed Mobility    Bed Mobility  supine-sit;sit-supine  -AC (r) PF (t) AC (c)        Supine-Sit [place] (Bed Mobility)  modified independence  -AC (r) PF (t) AC (c)        Sit-Supine [place] (Bed Mobility)  modified independence  -AC (r) PF (t) AC (c)        Assistive Device (Bed Mobility)  bed rails;draw sheet;head of bed elevated  -AC (r) PF (t) AC (c)           Functional Mobility    Functional Mobility- Ind. Level  contact guard assist;verbal cues required  -AC (r) PF (t) AC (c)        Functional Mobility- Comment  Pt ambulated to the hallway and BR with CGA.   -AC (r) PF (t) AC (c)           Transfer Assessment/Treatment    Transfers  sit-stand transfer;stand-sit transfer;toilet transfer  -AC (r) PF (t) AC (c)           Transfers    Sit-Stand [place] (Transfers)  standby assist  -AC (r) PF (t) AC (c)        Stand-Sit [place] (Transfers)  standby assist  -AC (r) PF (t) AC (c)        [place] Level (Toilet Transfer)  standby assist  -AC (r) PF (t) AC (c)        Assistive Device (Toilet Transfer)  commode;grab bars/safety frame  -AC (r) PF (t) AC (c)           Toilet Transfer    Type (Toilet Transfer)  stand-sit;sit-stand  -AC (r) PF (t) AC (c)           Safety Issues, Functional Mobility    Safety Issues Affecting Function (Mobility)  awareness of need for assistance;impulsivity  -AC (r) PF (t) AC (c)        Impairments Affecting Function (Mobility)  balance;endurance/activity tolerance;strength  -AC (r) PF (t) AC (c)           Motor Skills    Motor Skills  coordination  -AC (r) PF (t) AC (c)        Coordination  bilateral;upper extremity;WFL WFL ESTEBAN, pt demo resting tremors in BUE   -AC (r) PF (t) AC (c)           Balance    Balance Assessment  sitting  static balance;sitting dynamic balance;standing static balance;standing dynamic balance  -AC (r) PF (t) AC (c)        Static Sitting Balance  WFL;supported;sitting, edge of bed  -AC (r) PF (t) AC (c)        Dynamic Sitting Balance  WFL;unsupported;sitting, edge of bed  -AC (r) PF (t) AC (c)        Static Standing Balance  WFL;unsupported;standing  -AC (r) PF (t) AC (c)        Dynamic Standing Balance  mild impairment;unsupported;standing Pt demo mild lateral left lean when ambulating   -AC (r) PF (t) AC (c)           Activities of Daily Living    BADL Assessment/Intervention  lower body dressing;toileting;grooming  -AC (r) PF (t) AC (c)           Lower Body Dressing Assessment/Training    [place] Level (Lower Body Dressing)  lower body dressing skills;doff;don;socks;independent  -AC (r) PF (t) AC (c)        Position (Lower Body Dressing)  edge of bed sitting  -AC (r) PF (t) AC (c)           Grooming Assessment/Training    [place] Level (Grooming)  grooming skills;wash face, hands;standby assist  -AC (r) PF (t) AC (c)        Position (Grooming)  sink side  -AC (r) PF (t) AC (c)        Comment (Grooming)  Mild impairment noted while pt was reaching for paper towels to dry hands.   -AC (r) PF (t) AC (c)           Toileting Assessment/Training    [place] Level (Toileting)  toileting skills;adjust/manage clothing;independent  -AC (r) PF (t) AC (c)        Assistive Devices (Toileting)  commode;grab bar/safety frame  -AC (r) PF (t) AC (c)        Position (Toileting)  unsupported standing;unsupported sitting  -AC (r) PF (t) AC (c)           BADL Safety/Performance    Impairments, BADL Safety/Performance  balance;endurance/activity tolerance;strength;coordination  -AC (r) PF (t) AC (c)           Wound 09/29/21 2000 Left posterior elbow Skin Tear    Wound - Properties Group Placement Date: 09/29/21  [facility] Placement Time: 2000  -SH Present on Hospital Admission: Y  - Side: Left  - Orientation: posterior  -  Location: elbow  -SH Primary Wound Type: Skin tear  -SH    Retired Wound - Properties Group Date first assessed: 09/29/21  - Time first assessed: 2000  -SH Present on Hospital Admission: Y  -SH Side: Left  -SH Location: elbow  -SH Primary Wound Type: Skin tear  -SH       Plan of Care Review    Plan of Care Reviewed With  patient  -AC (r) PF (t) AC (c)        Progress  improving  -AC (r) PF (t) AC (c)        Outcome Summary  OT Eval completed. Pt oriented to self, place, and time but disoriented to situation. Pt was resting supine in bed on Room air upon arrival. Pt reported 0/10 pain. Pt came to EOB with Mod I. Once EOB pt demo resting tremors in both hands that he reports has been present for many years. Pt demos no deficits with sensation or coordination of FM/GM activities during ESTEBAN. BUE AROM WFL. BUE strength 4+/5. Pt doffed/donned socks at EOB independently. Pt performed sit to stand with SBA. Pt ambulated to the hallway and BR with CGA. Pt required one vc to slow walking pace down in order to maintain safe balance. Pt performed toileting while standing at commode with CGA. Pt performed grooming activity (washing hands) sink side with CGA. Pts BUE tremors present minor impairments for pts ability to complete BADLS however, pt does not recognize deficits in task completion caused by his resting tremors.Pt performed stand to sit back into bed with SBA. Pt was left supine in bed with call light near. OT services are not warranted at this time due to pt being near basline ADL function. Defer to PT as needed. D/C recommendation to home with assistance. Pt may benefit from Home health OT for home evaluation to increase environmnetal safety and to address functional coordination.  -AC (r) PF (t) AC (c)           Positioning and Restraints    Pre-Treatment Position  in bed  -AC (r) PF (t) AC (c)        Post Treatment Position  bed  -AC (r) PF (t) AC (c)        In Bed  supine;fowlers;call light within  reach;encouraged to call for assist;exit alarm on;side rails up x2  -AC (r) PF (t) AC (c)           Therapy Assessment/Plan (OT)    Date of Referral to OT  10/30/21  -AC (r) PF (t) AC (c)        Criteria for Skilled Therapeutic Interventions Met (OT)  no problems identified which require skilled intervention  -AC (r) PF (t) AC (c)        Therapy Frequency (OT)  evaluation only  -AC (r) PF (t) AC (c)           Therapy Plan Review/Discharge Plan (OT)    Therapy Plan Review (OT)  patient  -AC (r) PF (t) AC (c)        Anticipated Discharge Disposition (OT)  home with assist;home with home health  -AC (r) PF (t) AC (c)          User Key  (r) = Recorded By, (t) = Taken By, (c) = Cosigned By    Initials Name Effective Dates    Jose Maldonado, OTR/L, CNT 04/09/19 -     Breanna Razo RN 09/08/21 -     Zacarias Bustamante Jr., OT Student 08/04/21 -           Occupational Therapy Education                 Title: PT OT SLP Therapies (Done)     Topic: Occupational Therapy (Done)     Point: ADL training (Done)     Description:   Instruct learner(s) on proper safety adaptation and remediation techniques during self care or transfers.   Instruct in proper use of assistive devices.              Learning Progress Summary           Patient Acceptance, E,TB, VU by PF at 10/1/2021 0849    Comment: OT POC, BADL training, D/C planning                   Point: Home exercise program (Done)     Description:   Instruct learner(s) on appropriate technique for monitoring, assisting and/or progressing therapeutic exercises/activities.              Learning Progress Summary           Patient Acceptance, E,TB, VU by PF at 10/1/2021 0849    Comment: OT POC, BADL training, D/C planning                   Point: Precautions (Done)     Description:   Instruct learner(s) on prescribed precautions during self-care and functional transfers.              Learning Progress Summary           Patient Acceptance, E,TB, VU by PF at 10/1/2021 0849     Comment: OT POC, BADL training, D/C planning                   Point: Body mechanics (Done)     Description:   Instruct learner(s) on proper positioning and spine alignment during self-care, functional mobility activities and/or exercises.              Learning Progress Summary           Patient Acceptance, E,TB, VU by PF at 10/1/2021 0849    Comment: OT POC, BADL training, D/C planning                               User Key     Initials Effective Dates Name Provider Type Discipline     08/04/21 -  Zacarias León Jr., OT Student OT Student OT                OT Recommendation and Plan  Therapy Frequency (OT): evaluation only  Plan of Care Review  Plan of Care Reviewed With: patient  Progress: improving  Outcome Summary: OT Eval completed. Pt oriented to self, place, and time but disoriented to situation. Pt was resting supine in bed on Room air upon arrival. Pt reported 0/10 pain. Pt came to EOB with Mod I. Once EOB pt demo resting tremors in both hands that he reports has been present for many years. Pt demos no deficits with sensation or coordination of FM/GM activities during ESTEBAN. BUE AROM WFL. BUE strength 4+/5. Pt doffed/donned socks at EOB independently. Pt performed sit to stand with SBA. Pt ambulated to the hallway and BR with CGA. Pt required one vc to slow walking pace down in order to maintain safe balance. Pt performed toileting while standing at commode with CGA. Pt performed grooming activity (washing hands) sink side with CGA. Pts BUE tremors present minor impairments for pts ability to complete BADLS however, pt does not recognize deficits in task completion caused by his resting tremors.Pt performed stand to sit back into bed with SBA. Pt was left supine in bed with call light near. OT services are not warranted at this time due to pt being near basline ADL function. Defer to PT as needed. D/C recommendation to home with assistance. Pt may benefit from Home health OT for home evaluation to  increase environmnetal safety and to address functional coordination.  Plan of Care Reviewed With: patient  Outcome Summary: OT Eval completed. Pt oriented to self, place, and time but disoriented to situation. Pt was resting supine in bed on Room air upon arrival. Pt reported 0/10 pain. Pt came to EOB with Mod I. Once EOB pt demo resting tremors in both hands that he reports has been present for many years. Pt demos no deficits with sensation or coordination of FM/GM activities during ESTEBAN. BUE AROM WFL. BUE strength 4+/5. Pt doffed/donned socks at EOB independently. Pt performed sit to stand with SBA. Pt ambulated to the hallway and BR with CGA. Pt required one vc to slow walking pace down in order to maintain safe balance. Pt performed toileting while standing at commode with CGA. Pt performed grooming activity (washing hands) sink side with CGA. Pts BUE tremors present minor impairments for pts ability to complete BADLS however, pt does not recognize deficits in task completion caused by his resting tremors.Pt performed stand to sit back into bed with SBA. Pt was left supine in bed with call light near. OT services are not warranted at this time due [facility]. Defer to PT as needed. D/C recommendation to home with assistance. Pt may benefit from Home health OT for home evaluation to increase environmnetal safety and to address functional coordination.         Outcome Measures     Row Name 10/01/21 0750             How much help from another is currently needed...    Putting on and taking off regular lower body clothing?  4  -AC (r) PF (t) AC (c)      Bathing (including washing, rinsing, and drying)  3  -AC (r) PF (t) AC (c)      Toileting (which includes using toilet bed pan or urinal)  4  -AC (r) PF (t) AC (c)      Putting on and taking off regular upper body clothing  4  -AC (r) PF (t) AC (c)      Taking care of personal grooming (such as brushing teeth)  3  -AC (r) PF (t) AC (c)       Eating meals  4  -AC (r) PF (t) AC (c)      AM-PAC 6 Clicks Score (OT)  22  -AC (r) PF (t)         Functional Assessment    Outcome Measure Options  AM-PAC 6 Clicks Daily Activity (OT)  -AC (r) PF (t) AC (c)        User Key  (r) = Recorded By, (t) = Taken By, (c) = Cosigned By    Initials Name Provider Type    Jose Maldonado, OTR/L, CNT Occupational Therapist    Zacarias Bustamante Jr., OT Student OT Student          Time Calculation:   Time Calculation- OT     Row Name 10/01/21 0849             Time Calculation- OT    OT Start Time  0750 +10 min chart review  -AC (r) PF (t) AC (c)      OT Stop Time  0835  -AC (r) PF (t) AC (c)      OT Time Calculation (min)  45 min  -AC (r) PF (t)      OT Received On  10/01/21  -AC (r) PF (t) AC (c)        User Key  (r) = Recorded By, (t) = Taken By, (c) = Cosigned By    Initials Name Provider Type    Jose Maldonado, OTR/L, CHRISTIE Occupational Therapist    Zacarias Bustamante Jr., OT Student OT Student                   OT Discharge Summary  Anticipated Discharge Disposition (OT): home with 24/7 care, home with home health  Reason for Discharge: At baseline function  Outcomes Achieved: Other  Discharge Destination: Home with assist, Home with home health    ZACARIAS BAILEY OT Student  10/1/2021

## 2021-10-01 NOTE — CASE MANAGEMENT/SOCIAL WORK
Continued Stay Note   [place]     Patient Name: Edy Urbina  MRN: [id number]  Today's Date: 10/1/2021    Admit Date: 9/29/2021    Discharge Plan     Row Name 10/01/21 1053       Plan    Plan  Referral to [facility]    Provided Post Acute Provider List?  Yes    Post Acute Provider List  Nursing Home    Provided Post Acute Provider Quality & Resource List?  Yes    Post Acute Provider Quality and Resource List  Nursing Home    Delivered To  Support Person    Method of Delivery  Telephone    Patient/Family in Agreement with Plan  yes    Plan Comments  Spoke with sonGlory [phone] and he is aware pt readiness for d/c and recommended SNF/AL.  Son agreeable for pt to be listed at [facility] and per [place] there they have openings. They will call back decision and aware of readiness for discharge. Will follow.        Discharge Codes    No documentation.             HUBER Valdivia

## 2021-10-01 NOTE — PLAN OF CARE
Goal Outcome Evaluation:            Oriented to self, place, and time, not situation. Confused/forgetful. Bed alarm on.  No C/O pain.  IVF infusing as ordered.  Voiding urinal and ambulates to RR. With assist. Lovenox VTE.1500 ml per day fluid restriction.  Resting between care.  Will continue to monitor.

## 2021-10-01 NOTE — PLAN OF CARE
Goal Outcome Evaluation:  Plan of Care Reviewed With: patient        Progress: improving  Outcome Summary: PT eval completed.  Pt pleasant and agreeable to therapy.  Pt oriented to person, place and year.  Required cues for month.  Pt demonstrates AROM all 4 extremities WFLs, good strength, balance and activity tolerance performing gait ~ 400 ft w/ SBA.  Pt does not currently demonstrate need for skilled services.  PT to sign off at this time.  Please reconsult if anything changes.

## 2021-10-01 NOTE — CASE MANAGEMENT/SOCIAL WORK
Continued Stay Note   [place]     Patient Name: Edy Urbina  MRN: [id number]  Today's Date: 10/1/2021    Admit Date: 9/29/2021    Discharge Plan     Row Name 10/01/21 1432       Plan    Plan  [facility]    Patient/Family in Agreement with Plan  yes    Final Discharge Disposition Code  01 - home or self-care    Final Note  Pt does not qualify for SNF level care.  Jb-son called [facility] and room available for admission there today.  Informed RN to call son when pt is ready to leave.        Discharge Codes    No documentation.             HUBER Valdivia

## 2021-10-01 NOTE — PLAN OF CARE
Goal Outcome Evaluation:  Plan of Care Reviewed With: patient        Progress: improving  Outcome Summary: OT Eval completed. Pt oriented to self, place, and time but disoriented to situation. Pt was resting supine in bed on Room air upon arrival. Pt reported 0/10 pain. Pt came to EOB with Mod I. Once EOB pt demo resting tremors in both hands that he reports has been present for many years. Pt demos no deficits with sensation or coordination of FM/GM activities during ESTEBAN. BUE AROM WFL. BUE strength 4+/5. Pt doffed/donned socks at EOB independently. Pt performed sit to stand with SBA. Pt ambulated to the hallway and BR with CGA. Pt required one vc to slow walking pace down in order to maintain safe balance. Pt performed toileting while standing at commode with CGA. Pt performed grooming activity (washing hands) sink side with CGA. Pts BUE tremors present minor impairments for pts ability to complete BADLS however, pt does not recognize deficits in task completion caused by his resting tremors.Pt performed stand to sit back into bed with SBA. Pt was left supine in bed with call light near. OT services are not warranted at this time due to pt being near basline ADL function. Defer to PT as needed. D/C recommendation to home with assistance. Pt may benefit from Home health OT for home evaluation to increase environmnetal safety and to address functional coordination.   Yes

## 2021-10-01 NOTE — THERAPY EVALUATION
Patient Name: Edy Urbina  : 1940    MRN: [id number]                              Today's Date: 10/1/2021       Admit Date: 2021    Visit Dx:     ICD-10-CM ICD-9-CM   1. Hyponatremia  E87.1 276.1   2. Lewy body dementia with behavioral disturbance (HCC)  G31.83 331.82    F02.81 294.11   3. Dysphagia, unspecified type  R13.10 787.20   4. Impaired mobility  Z74.09 799.89     Patient Active Problem List   Diagnosis   • Anemia   • Severe malnutrition (HCC)   • Lewy body dementia with behavioral disturbance (HCC)   • Prostate cancer (HCC)   • Hyponatremia   • Cachexia (HCC)     Past Medical History:   Diagnosis Date   • Cataracts, bilateral    • Occasional tremors    • Prostate cancer (CMS/HCC) 2011    t2c,[place]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    • Varicose vein of leg 2019   • Varicose veins of legs      Past Surgical History:   Procedure Laterality Date   • EYE SURGERY     • HERNIA REPAIR Left    • INGUINAL HERNIA REPAIR Right 2019    Procedure: OPEN RIGHT INGUINAL HERNIA REPAIR WITH MESH;  Surgeon: Alesia Mora MD;  Location: [facility] OR;  Service: General   • PROSTATECTOMY  2011    RALP     General Information     Row Name 10/01/21 1121          Physical Therapy Time and Intention    Document Type  evaluation;other (see comments) see MAR  -VAIBHAV     Mode of Treatment  physical therapy  -VAIBHAV     Row Name 10/01/21 1121          General Information    Patient Profile Reviewed  yes  -JE     Prior Level of Function  dependent:;driving;mod assist:;shopping;independent:;all household mobility;community mobility;ADL's dtr drives him  -JE     Existing Precautions/Restrictions  fall  -JE     Row Name 10/01/21 1121          Living Environment    Lives With  alone  -VAIBHAV     Row Name 10/01/21 1121          Home Main Entrance    Number of Stairs, Main Entrance  none  -JE     Row Name 10/01/21 1121          Stairs Within Home, Primary    Number of Stairs, Within Home, Primary  none   -[facility] Name 10/01/21 1121          Cognition    Orientation Status (Cognition)  (S) oriented to;person;place;other (see comments) to year, required cues for month  -[facility] Name 10/01/21 1121          Safety Issues, Functional Mobility    Safety Issues Affecting Function (Mobility)  safety precaution awareness  -       User Key  (r) = Recorded By, (t) = Taken By, (c) = Cosigned By    Initials Name Provider Type    Maria G Zapien, PT Physical Therapist        Mobility     Row Name 10/01/21 1121          Bed Mobility    Bed Mobility  scooting/bridging;supine-sit;sit-supine  -     Scooting/Bridging [place] (Bed Mobility)  independent;standby assist  -     Supine-Sit [place] (Bed Mobility)  independent;standby assist  -     Sit-Supine [place] (Bed Mobility)  independent;standby assist  -     Assistive Device (Bed Mobility)  head of bed elevated  -[facility] Name 10/01/21 1121          Sit-Stand Transfer    Sit-Stand [place] (Transfers)  standby assist;independent  -[facility] Name 10/01/21 1121          Gait/Stairs (Locomotion)    [place] Level (Gait)  standby assist;independent  -     Distance in Feet (Gait)  400 ft  -     Comment (Gait/Stairs)  good step length, foot clearance and gait speed; performed 360 degree turn w/ continuous stepping and no LOB, sternal nudge X 3, and reaching for targets w/out LOB  -       User Key  (r) = Recorded By, (t) = Taken By, (c) = Cosigned By    Initials Name Provider Type    Maria G Zapien, PT Physical Therapist        Obj/Interventions     Row Name 10/01/21 1121          Range of Motion Comprehensive    General Range of Motion  no range of motion deficits identified  -[facility] Name 10/01/21 1121          Strength Comprehensive (MMT)    General Manual Muscle Testing (MMT) Assessment  no strength deficits identified  -[facility] Name 10/01/21 1121          Motor Skills    Motor Skills  other (see comments) intact to finger  opposition  -     Row Name 10/01/21 1121          Balance    Balance Assessment  sitting static balance;sitting dynamic balance;standing static balance;standing dynamic balance  -     Static Sitting Balance  WNL;unsupported;sitting, edge of bed  -     Dynamic Sitting Balance  WFL;unsupported;sitting, edge of bed  -     Static Standing Balance  WFL;unsupported;standing  -     Dynamic Standing Balance  WFL;unsupported;standing  -     Row Name 10/01/21 1121          Sensory Assessment (Somatosensory)    Sensory Assessment (Somatosensory)  sensation intact  -       User Key  (r) = Recorded By, (t) = Taken By, (c) = Cosigned By    Initials Name Provider Type    Maria G Zapien, PT Physical Therapist        Goals/Plan    No documentation.       Clinical Impression     [facility] Name 10/01/21 1121          Pain    Additional Documentation  Pain Scale: Numbers Pre/Post-Treatment (Group)  -[facility] Name 10/01/21 1121          Pain Scale: Numbers Pre/Post-Treatment    Pretreatment Pain Rating  0/10 - no pain  -     Posttreatment Pain Rating  0/10 - no pain  -[facility] Name 10/01/21 1121          Plan of Care Review    Plan of Care Reviewed With  patient  -     Progress  improving  -     Outcome Summary  PT eval completed.  Pt pleasant and agreeable to therapy.  Pt oriented to person, place and year.  Required cues for month.  Pt demonstrates AROM all 4 extremities WFLs, good strength, balance and activity tolerance performing gait ~ 400 ft w/ SBA.  Pt does not currently demonstrate need for skilled services.  PT to sign off at this time.  Please reconsult if anything changes.  -     Row Name 10/01/21 1121          Therapy Assessment/Plan (PT)    Patient/Family Therapy Goals Statement (PT)  return to prior level of function  -     Criteria for Skilled Interventions Met (PT)  no;no problems identified which require skilled intervention  -     Predicted Duration of Therapy Intervention (PT)  one visit  for eval only  -     Row Name 10/01/21 1121          Vital Signs    O2 Delivery Pre Treatment  room air  -     O2 Delivery Intra Treatment  room air  -     O2 Delivery Post Treatment  room air  -JE     Pre Patient Position  Supine  -     Intra Patient Position  Standing  -     Post Patient Position  Supine  -     Row Name 10/01/21 1121          Positioning and Restraints    Pre-Treatment Position  in bed  -JE     Post Treatment Position  bed  -JE     In Bed  supine;call light within reach;encouraged to call for assist;exit alarm on;side rails up x2;legs elevated  -       User Key  (r) = Recorded By, (t) = Taken By, (c) = Cosigned By    Initials Name Provider Type    Maria G Zapien, PT Physical Therapist        Outcome Measures     Row Name 10/01/21 1121          How much help from another person do you currently need...    Turning from your back to your side while in flat bed without using bedrails?  4  -JE     Moving from lying on back to sitting on the side of a flat bed without bedrails?  4  -JE     Moving to and from a bed to a chair (including a wheelchair)?  4  -JE     Standing up from a chair using your arms (e.g., wheelchair, bedside chair)?  4  -JE     Climbing 3-5 steps with a railing?  3  -JE     To walk in hospital room?  4  -     AM-PAC 6 Clicks Score (PT)  23  -     Row Name 10/01/21 1121 10/01/21 0750       Functional Assessment    Outcome Measure Options  AM-PAC 6 Clicks Basic Mobility (PT)  -  AM-PAC 6 Clicks Daily Activity (OT)  (Pended)   -PF      User Key  (r) = Recorded By, (t) = Taken By, (c) = Cosigned By    Initials Name Provider Type    Maria G Zapien, PT Physical Therapist    Zacarias Bustamante Jr., OT Student OT Student                       Physical Therapy Education                 Title: PT OT SLP Therapies (Done)     Topic: Physical Therapy (Done)     Point: Precautions (Done)     Learning Progress Summary           Patient Acceptance, E,TB, VU,NR by [facility]AV at  10/1/2021 1154    Comment: Education re: purpose of PT/importance of activity, for safety/falls prevention w/ emphasis on calling out for assist to be up out of bed                               User Key     Initials Effective Dates Name Provider Type [facility]    VAIBHAV 08/02/18 -  Maria G Peterson, PT Physical Therapist PT              PT Recommendation and Plan     Plan of Care Reviewed With: patient  Progress: improving  Outcome Summary: PT eval completed.  Pt pleasant and agreeable to therapy.  Pt oriented to person, place and year.  Required cues for month.  Pt demonstrates AROM all 4 extremities WFLs, good strength, balance and activity tolerance performing gait ~ 400 ft w/ SBA.  Pt does not currently demonstrate need for skilled services.  PT to sign off at this time.  Please reconsult if anything changes.     Time Calculation:   PT Charges     Row Name 10/01/21 1149             Time Calculation    Start Time  1121  -      Stop Time  1149  -      Time Calculation (min)  28 min  -      PT Received On  10/01/21  -        User Key  (r) = Recorded By, (t) = Taken By, (c) = Cosigned By    Initials Name Provider Type    Maria G Zapien, PT Physical Therapist        Therapy Charges for Today     Code Description Service Date Service Provider Modifiers Qty    [id number] HC PT EVAL LOW COMPLEXITY 2 10/1/2021 Maria G Peterson, PT GP 1          PT G-Codes  Outcome Measure Options: AM-PAC 6 Clicks Basic Mobility (PT)  AM-PAC 6 Clicks Score (PT): 23  AM-PAC 6 Clicks Score (OT): (P) 22    Maria G Peterson PT  10/1/2021

## 2021-10-01 NOTE — DISCHARGE SUMMARY
[facility] Medicine Services  DISCHARGE SUMMARY       Date of Admission: 9/29/2021  Date of Discharge:  10/1/2021  Primary Care Physician: Torey Amato,     Discharge Diagnoses:  Active Hospital Problems    Diagnosis    • **Hyponatremia    • Cachexia (HCC)    • Prostate cancer (HCC)    • Lewy body dementia with behavioral disturbance (HCC)    • Severe malnutrition (HCC)    • Anemia          Presenting Problem/History of Present Illness:  Hyponatremia [E87.1]  Hyponatremia [E87.1]     Chief Complaint on Day of Discharge:   No complaint    History of Present Illness on Day of Discharge:   Sensorium continues to improve.  He seems to be back to his cognitive baseline.  The patient is cooperative, alert and oriented.  Sodium has improved to 131 and the patient has stable for discharge.  He does not qualify for SNF placement.  The patient's son was notified and they have arranged for him to be placed at an assisted living facility today.  The patient is eating and drinking well when his meals have been set up for him.    Hospital Course  This 80-year-old male was brought to the emergency department by his daughter for a 2 to 3-week history of increasing confusion.  The patient lives alone and his daughter visits him on a daily basis to see how he is doing.  She has noted increasing confusion over the past 2 to 3 weeks.  She has also noted gradual weight loss over the past 3 to 4 months.  Patient has Meals on Wheels delivered to his home but his intake has been decreasing.  The patient's daughter confirms that the patient rarely mentions being hungry or thirsty.  She notes a decrease in caloric intake as well.  The patient's daughter notes that he has experienced decreased awareness of self need.  She noted that he was alone at home with his pants on but with no shirt on.  He indicated to his daughter that he was cold but was unaware of how to alleviate his coldness by simply  putting on his shirt.  He has done that now for several days without recognizing that he was only partially clothed.  The patient's daughter is concerned about the progressive nature of his dementia.  Ultimately, there is concern that the patient's dementia may include alterations in self-awareness leading to self-neglect and subsequent electrolyte disturbance.  The alternative would be an underlying condition leading to hyponatremia which has affected his cognitive status.  Given the long-term and progressive nature of the patient's weight loss with gradual evolution of self-neglect and lack of self-awareness, I suspect the former rather than the latter leading to a hypovolemic hyponatremia.  To that end, the emergency room physician has already administered 1 L of lactated Ringers.     Work-up in the emergency department reveals a CMP that shows a sodium of 116, BUN 25, chloride 82.  BUN/creatinine ratio is increased at 30.9.  INR is 1.14.  Troponin, lactate, procalcitonin all within normal limits.  CBC shows a normal white blood cell count with hemoglobin 10.1.  Urinalysis is negative.  Covid swab was negative.  Chest x-ray shows no evidence of acute cardiopulmonary process.  CT scan of the head reveals no acute intracranial process.     The patient's daughter states that the patient's current situation is no longer safe at home and that she is aware that he is unable to care for himself any longer.  They would like to pursue SNF     PLAN:   Admit to the intensive care unit  Normal saline at 75 cc/h  1500 cc daily fluid restriction  Serum and urine osmolality  Cortisol level  BMP every 6 hours  Attempt to restrict sodium increased to 12 mmol every 24 hours   consultation for nursing home placement  Regular diet    On the day of admission the patient was significantly improved from a cognitive standpoint with reduction confusion.  He knew the president was, the season of the year and what hospital  he was in.  Sodium had improved slightly to 120.  Patient received a 500 cc normal saline bolus followed by normal saline 100 cc/h.  Likeliest cause of hyponatremia is hypovolemia.  He has not reliably been eating or drinking at home.  He does extremely well here with both food and fluid consumption as long as his meals are set up for him.  He will not qualify for SNF placement but the patient's son has arranged for assisted living care for him.    Pertinent Test Results:   Lab Results (last 7 days)     Procedure Component Value Units Date/Time    Basic Metabolic Panel [[id number]]  (Abnormal) Collected: 10/01/21 0729    Specimen: Blood Updated: 10/01/21 0810     Glucose 78 mg/dL      BUN 15 mg/dL      Creatinine 0.60 mg/dL      Sodium 131 mmol/L      Potassium 4.1 mmol/L      Chloride 97 mmol/L      CO2 27.0 mmol/L      Calcium 8.0 mg/dL      eGFR Non African Amer 130 mL/min/1.73      BUN/Creatinine Ratio 25.0     Anion Gap 7.0 mmol/L     Narrative:      GFR Normal >60  Chronic Kidney Disease <60  Kidney Failure <15      CBC & Differential [[id number]]  (Abnormal) Collected: 10/01/21 0437    Specimen: Blood Updated: 10/01/21 0500    Narrative:      The following orders were created for panel order CBC & Differential.  Procedure                               Abnormality         Status                     ---------                               -----------         ------                     CBC Auto Differential[[id number]]        Abnormal            Final result                 Please view results for these tests on the individual orders.    CBC Auto Differential [[id number]]  (Abnormal) Collected: 10/01/21 0437    Specimen: Blood Updated: 10/01/21 0500     WBC 5.40 10*3/mm3      RBC 3.86 10*6/mm3      Hemoglobin 10.6 g/dL      Hematocrit 32.4 %      MCV 83.9 fL      MCH 27.5 pg      MCHC 32.7 g/dL      RDW 13.8 %      RDW-SD 42.7 fl      MPV 9.3 fL      Platelets 217 10*3/mm3      Neutrophil % 68.1 %      Lymphocyte %  20.2 %      Monocyte % 9.1 %      Eosinophil % 1.3 %      Basophil % 0.7 %      Immature Grans % 0.6 %      Neutrophils, Absolute 3.68 10*3/mm3      Lymphocytes, Absolute 1.09 10*3/mm3      Monocytes, Absolute 0.49 10*3/mm3      Eosinophils, Absolute 0.07 10*3/mm3      Basophils, Absolute 0.04 10*3/mm3      Immature Grans, Absolute 0.03 10*3/mm3      nRBC 0.0 /100 WBC     Basic Metabolic Panel [[id number]]  (Abnormal) Collected: 09/30/21 1849    Specimen: Blood Updated: 09/30/21 1916     Glucose 112 mg/dL      BUN 16 mg/dL      Creatinine 0.72 mg/dL      Sodium 128 mmol/L      Potassium 4.7 mmol/L      Comment: Slight hemolysis detected by analyzer. Results may be affected.        Chloride 94 mmol/L      CO2 26.0 mmol/L      Calcium 7.9 mg/dL      eGFR Non African Amer 105 mL/min/1.73      BUN/Creatinine Ratio 22.2     Anion Gap 8.0 mmol/L     Narrative:      GFR Normal >60  Chronic Kidney Disease <60  Kidney Failure <15      Blood Culture - Blood, Arm, Left [[id number]] Collected: 09/29/21 1628    Specimen: Blood from Arm, Left Updated: 09/30/21 1815     Blood Culture No growth at 24 hours    Blood Culture - Blood, Arm, Right [[id number]] Collected: 09/29/21 1628    Specimen: Blood from Arm, Right Updated: 09/30/21 1815     Blood Culture No growth at 24 hours    Cortisol [[id number]] Collected: 09/29/21 2003    Specimen: Blood Updated: 09/30/21 1236     Cortisol 20.06 mcg/dL     Narrative:      Cortisol Reference Ranges:    Cortisol 6AM - 10AM Range: 6.02-18.40 mcg/dl  Cortisol 4PM - 8PM Range: 2.68-10.50 mcg/dl      Results may be falsely increased if patient taking Biotin.      Basic Metabolic Panel [[id number]]  (Abnormal) Collected: 09/30/21 1115    Specimen: Blood Updated: 09/30/21 1143     Glucose 127 mg/dL      BUN 16 mg/dL      Creatinine 0.65 mg/dL      Sodium 120 mmol/L      Potassium 4.2 mmol/L      Comment: Slight hemolysis detected by analyzer. Results may be affected.        Chloride 89 mmol/L      CO2  26.0 mmol/L      Calcium 7.8 mg/dL      eGFR Non African Amer 118 mL/min/1.73      BUN/Creatinine Ratio 24.6     Anion Gap 5.0 mmol/L     Narrative:      GFR Normal >60  Chronic Kidney Disease <60  Kidney Failure <15      Sodium, Urine, Random - Urine, Clean Catch [[id number]] Collected: 09/29/21 1622    Specimen: Urine, Clean Catch Updated: 09/30/21 0938     Sodium, Urine <20 mmol/L     Narrative:      Reference intervals for random urine have not been established.  Clinical usage is dependent upon physician's interpretation in combination with other laboratory tests.       Basic Metabolic Panel [[id number]]  (Abnormal) Collected: 09/30/21 0319    Specimen: Blood Updated: 09/30/21 0419     Glucose 108 mg/dL      BUN 17 mg/dL      Creatinine 0.64 mg/dL      Sodium 119 mmol/L      Potassium 4.1 mmol/L      Chloride 85 mmol/L      CO2 27.0 mmol/L      Calcium 7.9 mg/dL      eGFR Non African Amer 120 mL/min/1.73      BUN/Creatinine Ratio 26.6     Anion Gap 7.0 mmol/L     Narrative:      GFR Normal >60  Chronic Kidney Disease <60  Kidney Failure <15      Basic Metabolic Panel [[id number]]  (Abnormal) Collected: 09/30/21 0055    Specimen: Blood Updated: 09/30/21 0136     Glucose 99 mg/dL      BUN 18 mg/dL      Creatinine 0.64 mg/dL      Sodium 119 mmol/L      Potassium 4.0 mmol/L      Chloride 84 mmol/L      CO2 27.0 mmol/L      Calcium 8.1 mg/dL      eGFR Non African Amer 120 mL/min/1.73      BUN/Creatinine Ratio 28.1     Anion Gap 8.0 mmol/L     Narrative:      GFR Normal >60  Chronic Kidney Disease <60  Kidney Failure <15      CBC & Differential [[id number]]  (Abnormal) Collected: 09/30/21 0055    Specimen: Blood Updated: 09/30/21 0116    Narrative:      The following orders were created for panel order CBC & Differential.  Procedure                               Abnormality         Status                     ---------                               -----------         ------                     CBC Auto  Differential[[id number]]        Abnormal            Final result                 Please view results for these tests on the individual orders.    CBC Auto Differential [[id number]]  (Abnormal) Collected: 09/30/21 0055    Specimen: Blood Updated: 09/30/21 0116     WBC 5.92 10*3/mm3      RBC 3.64 10*6/mm3      Hemoglobin 10.1 g/dL      Hematocrit 29.7 %      MCV 81.6 fL      MCH 27.7 pg      MCHC 34.0 g/dL      RDW 13.2 %      RDW-SD 39.8 fl      MPV 8.6 fL      Platelets 202 10*3/mm3      Neutrophil % 65.5 %      Lymphocyte % 23.5 %      Monocyte % 8.8 %      Eosinophil % 0.8 %      Basophil % 0.7 %      Immature Grans % 0.7 %      Neutrophils, Absolute 3.88 10*3/mm3      Lymphocytes, Absolute 1.39 10*3/mm3      Monocytes, Absolute 0.52 10*3/mm3      Eosinophils, Absolute 0.05 10*3/mm3      Basophils, Absolute 0.04 10*3/mm3      Immature Grans, Absolute 0.04 10*3/mm3      nRBC 0.0 /100 WBC     Troponin [[id number]]  (Normal) Collected: 09/29/21 2003    Specimen: Blood Updated: 09/29/21 2039     Troponin T <0.010 ng/mL     Narrative:      Troponin T Reference Range:  <= 0.03 ng/mL-   Negative for AMI  >0.03 ng/mL-     Abnormal for myocardial necrosis.  Clinicians would have to utilize clinical acumen, EKG, Troponin and serial changes to determine if it is an Acute Myocardial Infarction or myocardial injury due to an underlying chronic condition.       Results may be falsely decreased if patient taking Biotin.      Osmolality, Serum [[id number]]  (Abnormal) Collected: 09/29/21 2003    Specimen: Blood Updated: 09/29/21 2024     Osmolality 248 mOsm/kg     Osmolality, Urine - Urine, Clean Catch [[id number]]  (Normal) Collected: 09/29/21 1622    Specimen: Urine, Clean Catch Updated: 09/29/21 2015     Osmolality, Urine 176 mOsm/kg     CBC & Differential [[id number]]  (Abnormal) Collected: 09/29/21 2003    Specimen: Blood Updated: 09/29/21 2011    Narrative:      The following orders were created for panel order CBC &  Differential.  Procedure                               Abnormality         Status                     ---------                               -----------         ------                     CBC Auto Differential[[id number]]        Abnormal            Final result                 Please view results for these tests on the individual orders.    CBC Auto Differential [[id number]]  (Abnormal) Collected: 09/29/21 2003    Specimen: Blood Updated: 09/29/21 2011     WBC 6.62 10*3/mm3      RBC 3.75 10*6/mm3      Hemoglobin 10.3 g/dL      Hematocrit 31.2 %      MCV 83.2 fL      MCH 27.5 pg      MCHC 33.0 g/dL      RDW 13.2 %      RDW-SD 40.0 fl      MPV 8.6 fL      Platelets 218 10*3/mm3      Neutrophil % 68.8 %      Lymphocyte % 20.4 %      Monocyte % 8.0 %      Eosinophil % 1.1 %      Basophil % 0.6 %      Immature Grans % 1.1 %      Neutrophils, Absolute 4.56 10*3/mm3      Lymphocytes, Absolute 1.35 10*3/mm3      Monocytes, Absolute 0.53 10*3/mm3      Eosinophils, Absolute 0.07 10*3/mm3      Basophils, Absolute 0.04 10*3/mm3      Immature Grans, Absolute 0.07 10*3/mm3      nRBC 0.0 /100 WBC     Phosphorus [[id number]]  (Normal) Collected: 09/29/21 1628    Specimen: Blood Updated: 09/29/21 1952     Phosphorus 2.9 mg/dL     Magnesium [[id number]]  (Normal) Collected: 09/29/21 1628    Specimen: Blood Updated: 09/29/21 1949     Magnesium 1.9 mg/dL     COVID PRE-OP / PRE-PROCEDURE SCREENING ORDER (NO ISOLATION) - Swab, Nasal Cavity [[id number]]  (Normal) Collected: 09/29/21 1620    Specimen: Swab from Nasal Cavity Updated: 09/29/21 1723    Narrative:      The following orders were created for panel order COVID PRE-OP / PRE-PROCEDURE SCREENING ORDER (NO ISOLATION) - Swab, Nasal Cavity.  Procedure                               Abnormality         Status                     ---------                               -----------         ------                     COVID-19,[facility] [employer] [facility]...[[id number]]  Normal              Final result        "          Please view results for these tests on the individual orders.    COVID-19,[facility] [employer] IN-HOUSE,Nasal Swab No Transport Media 3-4 HR TAT - Swab, Nasal Cavity [[id number]]  (Normal) Collected: 09/29/21 1620    Specimen: Swab from Nasal Cavity Updated: 09/29/21 1723     COVID19 Not Detected    Narrative:      Fact sheet for providers: https://www.fda.gov/media/263345/download     Fact sheet for patients: https://www.fda.gov/media/181295/download    Test performed by PCR.    Consider negative results in combination with clinical observations, patient history, and epidemiological information.  Fact sheet for providers: https://www.fda.gov/media/339182/download     Fact sheet for patients: https://www.fda.gov/media/126889/download    Test performed by PCR.    Consider negative results in combination with clinical observations, patient history, and epidemiological information.    Procalcitonin [[id number]]  (Normal) Collected: 09/29/21 1628    Specimen: Blood Updated: 09/29/21 1712     Procalcitonin 0.16 ng/mL     Narrative:      As a Marker for Sepsis (Non-Neonates):     1. <0.5 ng/mL represents a low risk of severe sepsis and/or septic shock.  2. >2 ng/mL represents a high risk of severe sepsis and/or septic shock.    As a Marker for Lower Respiratory Tract Infections that require antibiotic therapy:  PCT on Admission     Antibiotic Therapy             6-12 Hrs later  >0.5                          Strongly Recommended            >0.25 - <0.5             Recommended  0.1 - 0.25                  Discouraged                       Remeasure/reassess PCT  <0.1                         Strongly Discouraged         Remeasure/reassess PCT      As 28 day mortality risk marker: \"Change in Procalcitonin Result\" (>80% or <=80%) if Day 0 (or Day 1) and Day 4 values are available. Refer to http://www.Guides-pct-calculator.com/    Change in PCT <=80 %   A decrease of PCT levels below or equal to 80% defines a positive change in PCT " test result representing a higher risk for 28-day all-cause mortality of patients diagnosed with severe sepsis or septic shock.    Change in PCT >80 %   A decrease of PCT levels of more than 80% defines a negative change in PCT result representing a lower risk for 28-day all-cause mortality of patients diagnosed with severe sepsis or septic shock.                Comprehensive Metabolic Panel [[id number]]  (Abnormal) Collected: 09/29/21 1628    Specimen: Blood Updated: 09/29/21 1708     Glucose 117 mg/dL      BUN 25 mg/dL      Creatinine 0.81 mg/dL      Sodium 116 mmol/L      Potassium 4.5 mmol/L      Chloride 82 mmol/L      CO2 28.0 mmol/L      Calcium 8.2 mg/dL      Total Protein 6.0 g/dL      Albumin 3.80 g/dL      ALT (SGPT) 22 U/L      AST (SGOT) 26 U/L      Alkaline Phosphatase 52 U/L      Total Bilirubin 0.5 mg/dL      eGFR Non African Amer 92 mL/min/1.73      Globulin 2.2 gm/dL      A/G Ratio 1.7 g/dL      BUN/Creatinine Ratio 30.9     Anion Gap 6.0 mmol/L     Narrative:      GFR Normal >60  Chronic Kidney Disease <60  Kidney Failure <15      Troponin [[id number]]  (Normal) Collected: 09/29/21 1628    Specimen: Blood Updated: 09/29/21 1702     Troponin T <0.010 ng/mL     Narrative:      Troponin T Reference Range:  <= 0.03 ng/mL-   Negative for AMI  >0.03 ng/mL-     Abnormal for myocardial necrosis.  Clinicians would have to utilize clinical acumen, EKG, Troponin and serial changes to determine if it is an Acute Myocardial Infarction or myocardial injury due to an underlying chronic condition.       Results may be falsely decreased if patient taking Biotin.      Lactic Acid, Plasma [[id number]]  (Normal) Collected: 09/29/21 1628    Specimen: Blood Updated: 09/29/21 1659     Lactate 1.1 mmol/L     Protime-INR [[id number]]  (Abnormal) Collected: 09/29/21 1628    Specimen: Blood Updated: 09/29/21 1653     Protime 14.2 Seconds      INR 1.14    Urinalysis With Culture If Indicated - Urine, Clean Catch [[id number]]   (Normal) Collected: 09/29/21 1622    Specimen: Urine, Clean Catch Updated: 09/29/21 1648     Color, UA Yellow     Appearance, UA Clear     pH, UA 6.5     Specific Gravity, UA 1.006     Glucose, UA Negative     Ketones, UA Negative     Bilirubin, UA Negative     Blood, UA Negative     Protein, UA Negative     Leuk Esterase, UA Negative     Nitrite, UA Negative     Urobilinogen, UA 0.2 E.U./dL    Narrative:      Urine microscopic not indicated.    CBC & Differential [[id number]]  (Abnormal) Collected: 09/29/21 1628    Specimen: Blood Updated: 09/29/21 1646    Narrative:      The following orders were created for panel order CBC & Differential.  Procedure                               Abnormality         Status                     ---------                               -----------         ------                     CBC Auto Differential[[id number]]        Abnormal            Final result                 Please view results for these tests on the individual orders.    CBC Auto Differential [[id number]]  (Abnormal) Collected: 09/29/21 1628    Specimen: Blood Updated: 09/29/21 1646     WBC 6.06 10*3/mm3      RBC 3.62 10*6/mm3      Hemoglobin 10.1 g/dL      Hematocrit 29.6 %      MCV 81.8 fL      MCH 27.9 pg      MCHC 34.1 g/dL      RDW 13.4 %      RDW-SD 40.1 fl      MPV 8.9 fL      Platelets 224 10*3/mm3      Neutrophil % 69.3 %      Lymphocyte % 19.5 %      Monocyte % 8.7 %      Eosinophil % 1.0 %      Basophil % 0.8 %      Immature Grans % 0.7 %      Neutrophils, Absolute 4.20 10*3/mm3      Lymphocytes, Absolute 1.18 10*3/mm3      Monocytes, Absolute 0.53 10*3/mm3      Eosinophils, Absolute 0.06 10*3/mm3      Basophils, Absolute 0.05 10*3/mm3      Immature Grans, Absolute 0.04 10*3/mm3      nRBC 0.0 /100 WBC         Imaging Results (Last 7 Days)     Procedure Component Value Units Date/Time    CT Abdomen Pelvis Without Contrast [[id number]] Collected: 09/29/21 2004     Updated: 09/29/21 2010    Narrative:      CT ABDOMEN  PELVIS WO CONTRAST- 9/29/2021 7:49 PM CDT     HISTORY: Abdominal pain, acute, nonlocalized; E87.0-Ksli-nrvuzuysbo and  hyponatremia; G31.83-Dementia with Lewy bodies; F02.81-Dementia in other  diseases classified elsewhere with behavioral disturbance      COMPARISON: None     DOSE LENGTH PRODUCT: 149 mGy cm. Automated exposure control was also  utilized to decrease patient radiation dose.     TECHNIQUE: Axial images the abdomen pelvis are obtained without IV or  oral contrast. Lack of IV contrast degrades sensitivity of this exam in  this patient with minimal peritoneal fat     FINDINGS:  There is hypodense lateral left hepatic lesions favoring  cysts, the largest measuring 2.6 cm. Remaining nonenhanced liver and  spleen are unremarkable. Nonenhanced pancreas is unremarkable. Minimal  prominence adrenal glands which maintain their adreniform shape. 1 cm  hyperdense right renal cysts suspected. No abnormal perinephric fluid  collection. No hydronephrosis. Bladder moderately distended.  Questionable biliary sludge the lumen of the gallbladder. No biliary  dilatation identified.     Moderate stool of the colon. No small bowel dilatation. Stomach  moderately distended with fluid and particulate material. Diffuse  vascular calcification with no aneurysm identified. No convincing  pathologic lymphadenopathy.     Bibasilar atelectasis.     Degenerative change of the regional skeleton with no focal destructive  osseous lesions. Chronic compression of superior plate of T12.       Impression:      1. The lack of IV and oral contrast degrades sensitivity of the exam.  2. Moderate fecal burden within the colon. No small bowel dilatation. No  free air or discrete abscess identified.  3. Moderately distended bladder.  4. 1 cm hyperdense right renal lesion favoring hyperdense/proteinaceous  cyst with no hydronephrosis.  5. Diffuse vascular calcification.  6. Hypodense left hepatic lesions favoring cysts.  This report was finalized  on 09/29/2021 20:07 by Dr. Jannie García MD.    XR Chest 1 View [[id number]] Collected: 09/29/21 1702     Updated: 09/29/21 1706    Narrative:      Frontal upright radiograph of the chest 9/29/2021 5:00 PM CDT     HISTORY: Confusion     COMPARISON: Chest exam dated 7/8/2019.     FINDINGS:      No lung consolidation. No pleural effusion or pneumothorax. The  cardiomediastinal silhouette and pulmonary vascularity are within normal  limits. The osseous structures and surrounding soft tissues demonstrate  no acute abnormality.       Impression:      1. No radiographic evidence of acute cardiopulmonary process. No  visualized infiltrate.        This report was finalized on 09/29/2021 17:03 by Dr August Richardson, .    CT Head Without Contrast [[id number]] Collected: 09/29/21 1647     Updated: 09/29/21 1651    Narrative:      EXAMINATION: CT HEAD WO CONTRAST-  9/29/2021 4:47 PM CDT     CT SCAN OF THE HEAD, WITHOUT CONTRAST:      HISTORY: Mental status changes     COMPARISONS: 7/8/2019 head CT      TECHNIQUE:     Radiation dose equals  mGy-cm.  Automated exposure control dose  reduction technique was implemented.        CT evaluation of the head without intravenous contrast. 5 mm transaxial  images were obtained.   2-D sagittal and coronal reconstruction images  were generated.     FINDINGS:      There is central and cortical atrophy.     There is low-attenuation in the periventricular subcortical white matter  compatible with chronic ischemic white matter changes.     Paranasal and mastoid sinuses are clear.     There is no skull fracture or acute calvarial abnormality.     CT appearance the head is likely unchanged.             Impression:      1. No CT evidence of an acute intracranial process.                              This report was finalized on 09/29/2021 16:48 by Dr. Jorge De Souza MD.            Condition on Discharge:    Stable and improved    Physical Exam on Discharge:  /50 (BP Location: Right arm,  "Patient Position: Lying)   Pulse 63   Temp 98.6 °F (37 °C) (Oral)   Resp 18   Ht 185.4 cm (73\")   Wt 60.4 kg (133 lb 1.6 oz)   SpO2 98%   BMI 17.56 kg/m²   Physical Exam     Constitutional:       Appearance: Normal appearance. He is cachectic.  HENT:      Head: Normocephalic and atraumatic.      Right Ear: External ear normal.      Left Ear: External ear normal.      Nose: Nose normal.      Mouth: Mucous membranes are moist.      Pharynx: Oropharynx is clear.   Eyes:      General: No scleral icterus.     Conjunctiva/sclera: Conjunctivae normal.   Cardiovascular:      Rate and Rhythm: Normal rate and regular rhythm.      Pulses: Normal pulses.      Heart sounds: Normal heart sounds. No friction rub.   Pulmonary:      Effort: Pulmonary effort is normal. No respiratory distress.      Breath sounds: Normal breath sounds.   Abdominal:      General: Abdomen is flat. Bowel sounds are normal.      Palpations: Abdomen is soft. There is no mass.      Tenderness: There is generalized abdominal tenderness.   Musculoskeletal:         General: Normal range of motion.      Right lower leg: Edema 1/4 present.      Left lower leg: Edema 1/4 present.   Skin:     General: Skin is warm and dry.      Coloration: Skin is not pale.   Neurological:      General: No focal deficit present.      Mental Status: He is alert. Mental status is at baseline. He is  oriented x3, talkative and alert.  Psychiatric:         Mood and Affect: Mood normal.         Judgment: Judgment normal.     Discharge Disposition:  Home or Self Care    Discharge Medications:     Discharge Medications      Continue These Medications      Instructions Start Date   memantine 10 MG tablet  Commonly known as: NAMENDA   TAKE 1 TABLET TWICE DAILY      Multivitamin Men 50+ tablet tablet  Generic drug: multivitamin with minerals   1 tablet, Oral      risperiDONE 0.5 MG tablet  Commonly known as: risperDAL   Take 1 tablet by mouth twice daily             Discharge Diet: "   Diet Instructions     Diet: Regular; Thin      Discharge Diet: Regular    Fluid Consistency: Thin          Discharge Care Plan / Instructions:   Discharge to assisted living    Activity at Discharge:   Activity Instructions     Activity as Tolerated            Follow-up Appointments:  Follow-up with primary care physician next week to recheck electrolytes       Electronically signed by Rajan Adams DO, 10/01/21, 14:47 CDT.    Time: Discharge Less than 30 min    Part of this note may be an electronic transcription/translation of spoken language to printed text using the Dragon Dictation system.

## 2021-10-02 NOTE — PLAN OF CARE
Goal Outcome Evaluation:  Plan of Care Reviewed With: patient        Progress: improving  Outcome Summary: Pt being d/c to [facility] via family. D/c insructions given to pt and family. Stable at d/c.

## 2021-10-02 NOTE — OUTREACH NOTE
Prep Survey      Responses   [facility] facility patient discharged from?  [place]   Is LACE score < 7 ?  No   Emergency Room discharge w/ pulse ox?  No   Eligibility  [facility]   Date of Admission  09/29/21   Date of Discharge  10/01/21   Discharge Disposition  Home or Self Care   Discharge diagnosis  Hyponatremia   Does the patient have one of the following disease processes/diagnoses(primary or secondary)?  Other   Does the patient have Home health ordered?  No   Is there a DME ordered?  No   Prep survey completed?  Yes          Coco Rodriguez RN

## 2021-10-04 ENCOUNTER — TRANSITIONAL CARE MANAGEMENT TELEPHONE ENCOUNTER (OUTPATIENT)
Dept: CALL CENTER | Facility: HOSPITAL | Age: 81
End: 2021-10-04

## 2021-10-04 LAB
BACTERIA SPEC AEROBE CULT: NORMAL
BACTERIA SPEC AEROBE CULT: NORMAL

## 2021-10-04 NOTE — OUTREACH NOTE
Call Center TCM Note      Responses   [facility] patient discharged from?  [place]   Does the patient have one of the following disease processes/diagnoses(primary or secondary)?  Other   TCM attempt successful?  Yes   Call start time  1051   Call end time  1053   Discharge diagnosis  Hyponatremia   Meds reviewed with patient/caregiver?  Yes   Is the patient having any side effects they believe may be caused by any medication additions or changes?  No   Does the patient have all medications ordered at discharge?  N/A   Prescription comments  no new meds   Is the patient taking all medications as directed (includes completed medication regime)?  Yes   Does the patient have a primary care provider?   Yes   Does the patient have an appointment with their PCP within 7 days of discharge?  Yes   Comments regarding PCP  Has a PCP hospital followup on 10/6/2021 with Shawna Cabrera 10/6/2021   Has the patient kept scheduled appointments due by today?  N/A   Has home health visited the patient within 72 hours of discharge?  N/A   Psychosocial issues?  No   Did the patient receive a copy of their discharge instructions?  Yes   Nursing interventions  Reviewed instructions with patient   What is the patient's perception of their health status since discharge?  Improving   Is the patient/caregiver able to teach back signs and symptoms related to disease process for when to call PCP?  Yes   Is the patient/caregiver able to teach back signs and symptoms related to disease process for when to call 911?  Yes   Is the patient/caregiver able to teach back the hierarchy of who to call/visit for symptoms/problems? PCP, Specialist, Home health nurse, Urgent Care, ED, 911  Yes   If the patient is a current smoker, are they able to teach back resources for cessation?  Not a smoker   TCM call completed?  Yes          Terrell Gaviria RN    10/4/2021, 10:53 EDT

## 2021-10-12 ENCOUNTER — READMISSION MANAGEMENT (OUTPATIENT)
Dept: CALL CENTER | Facility: HOSPITAL | Age: 81
End: 2021-10-12

## 2021-10-12 NOTE — OUTREACH NOTE
Medical Week 2 Survey      Responses   [facility] patient discharged from? [place]   Does the patient have one of the following disease processes/diagnoses(primary or secondary)? Other   Week 2 attempt successful? Yes   Call start time 1622   General alerts for this patient call Raul or Jennyfer   Discharge diagnosis Hyponatremia   Call end time 1627   Is patient permission given to speak with other caregiver? Yes   List who call center can speak with Jennyfer, daughter   Person spoke with today (if not patient) and relationship daughter   Meds reviewed with patient/caregiver? Yes   Is the patient having any side effects they believe may be caused by any medication additions or changes? No   Does the patient have all medications ordered at discharge? N/A   Prescription comments no new meds   Is the patient taking all medications as directed (includes completed medication regime)? Yes   Does the patient have a primary care provider?  Yes   Does the patient have an appointment with their PCP within 7 days of discharge? Yes   Comments regarding PCP DARLING Meyers    Has the patient kept scheduled appointments due by today? No   Nursing Interventions Advised to reschedule appointment   Comments rescheduled for 10/18/21 with Dr. Amato,  Dr. Laurent 10/28   Has home health visited the patient within 72 hours of discharge? N/A   Psychosocial issues? No   Did the patient receive a copy of their discharge instructions? Yes   Nursing interventions Reviewed instructions with patient   What is the patient's perception of their health status since discharge? Improving   Is the patient/caregiver able to teach back signs and symptoms related to disease process for when to call PCP? Yes   Is the patient/caregiver able to teach back signs and symptoms related to disease process for when to call 911? Yes   Is the patient/caregiver able to teach back the hierarchy of who to call/visit for symptoms/problems? PCP,  Specialist, Home health nurse, Urgent Care, ED, 911 Yes   If the patient is a current smoker, are they able to teach back resources for cessation? Not a smoker   Week 2 Call Completed? Yes   Wrap up additional comments Doing very well at assisted living. eating well, gaining weight. He is being more social.           aJnnie Ojeda RN

## 2021-10-18 ENCOUNTER — OFFICE VISIT (OUTPATIENT)
Dept: INTERNAL MEDICINE | Facility: CLINIC | Age: 81
End: 2021-10-18

## 2021-10-18 ENCOUNTER — LAB (OUTPATIENT)
Dept: LAB | Facility: HOSPITAL | Age: 81
End: 2021-10-18

## 2021-10-18 VITALS
OXYGEN SATURATION: 96 % | TEMPERATURE: 98 F | WEIGHT: 129 LBS | HEART RATE: 71 BPM | BODY MASS INDEX: 17.1 KG/M2 | SYSTOLIC BLOOD PRESSURE: 94 MMHG | RESPIRATION RATE: 16 BRPM | HEIGHT: 73 IN | DIASTOLIC BLOOD PRESSURE: 42 MMHG

## 2021-10-18 DIAGNOSIS — E87.1 HYPONATREMIA: ICD-10-CM

## 2021-10-18 DIAGNOSIS — F02.818 DEMENTIA ASSOCIATED WITH OTHER UNDERLYING DISEASE WITH BEHAVIORAL DISTURBANCE (HCC): ICD-10-CM

## 2021-10-18 DIAGNOSIS — E43 SEVERE MALNUTRITION (HCC): ICD-10-CM

## 2021-10-18 DIAGNOSIS — E87.1 HYPONATREMIA: Primary | ICD-10-CM

## 2021-10-18 LAB
ANION GAP SERPL CALCULATED.3IONS-SCNC: 6 MMOL/L (ref 5–15)
BUN SERPL-MCNC: 19 MG/DL (ref 8–23)
BUN/CREAT SERPL: 23.8 (ref 7–25)
CALCIUM SPEC-SCNC: 9.3 MG/DL (ref 8.6–10.5)
CHLORIDE SERPL-SCNC: 104 MMOL/L (ref 98–107)
CO2 SERPL-SCNC: 31 MMOL/L (ref 22–29)
CREAT SERPL-MCNC: 0.8 MG/DL (ref 0.76–1.27)
GFR SERPL CREATININE-BSD FRML MDRD: 93 ML/MIN/1.73
GLUCOSE SERPL-MCNC: 73 MG/DL (ref 65–99)
POTASSIUM SERPL-SCNC: 4.1 MMOL/L (ref 3.5–5.2)
SODIUM SERPL-SCNC: 141 MMOL/L (ref 136–145)

## 2021-10-18 PROCEDURE — 36415 COLL VENOUS BLD VENIPUNCTURE: CPT

## 2021-10-18 PROCEDURE — 80048 BASIC METABOLIC PNL TOTAL CA: CPT

## 2021-10-18 PROCEDURE — 99213 OFFICE O/P EST LOW 20 MIN: CPT | Performed by: NURSE PRACTITIONER

## 2021-10-18 RX ORDER — RISPERIDONE 0.5 MG/1
0.5 TABLET ORAL 2 TIMES DAILY
Qty: 180 TABLET | Refills: 1 | Status: SHIPPED | OUTPATIENT
Start: 2021-10-18 | End: 2022-03-25 | Stop reason: SDUPTHER

## 2021-10-18 NOTE — PROGRESS NOTES
Chief Complaint   Patient presents with   • Hospital Follow Up Visit     feeling better       History:  Edy Urbina is a 81 y.o. male who presents today for follow-up for evaluation of the above:    HPI     Patient presents today for hospital discharge follow up for hyponatremia. His daughter is present with him today.   She reports he is now living at assisted living facility and has been doing well since discharge.   Patient has not complaints today. He reports he has been eating well at his new facility.     BMI is 17    Date of Admission: 9/29/2021  Date of Discharge:  10/1/2021  TCM: 10/04/2021 Terrell Gaviria RN      Discharge Diagnoses:       Active Hospital Problems     Diagnosis     • **Hyponatremia     • Cachexia (HCC)     • Prostate cancer (HCC)     • Lewy body dementia with behavioral disturbance (HCC)     • Severe malnutrition (HCC)     • Anemia           [facility].           ROS:  Review of Systems   Constitutional: Negative for activity change, appetite change, fatigue, fever and unexpected weight change.   HENT: Negative.    Respiratory: Negative for cough, chest tightness, shortness of breath and wheezing.    Cardiovascular: Negative for chest pain, palpitations and leg swelling.   Gastrointestinal: Negative.    Endocrine: Negative.    Genitourinary: Negative.    Musculoskeletal: Negative.    Skin: Negative.    Neurological: Negative for dizziness and headaches.   Psychiatric/Behavioral: Negative.        Mr. Urbina  reports that he quit smoking about 19 years ago. His smoking use included cigarettes. He quit after 50.00 years of use. He has never used smokeless tobacco. He reports that he does not drink alcohol and does not use drugs.      Current Outpatient Medications:   •  memantine (NAMENDA) 10 MG tablet, TAKE 1 TABLET TWICE DAILY (Patient taking differently: Take 10 mg by mouth 2 (Two) Times a Day. TAKE 1 TABLET TWICE DAILY), Disp: 180 tablet, Rfl: 1  •  Multiple  "Vitamins-Minerals (MULTIVITAMIN MEN 50+) tablet, Take 1 tablet by mouth Daily., Disp: , Rfl:   •  risperiDONE (risperDAL) 0.5 MG tablet, Take 1 tablet by mouth 2 (Two) Times a Day., Disp: 180 tablet, Rfl: 1      OBJECTIVE:  BP 94/42 (BP Location: Left arm, Patient Position: Sitting, Cuff Size: Adult)   Pulse 71   Temp 98 °F (36.7 °C) (Temporal)   Resp 16   Ht 185.4 cm (73\")   Wt 58.5 kg (129 lb)   SpO2 96%   BMI 17.02 kg/m²    Physical Exam  Constitutional:       General: He is not in acute distress.  Pulmonary:      Effort: No respiratory distress.   Neurological:      Mental Status: He is oriented to person, place, and time.   Psychiatric:         Behavior: Behavior normal.         Assessment/Plan    Diagnoses and all orders for this visit:    1. Hyponatremia (Primary)  -     Basic metabolic panel; Future  Labs to further evaluate. Daughter reports he has been doing well since discharge. No c/o continued confusion.     .   2. 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  Dispense: 180 tablet; Refill: 1  Stable     3. Severe malnutrition (HCC)  He reports he has been eating well at his new facility.          An After Visit Summary was printed and given to the patient at discharge.  Return if symptoms worsen or fail to improve, for Next scheduled follow up. Sooner if problems arise.     {Review (Popup)  Instructions  Quality  Charge Capture  LOS  Follow-up  Communications :23}     Shawna Cabrera APRN. 10/18/2021   Electronically Signed  "

## 2021-10-21 ENCOUNTER — READMISSION MANAGEMENT (OUTPATIENT)
Dept: CALL CENTER | Facility: HOSPITAL | Age: 81
End: 2021-10-21

## 2021-10-21 NOTE — OUTREACH NOTE
Medical Week 3 Survey      Responses   [facility] patient discharged from? [place]   Does the patient have one of the following disease processes/diagnoses(primary or secondary)? Other   Week 3 attempt successful? Yes   Call start time 1610   Call end time 1613   Person spoke with today (if not patient) and relationship Vania, ann-marie   Meds reviewed with patient/caregiver? Yes   Is the patient taking all medications as directed (includes completed medication regime)? Yes   Has the patient kept scheduled appointments due by today? Yes   Has home health visited the patient within 72 hours of discharge? N/A   What is the patient's perception of their health status since discharge? Returned to baseline/stable   Week 3 Call Completed? Yes   Wrap up additional comments Daughter says he is at baseline but would like call next week.          Yvette Bonilla RN

## 2021-10-28 ENCOUNTER — OFFICE VISIT (OUTPATIENT)
Dept: NEUROLOGY | Facility: CLINIC | Age: 81
End: 2021-10-28

## 2021-10-28 ENCOUNTER — READMISSION MANAGEMENT (OUTPATIENT)
Dept: CALL CENTER | Facility: HOSPITAL | Age: 81
End: 2021-10-28

## 2021-10-28 ENCOUNTER — LAB (OUTPATIENT)
Dept: LAB | Facility: HOSPITAL | Age: 81
End: 2021-10-28

## 2021-10-28 VITALS
HEIGHT: 73 IN | WEIGHT: 128 LBS | SYSTOLIC BLOOD PRESSURE: 110 MMHG | DIASTOLIC BLOOD PRESSURE: 60 MMHG | RESPIRATION RATE: 18 BRPM | BODY MASS INDEX: 16.96 KG/M2 | HEART RATE: 72 BPM

## 2021-10-28 DIAGNOSIS — D64.9 ANEMIA, UNSPECIFIED TYPE: ICD-10-CM

## 2021-10-28 DIAGNOSIS — E87.1 HYPONATREMIA: ICD-10-CM

## 2021-10-28 DIAGNOSIS — R41.3 MEMORY DIFFICULTY: ICD-10-CM

## 2021-10-28 DIAGNOSIS — E87.1 HYPONATREMIA: Primary | ICD-10-CM

## 2021-10-28 LAB
ALBUMIN SERPL-MCNC: 4.1 G/DL (ref 3.5–5.2)
ALBUMIN/GLOB SERPL: 1.9 G/DL
ALP SERPL-CCNC: 55 U/L (ref 39–117)
ALT SERPL W P-5'-P-CCNC: 12 U/L (ref 1–41)
ANION GAP SERPL CALCULATED.3IONS-SCNC: 5 MMOL/L (ref 5–15)
AST SERPL-CCNC: 14 U/L (ref 1–40)
BASOPHILS # BLD AUTO: 0.05 10*3/MM3 (ref 0–0.2)
BASOPHILS NFR BLD AUTO: 0.9 % (ref 0–1.5)
BILIRUB SERPL-MCNC: 0.5 MG/DL (ref 0–1.2)
BUN SERPL-MCNC: 23 MG/DL (ref 8–23)
BUN/CREAT SERPL: 27.1 (ref 7–25)
CALCIUM SPEC-SCNC: 8.9 MG/DL (ref 8.6–10.5)
CHLORIDE SERPL-SCNC: 103 MMOL/L (ref 98–107)
CO2 SERPL-SCNC: 32 MMOL/L (ref 22–29)
CREAT SERPL-MCNC: 0.85 MG/DL (ref 0.76–1.27)
DEPRECATED RDW RBC AUTO: 48.9 FL (ref 37–54)
EOSINOPHIL # BLD AUTO: 0.15 10*3/MM3 (ref 0–0.4)
EOSINOPHIL NFR BLD AUTO: 2.6 % (ref 0.3–6.2)
ERYTHROCYTE [DISTWIDTH] IN BLOOD BY AUTOMATED COUNT: 14.6 % (ref 12.3–15.4)
GFR SERPL CREATININE-BSD FRML MDRD: 87 ML/MIN/1.73
GLOBULIN UR ELPH-MCNC: 2.2 GM/DL
GLUCOSE SERPL-MCNC: 95 MG/DL (ref 65–99)
HCT VFR BLD AUTO: 35.8 % (ref 37.5–51)
HGB BLD-MCNC: 11.1 G/DL (ref 13–17.7)
IMM GRANULOCYTES # BLD AUTO: 0.04 10*3/MM3 (ref 0–0.05)
IMM GRANULOCYTES NFR BLD AUTO: 0.7 % (ref 0–0.5)
LYMPHOCYTES # BLD AUTO: 1.62 10*3/MM3 (ref 0.7–3.1)
LYMPHOCYTES NFR BLD AUTO: 28.2 % (ref 19.6–45.3)
MCH RBC QN AUTO: 28 PG (ref 26.6–33)
MCHC RBC AUTO-ENTMCNC: 31 G/DL (ref 31.5–35.7)
MCV RBC AUTO: 90.4 FL (ref 79–97)
MONOCYTES # BLD AUTO: 0.54 10*3/MM3 (ref 0.1–0.9)
MONOCYTES NFR BLD AUTO: 9.4 % (ref 5–12)
NEUTROPHILS NFR BLD AUTO: 3.35 10*3/MM3 (ref 1.7–7)
NEUTROPHILS NFR BLD AUTO: 58.2 % (ref 42.7–76)
NRBC BLD AUTO-RTO: 0 /100 WBC (ref 0–0.2)
PLATELET # BLD AUTO: 191 10*3/MM3 (ref 140–450)
PMV BLD AUTO: 9.7 FL (ref 6–12)
POTASSIUM SERPL-SCNC: 4.1 MMOL/L (ref 3.5–5.2)
PROT SERPL-MCNC: 6.3 G/DL (ref 6–8.5)
RBC # BLD AUTO: 3.96 10*6/MM3 (ref 4.14–5.8)
SODIUM SERPL-SCNC: 140 MMOL/L (ref 136–145)
WBC # BLD AUTO: 5.75 10*3/MM3 (ref 3.4–10.8)

## 2021-10-28 PROCEDURE — 99214 OFFICE O/P EST MOD 30 MIN: CPT | Performed by: PSYCHIATRY & NEUROLOGY

## 2021-10-28 PROCEDURE — 80053 COMPREHEN METABOLIC PANEL: CPT

## 2021-10-28 PROCEDURE — 85025 COMPLETE CBC W/AUTO DIFF WBC: CPT

## 2021-10-28 PROCEDURE — 36415 COLL VENOUS BLD VENIPUNCTURE: CPT

## 2021-10-28 RX ORDER — DONEPEZIL HYDROCHLORIDE 5 MG/1
TABLET, FILM COATED ORAL
Qty: 15 TABLET | Refills: 2 | Status: SHIPPED | OUTPATIENT
Start: 2021-10-28 | End: 2022-01-05

## 2021-10-28 RX ORDER — MEMANTINE HYDROCHLORIDE 10 MG/1
TABLET ORAL
Qty: 180 TABLET | Refills: 1 | Status: SHIPPED | OUTPATIENT
Start: 2021-10-28 | End: 2022-03-24 | Stop reason: SDUPTHER

## 2021-10-28 NOTE — PATIENT INSTRUCTIONS
Patient to get to emergency room immediately if side effects occur from dose of Aricept.  Patient to get with PCP about low BMI/anemia

## 2021-10-28 NOTE — OUTREACH NOTE
Medical Week 4 Survey      Responses   [facility] patient discharged from? [place]   Does the patient have one of the following disease processes/diagnoses(primary or secondary)? Other   Week 4 attempt successful? Yes   Call start time 1616   Call end time 1617   Meds reviewed with patient/caregiver? Yes   Is the patient taking all medications as directed (includes completed medication regime)? Yes   Has the patient kept scheduled appointments due by today? Yes   Is the patient still receiving Home Health Services? N/A   What is the patient's perception of their health status since discharge? Improving   Week 4 Call Completed? Yes   Would the patient like one additional call? No   Graduated Yes   Did the patient feel the follow up calls were helpful during their recovery period? Yes   Was the number of calls appropriate? Yes          Page Lemus RN

## 2021-10-29 ENCOUNTER — TELEPHONE (OUTPATIENT)
Dept: NEUROLOGY | Facility: CLINIC | Age: 81
End: 2021-10-29

## 2021-10-29 NOTE — TELEPHONE ENCOUNTER
Caller: OLEGARIO AMBRIZ    Relationship: Emergency Contact; SPOUSE    Best call back number: [phone]    What was the call regarding: PT'S SON CALLED TO ASK IF PT'S PRESCRIPTION REFILLS WERE SENT TO [facility] OR [facility].    I CONFIRMED THAT BOTH DONEPEZIL & MEMANTINE RX REFILLS WERE SENT TO [facility].    DOCUMENTING PER [facility] PROTOCOL.

## 2022-01-04 DIAGNOSIS — R41.3 MEMORY DIFFICULTY: Primary | ICD-10-CM

## 2022-01-05 RX ORDER — DONEPEZIL HYDROCHLORIDE 5 MG/1
TABLET, FILM COATED ORAL
Qty: 45 TABLET | Refills: 0 | Status: SHIPPED | OUTPATIENT
Start: 2022-01-05 | End: 2022-03-25 | Stop reason: SINTOL

## 2022-03-25 ENCOUNTER — OFFICE VISIT (OUTPATIENT)
Dept: INTERNAL MEDICINE | Facility: CLINIC | Age: 82
End: 2022-03-25

## 2022-03-25 ENCOUNTER — OFFICE VISIT (OUTPATIENT)
Dept: NEUROLOGY | Facility: CLINIC | Age: 82
End: 2022-03-25

## 2022-03-25 ENCOUNTER — LAB (OUTPATIENT)
Dept: LAB | Facility: HOSPITAL | Age: 82
End: 2022-03-25

## 2022-03-25 VITALS
OXYGEN SATURATION: 99 % | HEIGHT: 73 IN | WEIGHT: 139 LBS | SYSTOLIC BLOOD PRESSURE: 110 MMHG | DIASTOLIC BLOOD PRESSURE: 62 MMHG | BODY MASS INDEX: 18.42 KG/M2 | HEART RATE: 65 BPM

## 2022-03-25 VITALS
DIASTOLIC BLOOD PRESSURE: 70 MMHG | TEMPERATURE: 97.2 F | BODY MASS INDEX: 18.59 KG/M2 | OXYGEN SATURATION: 98 % | RESPIRATION RATE: 16 BRPM | SYSTOLIC BLOOD PRESSURE: 118 MMHG | WEIGHT: 140.3 LBS | HEART RATE: 69 BPM | HEIGHT: 73 IN

## 2022-03-25 DIAGNOSIS — R25.1 TREMOR: ICD-10-CM

## 2022-03-25 DIAGNOSIS — F02.818 DEMENTIA ASSOCIATED WITH OTHER UNDERLYING DISEASE WITH BEHAVIORAL DISTURBANCE: ICD-10-CM

## 2022-03-25 DIAGNOSIS — E87.1 HYPONATREMIA: ICD-10-CM

## 2022-03-25 DIAGNOSIS — Z23 NEED FOR VACCINATION: ICD-10-CM

## 2022-03-25 DIAGNOSIS — D64.9 ANEMIA, UNSPECIFIED TYPE: ICD-10-CM

## 2022-03-25 DIAGNOSIS — R41.3 MEMORY DIFFICULTY: Primary | ICD-10-CM

## 2022-03-25 DIAGNOSIS — G31.83 LEWY BODY DEMENTIA WITH BEHAVIORAL DISTURBANCE: Primary | ICD-10-CM

## 2022-03-25 DIAGNOSIS — F02.818 LEWY BODY DEMENTIA WITH BEHAVIORAL DISTURBANCE: Primary | ICD-10-CM

## 2022-03-25 PROBLEM — E43 SEVERE MALNUTRITION: Status: RESOLVED | Noted: 2019-07-10 | Resolved: 2022-03-25

## 2022-03-25 LAB
ALBUMIN SERPL-MCNC: 4.2 G/DL (ref 3.5–5.2)
ALBUMIN/GLOB SERPL: 1.9 G/DL
ALP SERPL-CCNC: 60 U/L (ref 39–117)
ALT SERPL W P-5'-P-CCNC: 9 U/L (ref 1–41)
ANION GAP SERPL CALCULATED.3IONS-SCNC: 6 MMOL/L (ref 5–15)
AST SERPL-CCNC: 14 U/L (ref 1–40)
BASOPHILS # BLD AUTO: 0.06 10*3/MM3 (ref 0–0.2)
BASOPHILS NFR BLD AUTO: 1.1 % (ref 0–1.5)
BILIRUB SERPL-MCNC: 0.5 MG/DL (ref 0–1.2)
BUN SERPL-MCNC: 14 MG/DL (ref 8–23)
BUN/CREAT SERPL: 15.7 (ref 7–25)
CALCIUM SPEC-SCNC: 9 MG/DL (ref 8.6–10.5)
CHLORIDE SERPL-SCNC: 101 MMOL/L (ref 98–107)
CO2 SERPL-SCNC: 31 MMOL/L (ref 22–29)
CREAT SERPL-MCNC: 0.89 MG/DL (ref 0.76–1.27)
DEPRECATED RDW RBC AUTO: 42 FL (ref 37–54)
EGFRCR SERPLBLD CKD-EPI 2021: 86.6 ML/MIN/1.73
EOSINOPHIL # BLD AUTO: 0.18 10*3/MM3 (ref 0–0.4)
EOSINOPHIL NFR BLD AUTO: 3.3 % (ref 0.3–6.2)
ERYTHROCYTE [DISTWIDTH] IN BLOOD BY AUTOMATED COUNT: 13.3 % (ref 12.3–15.4)
GLOBULIN UR ELPH-MCNC: 2.2 GM/DL
GLUCOSE SERPL-MCNC: 91 MG/DL (ref 65–99)
HCT VFR BLD AUTO: 40 % (ref 37.5–51)
HGB BLD-MCNC: 12.6 G/DL (ref 13–17.7)
IMM GRANULOCYTES # BLD AUTO: 0.03 10*3/MM3 (ref 0–0.05)
IMM GRANULOCYTES NFR BLD AUTO: 0.5 % (ref 0–0.5)
LYMPHOCYTES # BLD AUTO: 1.36 10*3/MM3 (ref 0.7–3.1)
LYMPHOCYTES NFR BLD AUTO: 24.6 % (ref 19.6–45.3)
MCH RBC QN AUTO: 27.5 PG (ref 26.6–33)
MCHC RBC AUTO-ENTMCNC: 31.5 G/DL (ref 31.5–35.7)
MCV RBC AUTO: 87.1 FL (ref 79–97)
MONOCYTES # BLD AUTO: 0.54 10*3/MM3 (ref 0.1–0.9)
MONOCYTES NFR BLD AUTO: 9.8 % (ref 5–12)
NEUTROPHILS NFR BLD AUTO: 3.36 10*3/MM3 (ref 1.7–7)
NEUTROPHILS NFR BLD AUTO: 60.7 % (ref 42.7–76)
NRBC BLD AUTO-RTO: 0 /100 WBC (ref 0–0.2)
PLATELET # BLD AUTO: 218 10*3/MM3 (ref 140–450)
PMV BLD AUTO: 9.8 FL (ref 6–12)
POTASSIUM SERPL-SCNC: 4.2 MMOL/L (ref 3.5–5.2)
PROT SERPL-MCNC: 6.4 G/DL (ref 6–8.5)
RBC # BLD AUTO: 4.59 10*6/MM3 (ref 4.14–5.8)
SODIUM SERPL-SCNC: 138 MMOL/L (ref 136–145)
WBC NRBC COR # BLD: 5.53 10*3/MM3 (ref 3.4–10.8)

## 2022-03-25 PROCEDURE — 96160 PT-FOCUSED HLTH RISK ASSMT: CPT | Performed by: INTERNAL MEDICINE

## 2022-03-25 PROCEDURE — 36415 COLL VENOUS BLD VENIPUNCTURE: CPT | Performed by: PSYCHIATRY & NEUROLOGY

## 2022-03-25 PROCEDURE — 1159F MED LIST DOCD IN RCRD: CPT | Performed by: INTERNAL MEDICINE

## 2022-03-25 PROCEDURE — 85025 COMPLETE CBC W/AUTO DIFF WBC: CPT | Performed by: PSYCHIATRY & NEUROLOGY

## 2022-03-25 PROCEDURE — 1170F FXNL STATUS ASSESSED: CPT | Performed by: INTERNAL MEDICINE

## 2022-03-25 PROCEDURE — G0009 ADMIN PNEUMOCOCCAL VACCINE: HCPCS | Performed by: INTERNAL MEDICINE

## 2022-03-25 PROCEDURE — 99213 OFFICE O/P EST LOW 20 MIN: CPT | Performed by: INTERNAL MEDICINE

## 2022-03-25 PROCEDURE — 1126F AMNT PAIN NOTED NONE PRSNT: CPT | Performed by: INTERNAL MEDICINE

## 2022-03-25 PROCEDURE — 99214 OFFICE O/P EST MOD 30 MIN: CPT | Performed by: PSYCHIATRY & NEUROLOGY

## 2022-03-25 PROCEDURE — 90732 PPSV23 VACC 2 YRS+ SUBQ/IM: CPT | Performed by: INTERNAL MEDICINE

## 2022-03-25 PROCEDURE — G0439 PPPS, SUBSEQ VISIT: HCPCS | Performed by: INTERNAL MEDICINE

## 2022-03-25 PROCEDURE — 80053 COMPREHEN METABOLIC PANEL: CPT | Performed by: PSYCHIATRY & NEUROLOGY

## 2022-03-25 RX ORDER — MEMANTINE HYDROCHLORIDE 10 MG/1
10 TABLET ORAL 2 TIMES DAILY
Qty: 180 TABLET | Refills: 1 | Status: SHIPPED | OUTPATIENT
Start: 2022-03-25 | End: 2022-09-09 | Stop reason: SDUPTHER

## 2022-03-25 RX ORDER — RISPERIDONE 0.5 MG/1
0.5 TABLET ORAL 2 TIMES DAILY
Qty: 180 TABLET | Refills: 1 | Status: SHIPPED | OUTPATIENT
Start: 2022-03-25 | End: 2022-08-15

## 2022-03-25 RX ORDER — DONEPEZIL HYDROCHLORIDE 5 MG/1
2.5 TABLET, FILM COATED ORAL NIGHTLY
Qty: 45 TABLET | Refills: 1 | Status: CANCELLED | OUTPATIENT
Start: 2022-03-25

## 2022-03-25 NOTE — PATIENT INSTRUCTIONS
Patient to continue safety precautions as discussed.  Patient to get with PCP about low BMI.  Patient to get with PCP about follow-up vaccines as necessary

## 2022-03-25 NOTE — PROGRESS NOTES
The ABCs of the Annual Wellness Visit  Subsequent Medicare Wellness Visit    No chief complaint on file.   See  note  Subjective    History of Present Illness:  Eyd Urbina is a 81 y.o. male who presents for a Subsequent Medicare Wellness Visit.    The following portions of the patient's history were reviewed and   updated as appropriate: allergies, current medications, past family history, past medical history, past social history, past surgical history and problem list.    Compared to one year ago, the patient feels his physical   health is better.    Compared to one year ago, the patient feels his mental   health is better.    Recent Hospitalizations:  This patient has had a [facility] admission record on file within the last 365 days.    Current Medical Providers:  Patient Care Team:  Torey Amato DO as PCP - General (Internal Medicine)  Yuriy Spears MD as Consulting Physician (Urology)  Dalton Laurent MD as Consulting Physician (Neurology)    Outpatient Medications Prior to Visit   Medication Sig Dispense Refill   • memantine (NAMENDA) 10 MG tablet Take 1 tablet by mouth 2 (Two) Times a Day. TAKE 1 TABLET TWICE DAILY 180 tablet 1   • Multiple Vitamins-Minerals (MULTIVITAMIN MEN 50+) tablet Take 1 tablet by mouth Daily.     • Pseudoeph-Doxylamine-DM-APAP (NYQUIL PO) Take  by mouth As Needed (FOR SLEEP).     • risperiDONE (risperDAL) 0.5 MG tablet Take 1 tablet by mouth 2 (Two) Times a Day. 180 tablet 1     No facility-administered medications prior to visit.       No opioid medication identified on active medication list. I have reviewed chart for other potential  high risk medication/s and harmful drug interactions in the elderly.          Aspirin is not on active medication list.  Aspirin use is not indicated based on review of current medical condition/s. Risk of harm outweighs potential benefits.  .    Patient Active Problem List   Diagnosis   • Anemia   • Lewy body  "dementia with behavioral disturbance (HCC)   • Prostate cancer (HCC)   • Hyponatremia   • Cachexia (HCC)     Advance Care Planning  Advance Directive is not on file.  ACP discussion was held with the patient during this visit. Patient has an advance directive (not in EMR), copy requested.          Objective    Vitals:    22 1431   BP: 118/70   BP Location: Left arm   Patient Position: Sitting   Cuff Size: Adult   Pulse: 69   Resp: 16   Temp: 97.2 °F (36.2 °C)   SpO2: 98%   Weight: 63.6 kg (140 lb 4.8 oz)   Height: 185.4 cm (73\")     BMI Readings from Last 1 Encounters:   22 18.51 kg/m²   BMI is within normal parameters. No follow-up required.    Does the patient have evidence of cognitive impairment? Yes    Physical Exam            HEALTH RISK ASSESSMENT    Smoking Status:  Social History     Tobacco Use   Smoking Status Former Smoker   • Years: 50.00   • Types: Cigarettes   • Quit date: 2002   • Years since quittin.8   Smokeless Tobacco Never Used     Alcohol Consumption:  Social History     Substance and Sexual Activity   Alcohol Use No    Comment: QUIT DRINKING IN      Fall Risk Screen:    STEADI Fall Risk Assessment was completed, and patient is at LOW risk for falls.Assessment completed on:3/25/2022    Depression Screening:  PHQ-2/PHQ-9 Depression Screening 3/25/2022   Retired Total Score -   Little Interest or Pleasure in Doing Things 0-->not at all   Feeling Down, Depressed or Hopeless 0-->not at all   PHQ-9: Brief Depression Severity Measure Score 0       Health Habits and Functional and Cognitive Screening:  Functional & Cognitive Status 3/25/2022   Do you have difficulty preparing food and eating? No   Do you have difficulty bathing yourself, getting dressed or grooming yourself? No   Do you have difficulty using the toilet? No   Do you have difficulty moving around from place to place? No   Do you have trouble with steps or getting out of a bed or a chair? No   Current Diet " Unhealthy Diet   Dental Exam Up to date        Dental Exam Comment -   Eye Exam Up to date   Exercise (times per week) 2 times per week   Current Exercises Include Walking   Current Exercise Activities Include -   Do you need help using the phone?  No   Are you deaf or do you have serious difficulty hearing?  Yes   Do you need help with transportation? Yes   Do you need help shopping? No   Do you need help preparing meals?  No   Do you need help with housework?  Yes   Do you need help with laundry? Yes   Do you need help taking your medications? Yes   Do you need help managing money? Yes   Do you ever drive or ride in a car without wearing a seat belt? No   Have you felt unusual stress, anger or loneliness in the last month? No   Who do you live with? Alone   If you need help, do you have trouble finding someone available to you? No   Have you been bothered in the last four weeks by sexual problems? No   Do you have difficulty concentrating, remembering or making decisions? No       Age-appropriate Screening Schedule:  Refer to the list below for future screening recommendations based on patient's age, sex and/or medical conditions. Orders for these recommended tests are listed in the plan section. The patient has been provided with a written plan.    Health Maintenance   Topic Date Due   • TDAP/TD VACCINES (1 - Tdap) Never done   • ZOSTER VACCINE (1 of 2) Never done   • INFLUENZA VACCINE  03/31/2022 (Originally 8/1/2021)              Assessment/Plan   CMS Preventative Services Quick Reference  Risk Factors Identified During Encounter  Cardiovascular Disease  Dementia/Memory   Fall Risk-High or Moderate  Immunizations Discussed/Encouraged (specific Immunizations; Tdap, Influenza, Pneumococcal 23, Shingrix and COVID19  Inactivity/Sedentary  The above risks/problems have been discussed with the patient.  Follow up actions/plans if indicated are seen below in the Assessment/Plan Section.  Pertinent information has been  shared with the patient in the After Visit Summary.    Diagnoses and all orders for this visit:    1. Lewy body dementia with behavioral disturbance (HCC) (Primary)    2. 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  Dispense: 180 tablet; Refill: 1    3. Need for vaccination  -     Pneumococcal Polysaccharide Vaccine 23-Valent Greater Than or Equal To 3yo Subcutaneous / IM        Follow Up:   Return in about 6 months (around 9/25/2022) for Recheck.     An After Visit Summary and PPPS were made available to the patient.

## 2022-03-25 NOTE — PROGRESS NOTES
Subjective   Edy Urbina, 1940, is a male who is being seen today for   Chief Complaint   Patient presents with   • Memory Loss       HISTORY OF PRESENT ILLNESS: Patient seen for memory difficulty.  Patient has been stable.  Patient could not tolerate Aricept because of sleepiness noted by the people in the assisted living he was living with.  That was stopped several months ago.  Patient continues Namenda 10 mg p.o. twice daily.  MMSE today actually is improved to 25/30.  He has not had COVID.  He got 1 Covid vaccine but is to get back with his PCP about follow-up vaccines if necessary.    REVIEW OF SYSTEMS:   GENERAL: Blood pressure 110/62 left arm standing 100/60 sitting with pulse 61 sitting and 65 standing.  PULMONARY: No acute respiratory difficulty  CVS: No acute chest pain or palpitation  GASTROINTESTINAL: No acute GI distress  GENITOURINARY: No acute  distress  GYN: Not applicable  MUSCULOSKELETAL: No acute musculoskeletal symptoms  HEENT: No acute vision or hearing change  ENDOCRINE: Not diabetic  PSYCHIATRIC: History of occasional hallucinations hearing the phone ringing as per his son but the daughter is with him has not witnessed any patient denies these    HEMATOLOGY: History of anemia  SKIN: No acute skin changes  Family history reviewed and otherwise noncontributory to this problem but mother did have a history of stroke  Social history: Patient denies smoking or drug or alcohol use    PHYSICAL EXAMINATION:    GENERAL: No acute distress.  Patient has some intentional tremor both upper extremities  CRANIUM: Normal cephalic/atraumatic  HEENT:       EYES: No acute fundic abnormalities.  Pupils equal round reactive to light.  EOMs intact without nystagmus and fields full to confrontation.       EARS: Tympanic membranes normal and hears tuning fork bilaterally       THROAT: No acute oropharynx abnormalities and no swallowing difficulties by history       NECK: No bruits/no  lymphadenopathy  CHEST: No acute cardiopulmonary abnormalities by auscultation  ABDOMEN: Nondistended  EXTREMITIES: Dorsalis pedis pulses symmetrical  NEURO: Patient alert and follows commands with minimal difficulty  SPEECH: Somewhat hoarse but otherwise normal speech    CRANIAL NERVES: Motor strength and sensation of the upper extremities normal    MOTOR STRENGTH: Motor strength upper and lower extremities normal  STATION AND GAIT: Gait shows mild hyperkyphosis but otherwise Romberg negative  CEREBELLAR: Finger-nose and heel-to-shin normal  SENSORY: Decreased pin and vibration distal approximately lower extremities to the ankles bilaterally otherwise normal pin and vibration throughout  REFLEXES: Reflexes decreased at the ankle jerk with present knee jerk and biceps bilaterally without clonus or Babinski      ASSESSMENT AND PLAN: Patient with a history of memory difficulties and tremor as above.  Patient does not drive.  Safety precautions again reviewed.  I spent 30 minutes with patient with counseling exam and review of records.  BMI is borderline low.  Patient to follow-up with PCP on this.  Patient continued Namenda and get appropriate blood work.      Diagnoses and all orders for this visit:    1. Anemia, unspecified type (Primary)    2. Memory difficulty        Dictated utilizing Dragon voice recognition software

## 2022-03-25 NOTE — PATIENT INSTRUCTIONS
Medicare Wellness  Personal Prevention Plan of Service     Date of Office Visit:    Encounter Provider:  Torey Amato DO  Place of Service:  [facility] INTERNAL MEDICINE  Patient Name: Edy Urbina  :  1940    As part of the Medicare Wellness portion of your visit today, we are providing you with this personalized preventive plan of services (PPPS). This plan is based upon recommendations of the [place] Preventive Services Task Force (USPSTF) and the Advisory Committee on Immunization Practices (ACIP).    This lists the preventive care services that should be considered, and provides dates of when you are due. Items listed as completed are up-to-date and do not require any further intervention.    Health Maintenance   Topic Date Due    TDAP/TD VACCINES (1 - Tdap) Never done    ZOSTER VACCINE (1 of 2) Never done    Pneumococcal Vaccine 65+ (2 of 2 - PPSV23) 2020    COVID-19 Vaccine (2 - Moderna 3-dose series) 2021    ANNUAL WELLNESS VISIT  2021    INFLUENZA VACCINE  2022 (Originally 2021)    COLORECTAL CANCER SCREENING  2022       Orders Placed This Encounter   Procedures    Pneumococcal Polysaccharide Vaccine 23-Valent Greater Than or Equal To 1yo Subcutaneous / IM       Return in about 6 months (around 2022) for Recheck.

## 2022-03-25 NOTE — PROGRESS NOTES
"CC: f/u dementia    History:  Edy Urbina is a 81 y.o. male   He presents alone today with no complaints and notes that he has had no hallucinations, worsening of his memory, nor issues with behavioral disturbance.       ROS:  Review of Systems   Respiratory: Negative for shortness of breath.    Cardiovascular: Negative for chest pain.   Psychiatric/Behavioral: Negative for confusion and hallucinations.        reports that he quit smoking about 19 years ago. His smoking use included cigarettes. He quit after 50.00 years of use. He has never used smokeless tobacco. He reports that he does not drink alcohol and does not use drugs.      Current Outpatient Medications:   •  memantine (NAMENDA) 10 MG tablet, Take 1 tablet by mouth 2 (Two) Times a Day. TAKE 1 TABLET TWICE DAILY, Disp: 180 tablet, Rfl: 1  •  Multiple Vitamins-Minerals (MULTIVITAMIN MEN 50+) tablet, Take 1 tablet by mouth Daily., Disp: , Rfl:   •  Pseudoeph-Doxylamine-DM-APAP (NYQUIL PO), Take  by mouth As Needed (FOR SLEEP)., Disp: , Rfl:   •  risperiDONE (risperDAL) 0.5 MG tablet, Take 1 tablet by mouth 2 (Two) Times a Day., Disp: 180 tablet, Rfl: 1    OBJECTIVE:  /70 (BP Location: Left arm, Patient Position: Sitting, Cuff Size: Adult)   Pulse 69   Temp 97.2 °F (36.2 °C)   Resp 16   Ht 185.4 cm (73\")   Wt 63.6 kg (140 lb 4.8 oz)   SpO2 98%   BMI 18.51 kg/m²    Physical Exam  Constitutional:       General: He is not in acute distress.  Cardiovascular:      Rate and Rhythm: Normal rate and regular rhythm.      Heart sounds: Normal heart sounds. No murmur heard.  Pulmonary:      Effort: Pulmonary effort is normal. No respiratory distress.      Breath sounds: Normal breath sounds. No wheezing.   Neurological:      Mental Status: He is alert and oriented to person, place, and time.      Gait: Gait normal.   Psychiatric:         Mood and Affect: Mood normal.         Behavior: Behavior normal.         Assessment/Plan     Diagnoses and all orders " for this visit:    1. Lewy body dementia with behavioral disturbance (HCC) (Primary)  2. 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  Dispense: 180 tablet; Refill: 1   Stable on risperidone, which is refilled.      3. Need for vaccination  -     Pneumococcal Polysaccharide Vaccine 23-Valent Greater Than or Equal To 3yo Subcutaneous / IM    An After Visit Summary was printed and given to the patient at discharge.  Return in about 6 months (around 9/25/2022) for Recheck.          Torey Amato D.O. 3/27/2022   Electronically signed.

## 2022-04-06 DIAGNOSIS — R41.3 MEMORY DIFFICULTY: ICD-10-CM

## 2022-04-07 ENCOUNTER — OFFICE VISIT (OUTPATIENT)
Age: 82
End: 2022-04-07
Payer: MEDICARE

## 2022-04-07 VITALS
SYSTOLIC BLOOD PRESSURE: 118 MMHG | DIASTOLIC BLOOD PRESSURE: 62 MMHG | WEIGHT: 141 LBS | RESPIRATION RATE: 18 BRPM | TEMPERATURE: 98.1 F | BODY MASS INDEX: 18.69 KG/M2 | HEART RATE: 61 BPM | OXYGEN SATURATION: 96 % | HEIGHT: 73 IN

## 2022-04-07 DIAGNOSIS — J06.9 UPPER RESPIRATORY TRACT INFECTION, UNSPECIFIED TYPE: ICD-10-CM

## 2022-04-07 DIAGNOSIS — Z20.828 EXPOSURE TO THE FLU: ICD-10-CM

## 2022-04-07 DIAGNOSIS — R05.9 COUGH: Primary | ICD-10-CM

## 2022-04-07 LAB
INFLUENZA A ANTIBODY: NORMAL
INFLUENZA B ANTIBODY: NORMAL

## 2022-04-07 PROCEDURE — 1123F ACP DISCUSS/DSCN MKR DOCD: CPT | Performed by: NURSE PRACTITIONER

## 2022-04-07 PROCEDURE — 99203 OFFICE O/P NEW LOW 30 MIN: CPT | Performed by: NURSE PRACTITIONER

## 2022-04-07 PROCEDURE — G8427 DOCREV CUR MEDS BY ELIG CLIN: HCPCS | Performed by: NURSE PRACTITIONER

## 2022-04-07 PROCEDURE — G8420 CALC BMI NORM PARAMETERS: HCPCS | Performed by: NURSE PRACTITIONER

## 2022-04-07 PROCEDURE — 4004F PT TOBACCO SCREEN RCVD TLK: CPT | Performed by: NURSE PRACTITIONER

## 2022-04-07 PROCEDURE — 87804 INFLUENZA ASSAY W/OPTIC: CPT | Performed by: NURSE PRACTITIONER

## 2022-04-07 PROCEDURE — 4040F PNEUMOC VAC/ADMIN/RCVD: CPT | Performed by: NURSE PRACTITIONER

## 2022-04-07 RX ORDER — DONEPEZIL HYDROCHLORIDE 5 MG/1
TABLET, FILM COATED ORAL
Qty: 45 TABLET | Refills: 0 | OUTPATIENT
Start: 2022-04-07

## 2022-04-07 RX ORDER — OSELTAMIVIR PHOSPHATE 75 MG/1
75 CAPSULE ORAL DAILY
Qty: 7 CAPSULE | Refills: 0 | Status: SHIPPED | OUTPATIENT
Start: 2022-04-07 | End: 2022-04-14

## 2022-04-07 RX ORDER — RISPERIDONE 0.5 MG/1
0.5 TABLET, FILM COATED ORAL 2 TIMES DAILY
COMMUNITY
Start: 2022-03-25

## 2022-04-07 RX ORDER — MEMANTINE HYDROCHLORIDE 10 MG/1
10 TABLET ORAL 2 TIMES DAILY
COMMUNITY
Start: 2022-03-25

## 2022-04-07 RX ORDER — DONEPEZIL HYDROCHLORIDE 5 MG/1
TABLET, FILM COATED ORAL
COMMUNITY
Start: 2022-01-05

## 2022-04-07 RX ORDER — DIPHENHYDRAMINE HCL, PHENYLEPHRINE HCL 6.25; 2.5 MG/5ML; MG/5ML
SOLUTION ORAL
COMMUNITY

## 2022-04-07 ASSESSMENT — ENCOUNTER SYMPTOMS
SINUS PRESSURE: 0
ALLERGIC/IMMUNOLOGIC NEGATIVE: 1
SORE THROAT: 0
RHINORRHEA: 1
ABDOMINAL PAIN: 0
SHORTNESS OF BREATH: 0
NAUSEA: 0
DIARRHEA: 0
VOMITING: 0
COUGH: 1

## 2022-04-07 ASSESSMENT — VISUAL ACUITY: OU: 1

## 2022-04-07 NOTE — PROGRESS NOTES
[address]  [address] [address] [place] [address]  Dept: [phone]  Dept Fax: [phone]  Loc: [phone]    Yulisa Cuevas is a [de-identified] y.o. male who presents today for his medical conditions/complaintsas noted below. Yulisa Cuevas is c/o of Cough and Head Congestion        HPI:     Cough  This is a new problem. The current episode started in the past 7 days (2 days). The problem has been unchanged. The problem occurs every few hours. The cough is non-productive. Associated symptoms include nasal congestion and rhinorrhea. Pertinent negatives include no chills, fever, headaches, myalgias, rash, sore throat or shortness of breath. Nothing aggravates the symptoms. Risk factors for lung disease include smoking/tobacco exposure (Former smoker). He has tried rest for the symptoms. The treatment provided mild relief. He is here with 2 of his grandchildren and one of them has Flu A. He is eating and drinking well. Past Medical History:   Diagnosis Date    Dementia ([address]      No past surgical history on file. No family history on file. Social History     Tobacco Use    Smoking status: Former Smoker    Smokeless tobacco: Never Used   Substance Use Topics    Alcohol use: Not on file      Current Outpatient Medications   Medication Sig Dispense Refill    memantine (NAMENDA) 10 MG tablet Take 10 mg by mouth 2 times daily      Multiple Vitamins-Minerals (MULTIVITAMIN MEN 50+) TABS Take 1 tablet by mouth daily      risperiDONE (RISPERDAL) 0.5 MG tablet Take 0.5 mg by mouth 2 times daily      diphenhydrAMINE-phenylephrine (TRIACTING NIGHTIME COLD&COUGH) 6.25-2.5 MG/5ML LIQD Take by mouth      oseltamivir (TAMIFLU) 75 MG capsule Take 1 capsule by mouth daily for 7 days 7 capsule 0    donepezil (ARICEPT) 5 MG tablet  (Patient not taking: Reported on 4/7/2022)       No current facility-administered medications for this visit.      No Known Allergies    Health Maintenance   Topic Date Due    COVID-19 Vaccine (1) Never done    Depression Screen  Never done    DTaP/Tdap/Td vaccine (1 - Tdap) Never done    Shingles Vaccine (1 of 2) Never done    Flu vaccine (Season Ended) 09/01/2022    Pneumococcal 65+ years Vaccine  Completed    Hepatitis A vaccine  Aged Out    Hepatitis B vaccine  Aged Out    Hib vaccine  Aged Out    Meningococcal (ACWY) vaccine  Aged Out       Subjective:     Review of Systems   Constitutional: Negative for activity change, appetite change, chills and fever. HENT: Positive for rhinorrhea. Negative for congestion, ear discharge, sinus pressure and sore throat. Respiratory: Positive for cough. Negative for shortness of breath. Gastrointestinal: Negative for abdominal pain, diarrhea, nausea and vomiting. Musculoskeletal: Negative for arthralgias and myalgias. Skin: Negative for rash. Allergic/Immunologic: Negative. Neurological: Negative for headaches.       :Objective      Physical Exam  Vitals and nursing note reviewed. Constitutional:       General: He is awake. He is not in acute distress. Appearance: Normal appearance. He is well-developed and well-groomed. Comments: Thin, frail   HENT:      Head: Normocephalic. Right Ear: Hearing, tympanic membrane, ear canal and external ear normal.      Left Ear: Hearing, tympanic membrane, ear canal and external ear normal.      Nose: Rhinorrhea present. No congestion. Rhinorrhea is clear. Right Sinus: No frontal sinus tenderness. Left Sinus: No frontal sinus tenderness. Mouth/Throat:      Lips: Pink. Mouth: Mucous membranes are moist.      Pharynx: Oropharynx is clear. Uvula midline. Tonsils: 0 on the right. 0 on the left. Eyes:      General: Vision grossly intact. Neck:      Trachea: Phonation normal.   Cardiovascular:      Rate and Rhythm: Normal rate and regular rhythm.       Heart sounds: Normal heart sounds, S1 normal and S2 normal. No murmur heard. No friction rub. No gallop. Pulmonary:      Effort: Pulmonary effort is normal. No respiratory distress. Breath sounds: Normal breath sounds and air entry. No wheezing, rhonchi or rales. Abdominal:      Palpations: Abdomen is soft. Musculoskeletal:         General: No tenderness or deformity. Normal range of motion. Cervical back: Full passive range of motion without pain and neck supple. Lymphadenopathy:      Head:      Right side of head: No tonsillar adenopathy. Left side of head: No tonsillar adenopathy. Skin:     General: Skin is warm and dry. Capillary Refill: Capillary refill takes less than 2 seconds. Neurological:      General: No focal deficit present. Mental Status: He is alert, oriented to person, place, and time and easily aroused. Psychiatric:         Attention and Perception: Attention normal.         Mood and Affect: Mood normal.         Speech: Speech normal.         Behavior: Behavior normal. Behavior is cooperative. /62   Pulse 61   Temp 98.1 °F (36.7 °C) (Temporal)   Resp 18   Ht 6' 1\" (1.854 m)   Wt 141 lb (64 kg)   SpO2 96%   BMI 18.60 kg/m²     :Assessment       Diagnosis Orders   1. Cough  POCT Influenza A/B   2. Upper respiratory tract infection, unspecified type     3. Exposure to the flu         :Plan      Orders Placed This Encounter   Procedures    POCT Influenza A/B     Results for orders placed or performed in visit on 04/07/22   POCT Influenza A/B   Result Value Ref Range    Influenza A Ab neg     Influenza B Ab neg        No follow-ups on file.     Orders Placed This Encounter   Medications    oseltamivir (TAMIFLU) 75 MG capsule     Sig: Take 1 capsule by mouth daily for 7 days     Dispense:  7 capsule     Refill:  0        Patient Instructions     Plenty of fluids  Rest  OTC Tylenol or Motrin as needed  Tamiflu as directed due to exposure to flu today  Follow up with PCP or return to Urgent Care for worsening or unresolved symptoms. Patient Education        Viral Respiratory Infection: Care Instructions  Your Care Instructions     Viruses are very small organisms. They grow in number after they enter your body. There are many types that cause different illnesses, such as colds andthe mumps. The symptoms of a viral respiratory infection often start quickly. They include a fever, sore throat, and runny nose. You may also just not feel well. Or youmay not want to eat much. Most viral respiratory infections are not serious. They usually get better withtime and self-care. Antibiotics are not used to treat a viral infection. That's because antibiotics will not help cure a viral illness. In some cases, antiviral medicine can helpyour body fight a serious viral infection. Follow-up care is a key part of your treatment and safety. Be sure to make and go to all appointments, and call your doctor if you are having problems. It's also a good idea to know your test results and keep alist of the medicines you take. How can you care for yourself at home?  Rest as much as possible until you feel better.  Be safe with medicines. Take your medicine exactly as prescribed. Call your doctor if you think you are having a problem with your medicine. You will get more details on the specific medicine your doctor prescribes.  Take an over-the-counter pain medicine, such as acetaminophen (Tylenol), ibuprofen (Advil, Motrin), or naproxen (Aleve), as needed for pain and fever. Read and follow all instructions on the label. Do not give aspirin to anyone younger than 20. It has been linked to Reye syndrome, a serious illness.  Drink plenty of fluids. Hot fluids, such as tea or soup, may help relieve congestion in your nose and throat. If you have kidney, heart, or liver disease and have to limit fluids, talk with your doctor before you increase the amount of fluids you drink.    Try to clear mucus from your lungs by breathing deeply and coughing.  Gargle with warm salt water once an hour. This can help reduce swelling and throat pain. Use 1 teaspoon of salt mixed in 1 cup of warm water.  Do not smoke or allow others to smoke around you. If you need help quitting, talk to your doctor about stop-smoking programs and medicines. These can increase your chances of quitting for good. To avoid spreading the virus   Cough or sneeze into a tissue. Then throw the tissue away.  If you don't have a tissue, use your hand to cover your cough or sneeze. Then clean your hand. You can also cough into your sleeve.  Wash your hands often. Use soap and warm water. Wash for 15 to 20 seconds each time.  If you don't have soap and water near you, you can clean your hands with alcohol wipes or gel. When should you call for help? Call your doctor now or seek immediate medical care if:     You have a new or higher fever.      Your fever lasts more than 48 hours.      You have trouble breathing.      You have a fever with a stiff neck or a severe headache.      You are sensitive to light.      You feel very sleepy or confused. Watch closely for changes in your health, and be sure to contact your doctor if:     You do not get better as expected. Where can you learn more? Go to https://NetTalon.AdelaVoice. org and sign in to your [employer] account. Enter H093 in the [facility] box to learn more about \"Viral Respiratory Infection: Care Instructions. \"     If you do not have an account, please click on the \"Sign Up Now\" link. Current as of: July 6, 2021               Content Version: 13.2  © 2006-2022 Healthwise, Incorporated. Care instructions adapted under license by [facility] ([facility]). If you have questions about a medical condition or this instruction, always ask your healthcare professional. [address] [place] [address] any warranty or liability for your use of this information.               Patient given educational materials- see patient instructions. Discussed use, benefit, and side effects of prescribedmedications. All patient questions answered. Pt voiced understanding.        Electronically signed by FABIOLA Martinez CNP on 4/7/2022 at 5:10 PM

## 2022-04-07 NOTE — PATIENT INSTRUCTIONS
Plenty of fluids  Rest  OTC Tylenol or Motrin as needed  Tamiflu as directed due to exposure to flu today  Follow up with PCP or return to Urgent Care for worsening or unresolved symptoms. Patient Education        Viral Respiratory Infection: Care Instructions  Your Care Instructions     Viruses are very small organisms. They grow in number after they enter your body. There are many types that cause different illnesses, such as colds andthe mumps. The symptoms of a viral respiratory infection often start quickly. They include a fever, sore throat, and runny nose. You may also just not feel well. Or youmay not want to eat much. Most viral respiratory infections are not serious. They usually get better withtime and self-care. Antibiotics are not used to treat a viral infection. That's because antibiotics will not help cure a viral illness. In some cases, antiviral medicine can helpyour body fight a serious viral infection. Follow-up care is a key part of your treatment and safety. Be sure to make and go to all appointments, and call your doctor if you are having problems. It's also a good idea to know your test results and keep alist of the medicines you take. How can you care for yourself at home?  Rest as much as possible until you feel better.  Be safe with medicines. Take your medicine exactly as prescribed. Call your doctor if you think you are having a problem with your medicine. You will get more details on the specific medicine your doctor prescribes.  Take an over-the-counter pain medicine, such as acetaminophen (Tylenol), ibuprofen (Advil, Motrin), or naproxen (Aleve), as needed for pain and fever. Read and follow all instructions on the label. Do not give aspirin to anyone younger than 20. It has been linked to Reye syndrome, a serious illness.  Drink plenty of fluids. Hot fluids, such as tea or soup, may help relieve congestion in your nose and throat.  If you have kidney, heart, or liver disease and have to limit fluids, talk with your doctor before you increase the amount of fluids you drink.  Try to clear mucus from your lungs by breathing deeply and coughing.  Gargle with warm salt water once an hour. This can help reduce swelling and throat pain. Use 1 teaspoon of salt mixed in 1 cup of warm water.  Do not smoke or allow others to smoke around you. If you need help quitting, talk to your doctor about stop-smoking programs and medicines. These can increase your chances of quitting for good. To avoid spreading the virus   Cough or sneeze into a tissue. Then throw the tissue away.  If you don't have a tissue, use your hand to cover your cough or sneeze. Then clean your hand. You can also cough into your sleeve.  Wash your hands often. Use soap and warm water. Wash for 15 to 20 seconds each time.  If you don't have soap and water near you, you can clean your hands with alcohol wipes or gel. When should you call for help? Call your doctor now or seek immediate medical care if:     You have a new or higher fever.      Your fever lasts more than 48 hours.      You have trouble breathing.      You have a fever with a stiff neck or a severe headache.      You are sensitive to light.      You feel very sleepy or confused. Watch closely for changes in your health, and be sure to contact your doctor if:     You do not get better as expected. Where can you learn more? Go to https://MyzeankushSSP Europe.WiOffer. org and sign in to your [employer] account. Enter F445 in the [facility] box to learn more about \"Viral Respiratory Infection: Care Instructions. \"     If you do not have an account, please click on the \"Sign Up Now\" link. Current as of: July 6, 2021               Content Version: 13.2  © 2006-2022 Healthwise, [employer]. Care instructions adapted under license by [facility] ([facility]).  If you have questions about a medical condition or this instruction, always ask your healthcare professional. [address] [address] any warranty or liability for your use of this information.

## 2022-04-14 ENCOUNTER — APPOINTMENT (OUTPATIENT)
Dept: GENERAL RADIOLOGY | Facility: HOSPITAL | Age: 82
End: 2022-04-14

## 2022-04-14 ENCOUNTER — HOSPITAL ENCOUNTER (INPATIENT)
Facility: HOSPITAL | Age: 82
LOS: 5 days | Discharge: HOME HEALTH | End: 2022-04-19
Attending: EMERGENCY MEDICINE | Admitting: FAMILY MEDICINE

## 2022-04-14 ENCOUNTER — APPOINTMENT (OUTPATIENT)
Dept: CT IMAGING | Facility: HOSPITAL | Age: 82
End: 2022-04-14

## 2022-04-14 DIAGNOSIS — Z78.9 DECREASED ACTIVITIES OF DAILY LIVING (ADL): ICD-10-CM

## 2022-04-14 DIAGNOSIS — J18.9 PNEUMONIA DUE TO INFECTIOUS ORGANISM, UNSPECIFIED LATERALITY, UNSPECIFIED PART OF LUNG: ICD-10-CM

## 2022-04-14 DIAGNOSIS — R13.10 DYSPHAGIA, UNSPECIFIED TYPE: ICD-10-CM

## 2022-04-14 DIAGNOSIS — Z74.09 IMPAIRED MOBILITY: ICD-10-CM

## 2022-04-14 DIAGNOSIS — R41.82 ALTERED MENTAL STATUS, UNSPECIFIED ALTERED MENTAL STATUS TYPE: Primary | ICD-10-CM

## 2022-04-14 PROBLEM — R79.9 ELEVATED BUN: Status: ACTIVE | Noted: 2022-04-14

## 2022-04-14 PROBLEM — R53.1 GENERALIZED WEAKNESS: Status: ACTIVE | Noted: 2022-04-14

## 2022-04-14 LAB
ALBUMIN SERPL-MCNC: 4 G/DL (ref 3.5–5.2)
ALBUMIN/GLOB SERPL: 1.5 G/DL
ALP SERPL-CCNC: 43 U/L (ref 39–117)
ALT SERPL W P-5'-P-CCNC: 13 U/L (ref 1–41)
ANION GAP SERPL CALCULATED.3IONS-SCNC: 15 MMOL/L (ref 5–15)
AST SERPL-CCNC: 22 U/L (ref 1–40)
BILIRUB SERPL-MCNC: 1.1 MG/DL (ref 0–1.2)
BUN SERPL-MCNC: 34 MG/DL (ref 8–23)
BUN/CREAT SERPL: 29.8 (ref 7–25)
BURR CELLS BLD QL SMEAR: ABNORMAL
CALCIUM SPEC-SCNC: 9.2 MG/DL (ref 8.6–10.5)
CHLORIDE SERPL-SCNC: 107 MMOL/L (ref 98–107)
CO2 SERPL-SCNC: 22 MMOL/L (ref 22–29)
CREAT SERPL-MCNC: 1.14 MG/DL (ref 0.76–1.27)
CRP SERPL-MCNC: 14.05 MG/DL (ref 0–0.5)
D-LACTATE SERPL-SCNC: 3.1 MMOL/L (ref 0.5–2)
DACRYOCYTES BLD QL SMEAR: ABNORMAL
DEPRECATED RDW RBC AUTO: 43.7 FL (ref 37–54)
EGFRCR SERPLBLD CKD-EPI 2021: 65 ML/MIN/1.73
ERYTHROCYTE [DISTWIDTH] IN BLOOD BY AUTOMATED COUNT: 14 % (ref 12.3–15.4)
GLOBULIN UR ELPH-MCNC: 2.7 GM/DL
GLUCOSE SERPL-MCNC: 112 MG/DL (ref 65–99)
HCT VFR BLD AUTO: 38.3 % (ref 37.5–51)
HGB BLD-MCNC: 12.3 G/DL (ref 13–17.7)
HOLD SPECIMEN: NORMAL
HOLD SPECIMEN: NORMAL
LYMPHOCYTES # BLD MANUAL: 1.42 10*3/MM3 (ref 0.7–3.1)
LYMPHOCYTES NFR BLD MANUAL: 4.8 % (ref 5–12)
MCH RBC QN AUTO: 27.6 PG (ref 26.6–33)
MCHC RBC AUTO-ENTMCNC: 32.1 G/DL (ref 31.5–35.7)
MCV RBC AUTO: 85.9 FL (ref 79–97)
METAMYELOCYTES NFR BLD MANUAL: 3.8 % (ref 0–0)
MONOCYTES # BLD: 0.88 10*3/MM3 (ref 0.1–0.9)
MYELOCYTES NFR BLD MANUAL: 1 % (ref 0–0)
NEUTROPHILS # BLD AUTO: 15.22 10*3/MM3 (ref 1.7–7)
NEUTROPHILS NFR BLD MANUAL: 65.4 % (ref 42.7–76)
NEUTS BAND NFR BLD MANUAL: 17.3 % (ref 0–5)
NRBC BLD AUTO-RTO: 0 /100 WBC (ref 0–0.2)
OVALOCYTES BLD QL SMEAR: ABNORMAL
PLAT MORPH BLD: NORMAL
PLATELET # BLD AUTO: 213 10*3/MM3 (ref 140–450)
PMV BLD AUTO: 9.7 FL (ref 6–12)
POIKILOCYTOSIS BLD QL SMEAR: ABNORMAL
POTASSIUM SERPL-SCNC: 4.7 MMOL/L (ref 3.5–5.2)
PROCALCITONIN SERPL-MCNC: 43.88 NG/ML (ref 0–0.25)
PROT SERPL-MCNC: 6.7 G/DL (ref 6–8.5)
RBC # BLD AUTO: 4.46 10*6/MM3 (ref 4.14–5.8)
SODIUM SERPL-SCNC: 144 MMOL/L (ref 136–145)
VARIANT LYMPHS NFR BLD MANUAL: 1.9 % (ref 0–5)
VARIANT LYMPHS NFR BLD MANUAL: 5.8 % (ref 19.6–45.3)
WBC MORPH BLD: NORMAL
WBC NRBC COR # BLD: 18.41 10*3/MM3 (ref 3.4–10.8)
WHOLE BLOOD HOLD SPECIMEN: NORMAL
WHOLE BLOOD HOLD SPECIMEN: NORMAL

## 2022-04-14 PROCEDURE — 93010 ELECTROCARDIOGRAM REPORT: CPT | Performed by: INTERNAL MEDICINE

## 2022-04-14 PROCEDURE — 70450 CT HEAD/BRAIN W/O DYE: CPT

## 2022-04-14 PROCEDURE — 83605 ASSAY OF LACTIC ACID: CPT | Performed by: EMERGENCY MEDICINE

## 2022-04-14 PROCEDURE — 81001 URINALYSIS AUTO W/SCOPE: CPT | Performed by: EMERGENCY MEDICINE

## 2022-04-14 PROCEDURE — 85025 COMPLETE CBC W/AUTO DIFF WBC: CPT

## 2022-04-14 PROCEDURE — 80053 COMPREHEN METABOLIC PANEL: CPT

## 2022-04-14 PROCEDURE — 93005 ELECTROCARDIOGRAM TRACING: CPT | Performed by: EMERGENCY MEDICINE

## 2022-04-14 PROCEDURE — 85007 BL SMEAR W/DIFF WBC COUNT: CPT

## 2022-04-14 PROCEDURE — 86140 C-REACTIVE PROTEIN: CPT | Performed by: EMERGENCY MEDICINE

## 2022-04-14 PROCEDURE — 99285 EMERGENCY DEPT VISIT HI MDM: CPT

## 2022-04-14 PROCEDURE — 87040 BLOOD CULTURE FOR BACTERIA: CPT | Performed by: EMERGENCY MEDICINE

## 2022-04-14 PROCEDURE — 84145 PROCALCITONIN (PCT): CPT | Performed by: EMERGENCY MEDICINE

## 2022-04-14 PROCEDURE — 25010000002 CEFTRIAXONE PER 250 MG: Performed by: EMERGENCY MEDICINE

## 2022-04-14 PROCEDURE — 71045 X-RAY EXAM CHEST 1 VIEW: CPT

## 2022-04-14 PROCEDURE — 36415 COLL VENOUS BLD VENIPUNCTURE: CPT

## 2022-04-14 PROCEDURE — 25010000002 AZITHROMYCIN PER 500 MG: Performed by: EMERGENCY MEDICINE

## 2022-04-14 RX ORDER — SODIUM CHLORIDE 0.9 % (FLUSH) 0.9 %
10 SYRINGE (ML) INJECTION EVERY 12 HOURS SCHEDULED
Status: DISCONTINUED | OUTPATIENT
Start: 2022-04-14 | End: 2022-04-19 | Stop reason: HOSPADM

## 2022-04-14 RX ORDER — ACETAMINOPHEN 650 MG/1
650 SUPPOSITORY RECTAL EVERY 4 HOURS PRN
Status: DISCONTINUED | OUTPATIENT
Start: 2022-04-14 | End: 2022-04-19 | Stop reason: HOSPADM

## 2022-04-14 RX ORDER — ACETAMINOPHEN 160 MG/5ML
650 SOLUTION ORAL EVERY 4 HOURS PRN
Status: DISCONTINUED | OUTPATIENT
Start: 2022-04-14 | End: 2022-04-19 | Stop reason: HOSPADM

## 2022-04-14 RX ORDER — ONDANSETRON 4 MG/1
4 TABLET, FILM COATED ORAL EVERY 6 HOURS PRN
Status: DISCONTINUED | OUTPATIENT
Start: 2022-04-14 | End: 2022-04-19 | Stop reason: HOSPADM

## 2022-04-14 RX ORDER — MEMANTINE HYDROCHLORIDE 5 MG/1
10 TABLET ORAL 2 TIMES DAILY
Status: DISCONTINUED | OUTPATIENT
Start: 2022-04-15 | End: 2022-04-19 | Stop reason: HOSPADM

## 2022-04-14 RX ORDER — ACETAMINOPHEN 325 MG/1
650 TABLET ORAL EVERY 4 HOURS PRN
Status: DISCONTINUED | OUTPATIENT
Start: 2022-04-14 | End: 2022-04-19 | Stop reason: HOSPADM

## 2022-04-14 RX ORDER — SODIUM CHLORIDE 0.9 % (FLUSH) 0.9 %
10 SYRINGE (ML) INJECTION AS NEEDED
Status: DISCONTINUED | OUTPATIENT
Start: 2022-04-14 | End: 2022-04-19 | Stop reason: HOSPADM

## 2022-04-14 RX ORDER — ONDANSETRON 2 MG/ML
4 INJECTION INTRAMUSCULAR; INTRAVENOUS EVERY 6 HOURS PRN
Status: DISCONTINUED | OUTPATIENT
Start: 2022-04-14 | End: 2022-04-19 | Stop reason: HOSPADM

## 2022-04-14 RX ORDER — SODIUM CHLORIDE, SODIUM LACTATE, POTASSIUM CHLORIDE, CALCIUM CHLORIDE 600; 310; 30; 20 MG/100ML; MG/100ML; MG/100ML; MG/100ML
100 INJECTION, SOLUTION INTRAVENOUS CONTINUOUS
Status: DISCONTINUED | OUTPATIENT
Start: 2022-04-14 | End: 2022-04-15

## 2022-04-14 RX ORDER — RISPERIDONE 1 MG/1
0.5 TABLET ORAL 2 TIMES DAILY
Status: DISCONTINUED | OUTPATIENT
Start: 2022-04-15 | End: 2022-04-19 | Stop reason: HOSPADM

## 2022-04-14 RX ORDER — MULTIPLE VITAMINS W/ MINERALS TAB 9MG-400MCG
1 TAB ORAL DAILY
Status: DISCONTINUED | OUTPATIENT
Start: 2022-04-15 | End: 2022-04-19 | Stop reason: HOSPADM

## 2022-04-14 RX ORDER — IPRATROPIUM BROMIDE AND ALBUTEROL SULFATE 2.5; .5 MG/3ML; MG/3ML
3 SOLUTION RESPIRATORY (INHALATION)
Status: DISCONTINUED | OUTPATIENT
Start: 2022-04-14 | End: 2022-04-17

## 2022-04-14 RX ADMIN — SODIUM CHLORIDE, POTASSIUM CHLORIDE, SODIUM LACTATE AND CALCIUM CHLORIDE 1000 ML: 600; 310; 30; 20 INJECTION, SOLUTION INTRAVENOUS at 21:53

## 2022-04-14 RX ADMIN — CEFTRIAXONE SODIUM 1 G: 10 INJECTION, POWDER, FOR SOLUTION INTRAVENOUS at 22:57

## 2022-04-14 RX ADMIN — AZITHROMYCIN DIHYDRATE 500 MG: 500 INJECTION, POWDER, LYOPHILIZED, FOR SOLUTION INTRAVENOUS at 23:01

## 2022-04-15 PROBLEM — G93.41 METABOLIC ENCEPHALOPATHY: Status: ACTIVE | Noted: 2022-04-15

## 2022-04-15 PROBLEM — E87.1 HYPONATREMIA: Status: RESOLVED | Noted: 2021-09-29 | Resolved: 2022-04-15

## 2022-04-15 PROBLEM — F02.80 LEWY BODY DEMENTIA WITHOUT BEHAVIORAL DISTURBANCE: Status: ACTIVE | Noted: 2019-07-11

## 2022-04-15 LAB
ANION GAP SERPL CALCULATED.3IONS-SCNC: 11 MMOL/L (ref 5–15)
BACTERIA UR QL AUTO: ABNORMAL /HPF
BILIRUB UR QL STRIP: NEGATIVE
BUN SERPL-MCNC: 31 MG/DL (ref 8–23)
BUN/CREAT SERPL: 33.7 (ref 7–25)
CALCIUM SPEC-SCNC: 8.9 MG/DL (ref 8.6–10.5)
CHLORIDE SERPL-SCNC: 104 MMOL/L (ref 98–107)
CHOLEST SERPL-MCNC: 141 MG/DL (ref 0–200)
CLARITY UR: CLEAR
CO2 SERPL-SCNC: 26 MMOL/L (ref 22–29)
COLOR UR: ABNORMAL
CREAT SERPL-MCNC: 0.92 MG/DL (ref 0.76–1.27)
CRP SERPL-MCNC: 25.72 MG/DL (ref 0–0.5)
D-LACTATE SERPL-SCNC: 3.7 MMOL/L (ref 0.5–2)
DEPRECATED RDW RBC AUTO: 45 FL (ref 37–54)
EGFRCR SERPLBLD CKD-EPI 2021: 84.1 ML/MIN/1.73
ERYTHROCYTE [DISTWIDTH] IN BLOOD BY AUTOMATED COUNT: 14.4 % (ref 12.3–15.4)
ERYTHROCYTE [SEDIMENTATION RATE] IN BLOOD: 9 MM/HR (ref 0–20)
GLUCOSE SERPL-MCNC: 107 MG/DL (ref 65–99)
GLUCOSE UR STRIP-MCNC: NEGATIVE MG/DL
HBA1C MFR BLD: 5.5 % (ref 4.8–5.6)
HCT VFR BLD AUTO: 39.9 % (ref 37.5–51)
HDLC SERPL-MCNC: 67 MG/DL (ref 40–60)
HGB BLD-MCNC: 12.8 G/DL (ref 13–17.7)
HGB UR QL STRIP.AUTO: NEGATIVE
HYALINE CASTS UR QL AUTO: ABNORMAL /LPF
KETONES UR QL STRIP: ABNORMAL
LDLC SERPL CALC-MCNC: 60 MG/DL (ref 0–100)
LDLC/HDLC SERPL: 0.9 {RATIO}
LEUKOCYTE ESTERASE UR QL STRIP.AUTO: NEGATIVE
MCH RBC QN AUTO: 27.7 PG (ref 26.6–33)
MCHC RBC AUTO-ENTMCNC: 32.1 G/DL (ref 31.5–35.7)
MCV RBC AUTO: 86.4 FL (ref 79–97)
NITRITE UR QL STRIP: NEGATIVE
PH UR STRIP.AUTO: 5.5 [PH] (ref 5–8)
PLATELET # BLD AUTO: 191 10*3/MM3 (ref 140–450)
PMV BLD AUTO: 9.8 FL (ref 6–12)
POTASSIUM SERPL-SCNC: 4 MMOL/L (ref 3.5–5.2)
PROT UR QL STRIP: ABNORMAL
RBC # BLD AUTO: 4.62 10*6/MM3 (ref 4.14–5.8)
RBC # UR STRIP: ABNORMAL /HPF
REF LAB TEST METHOD: ABNORMAL
SARS-COV-2 RNA PNL SPEC NAA+PROBE: NOT DETECTED
SODIUM SERPL-SCNC: 141 MMOL/L (ref 136–145)
SP GR UR STRIP: 1.02 (ref 1–1.03)
SQUAMOUS #/AREA URNS HPF: ABNORMAL /HPF
TRIGL SERPL-MCNC: 68 MG/DL (ref 0–150)
UROBILINOGEN UR QL STRIP: ABNORMAL
VLDLC SERPL-MCNC: 14 MG/DL (ref 5–40)
WBC # UR STRIP: ABNORMAL /HPF
WBC NRBC COR # BLD: 17.52 10*3/MM3 (ref 3.4–10.8)

## 2022-04-15 PROCEDURE — 80048 BASIC METABOLIC PNL TOTAL CA: CPT | Performed by: NURSE PRACTITIONER

## 2022-04-15 PROCEDURE — 85652 RBC SED RATE AUTOMATED: CPT | Performed by: NURSE PRACTITIONER

## 2022-04-15 PROCEDURE — 94799 UNLISTED PULMONARY SVC/PX: CPT

## 2022-04-15 PROCEDURE — 97166 OT EVAL MOD COMPLEX 45 MIN: CPT | Performed by: OCCUPATIONAL THERAPIST

## 2022-04-15 PROCEDURE — 87635 SARS-COV-2 COVID-19 AMP PRB: CPT | Performed by: FAMILY MEDICINE

## 2022-04-15 PROCEDURE — 92610 EVALUATE SWALLOWING FUNCTION: CPT

## 2022-04-15 PROCEDURE — 25010000002 AZITHROMYCIN PER 500 MG: Performed by: NURSE PRACTITIONER

## 2022-04-15 PROCEDURE — 83036 HEMOGLOBIN GLYCOSYLATED A1C: CPT | Performed by: NURSE PRACTITIONER

## 2022-04-15 PROCEDURE — 94664 DEMO&/EVAL PT USE INHALER: CPT

## 2022-04-15 PROCEDURE — 93010 ELECTROCARDIOGRAM REPORT: CPT | Performed by: INTERNAL MEDICINE

## 2022-04-15 PROCEDURE — 93005 ELECTROCARDIOGRAM TRACING: CPT | Performed by: EMERGENCY MEDICINE

## 2022-04-15 PROCEDURE — 80061 LIPID PANEL: CPT | Performed by: NURSE PRACTITIONER

## 2022-04-15 PROCEDURE — 94640 AIRWAY INHALATION TREATMENT: CPT

## 2022-04-15 PROCEDURE — 86140 C-REACTIVE PROTEIN: CPT | Performed by: NURSE PRACTITIONER

## 2022-04-15 PROCEDURE — 83605 ASSAY OF LACTIC ACID: CPT | Performed by: EMERGENCY MEDICINE

## 2022-04-15 PROCEDURE — 85027 COMPLETE CBC AUTOMATED: CPT | Performed by: NURSE PRACTITIONER

## 2022-04-15 PROCEDURE — 25010000002 CEFTRIAXONE PER 250 MG: Performed by: NURSE PRACTITIONER

## 2022-04-15 PROCEDURE — 97162 PT EVAL MOD COMPLEX 30 MIN: CPT | Performed by: PHYSICAL THERAPIST

## 2022-04-15 RX ORDER — SODIUM CHLORIDE 9 MG/ML
50 INJECTION, SOLUTION INTRAVENOUS CONTINUOUS
Status: DISCONTINUED | OUTPATIENT
Start: 2022-04-15 | End: 2022-04-15

## 2022-04-15 RX ADMIN — Medication 10 ML: at 22:34

## 2022-04-15 RX ADMIN — MEMANTINE HYDROCHLORIDE 10 MG: 5 TABLET, FILM COATED ORAL at 22:33

## 2022-04-15 RX ADMIN — IPRATROPIUM BROMIDE AND ALBUTEROL SULFATE 3 ML: 2.5; .5 SOLUTION RESPIRATORY (INHALATION) at 15:01

## 2022-04-15 RX ADMIN — RISPERIDONE 0.5 MG: 1 TABLET, FILM COATED ORAL at 22:33

## 2022-04-15 RX ADMIN — MEMANTINE HYDROCHLORIDE 10 MG: 5 TABLET, FILM COATED ORAL at 05:31

## 2022-04-15 RX ADMIN — IPRATROPIUM BROMIDE AND ALBUTEROL SULFATE 3 ML: 2.5; .5 SOLUTION RESPIRATORY (INHALATION) at 11:52

## 2022-04-15 RX ADMIN — SODIUM CHLORIDE, POTASSIUM CHLORIDE, SODIUM LACTATE AND CALCIUM CHLORIDE 100 ML/HR: 600; 310; 30; 20 INJECTION, SOLUTION INTRAVENOUS at 01:17

## 2022-04-15 RX ADMIN — Medication 10 ML: at 01:17

## 2022-04-15 RX ADMIN — CEFTRIAXONE SODIUM 1 G: 10 INJECTION, POWDER, FOR SOLUTION INTRAVENOUS at 22:33

## 2022-04-15 RX ADMIN — IPRATROPIUM BROMIDE AND ALBUTEROL SULFATE 3 ML: 2.5; .5 SOLUTION RESPIRATORY (INHALATION) at 00:27

## 2022-04-15 RX ADMIN — IPRATROPIUM BROMIDE AND ALBUTEROL SULFATE 3 ML: 2.5; .5 SOLUTION RESPIRATORY (INHALATION) at 23:04

## 2022-04-15 RX ADMIN — SODIUM CHLORIDE 50 ML/HR: 9 INJECTION, SOLUTION INTRAVENOUS at 09:31

## 2022-04-15 RX ADMIN — DILTIAZEM HYDROCHLORIDE 5 MG/HR: 5 INJECTION, SOLUTION INTRAVENOUS at 05:50

## 2022-04-15 RX ADMIN — RISPERIDONE 0.5 MG: 1 TABLET, FILM COATED ORAL at 05:31

## 2022-04-15 RX ADMIN — AZITHROMYCIN DIHYDRATE 500 MG: 500 INJECTION, POWDER, LYOPHILIZED, FOR SOLUTION INTRAVENOUS at 22:34

## 2022-04-15 RX ADMIN — DILTIAZEM HYDROCHLORIDE 12.5 MG/HR: 5 INJECTION, SOLUTION INTRAVENOUS at 12:36

## 2022-04-15 RX ADMIN — IPRATROPIUM BROMIDE AND ALBUTEROL SULFATE 3 ML: 2.5; .5 SOLUTION RESPIRATORY (INHALATION) at 18:25

## 2022-04-15 NOTE — ED NOTES
Contacted LIVAN Garcia regarding pt status. Connected physiological monitoring and obtained new set of vitals.     Updated Dr Rosado regarding vitals.

## 2022-04-15 NOTE — ED NOTES
Pt in room resting. Pt is alert and coperative. Pt attempted to provide urine specimen with out any success. MD verbal order to in and out willis cath pt for specimen. Daughter at bedside. Pt and family updated. No distress noted

## 2022-04-15 NOTE — PLAN OF CARE
Goal Outcome Evaluation:  Plan of Care Reviewed With: (P) patient        Progress: (P) no change  Outcome Evaluation: (P) PT eval completed. Per nursing request pt HR must stay under 120BPM througout session. Pt alert and oriented to person, place, and situation with verbal prompts, on RA, in supine upon arrival, requesting help with tolieting. Pt denied any pain this afternoon. At rest pt demonstrates significant resting tremors primarily in the UE. With bed mobility pt performed supine to sit with Mod A, bedrails, and HOB elevated. At EOB pt performed STS with Min A and HHA again with significant tremors and high fall risk. Pt perfomred tolieting in urinal at bedside. Pt HR raised to max of 125 with urinating and returned to EOB when done. Pt performed sit to supine with Min A with HR decreasing to 107 within a minute. Pt will benefit from skilled PT to address deficits in activity tolerance, reduce risk of falls and maximize independence with functional mobility. DC recommend back SNF vs assisted living with home health pending progress.

## 2022-04-15 NOTE — PROGRESS NOTES
[facility] Medicine Services  INPATIENT PROGRESS NOTE    Length of Stay: 1  Date of Admission: 4/14/2022  Primary Care Physician: Torey Amato DO    Subjective     Chief Complaint:     Increasing confusion, declining physical status    HPI     The patient is essentially unchanged from the time of admission late last night.  He continues to be confused.  He has been incontinent of bowel and bladder today.  Chest x-ray revealed what appears to be a right middle lobe pneumonia.  He continues with IV antibiotic treatment.  White blood cell count today is improved at 17,500.  CMP is unremarkable.  CBC will be repeated in a.m.      Review of Systems     All pertinent negatives and positives are as above. All other systems have been reviewed and are negative unless otherwise stated.     Objective    Temp:  [98 °F (36.7 °C)-99.9 °F (37.7 °C)] 98 °F (36.7 °C)  Heart Rate:  [] 103  Resp:  [14-21] 18  BP: (106-160)/(48-95) 132/55    Lab Results (last 24 hours)     Procedure Component Value Units Date/Time    COVID PRE-OP / PRE-PROCEDURE SCREENING ORDER (NO ISOLATION) - Swab, Nasal Cavity [[id number]]  (Normal) Collected: 04/15/22 0646    Specimen: Swab from Nasal Cavity Updated: 04/15/22 0730    Narrative:      The following orders were created for panel order COVID PRE-OP / PRE-PROCEDURE SCREENING ORDER (NO ISOLATION) - Swab, Nasal Cavity.  Procedure                               Abnormality         Status                     ---------                               -----------         ------                     COVID-19,[facility]...[[id number]]  Normal              Final result                 Please view results for these tests on the individual orders.    COVID-19,[facility] IN-HOUSE,Nasal Swab No Transport Media 3-4 HR TAT - Swab, Nasal Cavity [[id number]]  (Normal) Collected: 04/15/22 0646    Specimen: Swab from Nasal Cavity Updated: 04/15/22 0730     COVID19 Not  Detected    Narrative:      Fact sheet for providers: https://www.fda.gov/media/509036/download     Fact sheet for patients: https://www.fda.gov/media/845784/download    Test performed by PCR.    Consider negative results in combination with clinical observations, patient history, and epidemiological information.    Lipid Panel [[id number]]  (Abnormal) Collected: 04/15/22 0607    Specimen: Blood Updated: 04/15/22 0637     Total Cholesterol 141 mg/dL      Triglycerides 68 mg/dL      HDL Cholesterol 67 mg/dL      LDL Cholesterol  60 mg/dL      VLDL Cholesterol 14 mg/dL      LDL/HDL Ratio 0.90    Narrative:      Cholesterol Reference Ranges  (U.S. Department of Health and Human Services ATP III Classifications)    Desirable          <200 mg/dL  Borderline High    200-239 mg/dL  High Risk          >240 mg/dL      Triglyceride Reference Ranges  (U.S. Department of Health and Human Services ATP III Classifications)    Normal           <150 mg/dL  Borderline High  150-199 mg/dL  High             200-499 mg/dL  Very High        >500 mg/dL    HDL Reference Ranges  (U.S. Department of Health and Human Services ATP III Classifications)    Low     <40 mg/dl (major risk factor for CHD)  High    >60 mg/dl ('negative' risk factor for CHD)        LDL Reference Ranges  (U.S. Department of Health and Human Services ATP III Classifications)    Optimal          <100 mg/dL  Near Optimal     100-129 mg/dL  Borderline High  130-159 mg/dL  High             160-189 mg/dL  Very High        >189 mg/dL    C-reactive Protein [[id number]]  (Abnormal) Collected: 04/15/22 0607    Specimen: Blood Updated: 04/15/22 0637     C-Reactive Protein 25.72 mg/dL     Basic Metabolic Panel [[id number]]  (Abnormal) Collected: 04/15/22 0607    Specimen: Blood Updated: 04/15/22 0635     Glucose 107 mg/dL      BUN 31 mg/dL      Creatinine 0.92 mg/dL      Sodium 141 mmol/L      Potassium 4.0 mmol/L      Chloride 104 mmol/L      CO2 26.0 mmol/L      Calcium 8.9 mg/dL       BUN/Creatinine Ratio 33.7     Anion Gap 11.0 mmol/L      eGFR 84.1 mL/min/1.73      Comment: National Kidney Foundation and American Society of Nephrology (ASN) Task Force recommended calculation based on the Chronic Kidney Disease Epidemiology Collaboration (CKD-EPI) equation refit without adjustment for race.       Narrative:      GFR Normal >60  Chronic Kidney Disease <60  Kidney Failure <15      Hemoglobin A1c [[id number]]  (Normal) Collected: 04/15/22 0607    Specimen: Blood Updated: 04/15/22 0630     Hemoglobin A1C 5.50 %     Narrative:      Hemoglobin A1C Ranges:    Increased Risk for Diabetes  5.7% to 6.4%  Diabetes                     >= 6.5%  Diabetic Goal                < 7.0%    Sedimentation Rate [[id number]]  (Normal) Collected: 04/15/22 0607    Specimen: Blood Updated: 04/15/22 0629     Sed Rate 9 mm/hr     CBC (No Diff) [[id number]]  (Abnormal) Collected: 04/15/22 0607    Specimen: Blood Updated: 04/15/22 0620     WBC 17.52 10*3/mm3      RBC 4.62 10*6/mm3      Hemoglobin 12.8 g/dL      Hematocrit 39.9 %      MCV 86.4 fL      MCH 27.7 pg      MCHC 32.1 g/dL      RDW 14.4 %      RDW-SD 45.0 fl      MPV 9.8 fL      Platelets 191 10*3/mm3     STAT Lactic Acid, Reflex [[id number]]  (Abnormal) Collected: 04/15/22 0119    Specimen: Blood Updated: 04/15/22 0155     Lactate 3.7 mmol/L     Urinalysis With Culture If Indicated - Urine, Clean Catch [[id number]]  (Abnormal) Collected: 04/14/22 2354    Specimen: Urine, Clean Catch Updated: 04/15/22 0006     Color, UA Dark Yellow     Appearance, UA Clear     pH, UA 5.5     Specific Gravity, UA 1.024     Glucose, UA Negative     Ketones, UA Trace     Bilirubin, UA Negative     Blood, UA Negative     Protein, UA 30 mg/dL (1+)     Leuk Esterase, UA Negative     Nitrite, UA Negative     Urobilinogen, UA 0.2 E.U./dL    Urinalysis, Microscopic Only - Urine, Clean Catch [[id number]]  (Abnormal) Collected: 04/14/22 2354    Specimen: Urine, Clean Catch Updated: 04/15/22 0006  "    RBC, UA 0-2 /HPF      WBC, UA 0-2 /HPF      Bacteria, UA None Seen /HPF      Squamous Epithelial Cells, UA 0-2 /HPF      Hyaline Casts, UA 0-2 /LPF      Methodology Automated Microscopy    Blood Culture - Blood, Arm, Right [[id number]] Collected: 04/14/22 2145    Specimen: Blood from Arm, Right Updated: 04/14/22 2213    Blood Culture - Blood, Arm, Right [[id number]] Collected: 04/14/22 2145    Specimen: Blood from Arm, Right Updated: 04/14/22 2213    Lactic Acid, Plasma [[id number]]  (Abnormal) Collected: 04/14/22 2145    Specimen: Blood Updated: 04/14/22 2208     Lactate 3.1 mmol/L     Procalcitonin [[id number]]  (Abnormal) Collected: 04/14/22 1852    Specimen: Blood Updated: 04/14/22 2131     Procalcitonin 43.88 ng/mL     Narrative:      As a Marker for Sepsis (Non-Neonates):    1. <0.5 ng/mL represents a low risk of severe sepsis and/or septic shock.  2. >2 ng/mL represents a high risk of severe sepsis and/or septic shock.    As a Marker for Lower Respiratory Tract Infections that require antibiotic therapy:    PCT on Admission    Antibiotic Therapy       6-12 Hrs later    >0.5                Strongly Recommended  >0.25 - <0.5        Recommended   0.1 - 0.25          Discouraged              Remeasure/reassess PCT  <0.1                Strongly Discouraged     Remeasure/reassess PCT    As 28 day mortality risk marker: \"Change in Procalcitonin Result\" (>80% or <=80%) if Day 0 (or Day 1) and Day 4 values are available. Refer to http://www.[facility]-pct-calculator.com    Change in PCT <=80%  A decrease of PCT levels below or equal to 80% defines a positive change in PCT test result representing a higher risk for 28-day all-cause mortality of patients diagnosed with severe sepsis for septic shock.    Change in PCT >80%  A decrease of PCT levels of more than 80% defines a negative change in PCT result representing a lower risk for 28-day all-cause mortality of patients diagnosed with severe sepsis or septic shock.       " C-reactive Protein [[id number]]  (Abnormal) Collected: 04/14/22 1852    Specimen: Blood Updated: 04/14/22 2122     C-Reactive Protein 14.05 mg/dL     [facility] Draw [[id number]] Collected: 04/14/22 1852    Specimen: Blood Updated: 04/14/22 2002    Narrative:      The following orders were created for panel order [facility] Draw.  Procedure                               Abnormality         Status                     ---------                               -----------         ------                     Green Top (Gel)[[id number]]                                  Final result               Lavender Top[[id number]]                                     Final result               Red Top[[id number]]                                          Final result               Light Blue Top[[id number]]                                   Final result                 Please view results for these tests on the individual orders.    Red Top [[id number]] Collected: 04/14/22 1852    Specimen: Blood Updated: 04/14/22 2002     Extra Tube Hold for add-ons.     Comment: Auto resulted.       Light Blue Top [[id number]] Collected: 04/14/22 1852    Specimen: Blood Updated: 04/14/22 2002     Extra Tube hold for add-on     Comment: Auto resulted       Green Top (Gel) [[id number]] Collected: 04/14/22 1852    Specimen: Blood Updated: 04/14/22 2002     Extra Tube Hold for add-ons.     Comment: Auto resulted.       Lavender Top [[id number]] Collected: 04/14/22 1852    Specimen: Blood Updated: 04/14/22 2002     Extra Tube hold for add-on     Comment: Auto resulted       Manual Differential [[id number]]  (Abnormal) Collected: 04/14/22 1852    Specimen: Blood Updated: 04/14/22 1935     Neutrophil % 65.4 %      Lymphocyte % 5.8 %      Monocyte % 4.8 %      Bands %  17.3 %      Metamyelocyte % 3.8 %      Myelocyte % 1.0 %      Atypical Lymphocyte % 1.9 %      Neutrophils Absolute 15.22 10*3/mm3      Lymphocytes Absolute 1.42 10*3/mm3      Monocytes Absolute 0.88 10*3/mm3       Crenated RBC's Slight/1+     Dacrocytes Slight/1+     Ovalocytes Slight/1+     Poikilocytes Mod/2+     WBC Morphology Normal     Platelet Morphology Normal    Comprehensive Metabolic Panel [[id number]]  (Abnormal) Collected: 04/14/22 1852    Specimen: Blood Updated: 04/14/22 1916     Glucose 112 mg/dL      BUN 34 mg/dL      Creatinine 1.14 mg/dL      Sodium 144 mmol/L      Potassium 4.7 mmol/L      Chloride 107 mmol/L      CO2 22.0 mmol/L      Calcium 9.2 mg/dL      Total Protein 6.7 g/dL      Albumin 4.00 g/dL      ALT (SGPT) 13 U/L      AST (SGOT) 22 U/L      Alkaline Phosphatase 43 U/L      Total Bilirubin 1.1 mg/dL      Globulin 2.7 gm/dL      A/G Ratio 1.5 g/dL      BUN/Creatinine Ratio 29.8     Anion Gap 15.0 mmol/L      eGFR 65.0 mL/min/1.73      Comment: National Kidney Foundation and American Society of Nephrology (ASN) Task Force recommended calculation based on the Chronic Kidney Disease Epidemiology Collaboration (CKD-EPI) equation refit without adjustment for race.       Narrative:      GFR Normal >60  Chronic Kidney Disease <60  Kidney Failure <15      CBC & Differential [[id number]]  (Abnormal) Collected: 04/14/22 1852    Specimen: Blood Updated: 04/14/22 1912    Narrative:      The following orders were created for panel order CBC & Differential.  Procedure                               Abnormality         Status                     ---------                               -----------         ------                     CBC Auto Differential[[id number]]        Abnormal            Final result                 Please view results for these tests on the individual orders.    CBC Auto Differential [[id number]]  (Abnormal) Collected: 04/14/22 1852    Specimen: Blood Updated: 04/14/22 1912     WBC 18.41 10*3/mm3      RBC 4.46 10*6/mm3      Hemoglobin 12.3 g/dL      Hematocrit 38.3 %      MCV 85.9 fL      MCH 27.6 pg      MCHC 32.1 g/dL      RDW 14.0 %      RDW-SD 43.7 fl      MPV 9.7 fL      Platelets 213  10*3/mm3      nRBC 0.0 /100 WBC           Imaging Results (Last 24 Hours)     Procedure Component Value Units Date/Time    CT Head Without Contrast [[id number]] Collected: 04/15/22 0407     Updated: 04/15/22 0413    Narrative:      EXAM/TECHNIQUE: CT of the head without contrast     INDICATION: Mental status change, unknown cause; R41.82-Altered mental  status, unspecified; J18.9-Pneumonia, unspecified organism     COMPARISON: 09/29/2021     DLP: 624 mGy cm. Automated exposure control was also utilized to  decrease patient radiation dose.     FINDINGS:     No evidence of intracranial hemorrhage. Gray-white differentiation is  maintained. Mild presumed chronic microvascular ischemic white matter  change and global cerebral volume loss. No midline shift or mass effect.  Lateral ventricles are nondilated. Basilar cisterns are patent. No acute  orbital finding. Mastoid air cells and visualized portion of the  paranasal sinuses are clear. No acute osseous finding.       Impression:         No acute intracranial findings.     These findings are in agreement with the critical and emergent findings  from the [facility] preliminary report.  This report was finalized on 04/15/2022 04:10 by Dr. Jessee Lopez MD.    XR Chest 1 View [[id number]] Collected: 04/14/22 2134     Updated: 04/14/22 2138    Narrative:      EXAM: XR CHEST 1 VW- 4/14/2022 9:26 PM CDT     HISTORY: Febrile       COMPARISON: September 29, 2021.     TECHNIQUE: Frontal radiograph of the chest     FINDINGS:   Vague groundglass opacities in the mid right lung. Left lung is clear..  Cardiac silhouette is normal. Vascular calcifications present aortic  arch..      The osseous structures and surrounding soft tissues demonstrate no acute  abnormality.          Impression:      1. Groundglass opacity in the mid right lung most likely representing  pneumonia.        This report was finalized on 04/14/2022 21:35 by Dr. Jakob Miranda MD.             Intake/Output Summary  (Last 24 hours) at 4/15/2022 1613  Last data filed at 4/14/2022 2257  Gross per 24 hour   Intake 1000 ml   Output --   Net 1000 ml       Physical Exam  Constitutional:       Appearance: He is ill-appearing.   HENT:      Head: Normocephalic and atraumatic.      Mouth/Throat:      Mouth: Mucous membranes are dry.      Pharynx: Oropharynx is clear.   Eyes:      Conjunctiva/sclera: Conjunctivae normal.      Pupils: Pupils are equal, round, and reactive to light.   Cardiovascular:      Rate and Rhythm: Normal rate and regular rhythm.   Pulmonary:      Effort: No respiratory distress.      Comments: A few inspiratory rhonchi on the right  Abdominal:      General: There is no distension.      Palpations: Abdomen is soft.      Tenderness: There is no abdominal tenderness.   Musculoskeletal:      Cervical back: Neck supple.      Right lower leg: No edema.      Left lower leg: No edema.      Comments: Generalized weakness and debility   Skin:     General: Skin is warm and dry.   Neurological:      Mental Status: He is alert. Mental status is at baseline.      Comments: Tremors noted with intentional movements   Psychiatric:      Comments: Flat affect, no behavioral outburst     Results Review:  I have reviewed the labs, radiology results, and diagnostic studies since my last progress note and made treatment changes reflective of the results.   I have reviewed the current medications.    Assessment/Plan     Active Hospital Problems    Diagnosis    • **Pneumonia of right middle lobe due to infectious organism    • Metabolic encephalopathy    • Generalized weakness    • Elevated BUN    • Cachexia (HCC)    • Lewy body dementia without behavioral disturbance (HCC)    • Anemia        PLAN:  Continue Rocephin and azithromycin  Discontinue IV fluids  Encourage oral intake    Electronically signed by Rajan Adams DO, 04/15/22, 16:13 CDT.

## 2022-04-15 NOTE — THERAPY EVALUATION
Patient Name: Edy Urbina  : 1940    MRN: [id number]                              Today's Date: 4/15/2022       Admit Date: 2022    Visit Dx:     ICD-10-CM ICD-9-CM   1. Altered mental status, unspecified altered mental status type  R41.82 780.97   2. Pneumonia due to infectious organism, unspecified laterality, unspecified part of lung  J18.9 486   3. Dysphagia, unspecified type  R13.10 787.20     Patient Active Problem List   Diagnosis   • Anemia   • Lewy body dementia without behavioral disturbance (HCC)   • Cachexia (HCC)   • Pneumonia of right middle lobe due to infectious organism   • Generalized weakness   • Elevated BUN   • Metabolic encephalopathy     Past Medical History:   Diagnosis Date   • Cataracts, bilateral    • Lewy body dementia (HCC)    • Occasional tremors    • Prostate cancer ([facility]) 2011    t2c,[place]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    • Varicose vein of leg 2019   • Varicose veins of legs      Past Surgical History:   Procedure Laterality Date   • EYE SURGERY     • HERNIA REPAIR Left    • INGUINAL HERNIA REPAIR Right 2019    Procedure: OPEN RIGHT INGUINAL HERNIA REPAIR WITH MESH;  Surgeon: Alesia Mora MD;  Location: [facility];  Service: General   • PROSTATECTOMY  2011    RALP      General Information     Row Name 04/15/22 1340          Physical Therapy Time and Intention    Document Type evaluation  CC: Declining physical status. Pneumonia of right middle lobe due to infectious organism, Generalized weakness. PMH:Lewy body dementia  -SB (r) JJ (t) SB (c)     Mode of Treatment physical therapy  -SB (r) JJ (t) SB (c)     Row Name 04/15/22 1340          General Information    Patient Profile Reviewed yes  -SB (r) JJ (t) SB (c)     Prior Level of Function independent:;all household mobility  Pt. ambulates in room but it is unclear how much assist pt receives for ADLs  -SB (r) JJ (t) SB (c)     Existing Precautions/Restrictions fall;other  (see comments)   HR has to stay under 120BPM  -SB (r) JJ (t) SB (c)     Barriers to Rehab medically complex;physical barrier;previous functional deficit;cognitive status  HR must stay under 120 BPM restrictions  -SB (r) JJ (t) SB (c)     Row Name 04/15/22 1340          Living Environment    People in Home facility resident  currently resides at [facility] assisted living  -SB (r) JJ (t) SB (c)     Row Name 04/15/22 1340          Home Main Entrance    Number of Stairs, Main Entrance none  -SB (r) JJ (t) SB (c)     Stair Railings, Main Entrance none  -SB (r) JJ (t) SB (c)     Row Name 04/15/22 1340          Stairs Within Home, Primary    Number of Stairs, Within Home, Primary none  -SB (r) JJ (t) SB (c)     Stair Railings, Within Home, Primary none  -SB (r) JJ (t) SB (c)     Row Name 04/15/22 1340          Cognition    Orientation Status (Cognition) oriented to;person;place;situation  -SB (r) JJ (t) SB (c)     Row Name 04/15/22 1340          Safety Issues, Functional Mobility    Safety Issues Affecting Function (Mobility) awareness of need for assistance;impulsivity;insight into deficits/self-awareness;judgment;problem-solving;safety precautions follow-through/compliance;sequencing abilities  -SB (r) JJ (t) SB (c)     Impairments Affecting Function (Mobility) balance;endurance/activity tolerance;strength;postural/trunk control;coordination  -SB (r) JJ (t) SB (c)           User Key  (r) = Recorded By, (t) = Taken By, (c) = Cosigned By    Initials Name Provider Type    Angelina Gerber, PT DPT Physical Therapist    David Coronado, IZAIAH Student PT Student               Mobility     Row Name 04/15/22 1340          Bed Mobility    Bed Mobility supine-sit;sit-supine  -SB (r) JJ (t) SB (c)     Supine-Sit [place] (Bed Mobility) moderate assist (50% patient effort)  -SB (r) JJ (t) SB (c)     Sit-Supine [place] (Bed Mobility) minimum assist (75% patient effort)  -SB (r) JJ (t) SB (c)     Supine-Sit-Supine  [place] (Bed Mobility) --  -SB (r) JJ (t) SB (c)     Assistive Device (Bed Mobility) bed rails;head of bed elevated  -SB (r) JJ (t) SB (c)     Row Name 04/15/22 1340          Sit-Stand Transfer    Sit-Stand [place] (Transfers) contact guard;minimum assist (75% patient effort)  -SB (r) JJ (t) SB (c)     Assistive Device (Sit-Stand Transfers) --  HHA  -SB (r) JJ (t) SB (c)           User Key  (r) = Recorded By, (t) = Taken By, (c) = Cosigned By    Initials Name Provider Type    SB Angelina Mcleod, PT DPT Physical Therapist    David Coronado, IZAIAH Student PT Student               Obj/Interventions     Row Name 04/15/22 1340          Range of Motion Comprehensive    General Range of Motion bilateral lower extremity ROM WFL  -SB (r) JJ (t) SB (c)     Row Name 04/15/22 1340          Strength Comprehensive (MMT)    General Manual Muscle Testing (MMT) Assessment lower extremity strength deficits identified  -SB (r) JJ (t) SB (c)     Comment, General Manual Muscle Testing (MMT) Assessment Formal MMT not assessed pt BLE grossly 3+/5 due to functional ability to stand  -SB (r) JJ (t) SB (c)     Row Name 04/15/22 1340          Balance    Balance Assessment sitting static balance;sitting dynamic balance;standing static balance;standing dynamic balance  -SB (r) JJ (t) SB (c)     Static Sitting Balance contact guard  -SB (r) JJ (t) SB (c)     Dynamic Sitting Balance contact guard  -SB (r) JJ (t) SB (c)     Position, Sitting Balance sitting edge of bed  -SB (r) JJ (t) SB (c)     Static Standing Balance minimal assist  -SB (r) JJ (t) SB (c)     Dynamic Standing Balance minimal assist  -SB (r) JJ (t) SB (c)     Position/Device Used, Standing Balance supported  HHA  -SB (r) JJ (t) SB (c)     Comment, Balance Pt demonstrates significant resting tremors. High fall risk.  -SB (r) JJ (t) SB (c)     Row Name 04/15/22 1340          Sensory Assessment (Somatosensory)    Sensory Assessment (Somatosensory) LE sensation intact  -SB  (r) JJ (t) SB (c)           User Key  (r) = Recorded By, (t) = Taken By, (c) = Cosigned By    Initials Name Provider Type    Angelina Gerber, PT DPT Physical Therapist    David Coronado, PT Student PT Student               Goals/Plan     Row Name 04/15/22 7903          Bed Mobility Goal 1 (PT)    Activity/Assistive Device (Bed Mobility Goal 1, PT) bed mobility activities, all  -SB (r) JJ (t) SB (c)     [place] Level/Cues Needed (Bed Mobility Goal 1, PT) modified independence  -SB (r) JJ (t) SB (c)     Time Frame (Bed Mobility Goal 1, PT) long term goal (LTG)  -SB (r) JJ (t) SB (c)     Progress/Outcomes (Bed Mobility Goal 1, PT) goal ongoing  -SB (r) JJ (t) SB (c)     Row Name 04/15/22 3240          Transfer Goal 1 (PT)    Activity/Assistive Device (Transfer Goal 1, PT) sit-to-stand/stand-to-sit;bed-to-chair/chair-to-bed;walker, rolling  -SB (r) JJ (t) SB (c)     [place] Level/Cues Needed (Transfer Goal 1, PT) minimum assist (75% or more patient effort)  -SB (r) JJ (t) SB (c)     Time Frame (Transfer Goal 1, PT) long term goal (LTG)  -SB (r) JJ (t) SB (c)     Progress/Outcome (Transfer Goal 1, PT) goal ongoing  -SB (r) JJ (t) SB (c)     Row Name 04/15/22 0500          Gait Training Goal 1 (PT)    Activity/Assistive Device (Gait Training Goal 1, PT) gait (walking locomotion);assistive device use;improve balance and speed;increase endurance/gait distance;increase energy conservation;walker, rolling  -SB (r) JJ (t) SB (c)     [place] Level (Gait Training Goal 1, PT) minimum assist (75% or more patient effort)  -SB (r) JJ (t) SB (c)     Distance (Gait Training Goal 1, PT) 50'  -SB (r) JJ (t) SB (c)     Time Frame (Gait Training Goal 1, PT) long term goal (LTG)  -SB (r) ZHAO (t) LAN (c)     Row Name 04/15/22 9413          Therapy Assessment/Plan (PT)    Planned Therapy Interventions (PT) balance training;bed mobility training;gait training;patient/family education;strengthening;transfer training  -SB (r)  JJ (t) SB (c)           User Key  (r) = Recorded By, (t) = Taken By, (c) = Cosigned By    Initials Name Provider Type    Angelina Gerber, PT DPT Physical Therapist    David Coronado, PT Student PT Student               Clinical Impression     Row Name 04/15/22 1340          Pain    Pretreatment Pain Rating 0/10 - no pain  -SB (r) JJ (t) SB (c)     Posttreatment Pain Rating 0/10 - no pain  -SB (r) JJ (t) SB (c)     Row Name 04/15/22 1340          Plan of Care Review    Plan of Care Reviewed With patient  -SB (r) JJ (t) SB (c)     Progress no change  -SB (r) JJ (t) SB (c)     Outcome Evaluation PT eval completed. Per nursing request pt HR must stay under 120BPM througout session. Pt alert and oriented to person, place, and situation with verbal prompts, on RA, in supine upon arrival, requesting help with tolieting. Pt denied any pain this afternoon. At rest pt demonstrates significant resting tremors primarily in the UE. With bed mobility pt performed supine to sit with Mod A, bedrails, and HOB elevated. At EOB pt performed STS with Min A and HHA again with significant tremors and high fall risk. Pt perfomred tolieting in urinal at bedside. Pt HR raised to max of 125 with urinating and returned to EOB when done. Pt performed sit to supine with Min A with HR decreasing to 107 within a minute. Pt will benefit from skilled PT to address deficits in activity tolerance, reduce risk of falls and maximize independence with functional mobility. DC recommend back SNF vs assisted living with home health pending progress.  -SB (r) JJ (t) SB (c)     Row Name 04/15/22 1340          Therapy Assessment/Plan (PT)    Rehab Potential (PT) good, to achieve stated therapy goals  -SB (r) JJ (t) SB (c)     Criteria for Skilled Interventions Met (PT) yes;meets criteria;skilled treatment is necessary  -SB (r) JJ (t) SB (c)     Therapy Frequency (PT) 2 times/day  -SB (r) JJ (t) SB (c)     Predicted Duration of Therapy Intervention (PT)  Until DC or goals met  -SB (r) JJ (t) SB (c)     Row Name 04/15/22 1340          Vital Signs    Pretreatment Heart Rate (beats/min) 116  -SB (r) JJ (t) SB (c)     Intratreatment Heart Rate (beats/min) 125  -SB (r) JJ (t) SB (c)     Posttreatment Heart Rate (beats/min) 107  -SB (r) JJ (t) SB (c)     O2 Delivery Pre Treatment room air  -SB (r) JJ (t) SB (c)     O2 Delivery Intra Treatment room air  -SB (r) JJ (t) SB (c)     O2 Delivery Post Treatment room air  -SB (r) JJ (t) SB (c)     Pre Patient Position Supine  -SB (r) JJ (t) SB (c)     Intra Patient Position Standing  -SB (r) JJ (t) SB (c)     Post Patient Position Supine  -SB (r) JJ (t) SB (c)           User Key  (r) = Recorded By, (t) = Taken By, (c) = Cosigned By    Initials Name Provider Type    Angelina Gerber, PT DPT Physical Therapist    David Coronado, PT Student PT Student               Outcome Measures     Row Name 04/15/22 1340          How much help from another person do you currently need...    Turning from your back to your side while in flat bed without using bedrails? 3  -SB (r) JJ (t) SB (c)     Moving from lying on back to sitting on the side of a flat bed without bedrails? 3  -SB (r) JJ (t) SB (c)     Moving to and from a bed to a chair (including a wheelchair)? 3  -SB (r) JJ (t) SB (c)     Standing up from a chair using your arms (e.g., wheelchair, bedside chair)? 3  -SB (r) JJ (t) SB (c)     Climbing 3-5 steps with a railing? 2  -SB (r) JJ (t) SB (c)     To walk in hospital room? 2  -SB (r) JJ (t) SB (c)     AM-PAC 6 Clicks Score (PT) 16  -SB (r) JJ (t)     Row Name 04/15/22 1340 04/15/22 1337       Functional Assessment    Outcome Measure Options AM-PAC 6 Clicks Basic Mobility (PT)  -SB (r) JJ (t) SB (c) AM-PAC 6 Clicks Daily Activity (OT)  -CH          User Key  (r) = Recorded By, (t) = Taken By, (c) = Cosigned By    Initials Name Provider Type    CH Noemi Mckay, OTR/L Occupational Therapist    Angelina Gerber, PT DPT Physical  Therapist    David Coronado, PT Student PT Student                             Physical Therapy Education                 Title: PT OT SLP Therapies (In Progress)     Topic: Physical Therapy (In Progress)     Point: Mobility training (In Progress)     Learning Progress Summary           Patient Acceptance, E, NR by ZHAO at 4/15/2022 1519    Comment: Pt educated on POC, and awareness for assistance needs.                   Point: Home exercise program (Not Started)     Learner Progress:  Not documented in this visit.          Point: Body mechanics (In Progress)     Learning Progress Summary           Patient Acceptance, E, NR by ZHAO at 4/15/2022 1519    Comment: Pt educated on POC, and awareness for assistance needs.                   Point: Precautions (In Progress)     Learning Progress Summary           Patient Acceptance, E, NR by ZHAO at 4/15/2022 1519    Comment: Pt educated on POC, and awareness for assistance needs.                               User Key     Initials Effective Dates Name Provider Type Discipline    ZHAO 03/07/22 -  David Boswell, PT Student PT Student PT              PT Recommendation and Plan  Planned Therapy Interventions (PT): balance training, bed mobility training, gait training, patient/family education, strengthening, transfer training  Plan of Care Reviewed With: patient  Progress: no change  Outcome Evaluation: PT eval completed. Per nursing request pt HR must stay under 120BPM througout session. Pt alert and oriented to person, place, and situation with verbal prompts, on RA, in supine upon arrival, requesting help with tolieting. Pt denied any pain this afternoon. At rest pt demonstrates significant resting tremors primarily in the UE. With bed mobility pt performed supine to sit with Mod A, bedrails, and HOB elevated. At EOB pt performed STS with Min A and HHA again with significant tremors and high fall risk. Pt perfomred tolieting in urinal at bedside. Pt HR raised to max of 125  with urinating and returned to EOB when done. Pt performed sit to supine with Min A with HR decreasing to 107 within a minute. Pt will benefit from skilled PT to address deficits in activity tolerance, reduce risk of falls and maximize independence with functional mobility. DC recommend back SNF vs assisted living with home health pending progress.     Time Calculation:    PT Charges     Row Name 04/15/22 1340             Time Calculation    Start Time 1338  chart review 10 min for total 58 min  -SB (r) JJ (t) SB (c)      Stop Time 1426  -SB (r) JJ (t) SB (c)      Time Calculation (min) 48 min  -SB (r) JJ (t)      PT Received On 04/15/22  -SB (r) JJ (t) SB (c)      PT Goal Re-Cert Due Date 04/25/22  -SB (r) JJ (t) SB (c)            User Key  (r) = Recorded By, (t) = Taken By, (c) = Cosigned By    Initials Name Provider Type    Angelina Gerber, PT DPT Physical Therapist    David Coronado, PT Student PT Student                  PT G-Codes  Outcome Measure Options: AM-PAC 6 Clicks Basic Mobility (PT)  AM-PAC 6 Clicks Score (PT): 16  AM-PAC 6 Clicks Score (OT): 12    David Boswell PT Student  4/15/2022

## 2022-04-15 NOTE — ED NOTES
"This RN attempted to assist patient to urinate in urinal. Pt unable to do so at this time. LR bolus started. Pt is continent without difficulties per pt daughter \"as far as he is aware\". Pt has home health nurse but they do not assist with bathroom needs.  "

## 2022-04-15 NOTE — ED PROVIDER NOTES
Subjective   Patient is a 81-year-old who is got a history of dementia came to the ER with altered mental status and confusion which is getting progressively worse there is no history of trauma no history of any fall the patient denies any abdominal pain no chest pain or cough.      Altered Mental Status  Presenting symptoms: confusion, disorientation and lethargy    Presenting symptoms: no behavior changes    Severity:  Moderate  Most recent episode:  2 days ago  Episode history:  Single  Timing:  Constant  Progression:  Worsening  Chronicity:  New  Context: dementia and nursing home resident    Context: not alcohol use, not drug use, not head injury, not recent change in medication, not recent illness and not recent infection    Associated symptoms: fever and weakness    Associated symptoms: no abdominal pain, normal movement, no bladder incontinence, no decreased appetite, no depression, no difficulty breathing, no headaches, no light-headedness, no nausea, no seizures, no slurred speech and no visual change        Review of Systems   Unable to perform ROS: Mental status change   Constitutional: Positive for fever. Negative for decreased appetite.   Gastrointestinal: Negative for abdominal pain and nausea.   Genitourinary: Negative for bladder incontinence.   Neurological: Positive for weakness. Negative for seizures, light-headedness and headaches.   Psychiatric/Behavioral: Positive for confusion.       Past Medical History:   Diagnosis Date   • Cataracts, bilateral    • Lewy body dementia (HCC)    • Occasional tremors    • Prostate cancer (HCC) 05/2011    t2c,[place]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    • Varicose vein of leg 06/05/2019   • Varicose veins of legs        No Known Allergies    Past Surgical History:   Procedure Laterality Date   • EYE SURGERY     • HERNIA REPAIR Left    • INGUINAL HERNIA REPAIR Right 6/28/2019    Procedure: OPEN RIGHT INGUINAL HERNIA REPAIR WITH MESH;  Surgeon:  Alesia Mora MD;  Location:  [facility] OR;  Service: General   • PROSTATECTOMY  2011    RALP       Family History   Problem Relation Age of Onset   • Stroke Mother        Social History     Socioeconomic History   • Marital status:    Tobacco Use   • Smoking status: Former Smoker     Years: 50.00     Types: Cigarettes     Quit date: 2002     Years since quittin.8   • Smokeless tobacco: Never Used   Substance and Sexual Activity   • Alcohol use: No     Comment: QUIT DRINKING IN    • Drug use: No   • Sexual activity: Defer           Objective   Physical Exam  Vitals and nursing note reviewed. Exam conducted with a chaperone present.   Constitutional:       General: He is not in acute distress.     Appearance: He is underweight. He is ill-appearing. He is not diaphoretic.   HENT:      Head: Normocephalic and atraumatic.      Nose: Nose normal.      Mouth/Throat:      Mouth: Mucous membranes are moist.      Pharynx: Oropharynx is clear.   Eyes:      General: No visual field deficit or scleral icterus.     Extraocular Movements: Extraocular movements intact.      Conjunctiva/sclera: Conjunctivae normal.      Pupils: Pupils are equal, round, and reactive to light.   Neck:      Vascular: No carotid bruit.      Meningeal: Brudzinski's sign and Kernig's sign absent.   Cardiovascular:      Rate and Rhythm: Normal rate and regular rhythm.      Pulses: Normal pulses.      Heart sounds: No murmur heard.    No friction rub.   Pulmonary:      Effort: Pulmonary effort is normal. No respiratory distress.      Breath sounds: Normal breath sounds. No stridor. No wheezing.   Abdominal:      General: Abdomen is flat. Bowel sounds are normal. There is no distension.      Palpations: Abdomen is soft. There is no mass.      Tenderness: There is no abdominal tenderness.   Musculoskeletal:         General: No swelling or tenderness. Normal range of motion.      Cervical back: Normal range of motion and neck supple. No  rigidity. No muscular tenderness.      Right lower le+ Edema present.      Left lower le+ Edema present.   Lymphadenopathy:      Cervical: No cervical adenopathy.   Skin:     General: Skin is dry.      Capillary Refill: Capillary refill takes 2 to 3 seconds.      Coloration: Skin is pale. Skin is not jaundiced.      Findings: No bruising or rash.      Comments: No cellulitis no rash no ecchymosis   Neurological:      General: No focal deficit present.      Mental Status: Mental status is at baseline. He is disoriented.      GCS: GCS eye subscore is 4. GCS verbal subscore is 5. GCS motor subscore is 6.      Cranial Nerves: Cranial nerves are intact. No cranial nerve deficit, dysarthria or facial asymmetry.      Sensory: Sensation is intact.      Motor: Motor function is intact. No weakness, abnormal muscle tone, seizure activity or pronator drift.      Deep Tendon Reflexes: Reflexes are normal and symmetric. Babinski sign absent on the right side. Babinski sign absent on the left side.      Reflex Scores:       Bicep reflexes are 2+ on the right side and 2+ on the left side.       Patellar reflexes are 2+ on the right side and 2+ on the left side.  Psychiatric:         Attention and Perception: Attention normal.         Mood and Affect: Mood and affect normal.         Speech: Speech normal.         Behavior: Behavior normal.         Procedures           ED Course  ED Course as of 22 1212   u  This patient was just brought into the ED to be seen [TS]   2146 Patient was brought in because of altered mental status he is got underlying dementia but is becoming more forgetful.  No other focal neurological deficit he is otherwise awake and follows commands and understands where he is.  Does not have any nuchal rigidity there is no evidence of any neurological deficits denies any headaches no photophobia.  His white cell count is elevated and he is got elevated procalcitonin level.  IV  antibiotics have been initiated the patient has got a pneumonia chest x-ray we are waiting on the urinalysis [TS]   2147 Turning over the case to Dr. Rosado after urinalysis is available the patient will be admitted. [TS]   2342 I took over from Dr. Siu at shift change.  Patient CT head is okay.  His chest x-ray does reveal what appears to be a right-sided pneumonia.  I talked with this patient's daughter and we will admit him for further care and IV antibiotics.  We will get a urine specimen and probably have to do a catheter to get that. [TR]      ED Course User Index  [TR] Carlos Manuel Rosado Jr., MD  [TS] Jeff Siu MD                                                 MDM  Number of Diagnoses or Management Options  Diagnosis management comments: Differential Diagnosis:  I considered toxic-metabolic etiology, hypoglycemia, hyperglycemia, diabetic ketoacidosis, drug overdose, ethanol intoxication, thiamine deficiency, hypothermia, hyponatremia, hypernatremia, organ failure, liver failure, kidney failure, thyroid failure, adrenal failure, hypoxia, hypercarbia, ischemic stroke, intracranial bleed, subarachnoid hemorrhage, closed head injury, subdural hematoma, seizure activity, syncopal episode, infectious etiology, hypertensive encephalopathy, vasculitis, thrombotic thrombocytopenic purpura and disseminated intravascular coagulation as a possible cause of altered mental status in this patient. This is a partial list of diagnoses considered.              Amount and/or Complexity of Data Reviewed  Clinical lab tests: ordered and reviewed  Tests in the radiology section of CPT®: ordered and reviewed  Tests in the medicine section of CPT®: reviewed and ordered    Risk of Complications, Morbidity, and/or Mortality  Presenting problems: moderate  Diagnostic procedures: moderate  Management options: moderate        Final diagnoses:   Altered mental status, unspecified altered mental status type   Pneumonia due to  infectious organism, unspecified laterality, unspecified part of lung       ED Disposition  ED Disposition     ED Disposition   Decision to Admit    Condition   --    Comment   Level of Care: Med/Surg [1]   Diagnosis: Altered mental status, unspecified altered mental status type [[id number]]   Admitting Physician: JN DING [[id number]]   Attending Physician: JN DING [[id number]]   Isolate for COVID?: No [0]   Certification: I Certify That Inpatient Hospital Services Are Medically Necessary For Greater Than 2 Midnights               No follow-up provider specified.       Medication List      ASK your doctor about these medications    NYQUIL MULTI-SYMPTOM PO  Ask about: Which instructions should I use?             Jeff Siu MD  04/14/22 0416       Jeff Siu MD  04/16/22 1212

## 2022-04-15 NOTE — PLAN OF CARE
"Goal Outcome Evaluation:  Plan of Care Reviewed With: patient        Progress: no change  Outcome Evaluation: OT meghanal completed. Per RN pt.'s HR is to be below 120 whilst on cardizem drip.  Pt. is AxO x3 & upon entering the room he is attempting to use a urinal but demo's full body tremors.  Mr. Urbina states these are chronic but otherwise has no complaints.  OTR & SPT assisted pt. with use of urinal after helping him to EOB for standing urination.  He completed tasks at Min A 1-2 with cues for safety.  OTR ordered weighted utensils for all meals and provided water pitcher as his means for hydration vs cup to reduce spillage.  Mr. Urbina demle's weakness, decreased act tasneem, imbalance, & debility.  OTR spoke with pt and his DTR & it is unclear how much phys assist he was requiring/receiving for ADLs prior to this hospitalization.  He was able to ambulate but his DTR states he \"has memory problems.\"  I suspect he was having difficulty initiating self care.  OT to address fxn & safety during ADLs & to edu pt & family in current level of  care.  Anticipate DC to SNF vs. [facility] with assist for ADLS PRN.  "

## 2022-04-15 NOTE — ED NOTES
Called respiratory for update regarding 0330 duo neb treatment. Was advised that someone will be down shortly

## 2022-04-15 NOTE — CASE MANAGEMENT/SOCIAL WORK
Continued Stay Note  OZIEL Paniagua     Patient Name: Edy Urbina  MRN: [id number]  Today's Date: 4/15/2022    Admit Date: 4/14/2022     Discharge Plan     Row Name 04/15/22 1535       Plan    Plan Comments Attempted to see for dc planning needs but therapy is currently working with pt. Will try again later today if time allows.               Discharge Codes    No documentation.                     DUKE Rivera

## 2022-04-15 NOTE — THERAPY EVALUATION
Patient Name: Edy Urbina  : 1940    MRN: [id number]                              Today's Date: 4/15/2022       Admit Date: 2022    Visit Dx:     ICD-10-CM ICD-9-CM   1. Altered mental status, unspecified altered mental status type  R41.82 780.97   2. Pneumonia due to infectious organism, unspecified laterality, unspecified part of lung  J18.9 486     Patient Active Problem List   Diagnosis   • Anemia   • Lewy body dementia without behavioral disturbance (HCC)   • Cachexia (HCC)   • Pneumonia of right middle lobe due to infectious organism   • Generalized weakness   • Elevated BUN   • Metabolic encephalopathy     Past Medical History:   Diagnosis Date   • Cataracts, bilateral    • Lewy body dementia (HCC)    • Occasional tremors    • Prostate cancer ([facility]) 2011    t2c,[place]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    • Varicose vein of leg 2019   • Varicose veins of legs      Past Surgical History:   Procedure Laterality Date   • EYE SURGERY     • HERNIA REPAIR Left    • INGUINAL HERNIA REPAIR Right 2019    Procedure: OPEN RIGHT INGUINAL HERNIA REPAIR WITH MESH;  Surgeon: Alesia Mora MD;  Location: [facility] OR;  Service: General   • PROSTATECTOMY  2011    RALP      General Information     Row Name 04/15/22 4194          OT Time and Intention    Document Type evaluation  -     Mode of Treatment occupational therapy  -     Row Name 04/15/22 7696          General Information    Patient Profile Reviewed yes  -     Prior Level of Function independent:;all household mobility  Pt. ambulates in room but it is unclear how much assist pt receives for ADLs  -     Existing Precautions/Restrictions fall  -     Barriers to Rehab medically complex;previous functional deficit;cognitive status  -     Row Name 04/15/22 5021          Living Environment    People in Home facility resident  -     Row Name 04/15/22 8981          Home Main Entrance    Number of Stairs, Main  Entrance none  -     Stair Railings, Main Entrance none  -     Row Name 04/15/22 1337          Stairs Within Home, Primary    Number of Stairs, Within Home, Primary none  -     Stair Railings, Within Home, Primary none  -     Row Name 04/15/22 1337          Cognition    Orientation Status (Cognition) oriented to;person;place;situation  -     Row Name 04/15/22 1337          Safety Issues, Functional Mobility    Safety Issues Affecting Function (Mobility) awareness of need for assistance;insight into deficits/self-awareness;judgment;problem-solving;safety precaution awareness;impulsivity;safety precautions follow-through/compliance  -     Impairments Affecting Function (Mobility) balance;endurance/activity tolerance;motor control;strength  -           User Key  (r) = Recorded By, (t) = Taken By, (c) = Cosigned By    Initials Name Provider Type     Noemi Mckay, OTR/L Occupational Therapist                 Mobility/ADL's     Row Name 04/15/22 1337          Bed Mobility    Bed Mobility supine-sit;sit-supine  -     Supine-Sit Junior (Bed Mobility) moderate assist (50% patient effort)  -     Sit-Supine Junior (Bed Mobility) minimum assist (75% patient effort)  -     Bed Mobility, Safety Issues decreased use of arms for pushing/pulling;decreased use of legs for bridging/pushing;cognitive deficits limit understanding  -     Assistive Device (Bed Mobility) head of bed elevated;bed rails  -     Row Name 04/15/22 1337          Transfers    Transfers sit-stand transfer  -     Sit-Stand Junior (Transfers) minimum assist (75% patient effort)  -     Row Name 04/15/22 1337          Activities of Daily Living    BADL Assessment/Intervention toileting  -     Row Name 04/15/22 1337          Toileting Assessment/Training    Junior Level (Toileting) minimum assist (75% patient effort)  -     Assistive Devices (Toileting) urinal  -     Position (Toileting) supported standing   -           User Key  (r) = Recorded By, (t) = Taken By, (c) = Cosigned By    Initials Name Provider Type     Noemi Mckay, OTR/L Occupational Therapist               Obj/Interventions     Row Name 04/15/22 1337          Vision Assessment/Intervention    Visual Impairment/Limitations WFL  -     Row Name 04/15/22 1337          Range of Motion Comprehensive    General Range of Motion bilateral upper extremity ROM WFL  -     Row Name 04/15/22 1337          Strength Comprehensive (MMT)    Comment, General Manual Muscle Testing (MMT) Assessment BUEs 4/5  -     Row Name 04/15/22 1337          Motor Skills    Motor Skills coordination  -     Coordination gross motor deficit;other (see comments)  gross tremors  -     Row Name 04/15/22 1337          Balance    Balance Assessment sitting static balance;sit to stand dynamic balance;standing static balance  -     Static Sitting Balance contact guard  -     Position, Sitting Balance supported;sitting edge of bed  -     Sit to Stand Dynamic Balance minimal assist  -     Static Standing Balance minimal assist  -     Balance Interventions sitting;standing;sit to stand  -           User Key  (r) = Recorded By, (t) = Taken By, (c) = Cosigned By    Initials Name Provider Type     Noemi Mckay, OTR/L Occupational Therapist               Goals/Plan     Row Name 04/15/22 1337          Transfer Goal 1 (OT)    Activity/Assistive Device (Transfer Goal 1, OT) sit-to-stand/stand-to-sit;bed-to-chair/chair-to-bed;toilet;walker, rolling;commode, 3-in-1  -     [place] Level/Cues Needed (Transfer Goal 1, OT) contact guard required  -     Time Frame (Transfer Goal 1, OT) long term goal (LTG);by discharge  -     Progress/Outcome (Transfer Goal 1, OT) goal ongoing  -     Row Name 04/15/22 1337          Toileting Goal 1 (OT)    Activity/Device (Toileting Goal 1, OT) toileting skills, all;adjust/manage clothing;perform perineal hygiene  -     [place]  Level/Cues Needed (Toileting Goal 1, OT) contact guard required  -     Time Frame (Toileting Goal 1, OT) long term goal (LTG);by discharge  -     Progress/Outcome (Toileting Goal 1, OT) goal ongoing  -     Row Name 04/15/22 8647          Self-Feeding Goal 1 (OT)    Activity/Device (Self-Feeding Goal 1, OT) self-feeding skills, all;scoop food and bring to mouth;adapted cup;scoop dish/plate guard;nonslip mat  -     [place] Level/Cues Needed (Self-Feeding Goal 1, OT) set-up required  -     Time Frame (Self-Feeding Goal 1, OT) long term goal (LTG);by discharge  -     Progress/Outcomes (Self-Feeding Goal 1, OT) goal ongoing  -     Row Name 04/15/22 4797          Therapy Assessment/Plan (OT)    Planned Therapy Interventions (OT) activity tolerance training;adaptive equipment training;BADL retraining;edema control/reduction;functional balance retraining;patient/caregiver education/training;ROM/therapeutic exercise;strengthening exercise;occupation/activity based interventions;transfer/mobility retraining  [facility]           User Key  (r) = Recorded By, (t) = Taken By, (c) = Cosigned By    Initials Name Provider Type     Noemi Mckay, OTR/L Occupational Therapist               Clinical Impression     Row Name 04/15/22 1337          Plan of Care Review    Plan of Care Reviewed With patient  -     Progress no change  -     Outcome Evaluation OT eval completed. Per RN pt.'s HR is to be below 120 whilst on cardizem drip.  Pt. is AxO x3 & upon entering the room he is attempting to use a urinal but blanca's full body tremors.  Mr. Urbina states these are chronic but otherwise has no complaints.  OTR & SPT assisted pt. with use of urinal after helping him to EOB for standing urination.  He completed tasks at Min A 1-2 with cues for safety.  OTR ordered weighted utensils for all meals and provided water pitcher as his means for hydration vs cup to reduce spillage.  Mr. Ciara rios's weakness, decreased act tasneem,  "imbalance, & debility.  OTR spoke with pt and his DTR & it is unclear how much phys assist he was requiring/receiving for ADLs prior to this hospitalization.  He was able to ambulate but his DTR states he \"has memory problems.\"  I suspect he was having difficulty initiating self care.  OT to address fxn & safety during ADLs & to edu pt & family in current level of  care.  Anticipate DC to SNF vs. [facility] with assist for ADLS PRN.  -     Row Name 04/15/22 1340 04/15/22 1337       Therapy Assessment/Plan (OT)    Patient/Family Therapy Goal Statement (OT) no goals stated plan to collaborate PRN  - --    Rehab Potential (OT) fair, will monitor progress closely  - fair, will monitor progress closely  -    Criteria for Skilled Therapeutic Interventions Met (OT) yes;skilled treatment is necessary  - yes;skilled treatment is necessary  -    Therapy Frequency (OT) 5 times/wk  - 5 times/wk  -    Predicted Duration of Therapy Intervention (OT) until DC  - --    Row Name 04/15/22 1340 04/15/22 1337       Therapy Plan Review/Discharge Plan (OT)    Anticipated Discharge Disposition (OT) skilled nursing facility;assisted living  - skilled nursing facility;assisted living  -    Row Name 04/15/22 1340 04/15/22 1337       Positioning and Restraints    Pre-Treatment Position in bed  - in bed  -CH    Post Treatment Position bed  - bed  -CH    In Bed fowlers;call light within reach;encouraged to call for assist;side rails up x2  -CH fowlers;call light within reach;encouraged to call for assist;side rails up x2  -          User Key  (r) = Recorded By, (t) = Taken By, (c) = Cosigned By    Initials Name Provider Type    CH Noemi Mckay, OTR/L Occupational Therapist               Outcome Measures     Row Name 04/15/22 1337          How much help from another is currently needed...    Putting on and taking off regular lower body clothing? 2  -CH     Bathing (including washing, rinsing, and drying) 2  -CH     " Toileting (which includes using toilet bed pan or urinal) 2  -CH     Putting on and taking off regular upper body clothing 2  -CH     Taking care of personal grooming (such as brushing teeth) 2  -CH     Eating meals 2  -CH     AM-PAC 6 Clicks Score (OT) 12  -     Row Name 04/15/22 1337          Functional Assessment    Outcome Measure Options AM-PAC 6 Clicks Daily Activity (OT)  -           User Key  (r) = Recorded By, (t) = Taken By, (c) = Cosigned By    Initials Name Provider Type     Noemi Mckay, OTR/L Occupational Therapist                Occupational Therapy Education                 Title: PT OT SLP Therapies (In Progress)     Topic: Occupational Therapy (In Progress)     Point: ADL training (Done)     Description:   Instruct learner(s) on proper safety adaptation and remediation techniques during self care or transfers.   Instruct in proper use of assistive devices.              Learning Progress Summary           Patient Acceptance, E,D, VU,NR by  at 4/15/2022 1511                   Point: Home exercise program (Not Started)     Description:   Instruct learner(s) on appropriate technique for monitoring, assisting and/or progressing therapeutic exercises/activities.              Learner Progress:  Not documented in this visit.          Point: Precautions (Not Started)     Description:   Instruct learner(s) on prescribed precautions during self-care and functional transfers.              Learner Progress:  Not documented in this visit.          Point: Body mechanics (Done)     Description:   Instruct learner(s) on proper positioning and spine alignment during self-care, functional mobility activities and/or exercises.              Learning Progress Summary           Patient Acceptance, E,D, VU,NR by  at 4/15/2022 1511                               User Key     Initials Effective Dates Name Provider Type [facility] 06/16/21 -  Noemi Mckay, OTR/L Occupational Therapist OT              OT  "Recommendation and Plan  Planned Therapy Interventions (OT): activity tolerance training, adaptive equipment training, BADL retraining, edema control/reduction, functional balance retraining, patient/caregiver education/training, ROM/therapeutic exercise, strengthening exercise, occupation/activity based interventions, transfer/mobility retraining  Therapy Frequency (OT): 5 times/wk  Plan of Care Review  Plan of Care Reviewed With: patient  Progress: no change  Outcome Evaluation: OT eval completed. Per RN pt.'s HR is to be below 120 whilst on cardizem drip.  Pt. is AxO x3 & upon entering the room he is attempting to use a urinal but demo's full body tremors.  Mr. Urbina states these are chronic but otherwise has no complaints.  OTR & SPT assisted pt. with use of urinal after helping him to EOB for standing urination.  He completed tasks at Min A 1-2 with cues for safety.  OTR ordered weighted utensils for all meals and provided water pitcher as his means for hydration vs cup to reduce spillage.  Mr. Urbina demle's weakness, decreased act tasneem, imbalance, & debility.  OTR spoke with pt and his DTR & it is unclear how much phys assist he was requiring/receiving for ADLs prior to this hospitalization.  He was able to ambulate but his DTR states he \"has memory problems.\"  I suspect he was having difficulty initiating self care.  OT to address fxn & safety during ADLs & to edu pt & family in current level of  care.  Anticipate DC to SNF vs. [facility] with assist for ADLS PRN.     Time Calculation:    Time Calculation- OT     Row Name 04/15/22 1338             Time Calculation- OT    OT Start Time 1337  -CH      OT Stop Time 1415  -CH      OT Time Calculation (min) 38 min  -CH      OT Received On 04/15/22  -      OT Goal Re-Cert Due Date 04/25/22  -              Untimed Charges    OT Eval/Re-eval Minutes 38  -CH              Total Minutes    Untimed Charges Total Minutes 38  -CH       Total Minutes 38  -CH            User " Key  (r) = Recorded By, (t) = Taken By, (c) = Cosigned By    Initials Name Provider Type     Noemi Mckay, OTR/L Occupational Therapist              Therapy Charges for Today     Code Description Service Date Service Provider Modifiers Qty    [id number] HC OT EVAL MOD COMPLEXITY 3 4/15/2022 Noemi Mckay OTR/L GO 1               UDAY Clarke/KURTIS  4/15/2022

## 2022-04-15 NOTE — PAYOR COMM NOTE
"Edy Ambriz (81 y.o. Male) [id number]  Admit 4/14   [facility]   berenice phone     Fax                Date of Birth   1940    Social Security Number       Address   [address] [facility] [address]    Home Phone   [phone]    MRN   [id number]       Jehovah's witness   Sabianist    Marital Status                               Admission Date   4/14/22    Admission Type   Emergency    Admitting Provider   Rajan Adams DO    Attending Provider   Rajan Adams DO    Department, Room/Bed   [facility] Emergency Department, 41/41       Discharge Date       Discharge Disposition       Discharge Destination                               Attending Provider: Rajan Adams DO    Allergies: No Known Allergies    Isolation: None   Infection: None   Code Status: CPR   Advance Care Planning Activity    Ht: 185.4 cm (73\")   Wt: 63.5 kg (140 lb)    Admission Cmt: None   Principal Problem: None                Active Insurance as of 4/14/2022     Primary Coverage     Payor Plan Insurance Group Employer/Plan Group    HUMANA MEDICARE REPLACEMENT HUMANA MEDICARE REPLACEMENT [id number]     Payor Plan Address Payor Plan Phone Number Payor Plan Fax Number Effective Dates    [address] [address] [phone]  1/1/2021 - None Entered    [facility] [id number]       Subscriber Name Subscriber Birth Date Member ID       EDY AMBRIZ 1940 [id number]                 Emergency Contacts      (Rel.) Home Phone Work Phone Mobile Phone    OLEGARIO AMBRIZ (Son) -- -- [phone]    MaryJennyfer cochran (Daughter) -- -- [phone]    Amparo, Dawn (Daughter) -- -- [phone]               History & Physical      Justus Landin DO at 04/14/22 2329              [facility] Medicine Services  HISTORY AND PHYSICAL    Date of Admission: 4/14/2022  Primary Care Physician: Torey Amato DO    Subjective     Chief " Complaint: Declining physical status    History of Present Illness  Edy Urbina is an 81-year-old male with a past medical history of Lewy body dementia, currently resides at [facility].  Daughter is at bedside and provides history.  Patient has had worsening altered mental status, becoming more forgetful.  With concerns daughter brought him to the emergency department for evaluation.  Work-up reveals elevated CRP, procalcitonin, lactic acid and white count.  Chest x-ray revealed groundglass opacity in the mid right lung most likely representing pneumonia.  Patient has had no fever, cough, chills.  Daughter is quite surprised of his diagnosis.  He has no complaints.  Significant tremors noted when assisting patient to set up.  He is quite stiff-rigid in his movements.  He is pleasant.  He is admitted for further evaluation treatment.    Review of Systems   A 10 point review of systems was completed, all negative except for those discussed in HPI    Past Medical History:   Past Medical History:   Diagnosis Date   • Cataracts, bilateral    • Lewy body dementia (HCC)    • Occasional tremors    • Prostate cancer (HCC) 05/2011    t2c,[place]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    • Varicose vein of leg 06/05/2019   • Varicose veins of legs        Past Surgical History:   Past Surgical History:   Procedure Laterality Date   • EYE SURGERY     • HERNIA REPAIR Left    • INGUINAL HERNIA REPAIR Right 6/28/2019    Procedure: OPEN RIGHT INGUINAL HERNIA REPAIR WITH MESH;  Surgeon: Alesia Mora MD;  Location: [facility];  Service: General   • PROSTATECTOMY  05/2011    RALP       Family History: family history includes Stroke in his mother.    Social History:  reports that he quit smoking about 19 years ago. His smoking use included cigarettes. He quit after 50.00 years of use. He has never used smokeless tobacco. He reports that he does not drink alcohol and does not use drugs.    Code  "Status: Full, his son Raul is power of       Allergies:  No Known Allergies    Medications:  Prior to Admission medications    Medication Sig Start Date End Date Taking? Authorizing Provider   memantine (NAMENDA) 10 MG tablet Take 1 tablet by mouth 2 (Two) Times a Day. TAKE 1 TABLET TWICE DAILY 3/25/22   Dalton Laurent MD   Multiple Vitamins-Minerals (MULTIVITAMIN MEN 50+) tablet Take 1 tablet by mouth Daily.    Provider, MD Xi   Pseudoeph-Doxylamine-DM-APAP (NYQUIL PO) Take  by mouth As Needed (FOR SLEEP).    Provider, MD Xi   risperiDONE (risperDAL) 0.5 MG tablet Take 1 tablet by mouth 2 (Two) Times a Day. 3/25/22   Torey Amato,      I have utilized all available immediate resources to obtain, update, and review the patient's current medications.    Objective     /65   Pulse 87   Temp 99.9 °F (37.7 °C) (Oral)   Resp 16   Ht 185.4 cm (73\")   Wt 63.5 kg (140 lb)   SpO2 99%   BMI 18.47 kg/m²   Physical Exam  Vitals reviewed.   Constitutional:       Appearance: He is ill-appearing.   HENT:      Head: Normocephalic and atraumatic.      Mouth/Throat:      Mouth: Mucous membranes are dry.      Pharynx: Oropharynx is clear.   Eyes:      Conjunctiva/sclera: Conjunctivae normal.      Pupils: Pupils are equal, round, and reactive to light.   Cardiovascular:      Rate and Rhythm: Normal rate and regular rhythm.   Pulmonary:      Effort: No respiratory distress.      Comments: Diminished throughout without adventitious breath sounds  Abdominal:      General: There is no distension.      Palpations: Abdomen is soft.      Tenderness: There is no abdominal tenderness.   Musculoskeletal:      Cervical back: Neck supple.      Right lower leg: No edema.      Left lower leg: No edema.      Comments: Generalized weakness and debility   Skin:     General: Skin is warm and dry.   Neurological:      Mental Status: He is alert. Mental status is at baseline.      Comments: Tremors " noted with intentional movements   Psychiatric:      Comments: Flat affect, no behavioral outburst       Pertinent Data:   Lab Results (last 72 hours)     Procedure Component Value Units Date/Time    Blood Culture - Blood, Arm, Right [[id number]] Collected: 04/14/22 2145    Specimen: Blood from Arm, Right Updated: 04/14/22 2213    Blood Culture - Blood, Arm, Right [[id number]] Collected: 04/14/22 2145    Specimen: Blood from Arm, Right Updated: 04/14/22 2213    Lactic Acid, Plasma [[id number]]  (Abnormal) Collected: 04/14/22 2145    Specimen: Blood Updated: 04/14/22 2208     Lactate 3.1 mmol/L     Procalcitonin [[id number]]  (Abnormal) Collected: 04/14/22 1852    Specimen: Blood Updated: 04/14/22 2131     Procalcitonin 43.88 ng/mL     C-reactive Protein [[id number]]  (Abnormal) Collected: 04/14/22 1852    Specimen: Blood Updated: 04/14/22 2122     C-Reactive Protein 14.05 mg/dL     Manual Differential [[id number]]  (Abnormal) Collected: 04/14/22 1852    Specimen: Blood Updated: 04/14/22 1935     Neutrophil % 65.4 %      Lymphocyte % 5.8 %      Monocyte % 4.8 %      Bands %  17.3 %      Metamyelocyte % 3.8 %      Myelocyte % 1.0 %      Atypical Lymphocyte % 1.9 %      Neutrophils Absolute 15.22 10*3/mm3      Lymphocytes Absolute 1.42 10*3/mm3      Monocytes Absolute 0.88 10*3/mm3      Crenated RBC's Slight/1+     Dacrocytes Slight/1+     Ovalocytes Slight/1+     Poikilocytes Mod/2+     WBC Morphology Normal     Platelet Morphology Normal    Comprehensive Metabolic Panel [[id number]]  (Abnormal) Collected: 04/14/22 1852    Specimen: Blood Updated: 04/14/22 1916     Glucose 112 mg/dL      BUN 34 mg/dL      Creatinine 1.14 mg/dL      Sodium 144 mmol/L      Potassium 4.7 mmol/L      Chloride 107 mmol/L      CO2 22.0 mmol/L      Calcium 9.2 mg/dL      Total Protein 6.7 g/dL      Albumin 4.00 g/dL      ALT (SGPT) 13 U/L      AST (SGOT) 22 U/L      Alkaline Phosphatase 43 U/L      Total Bilirubin 1.1 mg/dL      Globulin 2.7 gm/dL       A/G Ratio 1.5 g/dL      BUN/Creatinine Ratio 29.8     Anion Gap 15.0 mmol/L      eGFR 65.0 mL/min/1.73     CBC Auto Differential [[id number]]  (Abnormal) Collected: 04/14/22 1852    Specimen: Blood Updated: 04/14/22 1912     WBC 18.41 10*3/mm3      RBC 4.46 10*6/mm3      Hemoglobin 12.3 g/dL      Hematocrit 38.3 %      MCV 85.9 fL      MCH 27.6 pg      MCHC 32.1 g/dL      RDW 14.0 %      RDW-SD 43.7 fl      MPV 9.7 fL      Platelets 213 10*3/mm3      nRBC 0.0 /100 WBC         Imaging Results (Last 24 Hours)     Procedure Component Value Units Date/Time    CT Head Without Contrast [[id number]] Resulted: 04/14/22 2157     Updated: 04/14/22 2215    XR Chest 1 View [[id number]] Collected: 04/14/22 2134     Updated: 04/14/22 2138    Narrative:      EXAM: XR CHEST 1 VW- 4/14/2022 9:26 PM CDT     HISTORY: Febrile       COMPARISON: September 29, 2021.     TECHNIQUE: Frontal radiograph of the chest     FINDINGS:   Vague groundglass opacities in the mid right lung. Left lung is clear..  Cardiac silhouette is normal. Vascular calcifications present aortic  arch..      The osseous structures and surrounding soft tissues demonstrate no acute  abnormality.          Impression:      1. Groundglass opacity in the mid right lung most likely representing  pneumonia.        This report was finalized on 04/14/2022 21:35 by Dr. Jakob Miranda MD.            Assessment / Plan     Assessment:   Active Hospital Problems    Diagnosis    • Altered mental status    • Pneumonia of right middle lobe due to infectious organism    • Generalized weakness    • Elevated BUN    • Lewy body dementia without behavioral disturbance (HCC)        Plan:   1.  Admit as inpatient  2.  Continue Rocephin and azithromycin as ordered in the emergency department  3.  Supplemental oxygen as needed, DuoNebs, incentive spirometry, ABGs as needed  4.  LR at 100 mL/hour  5.  Medications reviewed and restarted as appropriate  6.  DVT prophylaxis with SCD  7.  CT of  the head without contrast results pending  8.  Labs in a.m.  9.  Consult PT/OT and speech  10.  Consult -patient resides at [facility]    I discussed the patient's findings and my recommendations with: Justus Landin DO  Time spent: 30 minutes    Patient seen in the ED. EKG done and patient noted to be in A Fib RVR. Started on Cardizem GTT. Plan discussed with NP and agree.     Patient seen and examined by me on 4/14/2022 at 11:29 PM.    Electronically signed by DARLING Arias, 04/15/22, 00:01 CDT.    Electronically signed by Justus Landin DO at 04/15/22 9738          Emergency Department Notes      Jeff Siu MD at 04/14/22 0117          Subjective   Patient is a 81-year-old who is got a history of dementia came to the ER with altered mental status and confusion which is getting progressively worse there is no history of trauma no history of any fall the patient denies any abdominal pain no chest pain or cough.      Altered Mental Status  Presenting symptoms: confusion, disorientation and lethargy    Presenting symptoms: no behavior changes    Severity:  Moderate  Most recent episode:  2 days ago  Episode history:  Single  Timing:  Constant  Progression:  Worsening  Chronicity:  New  Context: dementia and nursing home resident    Context: not alcohol use, not drug use, not head injury, not recent change in medication, not recent illness and not recent infection    Associated symptoms: fever and weakness    Associated symptoms: no abdominal pain, normal movement, no bladder incontinence, no decreased appetite, no depression, no difficulty breathing, no headaches, no light-headedness, no nausea, no seizures, no slurred speech and no visual change        Review of Systems   Unable to perform ROS: Mental status change   Constitutional: Positive for fever. Negative for decreased appetite.   Gastrointestinal: Negative for abdominal pain and nausea.   Genitourinary:  Negative for bladder incontinence.   Neurological: Positive for weakness. Negative for seizures, light-headedness and headaches.   Psychiatric/Behavioral: Positive for confusion.       Past Medical History:   Diagnosis Date   • Cataracts, bilateral    • Occasional tremors    • Prostate cancer (HCC) 2011    t2c,[place]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    • Varicose vein of leg 2019   • Varicose veins of legs        No Known Allergies    Past Surgical History:   Procedure Laterality Date   • EYE SURGERY     • HERNIA REPAIR Left    • INGUINAL HERNIA REPAIR Right 2019    Procedure: OPEN RIGHT INGUINAL HERNIA REPAIR WITH MESH;  Surgeon: Alesia Mora MD;  Location: [facility] OR;  Service: General   • PROSTATECTOMY  2011    RALP       Family History   Problem Relation Age of Onset   • Stroke Mother        Social History     Socioeconomic History   • Marital status:    Tobacco Use   • Smoking status: Former Smoker     Years: 50.00     Types: Cigarettes     Quit date: 2002     Years since quittin.8   • Smokeless tobacco: Never Used   Substance and Sexual Activity   • Alcohol use: No     Comment: QUIT DRINKING IN    • Drug use: No   • Sexual activity: Defer           Objective   Physical Exam  Vitals and nursing note reviewed. Exam conducted with a chaperone present.   Constitutional:       General: He is not in acute distress.     Appearance: He is underweight. He is ill-appearing. He is not diaphoretic.   HENT:      Head: Normocephalic and atraumatic.      Nose: Nose normal.      Mouth/Throat:      Mouth: Mucous membranes are moist.      Pharynx: Oropharynx is clear.   Eyes:      General: No visual field deficit or scleral icterus.     Extraocular Movements: Extraocular movements intact.      Conjunctiva/sclera: Conjunctivae normal.      Pupils: Pupils are equal, round, and reactive to light.   Neck:      Vascular: No carotid bruit.      Meningeal: Brudzinski's sign  and Kernig's sign absent.   Cardiovascular:      Rate and Rhythm: Normal rate and regular rhythm.      Pulses: Normal pulses.      Heart sounds: No murmur heard.    No friction rub.   Pulmonary:      Effort: Pulmonary effort is normal. No respiratory distress.      Breath sounds: Normal breath sounds. No stridor. No wheezing.   Abdominal:      General: Abdomen is flat. Bowel sounds are normal. There is no distension.      Palpations: Abdomen is soft. There is no mass.      Tenderness: There is no abdominal tenderness.   Musculoskeletal:         General: No swelling or tenderness. Normal range of motion.      Cervical back: Normal range of motion and neck supple. No rigidity. No muscular tenderness.      Right lower le+ Edema present.      Left lower le+ Edema present.   Lymphadenopathy:      Cervical: No cervical adenopathy.   Skin:     General: Skin is dry.      Capillary Refill: Capillary refill takes 2 to 3 seconds.      Coloration: Skin is pale. Skin is not jaundiced.      Findings: No bruising or rash.      Comments: No cellulitis no rash no ecchymosis   Neurological:      General: No focal deficit present.      Mental Status: Mental status is at baseline. He is disoriented.      GCS: GCS eye subscore is 4. GCS verbal subscore is 5. GCS motor subscore is 6.      Cranial Nerves: Cranial nerves are intact. No cranial nerve deficit, dysarthria or facial asymmetry.      Sensory: Sensation is intact.      Motor: Motor function is intact. No weakness, abnormal muscle tone, seizure activity or pronator drift.      Deep Tendon Reflexes: Reflexes are normal and symmetric. Babinski sign absent on the right side. Babinski sign absent on the left side.      Reflex Scores:       Bicep reflexes are 2+ on the right side and 2+ on the left side.       Patellar reflexes are 2+ on the right side and 2+ on the left side.  Psychiatric:         Attention and Perception: Attention normal.         Mood and Affect: Mood and  affect normal.         Speech: Speech normal.         Behavior: Behavior normal.         Procedures          ED Course  ED Course as of 04/14/22 2147   Thu Apr 14, 2022 2057 This patient was just brought into the ED to be seen [TS]   2146 Patient was brought in because of altered mental status he is got underlying dementia but is becoming more forgetful.  No other focal neurological deficit he is otherwise awake and follows commands and understands where he is.  Does not have any nuchal rigidity there is no evidence of any neurological deficits denies any headaches no photophobia.  His white cell count is elevated and he is got elevated procalcitonin level.  IV antibiotics have been initiated the patient has got a pneumonia chest x-ray we are waiting on the urinalysis [TS]   2147 Turning over the case to Dr. Rosado after urinalysis is available the patient will be admitted. [TS]      ED Course User Index  [TS] Jeff Siu MD                                                 MDM  Number of Diagnoses or Management Options  Diagnosis management comments: Differential Diagnosis:  I considered toxic-metabolic etiology, hypoglycemia, hyperglycemia, diabetic ketoacidosis, drug overdose, ethanol intoxication, thiamine deficiency, hypothermia, hyponatremia, hypernatremia, organ failure, liver failure, kidney failure, thyroid failure, adrenal failure, hypoxia, hypercarbia, ischemic stroke, intracranial bleed, subarachnoid hemorrhage, closed head injury, subdural hematoma, seizure activity, syncopal episode, infectious etiology, hypertensive encephalopathy, vasculitis, thrombotic thrombocytopenic purpura and disseminated intravascular coagulation as a possible cause of altered mental status in this patient. This is a partial list of diagnoses considered.              Amount and/or Complexity of Data Reviewed  Clinical lab tests: ordered and reviewed  Tests in the radiology section of CPT®: ordered and reviewed  Tests in  "the medicine section of CPT®: reviewed and ordered    Risk of Complications, Morbidity, and/or Mortality  Presenting problems: moderate  Diagnostic procedures: moderate  Management options: moderate        Final diagnoses:   Altered mental status, unspecified altered mental status type   Pneumonia due to infectious organism, unspecified laterality, unspecified part of lung       ED Disposition  ED Disposition     None          No follow-up provider specified.       Medication List      No changes were made to your prescriptions during this visit.          Jeff Siu MD  04/14/22 2147      Electronically signed by Jeff Siu MD at 04/14/22 2147     Gramlisch, Robert, RN at 04/14/22 2152        This RN attempted to assist patient to urinate in urinal. Pt unable to do so at this time. LR bolus started. Pt is continent without difficulties per pt daughter \"as far as he is aware\". Pt has home health nurse but they do not assist with bathroom needs.    Electronically signed by Gramlisch, Robert, RN at 04/14/22 2316     Jose Hugo RN at 04/14/22 2336        Pt in room resting. Pt is alert and coperative. Pt attempted to provide urine specimen with out any success. MD verbal order to in and out willis cath pt for specimen. Daughter at bedside. Pt and family updated. No distress noted       Electronically signed by Jose Hugo RN at 04/14/22 4964     Carlos Manuel Rosado Jr., MD at 04/14/22 2349          Subjective   History of Present Illness    Review of Systems    Past Medical History:   Diagnosis Date   • Cataracts, bilateral    • Occasional tremors    • Prostate cancer (HCC) 05/2011    t2c,[place]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    • Varicose vein of leg 6/5/2019   • Varicose veins of legs        No Known Allergies    Past Surgical History:   Procedure Laterality Date   • EYE SURGERY     • HERNIA REPAIR Left    • INGUINAL HERNIA REPAIR Right 6/28/2019    Procedure: OPEN RIGHT " INGUINAL HERNIA REPAIR WITH MESH;  Surgeon: Alesia Mora MD;  Location: [facility] OR;  Service: General   • PROSTATECTOMY  2011    RALP       Family History   Problem Relation Age of Onset   • Stroke Mother        Social History     Socioeconomic History   • Marital status:    Tobacco Use   • Smoking status: Former Smoker     Years: 50.00     Types: Cigarettes     Quit date: 2002     Years since quittin.8   • Smokeless tobacco: Never Used   Substance and Sexual Activity   • Alcohol use: No     Comment: QUIT DRINKING IN    • Drug use: No   • Sexual activity: Defer           Objective   Physical Exam    Procedures          ED Course  ED Course as of 22 2343   u  This patient was just brought into the ED to be seen [TS]    Patient was brought in because of altered mental status he is got underlying dementia but is becoming more forgetful.  No other focal neurological deficit he is otherwise awake and follows commands and understands where he is.  Does not have any nuchal rigidity there is no evidence of any neurological deficits denies any headaches no photophobia.  His white cell count is elevated and he is got elevated procalcitonin level.  IV antibiotics have been initiated the patient has got a pneumonia chest x-ray we are waiting on the urinalysis [TS]    Turning over the case to Dr. Rosado after urinalysis is available the patient will be admitted. [TS]    I took over from Dr. Siu at shift change.  Patient CT head is okay.  His chest x-ray does reveal what appears to be a right-sided pneumonia.  I talked with this patient's daughter and we will admit him for further care and IV antibiotics.  We will get a urine specimen and probably have to do a catheter to get that. [TR]      ED Course User Index  [TR] Carlos Manuel Rosado Jr., MD  [TS] Jeff Siu MD                                                 MDM  Number of Diagnoses or Management  Options  Altered mental status, unspecified altered mental status type: new and requires workup  Pneumonia due to infectious organism, unspecified laterality, unspecified part of lung: new and requires workup     Amount and/or Complexity of Data Reviewed  Clinical lab tests: ordered and reviewed  Tests in the radiology section of CPT®: ordered and reviewed  Tests in the medicine section of CPT®: ordered and reviewed  Discuss the patient with other providers: yes    Risk of Complications, Morbidity, and/or Mortality  Presenting problems: moderate  Diagnostic procedures: moderate  Management options: moderate    Patient Progress  Patient progress: stable      Final diagnoses:   Altered mental status, unspecified altered mental status type   Pneumonia due to infectious organism, unspecified laterality, unspecified part of lung       ED Disposition  ED Disposition     ED Disposition   Decision to Admit    Condition   --    Comment   Level of Care: Med/Surg [1]   Diagnosis: Altered mental status, unspecified altered mental status type [[id number]]   Admitting Physician: JN DING [[id number]]   Attending Physician: JN DING [[id number]]   Isolate for COVID?: No [0]   Certification: I Certify That Inpatient Hospital Services Are Medically Necessary For Greater Than 2 Midnights               No follow-up provider specified.       Medication List      No changes were made to your prescriptions during this visit.          Carlos Manuel Rosado Jr., MD  04/14/22 2343      Electronically signed by Carlos Manuel Rosado Jr., MD at 04/14/22 2343     Joi Mcintyre, RN at 04/15/22 0510        Contacted LIVAN Garcia regarding pt status. Connected physiological monitoring and obtained new set of vitals.     Updated Dr Rosado regarding vitals.     Electronically signed by Joi Mcintyre, RN at 04/15/22 0593     Joi Mcintyre, RN at 04/15/22 0514        Called respiratory for update regarding 0330 duo neb treatment. Was advised that someone will be  down shortly    Electronically signed by Joi Mcintyre RN at 04/15/22 0539       Vital Signs (last day)     Date/Time Temp Temp src Pulse Resp BP Patient Position SpO2    04/15/22 0721 -- -- 123 -- 135/62 -- 92    04/15/22 0719 -- -- 126 -- 135/62 -- --    04/15/22 0716 -- -- 127 -- 152/86 -- 92    04/15/22 0642 -- -- -- -- -- -- 93    04/15/22 0641 -- -- 111 -- 113/76 -- --    04/15/22 0639 -- -- 125 20 -- -- 93    04/15/22 0636 -- -- -- -- 122/65 -- 94    04/15/22 0624 -- -- 127 -- 113/68 -- --    04/15/22 0623 -- -- 133 -- -- -- --    04/15/22 0616 -- -- 127 -- 119/68 -- 92    04/15/22 0608 -- -- 120 -- 117/69 -- 93    04/15/22 0607 -- -- 121 -- -- -- 93    04/15/22 0550 -- -- 125 -- 118/95 -- --    04/15/22 0546 -- -- 134 -- 118/95 -- --    04/15/22 0531 -- -- 143 -- 124/77 -- 95    04/15/22 0515 -- -- -- -- 127/63 -- 93    04/15/22 05:01:02 98 (36.7) Oral 138 18 118/67 Lying 96    04/15/22 0500 -- -- 141 -- 160/67 -- 93    04/15/22 0116 -- -- 85 -- 122/58 -- 97    04/15/22 0035 -- -- -- 20 -- -- --    04/15/22 0031 -- -- 84 -- 116/63 -- 100    04/15/22 0027 -- -- 86 18 -- -- 98    04/15/22 0017 -- -- -- -- -- -- 97    04/15/22 0016 -- -- 86 -- 116/64 -- 97    04/14/22 2346 -- -- 87 -- 124/65 -- 99    04/14/22 2316 -- -- 86 16 118/65 Lying 97    04/14/22 2146 -- -- 81 16 118/60 Lying 96    04/14/22 1844 99.9 (37.7) Oral 82 14 112/53 Sitting 95            Current Facility-Administered Medications   Medication Dose Route Frequency Provider Last Rate Last Admin   • acetaminophen (TYLENOL) tablet 650 mg  650 mg Oral Q4H PRN Kenia Landa APRN        Or   • acetaminophen (TYLENOL) 160 MG/5ML solution 650 mg  650 mg Oral Q4H PRN Kenia Landa APRN        Or   • acetaminophen (TYLENOL) suppository 650 mg  650 mg Rectal Q4H PRN Kenia Landa, DARLING       • cefTRIAXone (ROCEPHIN) in SWFI 1 gram/10ml IV PUSH syringe  1 g Intravenous Q24H Kenia Landa APRN        And   • azithromycin 500 mg IVPB in 250 mL  NS  500 mg Intravenous Q24H Kenia Landa APRN       • dilTIAZem (CARDIZEM) 125 mg in 125 mL NS infusion  5-15 mg/hr Intravenous Continuous Carlos Manuel Rosado Jr., MD 15 mL/hr at 04/15/22 0719 15 mg/hr at 04/15/22 0719   • ipratropium-albuterol (DUO-NEB) nebulizer solution 3 mL  3 mL Nebulization Q4H - RT Kenia Landa APRN   3 mL at 04/15/22 0027   • lactated ringers infusion  100 mL/hr Intravenous Continuous Kenia Landa APRN 100 mL/hr at 04/15/22 0719 100 mL/hr at 04/15/22 0719   • memantine (NAMENDA) tablet 10 mg  10 mg Oral BID Kenia Landa APRN   10 mg at 04/15/22 0531   • multivitamin with minerals 1 tablet  1 tablet Oral Daily Kenia Landa APRN       • ondansetron (ZOFRAN) tablet 4 mg  4 mg Oral Q6H PRN Kenia Landa APRN        Or   • ondansetron (ZOFRAN) injection 4 mg  4 mg Intravenous Q6H PRN Kenia Landa APRN       • risperiDONE (risperDAL) tablet 0.5 mg  0.5 mg Oral BID Kenia Landa APRN   0.5 mg at 04/15/22 0531   • sodium chloride 0.9 % flush 10 mL  10 mL Intravenous Q12H Kenia Landa APRN   10 mL at 04/15/22 0117   • sodium chloride 0.9 % flush 10 mL  10 mL Intravenous PRN Kenia Landa APRN         Current Outpatient Medications   Medication Sig Dispense Refill   • memantine (NAMENDA) 10 MG tablet Take 1 tablet by mouth 2 (Two) Times a Day. TAKE 1 TABLET TWICE DAILY 180 tablet 1   • Multiple Vitamins-Minerals (MULTIVITAMIN MEN 50+) tablet Take 1 tablet by mouth Daily.     • Pseudoeph-Doxylamine-DM-APAP (NYQUIL PO) Take  by mouth As Needed (FOR SLEEP).     • risperiDONE (risperDAL) 0.5 MG tablet Take 1 tablet by mouth 2 (Two) Times a Day. 180 tablet 1       Lab Results (last 24 hours)     Procedure Component Value Units Date/Time    COVID PRE-OP / PRE-PROCEDURE SCREENING ORDER (NO ISOLATION) - Swab, Nasal Cavity [[id number]]  (Normal) Collected: 04/15/22 0646    Specimen: Swab from Nasal Cavity Updated: 04/15/22 0730    Narrative:      The  following orders were created for panel order COVID PRE-OP / PRE-PROCEDURE SCREENING ORDER (NO ISOLATION) - Swab, Nasal Cavity.  Procedure                               Abnormality         Status                     ---------                               -----------         ------                     COVID-19,[facility]...[[id number]]  Normal              Final result                 Please view results for these tests on the individual orders.    COVID-19,[facility] IN-HOUSE,Nasal Swab No Transport Media 3-4 HR TAT - Swab, Nasal Cavity [[id number]]  (Normal) Collected: 04/15/22 0646    Specimen: Swab from Nasal Cavity Updated: 04/15/22 0730     COVID19 Not Detected    Narrative:      Fact sheet for providers: https://www.fda.gov/media/229318/download     Fact sheet for patients: https://www.fda.gov/media/902178/download    Test performed by PCR.    Consider negative results in combination with clinical observations, patient history, and epidemiological information.    Lipid Panel [[id number]]  (Abnormal) Collected: 04/15/22 0607    Specimen: Blood Updated: 04/15/22 0637     Total Cholesterol 141 mg/dL      Triglycerides 68 mg/dL      HDL Cholesterol 67 mg/dL      LDL Cholesterol  60 mg/dL      VLDL Cholesterol 14 mg/dL      LDL/HDL Ratio 0.90    Narrative:      Cholesterol Reference Ranges  (U.S. Department of Health and Human Services ATP III Classifications)    Desirable          <200 mg/dL  Borderline High    200-239 mg/dL  High Risk          >240 mg/dL      Triglyceride Reference Ranges  (U.S. Department of Health and Human Services ATP III Classifications)    Normal           <150 mg/dL  Borderline High  150-199 mg/dL  High             200-499 mg/dL  Very High        >500 mg/dL    HDL Reference Ranges  (U.S. Department of Health and Human Services ATP III Classifications)    Low     <40 mg/dl (major risk factor for CHD)  High    >60 mg/dl ('negative' risk factor for CHD)        LDL Reference Ranges  (U.S.  Department of Health and Human Services ATP III Classifications)    Optimal          <100 mg/dL  Near Optimal     100-129 mg/dL  Borderline High  130-159 mg/dL  High             160-189 mg/dL  Very High        >189 mg/dL    C-reactive Protein [[id number]]  (Abnormal) Collected: 04/15/22 0607    Specimen: Blood Updated: 04/15/22 0637     C-Reactive Protein 25.72 mg/dL     Basic Metabolic Panel [[id number]]  (Abnormal) Collected: 04/15/22 0607    Specimen: Blood Updated: 04/15/22 0635     Glucose 107 mg/dL      BUN 31 mg/dL      Creatinine 0.92 mg/dL      Sodium 141 mmol/L      Potassium 4.0 mmol/L      Chloride 104 mmol/L      CO2 26.0 mmol/L      Calcium 8.9 mg/dL      BUN/Creatinine Ratio 33.7     Anion Gap 11.0 mmol/L      eGFR 84.1 mL/min/1.73      Comment: National Kidney Foundation and American Society of Nephrology (ASN) Task Force recommended calculation based on the Chronic Kidney Disease Epidemiology Collaboration (CKD-EPI) equation refit without adjustment for race.       Narrative:      GFR Normal >60  Chronic Kidney Disease <60  Kidney Failure <15      Hemoglobin A1c [[id number]]  (Normal) Collected: 04/15/22 0607    Specimen: Blood Updated: 04/15/22 0630     Hemoglobin A1C 5.50 %     Narrative:      Hemoglobin A1C Ranges:    Increased Risk for Diabetes  5.7% to 6.4%  Diabetes                     >= 6.5%  Diabetic Goal                < 7.0%    Sedimentation Rate [[id number]]  (Normal) Collected: 04/15/22 0607    Specimen: Blood Updated: 04/15/22 0629     Sed Rate 9 mm/hr     CBC (No Diff) [[id number]]  (Abnormal) Collected: 04/15/22 0607    Specimen: Blood Updated: 04/15/22 0620     WBC 17.52 10*3/mm3      RBC 4.62 10*6/mm3      Hemoglobin 12.8 g/dL      Hematocrit 39.9 %      MCV 86.4 fL      MCH 27.7 pg      MCHC 32.1 g/dL      RDW 14.4 %      RDW-SD 45.0 fl      MPV 9.8 fL      Platelets 191 10*3/mm3     STAT Lactic Acid, Reflex [[id number]]  (Abnormal) Collected: 04/15/22 0119    Specimen: Blood Updated:  04/15/22 0155     Lactate 3.7 mmol/L     Urinalysis With Culture If Indicated - Urine, Clean Catch [[id number]]  (Abnormal) Collected: 04/14/22 2354    Specimen: Urine, Clean Catch Updated: 04/15/22 0006     Color, UA Dark Yellow     Appearance, UA Clear     pH, UA 5.5     Specific Gravity, UA 1.024     Glucose, UA Negative     Ketones, UA Trace     Bilirubin, UA Negative     Blood, UA Negative     Protein, UA 30 mg/dL (1+)     Leuk Esterase, UA Negative     Nitrite, UA Negative     Urobilinogen, UA 0.2 E.U./dL    Urinalysis, Microscopic Only - Urine, Clean Catch [[id number]]  (Abnormal) Collected: 04/14/22 2354    Specimen: Urine, Clean Catch Updated: 04/15/22 0006     RBC, UA 0-2 /HPF      WBC, UA 0-2 /HPF      Bacteria, UA None Seen /HPF      Squamous Epithelial Cells, UA 0-2 /HPF      Hyaline Casts, UA 0-2 /LPF      Methodology Automated Microscopy    Blood Culture - Blood, Arm, Right [[id number]] Collected: 04/14/22 2145    Specimen: Blood from Arm, Right Updated: 04/14/22 2213    Blood Culture - Blood, Arm, Right [[id number]] Collected: 04/14/22 2145    Specimen: Blood from Arm, Right Updated: 04/14/22 2213    Lactic Acid, Plasma [[id number]]  (Abnormal) Collected: 04/14/22 2145    Specimen: Blood Updated: 04/14/22 2208     Lactate 3.1 mmol/L     Procalcitonin [[id number]]  (Abnormal) Collected: 04/14/22 1852    Specimen: Blood Updated: 04/14/22 2131     Procalcitonin 43.88 ng/mL     Narrative:      As a Marker for Sepsis (Non-Neonates):    1. <0.5 ng/mL represents a low risk of severe sepsis and/or septic shock.  2. >2 ng/mL represents a high risk of severe sepsis and/or septic shock.    As a Marker for Lower Respiratory Tract Infections that require antibiotic therapy:    PCT on Admission    Antibiotic Therapy       6-12 Hrs later    >0.5                Strongly Recommended  >0.25 - <0.5        Recommended   0.1 - 0.25          Discouraged              Remeasure/reassess PCT  <0.1                Strongly  "Discouraged     Remeasure/reassess PCT    As 28 day mortality risk marker: \"Change in Procalcitonin Result\" (>80% or <=80%) if Day 0 (or Day 1) and Day 4 values are available. Refer to http://www.[facility]-pct-calculator.com    Change in PCT <=80%  A decrease of PCT levels below or equal to 80% defines a positive change in PCT test result representing a higher risk for 28-day all-cause mortality of patients diagnosed with severe sepsis for septic shock.    Change in PCT >80%  A decrease of PCT levels of more than 80% defines a negative change in PCT result representing a lower risk for 28-day all-cause mortality of patients diagnosed with severe sepsis or septic shock.       C-reactive Protein [[id number]]  (Abnormal) Collected: 04/14/22 1852    Specimen: Blood Updated: 04/14/22 2122     C-Reactive Protein 14.05 mg/dL     [place] Draw [[id number]] Collected: 04/14/22 1852    Specimen: Blood Updated: 04/14/22 2002    Narrative:      The following orders were created for panel order [place] Draw.  Procedure                               Abnormality         Status                     ---------                               -----------         ------                     Green Top (Gel)[[id number]]                                  Final result               Lavender Top[[id number]]                                     Final result               Red Top[[id number]]                                          Final result               Light Blue Top[[id number]]                                   Final result                 Please view results for these tests on the individual orders.    Red Top [[id number]] Collected: 04/14/22 1852    Specimen: Blood Updated: 04/14/22 2002     Extra Tube Hold for add-ons.     Comment: Auto resulted.       Light Blue Top [[id number]] Collected: 04/14/22 1852    Specimen: Blood Updated: 04/14/22 2002     Extra Tube hold for add-on     Comment: Auto resulted       Green Top (Gel) [[id number]] Collected: " 04/14/22 1852    Specimen: Blood Updated: 04/14/22 2002     Extra Tube Hold for add-ons.     Comment: Auto resulted.       Lavender Top [[id number]] Collected: 04/14/22 1852    Specimen: Blood Updated: 04/14/22 2002     Extra Tube hold for add-on     Comment: Auto resulted       Manual Differential [[id number]]  (Abnormal) Collected: 04/14/22 1852    Specimen: Blood Updated: 04/14/22 1935     Neutrophil % 65.4 %      Lymphocyte % 5.8 %      Monocyte % 4.8 %      Bands %  17.3 %      Metamyelocyte % 3.8 %      Myelocyte % 1.0 %      Atypical Lymphocyte % 1.9 %      Neutrophils Absolute 15.22 10*3/mm3      Lymphocytes Absolute 1.42 10*3/mm3      Monocytes Absolute 0.88 10*3/mm3      Crenated RBC's Slight/1+     Dacrocytes Slight/1+     Ovalocytes Slight/1+     Poikilocytes Mod/2+     WBC Morphology Normal     Platelet Morphology Normal    Comprehensive Metabolic Panel [[id number]]  (Abnormal) Collected: 04/14/22 1852    Specimen: Blood Updated: 04/14/22 1916     Glucose 112 mg/dL      BUN 34 mg/dL      Creatinine 1.14 mg/dL      Sodium 144 mmol/L      Potassium 4.7 mmol/L      Chloride 107 mmol/L      CO2 22.0 mmol/L      Calcium 9.2 mg/dL      Total Protein 6.7 g/dL      Albumin 4.00 g/dL      ALT (SGPT) 13 U/L      AST (SGOT) 22 U/L      Alkaline Phosphatase 43 U/L      Total Bilirubin 1.1 mg/dL      Globulin 2.7 gm/dL      A/G Ratio 1.5 g/dL      BUN/Creatinine Ratio 29.8     Anion Gap 15.0 mmol/L      eGFR 65.0 mL/min/1.73      Comment: National Kidney Foundation and American Society of Nephrology (ASN) Task Force recommended calculation based on the Chronic Kidney Disease Epidemiology Collaboration (CKD-EPI) equation refit without adjustment for race.       Narrative:      GFR Normal >60  Chronic Kidney Disease <60  Kidney Failure <15      CBC & Differential [[id number]]  (Abnormal) Collected: 04/14/22 1852    Specimen: Blood Updated: 04/14/22 1912    Narrative:      The following orders were created for panel order  CBC & Differential.  Procedure                               Abnormality         Status                     ---------                               -----------         ------                     CBC Auto Differential[[id number]]        Abnormal            Final result                 Please view results for these tests on the individual orders.    CBC Auto Differential [[id number]]  (Abnormal) Collected: 04/14/22 1852    Specimen: Blood Updated: 04/14/22 1912     WBC 18.41 10*3/mm3      RBC 4.46 10*6/mm3      Hemoglobin 12.3 g/dL      Hematocrit 38.3 %      MCV 85.9 fL      MCH 27.6 pg      MCHC 32.1 g/dL      RDW 14.0 %      RDW-SD 43.7 fl      MPV 9.7 fL      Platelets 213 10*3/mm3      nRBC 0.0 /100 WBC         Imaging Results (Last 24 Hours)     Procedure Component Value Units Date/Time    CT Head Without Contrast [[id number]] Collected: 04/15/22 0407     Updated: 04/15/22 0413    Narrative:      EXAM/TECHNIQUE: CT of the head without contrast     INDICATION: Mental status change, unknown cause; R41.82-Altered mental  status, unspecified; J18.9-Pneumonia, unspecified organism     COMPARISON: 09/29/2021     DLP: 624 mGy cm. Automated exposure control was also utilized to  decrease patient radiation dose.     FINDINGS:     No evidence of intracranial hemorrhage. Gray-white differentiation is  maintained. Mild presumed chronic microvascular ischemic white matter  change and global cerebral volume loss. No midline shift or mass effect.  Lateral ventricles are nondilated. Basilar cisterns are patent. No acute  orbital finding. Mastoid air cells and visualized portion of the  paranasal sinuses are clear. No acute osseous finding.       Impression:         No acute intracranial findings.     These findings are in agreement with the critical and emergent findings  from the [facility] preliminary report.  This report was finalized on 04/15/2022 04:10 by Dr. Jessee Lopez MD.    XR Chest 1 View [[id number]] Collected: 04/14/22  2134     Updated: 04/14/22 2138    Narrative:      EXAM: XR CHEST 1 VW- 4/14/2022 9:26 PM CDT     HISTORY: Febrile       COMPARISON: September 29, 2021.     TECHNIQUE: Frontal radiograph of the chest     FINDINGS:   Vague groundglass opacities in the mid right lung. Left lung is clear..  Cardiac silhouette is normal. Vascular calcifications present aortic  arch..      The osseous structures and surrounding soft tissues demonstrate no acute  abnormality.          Impression:      1. Groundglass opacity in the mid right lung most likely representing  pneumonia.        This report was finalized on 04/14/2022 21:35 by Dr. Jakob Miranda MD.          Iv abx's    cardizem gtt   lr 100 hr

## 2022-04-15 NOTE — H&P
[facility] Medicine Services  HISTORY AND PHYSICAL    Date of Admission: 4/14/2022  Primary Care Physician: Torey Amato DO    Subjective     Chief Complaint: Declining physical status    History of Present Illness  Edy Urbina is an 81-year-old male with a past medical history of Lewy body dementia, currently resides at [facility].  Daughter is at bedside and provides history.  Patient has had worsening altered mental status, becoming more forgetful.  With concerns daughter brought him to the emergency department for evaluation.  Work-up reveals elevated CRP, procalcitonin, lactic acid and white count.  Chest x-ray revealed groundglass opacity in the mid right lung most likely representing pneumonia.  Patient has had no fever, cough, chills.  Daughter is quite surprised of his diagnosis.  He has no complaints.  Significant tremors noted when assisting patient to set up.  He is quite stiff-rigid in his movements.  He is pleasant.  He is admitted for further evaluation treatment.    Review of Systems   A 10 point review of systems was completed, all negative except for those discussed in HPI    Past Medical History:   Past Medical History:   Diagnosis Date   • Cataracts, bilateral    • Lewy body dementia (HCC)    • Occasional tremors    • Prostate cancer (HCC) 05/2011    t2c,[place]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    • Varicose vein of leg 06/05/2019   • Varicose veins of legs        Past Surgical History:   Past Surgical History:   Procedure Laterality Date   • EYE SURGERY     • HERNIA REPAIR Left    • INGUINAL HERNIA REPAIR Right 6/28/2019    Procedure: OPEN RIGHT INGUINAL HERNIA REPAIR WITH MESH;  Surgeon: Alesia Mora MD;  Location: [facility];  Service: General   • PROSTATECTOMY  05/2011    RALP       Family History: family history includes Stroke in his mother.    Social History:  reports that he quit smoking about 19 years  "ago. His smoking use included cigarettes. He quit after 50.00 years of use. He has never used smokeless tobacco. He reports that he does not drink alcohol and does not use drugs.    Code Status: Full, his son Raul is power of       Allergies:  No Known Allergies    Medications:  Prior to Admission medications    Medication Sig Start Date End Date Taking? Authorizing Provider   memantine (NAMENDA) 10 MG tablet Take 1 tablet by mouth 2 (Two) Times a Day. TAKE 1 TABLET TWICE DAILY 3/25/22   Dalton Laurent MD   Multiple Vitamins-Minerals (MULTIVITAMIN MEN 50+) tablet Take 1 tablet by mouth Daily.    Provider, MD Xi   Pseudoeph-Doxylamine-DM-APAP (NYQUIL PO) Take  by mouth As Needed (FOR SLEEP).    Provider, MD Xi   risperiDONE (risperDAL) 0.5 MG tablet Take 1 tablet by mouth 2 (Two) Times a Day. 3/25/22   Torey Amato DO     I have utilized all available immediate resources to obtain, update, and review the patient's current medications.    Objective     /65   Pulse 87   Temp 99.9 °F (37.7 °C) (Oral)   Resp 16   Ht 185.4 cm (73\")   Wt 63.5 kg (140 lb)   SpO2 99%   BMI 18.47 kg/m²   Physical Exam  Vitals reviewed.   Constitutional:       Appearance: He is ill-appearing.   HENT:      Head: Normocephalic and atraumatic.      Mouth/Throat:      Mouth: Mucous membranes are dry.      Pharynx: Oropharynx is clear.   Eyes:      Conjunctiva/sclera: Conjunctivae normal.      Pupils: Pupils are equal, round, and reactive to light.   Cardiovascular:      Rate and Rhythm: Normal rate and regular rhythm.   Pulmonary:      Effort: No respiratory distress.      Comments: Diminished throughout without adventitious breath sounds  Abdominal:      General: There is no distension.      Palpations: Abdomen is soft.      Tenderness: There is no abdominal tenderness.   Musculoskeletal:      Cervical back: Neck supple.      Right lower leg: No edema.      Left lower leg: No edema.      " Comments: Generalized weakness and debility   Skin:     General: Skin is warm and dry.   Neurological:      Mental Status: He is alert. Mental status is at baseline.      Comments: Tremors noted with intentional movements   Psychiatric:      Comments: Flat affect, no behavioral outburst       Pertinent Data:   Lab Results (last 72 hours)     Procedure Component Value Units Date/Time    Blood Culture - Blood, Arm, Right [[id number]] Collected: 04/14/22 2145    Specimen: Blood from Arm, Right Updated: 04/14/22 2213    Blood Culture - Blood, Arm, Right [[id number]] Collected: 04/14/22 2145    Specimen: Blood from Arm, Right Updated: 04/14/22 2213    Lactic Acid, Plasma [[id number]]  (Abnormal) Collected: 04/14/22 2145    Specimen: Blood Updated: 04/14/22 2208     Lactate 3.1 mmol/L     Procalcitonin [[id number]]  (Abnormal) Collected: 04/14/22 1852    Specimen: Blood Updated: 04/14/22 2131     Procalcitonin 43.88 ng/mL     C-reactive Protein [[id number]]  (Abnormal) Collected: 04/14/22 1852    Specimen: Blood Updated: 04/14/22 2122     C-Reactive Protein 14.05 mg/dL     Manual Differential [[id number]]  (Abnormal) Collected: 04/14/22 1852    Specimen: Blood Updated: 04/14/22 1935     Neutrophil % 65.4 %      Lymphocyte % 5.8 %      Monocyte % 4.8 %      Bands %  17.3 %      Metamyelocyte % 3.8 %      Myelocyte % 1.0 %      Atypical Lymphocyte % 1.9 %      Neutrophils Absolute 15.22 10*3/mm3      Lymphocytes Absolute 1.42 10*3/mm3      Monocytes Absolute 0.88 10*3/mm3      Crenated RBC's Slight/1+     Dacrocytes Slight/1+     Ovalocytes Slight/1+     Poikilocytes Mod/2+     WBC Morphology Normal     Platelet Morphology Normal    Comprehensive Metabolic Panel [[id number]]  (Abnormal) Collected: 04/14/22 1852    Specimen: Blood Updated: 04/14/22 1916     Glucose 112 mg/dL      BUN 34 mg/dL      Creatinine 1.14 mg/dL      Sodium 144 mmol/L      Potassium 4.7 mmol/L      Chloride 107 mmol/L      CO2 22.0 mmol/L      Calcium 9.2  mg/dL      Total Protein 6.7 g/dL      Albumin 4.00 g/dL      ALT (SGPT) 13 U/L      AST (SGOT) 22 U/L      Alkaline Phosphatase 43 U/L      Total Bilirubin 1.1 mg/dL      Globulin 2.7 gm/dL      A/G Ratio 1.5 g/dL      BUN/Creatinine Ratio 29.8     Anion Gap 15.0 mmol/L      eGFR 65.0 mL/min/1.73     CBC Auto Differential [[id number]]  (Abnormal) Collected: 04/14/22 1852    Specimen: Blood Updated: 04/14/22 1912     WBC 18.41 10*3/mm3      RBC 4.46 10*6/mm3      Hemoglobin 12.3 g/dL      Hematocrit 38.3 %      MCV 85.9 fL      MCH 27.6 pg      MCHC 32.1 g/dL      RDW 14.0 %      RDW-SD 43.7 fl      MPV 9.7 fL      Platelets 213 10*3/mm3      nRBC 0.0 /100 WBC         Imaging Results (Last 24 Hours)     Procedure Component Value Units Date/Time    CT Head Without Contrast [[id number]] Resulted: 04/14/22 2157     Updated: 04/14/22 2215    XR Chest 1 View [[id number]] Collected: 04/14/22 2134     Updated: 04/14/22 2138    Narrative:      EXAM: XR CHEST 1 VW- 4/14/2022 9:26 PM CDT     HISTORY: Febrile       COMPARISON: September 29, 2021.     TECHNIQUE: Frontal radiograph of the chest     FINDINGS:   Vague groundglass opacities in the mid right lung. Left lung is clear..  Cardiac silhouette is normal. Vascular calcifications present aortic  arch..      The osseous structures and surrounding soft tissues demonstrate no acute  abnormality.          Impression:      1. Groundglass opacity in the mid right lung most likely representing  pneumonia.        This report was finalized on 04/14/2022 21:35 by Dr. Jakob Miranda MD.            Assessment / Plan     Assessment:   Active Hospital Problems    Diagnosis    • Altered mental status    • Pneumonia of right middle lobe due to infectious organism    • Generalized weakness    • Elevated BUN    • Lewy body dementia without behavioral disturbance (HCC)        Plan:   1.  Admit as inpatient  2.  Continue Rocephin and azithromycin as ordered in the emergency department  3.   Supplemental oxygen as needed, DuoNebs, incentive spirometry, ABGs as needed  4.  LR at 100 mL/hour  5.  Medications reviewed and restarted as appropriate  6.  DVT prophylaxis with SCD  7.  CT of the head without contrast results pending  8.  Labs in a.m.  9.  Consult PT/OT and speech  10.  Consult -patient resides at [facility]    I discussed the patient's findings and my recommendations with: Justus Landin DO  Time spent: 30 minutes    Patient seen in the ED. EKG done and patient noted to be in A Fib RVR. Started on Cardizem GTT. Plan discussed with NP and agree.     Patient seen and examined by me on 4/14/2022 at 11:29 PM.    Electronically signed by DARLING Arias, 04/15/22, 00:01 CDT.

## 2022-04-15 NOTE — THERAPY EVALUATION
Acute Care - Speech Language Pathology   Swallow Initial Evaluation [facility]     Patient Name: Edy Urbina  : 1940  MRN: [id number]  Today's Date: 4/15/2022               Admit Date: 2022    SPEECH-LANGUAGE PATHOLOGY EVALUATION - SWALLOW  Subjective: The patient was seen on this date for a Clinical Swallow evaluation.  Patient was alert and cooperative.  Significant history: AMS, Lewy body dementia, RML pneumonia, generalized weakness.  Objective: Textures given included thin liquid, puree consistency and regular consistency.  Assessment: Pt had 3 to 4 multiple swallows with every consistency. He had facial grimacing 1x with thin. He took a large bite with the regular solid trial and displayed prolonged mastication. He also has only an upper denture in as he never wears his lower denture. No coughing, throat clearing, or wet vocal quality was observed. He had moderate residue from the regular solid on the left side of the soft palate.  SLP Findings:  Patient presents with mild oral dysphagia, without esophageal component.   Recommendations: Diet Textures: thin liquid, mechanical soft consistency food.  Medications should be taken whole with thin liquids.   Recommended Strategies: Upright for PO, small bites and sips and alternate liquids and solids. Oral care before breakfast, after all meals and PRN.  Dysphagia therapy is recommended to monitor diet tolerance.  Bill Carpenter, CCC-SLP 4/15/2022 15:41 CDT    Visit Dx:     ICD-10-CM ICD-9-CM   1. Altered mental status, unspecified altered mental status type  R41.82 780.97   2. Pneumonia due to infectious organism, unspecified laterality, unspecified part of lung  J18.9 486   3. Dysphagia, unspecified type  R13.10 787.20     Patient Active Problem List   Diagnosis   • Anemia   • Lewy body dementia without behavioral disturbance (HCC)   • Cachexia (HCC)   • Pneumonia of right middle lobe due to infectious organism   • Generalized weakness   •  Elevated BUN   • Metabolic encephalopathy     Past Medical History:   Diagnosis Date   • Cataracts, bilateral    • Lewy body dementia (HCC)    • Occasional tremors    • Prostate cancer (HCC) 05/2011    t2c,[place]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    • Varicose vein of leg 06/05/2019   • Varicose veins of legs      Past Surgical History:   Procedure Laterality Date   • EYE SURGERY     • HERNIA REPAIR Left    • INGUINAL HERNIA REPAIR Right 6/28/2019    Procedure: OPEN RIGHT INGUINAL HERNIA REPAIR WITH MESH;  Surgeon: Alesia Mora MD;  Location: [facility] OR;  Service: General   • PROSTATECTOMY  05/2011    RALP       SLP Recommendation and Plan  SLP Swallowing Diagnosis: mild, oral dysphagia, R/O pharyngeal dysphagia (04/15/22 1032)  SLP Diet Recommendation: soft textures, ground, thin liquids (04/15/22 1032)  Recommended Precautions and Strategies: upright posture during/after eating, small bites of food and sips of liquid, alternate between small bites of food and sips of liquid, general aspiration precautions (04/15/22 1032)  SLP Rec. for Method of Medication Administration: meds whole, with thin liquids (04/15/22 1032)     Monitor for Signs of Aspiration: yes, cough, gurgly voice, throat clearing, pneumonia, notify SLP if any concerns (04/15/22 1032)     Swallow Criteria for Skilled Therapeutic Interventions Met: demonstrates skilled criteria (04/15/22 1032)  Anticipated Discharge Disposition (SLP): unknown (04/15/22 1032)  Rehab Potential/Prognosis, Swallowing: good, to achieve stated therapy goals (04/15/22 1032)  Therapy Frequency (Swallow): PRN (04/15/22 1032)  Predicted Duration Therapy Intervention (Days): until discharge (04/15/22 1032)                                  Plan of Care Reviewed With: patient, daughter  Outcome Evaluation: See note      SWALLOW EVALUATION (last 72 hours)     SLP Adult Swallow Evaluation     Row Name 04/15/22 1032                   Rehab Evaluation    Document  Type evaluation  -MB        Subjective Information no complaints  -MB        Patient Observations alert;cooperative  -MB        Patient/Family/Caregiver Comments/Observations Daughter present  -MB                  General Information    Patient Profile Reviewed yes  -MB        Pertinent History Of Current Problem AMS, Lewy body dementia, RML pneumonia, generalized weakness.  -MB        Current Method of Nutrition regular textures;thin liquids  -MB        Precautions/Limitations, Vision WFL;for purposes of eval  -MB        Precautions/Limitations, Hearing WFL;for purposes of eval  -MB        Prior Level of Function-Communication cognitive-linguistic impairment;other (see comments)  dementia  -MB        Prior Level of Function-Swallowing no diet consistency restrictions  -MB        Plans/Goals Discussed with patient and family  -MB        Barriers to Rehab none identified  -MB        Patient's Goals for Discharge patient did not state  -MB        Family Goals for Discharge family did not state  -MB                  Pain    Additional Documentation Pain Scale: FACES Pre/Post-Treatment (Group)  -MB                  Pain Scale: FACES Pre/Post-Treatment    Pain: FACES Scale, Pretreatment 2-->hurts little bit  -MB                  Oral Motor Structure and Function    Dentition Assessment upper dentures/partial in place;other (see comments)  doesn't wear lower denture  -MB        Secretion Management WNL/WFL  -MB        Mucosal Quality moist, healthy  -MB                  Oral Musculature and Cranial Nerve Assessment    Oral Motor General Assessment generalized oral motor weakness;other (see comments)  resting tremor  -MB                  General Eating/Swallowing Observations    Eating/Swallowing Skills fed by SLP;self-fed  -MB        Positioning During Eating upright in bed  -MB        Utensils Used spoon;straw  -MB        Consistencies Trialed regular textures;pureed;thin liquids  -MB                  Clinical Swallow  Eval    Oral Prep Phase impaired  -MB        Oral Transit WFL  -MB        Oral Residue impaired  -MB        Pharyngeal Phase suspected pharyngeal impairment  -MB        Esophageal Phase unremarkable  -MB        Clinical Swallow Evaluation Summary See note  -MB                  Oral Prep Concerns    Oral Prep Concerns prolonged mastication  -MB        Prolonged Mastication regular consistencies  -MB                  Oral Residue Concerns    Oral Residue Concerns other (see comments)  left soft palate behind denture  -MB                  Pharyngeal Phase Concerns    Pharyngeal Phase Concerns multiple swallows  -MB        Multiple Swallows all consistencies  -MB                  SLP Evaluation Clinical Impression    SLP Swallowing Diagnosis mild;oral dysphagia;R/O pharyngeal dysphagia  -MB        Functional Impact risk of aspiration/pneumonia;risk of malnutrition;risk of dehydration  -MB        Rehab Potential/Prognosis, Swallowing good, to achieve stated therapy goals  -MB        Swallow Criteria for Skilled Therapeutic Interventions Met demonstrates skilled criteria  -MB                  Recommendations    Therapy Frequency (Swallow) PRN  -MB        Predicted Duration Therapy Intervention (Days) until discharge  -MB        SLP Diet Recommendation soft textures;ground;thin liquids  -MB        Recommended Precautions and Strategies upright posture during/after eating;small bites of food and sips of liquid;alternate between small bites of food and sips of liquid;general aspiration precautions  -MB        Oral Care Recommendations Oral Care BID/PRN  -MB        SLP Rec. for Method of Medication Administration meds whole;with thin liquids  -MB        Monitor for Signs of Aspiration yes;cough;gurgly voice;throat clearing;pneumonia;notify SLP if any concerns  -MB        Anticipated Discharge Disposition (SLP) unknown  -MB                  Swallow Goals (SLP)    Oral Nutrition/Hydration Goal Selection (SLP) oral  nutrition/hydration, SLP goal 1  -MB                  Oral Nutrition/Hydration Goal 1 (SLP)    Oral Nutrition/Hydration Goal 1, SLP Pt will tolerate least restrictive diet with no overt s/s of aspiration.  -MB        Time Frame (Oral Nutrition/Hydration Goal 1, SLP) by discharge  -MB        Barriers (Oral Nutrition/Hydration Goal 1, SLP) n/a  -MB        Progress/Outcomes (Oral Nutrition/Hydration Goal 1, SLP) goal ongoing  -MB              User Key  (r) = Recorded By, (t) = Taken By, (c) = Cosigned By    Initials Name Effective Dates    Bill Mckeon CCC-SLP 06/16/21 -                 EDUCATION  The patient has been educated in the following areas:   Dysphagia (Swallowing Impairment).        SLP GOALS     Row Name 04/15/22 1032             Oral Nutrition/Hydration Goal 1 (SLP)    Oral Nutrition/Hydration Goal 1, SLP Pt will tolerate least restrictive diet with no overt s/s of aspiration.  -MB      Time Frame (Oral Nutrition/Hydration Goal 1, SLP) by discharge  -MB      Barriers (Oral Nutrition/Hydration Goal 1, SLP) n/a  -MB      Progress/Outcomes (Oral Nutrition/Hydration Goal 1, SLP) goal ongoing  -MB            User Key  (r) = Recorded By, (t) = Taken By, (c) = Cosigned By    Initials Name Provider Type    Bill Mckeon CCC-SLP Speech and Language Pathologist                   Time Calculation:    Time Calculation- SLP     Row Name 04/15/22 1541             Time Calculation- SLP    SLP Start Time 1032  -MB      SLP Stop Time 1125  -MB      SLP Time Calculation (min) 53 min  -MB      SLP Received On 04/15/22  -MB      SLP Goal Re-Cert Due Date 04/25/22  -MB              Untimed Charges    65784-HB Eval Oral Pharyng Swallow Minutes 53  -MB              Total Minutes    Untimed Charges Total Minutes 53  -MB       Total Minutes 53  -MB            User Key  (r) = Recorded By, (t) = Taken By, (c) = Cosigned By    Initials Name Provider Type    Bill Mckeon CCC-SLP Speech and Language  Pathologist                Therapy Charges for Today     Code Description Service Date Service Provider Modifiers Qty    41729522734  ST EVAL ORAL PHARYNG SWALLOW 4 4/15/2022 Bill Carpenter, CONY-SLP GN 1               Bill Carpenter CCC-PATTY  4/15/2022

## 2022-04-15 NOTE — ED PROVIDER NOTES
Subjective   History of Present Illness    Review of Systems    Past Medical History:   Diagnosis Date   • Cataracts, bilateral    • Occasional tremors    • Prostate cancer (HCC) 2011    t2c,[place]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    • Varicose vein of leg 2019   • Varicose veins of legs        No Known Allergies    Past Surgical History:   Procedure Laterality Date   • EYE SURGERY     • HERNIA REPAIR Left    • INGUINAL HERNIA REPAIR Right 2019    Procedure: OPEN RIGHT INGUINAL HERNIA REPAIR WITH MESH;  Surgeon: Alesia Mora MD;  Location: [facility] OR;  Service: General   • PROSTATECTOMY  2011    RALP       Family History   Problem Relation Age of Onset   • Stroke Mother        Social History     Socioeconomic History   • Marital status:    Tobacco Use   • Smoking status: Former Smoker     Years: 50.00     Types: Cigarettes     Quit date: 2002     Years since quittin.8   • Smokeless tobacco: Never Used   Substance and Sexual Activity   • Alcohol use: No     Comment: QUIT DRINKING IN    • Drug use: No   • Sexual activity: Defer           Objective   Physical Exam    Procedures           ED Course  ED Course as of 22 2343   Thu  This patient was just brought into the ED to be seen [TS]   214 Patient was brought in because of altered mental status he is got underlying dementia but is becoming more forgetful.  No other focal neurological deficit he is otherwise awake and follows commands and understands where he is.  Does not have any nuchal rigidity there is no evidence of any neurological deficits denies any headaches no photophobia.  His white cell count is elevated and he is got elevated procalcitonin level.  IV antibiotics have been initiated the patient has got a pneumonia chest x-ray we are waiting on the urinalysis [TS]    Turning over the case to Dr. Rosado after urinalysis is available the patient will be admitted. [TS]    5730 I took over from Dr. Siu at shift change.  Patient CT head is okay.  His chest x-ray does reveal what appears to be a right-sided pneumonia.  I talked with this patient's daughter and we will admit him for further care and IV antibiotics.  We will get a urine specimen and probably have to do a catheter to get that. [TR]      ED Course User Index  [TR] Carlos Manuel Rosado Jr., MD  [TS] Jeff Siu MD                                                 MDM  Number of Diagnoses or Management Options  Altered mental status, unspecified altered mental status type: new and requires workup  Pneumonia due to infectious organism, unspecified laterality, unspecified part of lung: new and requires workup     Amount and/or Complexity of Data Reviewed  Clinical lab tests: ordered and reviewed  Tests in the radiology section of CPT®: ordered and reviewed  Tests in the medicine section of CPT®: ordered and reviewed  Discuss the patient with other providers: yes    Risk of Complications, Morbidity, and/or Mortality  Presenting problems: moderate  Diagnostic procedures: moderate  Management options: moderate    Patient Progress  Patient progress: stable      Final diagnoses:   Altered mental status, unspecified altered mental status type   Pneumonia due to infectious organism, unspecified laterality, unspecified part of lung       ED Disposition  ED Disposition     ED Disposition   Decision to Admit    Condition   --    Comment   Level of Care: Med/Surg [1]   Diagnosis: Altered mental status, unspecified altered mental status type [[id number]]   Admitting Physician: JN DING [[id number]]   Attending Physician: JN DING [[id number]]   Isolate for COVID?: No [0]   Certification: I Certify That Inpatient Hospital Services Are Medically Necessary For Greater Than 2 Midnights               No follow-up provider specified.       Medication List      No changes were made to your prescriptions during this visit.           Carlos Manuel Rosado Jr., MD  04/14/22 6526

## 2022-04-15 NOTE — PLAN OF CARE
Goal Outcome Evaluation:  Plan of Care Reviewed With: patient, daughter           Outcome Evaluation: See note

## 2022-04-16 PROBLEM — I48.20 CHRONIC ATRIAL FIBRILLATION WITH RAPID VENTRICULAR RESPONSE (HCC): Status: ACTIVE | Noted: 2022-04-16

## 2022-04-16 LAB
BURR CELLS BLD QL SMEAR: ABNORMAL
DEPRECATED RDW RBC AUTO: 45.8 FL (ref 37–54)
ERYTHROCYTE [DISTWIDTH] IN BLOOD BY AUTOMATED COUNT: 14.4 % (ref 12.3–15.4)
HCT VFR BLD AUTO: 35.8 % (ref 37.5–51)
HGB BLD-MCNC: 11.5 G/DL (ref 13–17.7)
LYMPHOCYTES # BLD MANUAL: 0.77 10*3/MM3 (ref 0.7–3.1)
LYMPHOCYTES NFR BLD MANUAL: 2 % (ref 5–12)
MCH RBC QN AUTO: 27.8 PG (ref 26.6–33)
MCHC RBC AUTO-ENTMCNC: 32.1 G/DL (ref 31.5–35.7)
MCV RBC AUTO: 86.7 FL (ref 79–97)
MICROCYTES BLD QL: ABNORMAL
MONOCYTES # BLD: 0.31 10*3/MM3 (ref 0.1–0.9)
NEUTROPHILS # BLD AUTO: 14.33 10*3/MM3 (ref 1.7–7)
NEUTROPHILS NFR BLD MANUAL: 86 % (ref 42.7–76)
NEUTS BAND NFR BLD MANUAL: 7 % (ref 0–5)
PLAT MORPH BLD: NORMAL
PLATELET # BLD AUTO: 169 10*3/MM3 (ref 140–450)
PMV BLD AUTO: 10.8 FL (ref 6–12)
POIKILOCYTOSIS BLD QL SMEAR: ABNORMAL
QT INTERVAL: 292 MS
QT INTERVAL: 360 MS
QTC INTERVAL: 418 MS
QTC INTERVAL: 438 MS
RBC # BLD AUTO: 4.13 10*6/MM3 (ref 4.14–5.8)
SCHISTOCYTES BLD QL SMEAR: ABNORMAL
VARIANT LYMPHS NFR BLD MANUAL: 5 % (ref 19.6–45.3)
WBC MORPH BLD: NORMAL
WBC NRBC COR # BLD: 15.41 10*3/MM3 (ref 3.4–10.8)

## 2022-04-16 PROCEDURE — 94799 UNLISTED PULMONARY SVC/PX: CPT

## 2022-04-16 PROCEDURE — 94664 DEMO&/EVAL PT USE INHALER: CPT

## 2022-04-16 PROCEDURE — 85007 BL SMEAR W/DIFF WBC COUNT: CPT | Performed by: FAMILY MEDICINE

## 2022-04-16 PROCEDURE — 97530 THERAPEUTIC ACTIVITIES: CPT

## 2022-04-16 PROCEDURE — 97110 THERAPEUTIC EXERCISES: CPT

## 2022-04-16 PROCEDURE — 25010000002 AZITHROMYCIN PER 500 MG: Performed by: NURSE PRACTITIONER

## 2022-04-16 PROCEDURE — 85025 COMPLETE CBC W/AUTO DIFF WBC: CPT | Performed by: FAMILY MEDICINE

## 2022-04-16 PROCEDURE — 25010000002 CEFTRIAXONE PER 250 MG: Performed by: NURSE PRACTITIONER

## 2022-04-16 RX ORDER — DILTIAZEM HYDROCHLORIDE 60 MG/1
60 TABLET, FILM COATED ORAL EVERY 6 HOURS SCHEDULED
Status: DISCONTINUED | OUTPATIENT
Start: 2022-04-16 | End: 2022-04-17

## 2022-04-16 RX ADMIN — RISPERIDONE 0.5 MG: 1 TABLET, FILM COATED ORAL at 21:41

## 2022-04-16 RX ADMIN — APIXABAN 2.5 MG: 2.5 TABLET, FILM COATED ORAL at 21:41

## 2022-04-16 RX ADMIN — DILTIAZEM HYDROCHLORIDE 15 MG/HR: 5 INJECTION, SOLUTION INTRAVENOUS at 09:29

## 2022-04-16 RX ADMIN — IPRATROPIUM BROMIDE AND ALBUTEROL SULFATE 3 ML: 2.5; .5 SOLUTION RESPIRATORY (INHALATION) at 13:43

## 2022-04-16 RX ADMIN — MEMANTINE HYDROCHLORIDE 10 MG: 5 TABLET, FILM COATED ORAL at 09:03

## 2022-04-16 RX ADMIN — RISPERIDONE 0.5 MG: 1 TABLET, FILM COATED ORAL at 09:03

## 2022-04-16 RX ADMIN — Medication 10 ML: at 22:57

## 2022-04-16 RX ADMIN — DILTIAZEM HYDROCHLORIDE 5 MG/HR: 5 INJECTION, SOLUTION INTRAVENOUS at 06:23

## 2022-04-16 RX ADMIN — MEMANTINE HYDROCHLORIDE 10 MG: 5 TABLET, FILM COATED ORAL at 21:41

## 2022-04-16 RX ADMIN — AZITHROMYCIN DIHYDRATE 500 MG: 500 INJECTION, POWDER, LYOPHILIZED, FOR SOLUTION INTRAVENOUS at 22:56

## 2022-04-16 RX ADMIN — IPRATROPIUM BROMIDE AND ALBUTEROL SULFATE 3 ML: 2.5; .5 SOLUTION RESPIRATORY (INHALATION) at 18:50

## 2022-04-16 RX ADMIN — IPRATROPIUM BROMIDE AND ALBUTEROL SULFATE 3 ML: 2.5; .5 SOLUTION RESPIRATORY (INHALATION) at 23:52

## 2022-04-16 RX ADMIN — Medication 1 TABLET: at 09:03

## 2022-04-16 RX ADMIN — DILTIAZEM HYDROCHLORIDE 12.5 MG/HR: 5 INJECTION, SOLUTION INTRAVENOUS at 09:02

## 2022-04-16 RX ADMIN — DILTIAZEM HYDROCHLORIDE 60 MG: 60 TABLET, FILM COATED ORAL at 17:18

## 2022-04-16 RX ADMIN — CEFTRIAXONE SODIUM 1 G: 10 INJECTION, POWDER, FOR SOLUTION INTRAVENOUS at 22:55

## 2022-04-16 NOTE — CASE MANAGEMENT/SOCIAL WORK
Discharge Planning Assessment  [facility]     Patient Name: Edy Ambriz  MRN: [id number]  Today's Date: 4/16/2022    Admit Date: 4/14/2022     Discharge Needs Assessment     Row Name 04/16/22 0905       Living Environment    People in Home facility resident    Name(s) of People in Home Resides at [facility]    Current Living Arrangements assisted living facility    Primary Care Provided by self;other (see comments)    Provides Primary Care For no one, unable/limited ability to care for self    Family Caregiver if Needed none    Quality of Family Relationships supportive;involved;helpful    Able to Return to Prior Arrangements --  depends on how pt progresses       Resource/Environmental Concerns    Resource/Environmental Concerns none       Transition Planning    Patient/Family Anticipates Transition to other (see comments)    Patient/Family Anticipated Services at Transition home health care       Discharge Needs Assessment    Readmission Within the Last 30 Days no previous admission in last 30 days    Current Outpatient/Agency/Support Group assisted living facility    Equipment Currently Used at Home none    Concerns to be Addressed basic needs;discharge planning    Anticipated Changes Related to Illness inability to care for self    Outpatient/Agency/Support Group Needs assisted living facility               Discharge Plan     Row Name 04/16/22 0907       Plan    Plan [facility] (if no progress possible SNF)    Patient/Family in Agreement with Plan yes    Plan Comments Spoke with OLEGARIO AMBRIZ Son   [phone] to assess for dc needs.  Pt lives at [facility] and they are hoping he can return there. Will follow to see how pt progresses with therapy.  Son saying he was getting around fine until 2 days ago, no DME nor HH current.  Pt has RX coverage/PCP.  Will follow.              Continued Care and Services - Admitted Since 4/14/2022    Coordination has not been started for  this encounter.          Demographic Summary    No documentation.                Functional Status    No documentation.                Psychosocial    No documentation.                Abuse/Neglect    No documentation.                Legal    No documentation.                Substance Abuse    No documentation.                Patient Forms    No documentation.                   HUBER Valdivia

## 2022-04-16 NOTE — PLAN OF CARE
Goal Outcome Evaluation:  Patient is alert and oriented to person, place, and year. Confusion on why he was in the hospital initially but oriented after discussion on pneumonia. Heart rate continues to be elevated atrial flutter on telemetry. Standby assist to transfer. Impulsive, does not call for staff before getting up. Fall precautions with bed alarm in place. IV antibiotics as ordered.

## 2022-04-16 NOTE — THERAPY TREATMENT NOTE
Acute Care - Physical Therapy Treatment Note  [facility]     Patient Name: Edy Urbina  : 1940  MRN: [id number]  Today's Date: 2022      Visit Dx:     ICD-10-CM ICD-9-CM   1. Altered mental status, unspecified altered mental status type  R41.82 780.97   2. Pneumonia due to infectious organism, unspecified laterality, unspecified part of lung  J18.9 486   3. Dysphagia, unspecified type  R13.10 787.20   4. Impaired mobility  Z74.09 799.89     Patient Active Problem List   Diagnosis   • Anemia   • Lewy body dementia without behavioral disturbance (HCC)   • Cachexia (HCC)   • Pneumonia of right middle lobe due to infectious organism   • Generalized weakness   • Elevated BUN   • Metabolic encephalopathy     Past Medical History:   Diagnosis Date   • Cataracts, bilateral    • Lewy body dementia (HCC)    • Occasional tremors    • Prostate cancer (HCC) 2011    t2c,[place]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    • Varicose vein of leg 2019   • Varicose veins of legs      Past Surgical History:   Procedure Laterality Date   • EYE SURGERY     • HERNIA REPAIR Left    • INGUINAL HERNIA REPAIR Right 2019    Procedure: OPEN RIGHT INGUINAL HERNIA REPAIR WITH MESH;  Surgeon: Alesia Mora MD;  Location: [facility];  Service: General   • PROSTATECTOMY  2011    RALP     PT Assessment (last 12 hours)     PT Evaluation and Treatment     Row Name 22 1300 22 1000       Physical Therapy Time and Intention    Document Type therapy note (daily note)  -AB --    Mode of Treatment physical therapy  -AB physical therapy  -AB    Session Not Performed -- other (see comments)  -AB    Comment, Session Not Performed -- Nsg states to check back due to high HR  -AB    Row Name 22 1300          General Information    Existing Precautions/Restrictions fall  -AB     Limitations/Impairments other (see comments)   HR has to stay under 120 BPM  -AB     Row Name 22 1300          Bed  Mobility    Supine-Sit [place] (Bed Mobility) verbal cues;minimum assist (75% patient effort)  -AB     Sit-Supine [place] (Bed Mobility) verbal cues;minimum assist (75% patient effort)  -AB     Row Name 04/16/22 1300          Transfers    Sit-Stand [place] (Transfers) contact guard;minimum assist (75% patient effort)  -AB     Row Name 04/16/22 1300          Gait/Stairs (Locomotion)    [place] Level (Gait) contact guard  -AB     Distance in Feet (Gait) side steps up in bed, sitting rest then 2' forward and back x 2. HR stayed 120 of below.  -AB     Row Name 04/16/22 1300          Motor Skills    Therapeutic Exercise aerobic  -AB     Row Name 04/16/22 1300          Aerobic Exercise    Comment, Aerobic Exercise (Therapeutic Exercise) sitting David LE AROM 20 reps  -AB     Row Name 04/16/22 1300          Positioning and Restraints    Pre-Treatment Position in bed  -AB     Post Treatment Position bed  -AB     In Bed fowlers;call light within reach;exit alarm on  -AB           User Key  (r) = Recorded By, (t) = Taken By, (c) = Cosigned By    Initials Name Provider Type    Анна Orantes, PTA Physical Therapist Assistant                Physical Therapy Education                 Title: PT OT SLP Therapies (In Progress)     Topic: Physical Therapy (In Progress)     Point: Mobility training (In Progress)     Learning Progress Summary           Patient Acceptance, E, NR by ZHAO at 4/15/2022 1519    Comment: Pt educated on POC, and awareness for assistance needs.                   Point: Home exercise program (Not Started)     Learner Progress:  Not documented in this visit.          Point: Body mechanics (In Progress)     Learning Progress Summary           Patient Acceptance, E, NR by JJ at 4/15/2022 1519    Comment: Pt educated on POC, and awareness for assistance needs.                   Point: Precautions (In Progress)     Learning Progress Summary           Patient Acceptance, E, NR by JJ at 4/15/2022  1519    Comment: Pt educated on POC, and awareness for assistance needs.                               User Key     Initials Effective Dates Name Provider Type Discipline    J 03/07/22 -  David Boswell, PT Student PT Student PT              PT Recommendation and Plan             Time Calculation:    PT Charges     Row Name 04/16/22 1300             Time Calculation    Start Time 1300  -AB      Stop Time 1331  -AB      Time Calculation (min) 31 min  -AB      PT Received On 04/16/22  -AB              Time Calculation- PT    Total Timed Code Minutes- PT 31 minute(s)  -AB            User Key  (r) = Recorded By, (t) = Taken By, (c) = Cosigned By    Initials Name Provider Type    AB Анна Pennington, NORMAN Physical Therapist Assistant              Therapy Charges for Today     Code Description Service Date Service Provider Modifiers Qty    35192234029 HC PT THERAPEUTIC ACT EA 15 MIN 4/16/2022 Анна Peninngton PTA GP 1    11098027212 HC PT THER PROC EA 15 MIN 4/16/2022 Анна Pennington, NORMAN GP 1          PT G-Codes  Outcome Measure Options: AM-PAC 6 Clicks Basic Mobility (PT)  AM-PAC 6 Clicks Score (PT): 16  AM-PAC 6 Clicks Score (OT): 12    Анна Pennington PTA  4/16/2022

## 2022-04-16 NOTE — PLAN OF CARE
Goal Outcome Evaluation:  Plan of Care Reviewed With: patient        Progress: improving  Outcome Evaluation: pt transitioned from cardizem drip to po, continues to have intermittent confusion, strict bed alarm pt is impulsive and gets up on his own, urinated in the trash can x1 today

## 2022-04-16 NOTE — PROGRESS NOTES
[facility] Medicine Services  INPATIENT PROGRESS NOTE    Length of Stay: 2  Date of Admission: 4/14/2022  Primary Care Physician: Torey Amato DO    Subjective     Chief Complaint:     Increasing confusion, declining physical status    HPI     The patient has no specific complaints today.  He continues to be confused and incontinent of bowel and bladder.  He developed atrial fibrillation last evening and was started on a Cardizem drip.  I will transition him to oral Cardizem today in the hopes of weaning the Cardizem drip.  He will be placed on low-dose Eliquis because of his age and low weight.  White blood cell count is improved to 15,400.  Bandemia persists.  Appetite remains intact.      Review of Systems     All pertinent negatives and positives are as above. All other systems have been reviewed and are negative unless otherwise stated.     Objective    Temp:  [97.8 °F (36.6 °C)-99.2 °F (37.3 °C)] 98.4 °F (36.9 °C)  Heart Rate:  [] 88  Resp:  [15-18] 16  BP: (106-130)/(57-69) 130/69    Lab Results (last 24 hours)     Procedure Component Value Units Date/Time    Manual Differential [[id number]]  (Abnormal) Collected: 04/16/22 0442    Specimen: Blood Updated: 04/16/22 0557     Neutrophil % 86.0 %      Lymphocyte % 5.0 %      Monocyte % 2.0 %      Bands %  7.0 %      Neutrophils Absolute 14.33 10*3/mm3      Lymphocytes Absolute 0.77 10*3/mm3      Monocytes Absolute 0.31 10*3/mm3      Lesa Cells Mod/2+     Microcytes Slight/1+     Poikilocytes Mod/2+     Schistocytes Mod/2+     WBC Morphology Normal     Platelet Morphology Normal    CBC & Differential [[id number]]  (Abnormal) Collected: 04/16/22 0442    Specimen: Blood Updated: 04/16/22 0557    Narrative:      The following orders were created for panel order CBC & Differential.  Procedure                               Abnormality         Status                     ---------                               -----------          ------                     CBC Auto Differential[[id number]]        Abnormal            Final result                 Please view results for these tests on the individual orders.    CBC Auto Differential [[id number]]  (Abnormal) Collected: 04/16/22 0442    Specimen: Blood Updated: 04/16/22 0557     WBC 15.41 10*3/mm3      RBC 4.13 10*6/mm3      Hemoglobin 11.5 g/dL      Hematocrit 35.8 %      MCV 86.7 fL      MCH 27.8 pg      MCHC 32.1 g/dL      RDW 14.4 %      RDW-SD 45.8 fl      MPV 10.8 fL      Platelets 169 10*3/mm3     Narrative:      The previously reported component NRBC is no longer being reported. Previous result was 0.0 /100 WBC (Reference Range: 0.0-0.2 /100 WBC) on 4/16/2022 at 0530 CDT.    Blood Culture - Blood, Arm, Right [[id number]]  (Normal) Collected: 04/14/22 2145    Specimen: Blood from Arm, Right Updated: 04/16/22 0448     Blood Culture No growth at 24 hours    Blood Culture - Blood, Arm, Right [[id number]]  (Normal) Collected: 04/14/22 2145    Specimen: Blood from Arm, Right Updated: 04/15/22 2218     Blood Culture No growth at 24 hours          Imaging Results (Last 24 Hours)     ** No results found for the last 24 hours. **           No intake or output data in the 24 hours ending 04/16/22 1508    Physical Exam  Constitutional:       Appearance: He is ill-appearing.   HENT:      Head: Normocephalic and atraumatic.      Mouth/Throat:      Mouth: Mucous membranes are dry.      Pharynx: Oropharynx is clear.   Eyes:      Conjunctiva/sclera: Conjunctivae normal.      Pupils: Pupils are equal, round, and reactive to light.   Cardiovascular:      Rate and Rhythm: Normal rate and regular rhythm.   Pulmonary:      Effort: No respiratory distress.      Comments:  Rales noted on the right.  Abdominal:      General: There is no distension.      Palpations: Abdomen is soft.      Tenderness: There is no abdominal tenderness.   Musculoskeletal:      Cervical back: Neck supple.      Right lower leg: No  edema.      Left lower leg: No edema.      Comments: Generalized weakness and debility   Skin:     General: Skin is warm and dry.   Neurological:      Mental Status: He is alert. Mental status is at baseline.      Comments: Tremors noted with intentional movements   Psychiatric:      Comments: Flat affect, no behavioral outburst     Results Review:  I have reviewed the labs, radiology results, and diagnostic studies since my last progress note and made treatment changes reflective of the results.   I have reviewed the current medications.    Assessment/Plan     Active Hospital Problems    Diagnosis    • **Pneumonia of right middle lobe due to infectious organism    • Chronic atrial fibrillation with rapid ventricular response (HCC)    • Metabolic encephalopathy    • Generalized weakness    • Elevated BUN    • Cachexia (HCC)    • Lewy body dementia without behavioral disturbance (HCC)    • Anemia        PLAN:  Continue broad-spectrum empiric antibiotics with Rocephin and azithromycin  Diltiazem 60 mg p.o. every 6 hours  Attempt to wean diltiazem drip  Continue PT/OT    Electronically signed by Rajan Adams DO, 04/16/22, 15:08 CDT.

## 2022-04-17 LAB
DEPRECATED RDW RBC AUTO: 45.3 FL (ref 37–54)
ERYTHROCYTE [DISTWIDTH] IN BLOOD BY AUTOMATED COUNT: 14.6 % (ref 12.3–15.4)
HCT VFR BLD AUTO: 39.1 % (ref 37.5–51)
HGB BLD-MCNC: 12.6 G/DL (ref 13–17.7)
LYMPHOCYTES # BLD MANUAL: 1.14 10*3/MM3 (ref 0.7–3.1)
LYMPHOCYTES NFR BLD MANUAL: 8 % (ref 5–12)
MCH RBC QN AUTO: 27.7 PG (ref 26.6–33)
MCHC RBC AUTO-ENTMCNC: 32.2 G/DL (ref 31.5–35.7)
MCV RBC AUTO: 85.9 FL (ref 79–97)
MONOCYTES # BLD: 1.01 10*3/MM3 (ref 0.1–0.9)
NEUTROPHILS # BLD AUTO: 10.39 10*3/MM3 (ref 1.7–7)
NEUTROPHILS NFR BLD MANUAL: 82 % (ref 42.7–76)
PLASMA CELL PREC NFR BLD MANUAL: 1 % (ref 0–0)
PLAT MORPH BLD: NORMAL
PLATELET # BLD AUTO: 193 10*3/MM3 (ref 140–450)
PMV BLD AUTO: 10.2 FL (ref 6–12)
POIKILOCYTOSIS BLD QL SMEAR: ABNORMAL
RBC # BLD AUTO: 4.55 10*6/MM3 (ref 4.14–5.8)
VARIANT LYMPHS NFR BLD MANUAL: 9 % (ref 19.6–45.3)
WBC MORPH BLD: NORMAL
WBC NRBC COR # BLD: 12.67 10*3/MM3 (ref 3.4–10.8)

## 2022-04-17 PROCEDURE — 97530 THERAPEUTIC ACTIVITIES: CPT

## 2022-04-17 PROCEDURE — 85025 COMPLETE CBC W/AUTO DIFF WBC: CPT | Performed by: FAMILY MEDICINE

## 2022-04-17 PROCEDURE — 25010000002 CEFTRIAXONE PER 250 MG: Performed by: NURSE PRACTITIONER

## 2022-04-17 PROCEDURE — 97110 THERAPEUTIC EXERCISES: CPT

## 2022-04-17 PROCEDURE — 94664 DEMO&/EVAL PT USE INHALER: CPT

## 2022-04-17 PROCEDURE — 25010000002 AZITHROMYCIN PER 500 MG: Performed by: NURSE PRACTITIONER

## 2022-04-17 PROCEDURE — 99223 1ST HOSP IP/OBS HIGH 75: CPT | Performed by: PSYCHIATRY & NEUROLOGY

## 2022-04-17 PROCEDURE — 94799 UNLISTED PULMONARY SVC/PX: CPT

## 2022-04-17 PROCEDURE — 85007 BL SMEAR W/DIFF WBC COUNT: CPT | Performed by: FAMILY MEDICINE

## 2022-04-17 RX ORDER — DILTIAZEM HYDROCHLORIDE 300 MG/1
300 CAPSULE, COATED, EXTENDED RELEASE ORAL
Status: DISCONTINUED | OUTPATIENT
Start: 2022-04-17 | End: 2022-04-18

## 2022-04-17 RX ADMIN — Medication 10 ML: at 08:52

## 2022-04-17 RX ADMIN — CEFTRIAXONE SODIUM 1 G: 10 INJECTION, POWDER, FOR SOLUTION INTRAVENOUS at 23:25

## 2022-04-17 RX ADMIN — RISPERIDONE 0.5 MG: 1 TABLET, FILM COATED ORAL at 08:52

## 2022-04-17 RX ADMIN — APIXABAN 2.5 MG: 2.5 TABLET, FILM COATED ORAL at 08:52

## 2022-04-17 RX ADMIN — Medication 10 ML: at 21:16

## 2022-04-17 RX ADMIN — DILTIAZEM HYDROCHLORIDE 300 MG: 300 CAPSULE, COATED, EXTENDED RELEASE ORAL at 10:54

## 2022-04-17 RX ADMIN — DILTIAZEM HYDROCHLORIDE 60 MG: 60 TABLET, FILM COATED ORAL at 00:13

## 2022-04-17 RX ADMIN — IPRATROPIUM BROMIDE AND ALBUTEROL SULFATE 3 ML: 2.5; .5 SOLUTION RESPIRATORY (INHALATION) at 06:21

## 2022-04-17 RX ADMIN — APIXABAN 2.5 MG: 2.5 TABLET, FILM COATED ORAL at 21:16

## 2022-04-17 RX ADMIN — MEMANTINE HYDROCHLORIDE 10 MG: 5 TABLET, FILM COATED ORAL at 21:16

## 2022-04-17 RX ADMIN — Medication 1 TABLET: at 08:52

## 2022-04-17 RX ADMIN — RISPERIDONE 0.5 MG: 1 TABLET, FILM COATED ORAL at 21:16

## 2022-04-17 RX ADMIN — DILTIAZEM HYDROCHLORIDE 60 MG: 60 TABLET, FILM COATED ORAL at 06:31

## 2022-04-17 RX ADMIN — AZITHROMYCIN DIHYDRATE 500 MG: 500 INJECTION, POWDER, LYOPHILIZED, FOR SOLUTION INTRAVENOUS at 23:28

## 2022-04-17 RX ADMIN — MEMANTINE HYDROCHLORIDE 10 MG: 5 TABLET, FILM COATED ORAL at 08:52

## 2022-04-17 NOTE — CONSULTS
"    Neurology Consult Note    Patient:  Edy Urbina   YOB: 1940  MRN:  [id number]  Date of Admission:  4/14/2022  8:55 PM    Date: 4/17/2022    Referring Provider:Rajan Adams DO   Reason for Consultation: evaluation for suspicion of Parkinson's Dz      History of presenright handedt illness:     This is a 81 y.o.  male.with significant hearing difficulties and  cognitive impairment  who lives at [facility] living [facility] admitted for increased confusion and more forgetful evaluated for possible Parkinson's  At one time it was postulated he may have Lewy Body dementia but this is unlikely as hallucinations stopped when some of his pain meds were discontinued but he was also placed on Risperdal . 0.5 mg BID which has extrapyramidal side effects.     History is obtained from patient, review of chart and nursing.  Daughter just left about 5 minutes before I entered the room. .    Nursing provides history that he has worsening tremor when he tries to eat so hat he needs assistance with eating.    On admission he had a leukocytosis of 18,000 and was found tohave pneumonia  Attending also noted the cogwheeling and increased stiffness. Of note patient remains on Risperdal     Patient is followed  By Dr Laurent in our Neurology clinic and was last seen 3/22  \"Patient has been stable.  Patient could not tolerate Aricept because of sleepiness noted by the people in the assisted living he was living with.  That was stopped several months ago.  Patient continues Namenda 10 mg p.o. twice daily.  MMSE today actually is improved to 25/30\"  Previous MMSE was 28 on 11/4/21  MMSE 20/30 on August 2021  MMSE 26/30 on September 2019--off of opioids. But patient was on Seroquel and Risperdal.  .In October 2019 he had improved dramatically with hallucination and memory and his MMSE 30/30    2.5 years ago he was hospitalized here at the age of 78 and son reported that his  cognitive impairment had " "progressed  for over the past year and that for  > 2 years he's demonstrated  paranoia, and hallucinations-- both  visual and auditory hallucinations.and variable duration of episodes of extreme confusion followed by complete baseline return   He will have bizarre behavior such as him attempting multiple times to shove objects up his rectum. There is occasional resting tremor Patient considerably worsened after his right inguinal hernia repair but had been on opioids for this. T  During the times of hallucinations he was on Norco 7.5/325 1 to 2 tabs every 4 hours. His UDS was positive for benzo's.and opiates during that admission of 8/2019 these were held    On 7/8/19 I saw him in the hospital and wrote: \"Security had to be called this morning as he was outside room and yelling about people being wong.  He refused his medications\"        W/u in July 2019:  Copper normal at 111  Ceruloplasmin normal at 20.7  Ammonia < 9  B12 425  Vitamin B1 243  Folate > 20  TSH 1.29  RMSF IgG and IgM was negative  MoCA 8/2019 was 17/30 c/w moderate cognitive impairment    MRI brain with and without:  atrophy and chronic small vessel disease    EEG 9/2019: IMPRESSION: Generally normal looking low amplitude EEG for age awake and possibly briefly drowsy with artifact as above.    During this hospitalization he developed atrial fibrillation     Past Medical History:   Diagnosis Date   • Cataracts, bilateral    • Lewy body dementia (HCC)    • Occasional tremors    • Prostate cancer (HCC) 05/2011    t2c,[place]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    • Varicose vein of leg 06/05/2019   • Varicose veins of legs        Past Surgical History:   Procedure Laterality Date   • EYE SURGERY     • HERNIA REPAIR Left    • INGUINAL HERNIA REPAIR Right 6/28/2019    Procedure: OPEN RIGHT INGUINAL HERNIA REPAIR WITH MESH;  Surgeon: Alesia Mora MD;  Location: [facility];  Service: General   • PROSTATECTOMY  05/2011    RALP       Prior " to Admission medications    Medication Sig Start Date End Date Taking? Authorizing Provider   memantine (NAMENDA) 10 MG tablet Take 1 tablet by mouth 2 (Two) Times a Day. TAKE 1 TABLET TWICE DAILY 3/25/22  Yes Dalton Laurent MD   Multiple Vitamins-Minerals (MULTIVITAMIN MEN 50+) tablet Take 1 tablet by mouth Daily.   Yes ProviderXi MD   risperiDONE (risperDAL) 0.5 MG tablet Take 1 tablet by mouth 2 (Two) Times a Day. 3/25/22  Yes Torey Amato,    Pseudoeph-Doxylamine-DM-APAP (NYQUIL MULTI-SYMPTOM PO) Take 1 capsule by mouth Every Night. Takes for sleep per daughter    ProviderXi MD       Hospital scheduled medications:   apixaban, 2.5 mg, Oral, Q12H  cefTRIAXone, 1 g, Intravenous, Q24H   And  azithromycin, 500 mg, Intravenous, Q24H  dilTIAZem CD, 300 mg, Oral, Q24H  memantine, 10 mg, Oral, BID  multivitamin with minerals, 1 tablet, Oral, Daily  risperiDONE, 0.5 mg, Oral, BID  sodium chloride, 10 mL, Intravenous, Q12H      Hospital PRN medications:  •  acetaminophen **OR** acetaminophen **OR** acetaminophen  •  ondansetron **OR** ondansetron  •  sodium chloride    No Known Allergies    Social History     Socioeconomic History   • Marital status:    Tobacco Use   • Smoking status: Former Smoker     Years: 50.00     Types: Cigarettes     Quit date: 2002     Years since quittin.8   • Smokeless tobacco: Never Used   Substance and Sexual Activity   • Alcohol use: No     Comment: QUIT DRINKING IN    • Drug use: No   • Sexual activity: Defer     Family History   Problem Relation Age of Onset   • Stroke Mother        Review of Systems  A 14 point review of systems was reviewed and was negative except for tremor making it difficult to eat    Vital Signs   Temp:  [97.6 °F (36.4 °C)-98.2 °F (36.8 °C)] 98 °F (36.7 °C)  Heart Rate:  [] 112  Resp:  [15-18] 16  BP: (124-131)/(62-78) 131/78    General Exam:  Head:  Normal cephalic, atraumatic  HEENT:  Neck  supple  Fundoscopic Exam:  No signs of disc edema  CVS:  Regular rate and rhythm.  No murmurs  Carotid Examination:  No bruits  Lungs:  Clear to auscultation  Abdomen:  Non-tender, Non-distended  Extremities:  No signs of peripheral edema  Skin:  No rashes    Neurologic Exam:    Mental Status:    -Awake, Alert, Oriented X 3  -No word finding difficulties  -No aphasia  -No dysarthria  -Follows simple and complex commands    CN II:  Visual fields full.  Pupils equally reactive to light  CN III, IV, VI:  Extraocular Muscles full with no signs of nystagmus  CN V:  Facial sensory is symmetric   CN VII:  Facial motor symmetric  CN VIII:  Gross hearing intact bilaterally  CN IX/X:  Palate elevates symmetrically  CN XI:  Shoulder shrug symmetric  CN XII:  Tongue is midline on protrusion    Motor: (strength out of 5:  1= minimal movement, 2 = movement in plane of gravity, 3 = movement against gravity, 4 = movement against some resistance, 5 = full strength)    -Right Upper Ext: Proximal: 5 Distal: 5  -Left Upper Ext: Proximal: 5 Distal: 5    -Right Lower Ext: Proximal: 5 Distal: 5  -Left Lower Ext: Proximal: 5 Distal: 5    DTR:  -Right   Bicep: 2+ Triceps: 2+ Brachioradialis: 2+   Patella: 2+ Ankle: 2+ Neg Babinski  -Left   Bicep: 2+ Triceps: 2+ Brachioradialis: 2+   Patella: 2+ Ankle: 2+ Neg Babinski    Sensory:  -Intact to light touch, pinprick, temperature, pain, and proprioception    Coordination:  -Finger to nose intact  -Heel to shin intact  -No ataxia    Gait  -No signs of ataxia  -ambulates unassisted      Results Review:  Lab Results (last 7 days)     Procedure Component Value Units Date/Time    Manual Differential [[id number]]  (Abnormal) Collected: 04/17/22 0820    Specimen: Blood Updated: 04/17/22 0905     Neutrophil % 82.0 %      Lymphocyte % 9.0 %      Monocyte % 8.0 %      Plasma Cells % 1.0 %      Neutrophils Absolute 10.39 10*3/mm3      Lymphocytes Absolute 1.14 10*3/mm3      Monocytes Absolute 1.01 10*3/mm3       Poikilocytes Slight/1+     WBC Morphology Normal     Platelet Morphology Normal    Narrative:      Rare platelet clump seen on smear    CBC & Differential [[id number]]  (Abnormal) Collected: 04/17/22 0820    Specimen: Blood Updated: 04/17/22 0905    Narrative:      The following orders were created for panel order CBC & Differential.  Procedure                               Abnormality         Status                     ---------                               -----------         ------                     CBC Auto Differential[[id number]]        Abnormal            Final result                 Please view results for these tests on the individual orders.    CBC Auto Differential [[id number]]  (Abnormal) Collected: 04/17/22 0820    Specimen: Blood Updated: 04/17/22 0905     WBC 12.67 10*3/mm3      RBC 4.55 10*6/mm3      Hemoglobin 12.6 g/dL      Hematocrit 39.1 %      MCV 85.9 fL      MCH 27.7 pg      MCHC 32.2 g/dL      RDW 14.6 %      RDW-SD 45.3 fl      MPV 10.2 fL      Platelets 193 10*3/mm3     Narrative:      The previously reported component NRBC is no longer being reported. Previous result was 0.0 /100 WBC (Reference Range: 0.0-0.2 /100 WBC) on 4/17/2022 at 0851 T.    Blood Culture - Blood, Arm, Right [[id number]]  (Normal) Collected: 04/14/22 2145    Specimen: Blood from Arm, Right Updated: 04/16/22 2216     Blood Culture No growth at 2 days    Blood Culture - Blood, Arm, Right [[id number]]  (Normal) Collected: 04/14/22 2145    Specimen: Blood from Arm, Right Updated: 04/16/22 2216     Blood Culture No growth at 2 days    Manual Differential [[id number]]  (Abnormal) Collected: 04/16/22 0442    Specimen: Blood Updated: 04/16/22 0557     Neutrophil % 86.0 %      Lymphocyte % 5.0 %      Monocyte % 2.0 %      Bands %  7.0 %      Neutrophils Absolute 14.33 10*3/mm3      Lymphocytes Absolute 0.77 10*3/mm3      Monocytes Absolute 0.31 10*3/mm3      Frakes Cells Mod/2+     Microcytes Slight/1+     Poikilocytes Mod/2+      Schistocytes Mod/2+     WBC Morphology Normal     Platelet Morphology Normal    CBC & Differential [[id number]]  (Abnormal) Collected: 04/16/22 0442    Specimen: Blood Updated: 04/16/22 0557    Narrative:      The following orders were created for panel order CBC & Differential.  Procedure                               Abnormality         Status                     ---------                               -----------         ------                     CBC Auto Differential[[id number]]        Abnormal            Final result                 Please view results for these tests on the individual orders.    CBC Auto Differential [[id number]]  (Abnormal) Collected: 04/16/22 0442    Specimen: Blood Updated: 04/16/22 0557     WBC 15.41 10*3/mm3      RBC 4.13 10*6/mm3      Hemoglobin 11.5 g/dL      Hematocrit 35.8 %      MCV 86.7 fL      MCH 27.8 pg      MCHC 32.1 g/dL      RDW 14.4 %      RDW-SD 45.8 fl      MPV 10.8 fL      Platelets 169 10*3/mm3     Narrative:      The previously reported component NRBC is no longer being reported. Previous result was 0.0 /100 WBC (Reference Range: 0.0-0.2 /100 WBC) on 4/16/2022 at 0530 CDT.    COVID PRE-OP / PRE-PROCEDURE SCREENING ORDER (NO ISOLATION) - Swab, Nasal Cavity [[id number]]  (Normal) Collected: 04/15/22 0646    Specimen: Swab from Nasal Cavity Updated: 04/15/22 0730    Narrative:      The following orders were created for panel order COVID PRE-OP / PRE-PROCEDURE SCREENING ORDER (NO ISOLATION) - Swab, Nasal Cavity.  Procedure                               Abnormality         Status                     ---------                               -----------         ------                     COVID-19,[facility] [employer] [facility]...[[id number]]  Normal              Final result                 Please view results for these tests on the individual orders.    COVID-19,[facility] [employer] IN-HOUSE,Nasal Swab No Transport Media 3-4 HR TAT - Swab, Nasal Cavity [[id number]]  (Normal) Collected: 04/15/22 0646     Specimen: Swab from Nasal Cavity Updated: 04/15/22 0730     COVID19 Not Detected    Narrative:      Fact sheet for providers: https://www.fda.gov/media/058795/download     Fact sheet for patients: https://www.fda.gov/media/674999/download    Test performed by PCR.    Consider negative results in combination with clinical observations, patient history, and epidemiological information.    Lipid Panel [[id number]]  (Abnormal) Collected: 04/15/22 0607    Specimen: Blood Updated: 04/15/22 0637     Total Cholesterol 141 mg/dL      Triglycerides 68 mg/dL      HDL Cholesterol 67 mg/dL      LDL Cholesterol  60 mg/dL      VLDL Cholesterol 14 mg/dL      LDL/HDL Ratio 0.90    Narrative:      Cholesterol Reference Ranges  (U.S. Department of Health and Human Services ATP III Classifications)    Desirable          <200 mg/dL  Borderline High    200-239 mg/dL  High Risk          >240 mg/dL      Triglyceride Reference Ranges  (U.S. Department of Health and Human Services ATP III Classifications)    Normal           <150 mg/dL  Borderline High  150-199 mg/dL  High             200-499 mg/dL  Very High        >500 mg/dL    HDL Reference Ranges  (U.S. Department of Health and Human Services ATP III Classifications)    Low     <40 mg/dl (major risk factor for CHD)  High    >60 mg/dl ('negative' risk factor for CHD)        LDL Reference Ranges  (U.S. Department of Health and Human Services ATP III Classifications)    Optimal          <100 mg/dL  Near Optimal     100-129 mg/dL  Borderline High  130-159 mg/dL  High             160-189 mg/dL  Very High        >189 mg/dL    C-reactive Protein [[id number]]  (Abnormal) Collected: 04/15/22 0607    Specimen: Blood Updated: 04/15/22 0637     C-Reactive Protein 25.72 mg/dL     Basic Metabolic Panel [[id number]]  (Abnormal) Collected: 04/15/22 0607    Specimen: Blood Updated: 04/15/22 0635     Glucose 107 mg/dL      BUN 31 mg/dL      Creatinine 0.92 mg/dL      Sodium 141 mmol/L      Potassium 4.0  mmol/L      Chloride 104 mmol/L      CO2 26.0 mmol/L      Calcium 8.9 mg/dL      BUN/Creatinine Ratio 33.7     Anion Gap 11.0 mmol/L      eGFR 84.1 mL/min/1.73      Comment: National Kidney Foundation and American Society of Nephrology (ASN) Task Force recommended calculation based on the Chronic Kidney Disease Epidemiology Collaboration (CKD-EPI) equation refit without adjustment for race.       Narrative:      GFR Normal >60  Chronic Kidney Disease <60  Kidney Failure <15      Hemoglobin A1c [[id number]]  (Normal) Collected: 04/15/22 0607    Specimen: Blood Updated: 04/15/22 0630     Hemoglobin A1C 5.50 %     Narrative:      Hemoglobin A1C Ranges:    Increased Risk for Diabetes  5.7% to 6.4%  Diabetes                     >= 6.5%  Diabetic Goal                < 7.0%    Sedimentation Rate [[id number]]  (Normal) Collected: 04/15/22 0607    Specimen: Blood Updated: 04/15/22 0629     Sed Rate 9 mm/hr     CBC (No Diff) [[id number]]  (Abnormal) Collected: 04/15/22 0607    Specimen: Blood Updated: 04/15/22 0620     WBC 17.52 10*3/mm3      RBC 4.62 10*6/mm3      Hemoglobin 12.8 g/dL      Hematocrit 39.9 %      MCV 86.4 fL      MCH 27.7 pg      MCHC 32.1 g/dL      RDW 14.4 %      RDW-SD 45.0 fl      MPV 9.8 fL      Platelets 191 10*3/mm3     STAT Lactic Acid, Reflex [[id number]]  (Abnormal) Collected: 04/15/22 0119    Specimen: Blood Updated: 04/15/22 0155     Lactate 3.7 mmol/L     Urinalysis With Culture If Indicated - Urine, Clean Catch [[id number]]  (Abnormal) Collected: 04/14/22 8844    Specimen: Urine, Clean Catch Updated: 04/15/22 0006     Color, UA Dark Yellow     Appearance, UA Clear     pH, UA 5.5     Specific Gravity, UA 1.024     Glucose, UA Negative     Ketones, UA Trace     Bilirubin, UA Negative     Blood, UA Negative     Protein, UA 30 mg/dL (1+)     Leuk Esterase, UA Negative     Nitrite, UA Negative     Urobilinogen, UA 0.2 E.U./dL    Urinalysis, Microscopic Only - Urine, Clean Catch [[id number]]  (Abnormal)  "Collected: 04/14/22 2354    Specimen: Urine, Clean Catch Updated: 04/15/22 0006     RBC, UA 0-2 /HPF      WBC, UA 0-2 /HPF      Bacteria, UA None Seen /HPF      Squamous Epithelial Cells, UA 0-2 /HPF      Hyaline Casts, UA 0-2 /LPF      Methodology Automated Microscopy    Lactic Acid, Plasma [[id number]]  (Abnormal) Collected: 04/14/22 2145    Specimen: Blood Updated: 04/14/22 2208     Lactate 3.1 mmol/L     Procalcitonin [[id number]]  (Abnormal) Collected: 04/14/22 1852    Specimen: Blood Updated: 04/14/22 2131     Procalcitonin 43.88 ng/mL     Narrative:      As a Marker for Sepsis (Non-Neonates):    1. <0.5 ng/mL represents a low risk of severe sepsis and/or septic shock.  2. >2 ng/mL represents a high risk of severe sepsis and/or septic shock.    As a Marker for Lower Respiratory Tract Infections that require antibiotic therapy:    PCT on Admission    Antibiotic Therapy       6-12 Hrs later    >0.5                Strongly Recommended  >0.25 - <0.5        Recommended   0.1 - 0.25          Discouraged              Remeasure/reassess PCT  <0.1                Strongly Discouraged     Remeasure/reassess PCT    As 28 day mortality risk marker: \"Change in Procalcitonin Result\" (>80% or <=80%) if Day 0 (or Day 1) and Day 4 values are available. Refer to http://[email or web address] [place]-pct-calculator.com    Change in PCT <=80%  A decrease of PCT levels below or equal to 80% defines a positive change in PCT test result representing a higher risk for 28-day all-cause mortality of patients diagnosed with severe sepsis for septic shock.    Change in PCT >80%  A decrease of PCT levels of more than 80% defines a negative change in PCT result representing a lower risk for 28-day all-cause mortality of patients diagnosed with severe sepsis or septic shock.       C-reactive Protein [[id number]]  (Abnormal) Collected: 04/14/22 1852    Specimen: Blood Updated: 04/14/22 2122     C-Reactive Protein 14.05 mg/dL     Deal Draw [[id number]] Collected: " 04/14/22 1852    Specimen: Blood Updated: 04/14/22 2002    Narrative:      The following orders were created for panel order [facility] Draw.  Procedure                               Abnormality         Status                     ---------                               -----------         ------                     Green Top (Gel)[[id number]]                                  Final result               Lavender Top[[id number]]                                     Final result               Red Top[[id number]]                                          Final result               Light Blue Top[[id number]]                                   Final result                 Please view results for these tests on the individual orders.    Red Top [[id number]] Collected: 04/14/22 1852    Specimen: Blood Updated: 04/14/22 2002     Extra Tube Hold for add-ons.     Comment: Auto resulted.       Light Blue Top [[id number]] Collected: 04/14/22 1852    Specimen: Blood Updated: 04/14/22 2002     Extra Tube hold for add-on     Comment: Auto resulted       Green Top (Gel) [[id number]] Collected: 04/14/22 1852    Specimen: Blood Updated: 04/14/22 2002     Extra Tube Hold for add-ons.     Comment: Auto resulted.       Lavender Top [[id number]] Collected: 04/14/22 1852    Specimen: Blood Updated: 04/14/22 2002     Extra Tube hold for add-on     Comment: Auto resulted       Manual Differential [[id number]]  (Abnormal) Collected: 04/14/22 1852    Specimen: Blood Updated: 04/14/22 1935     Neutrophil % 65.4 %      Lymphocyte % 5.8 %      Monocyte % 4.8 %      Bands %  17.3 %      Metamyelocyte % 3.8 %      Myelocyte % 1.0 %      Atypical Lymphocyte % 1.9 %      Neutrophils Absolute 15.22 10*3/mm3      Lymphocytes Absolute 1.42 10*3/mm3      Monocytes Absolute 0.88 10*3/mm3      Crenated RBC's Slight/1+     Dacrocytes Slight/1+     Ovalocytes Slight/1+     Poikilocytes Mod/2+     WBC Morphology Normal     Platelet Morphology Normal    Comprehensive  Metabolic Panel [[id number]]  (Abnormal) Collected: 04/14/22 1852    Specimen: Blood Updated: 04/14/22 1916     Glucose 112 mg/dL      BUN 34 mg/dL      Creatinine 1.14 mg/dL      Sodium 144 mmol/L      Potassium 4.7 mmol/L      Chloride 107 mmol/L      CO2 22.0 mmol/L      Calcium 9.2 mg/dL      Total Protein 6.7 g/dL      Albumin 4.00 g/dL      ALT (SGPT) 13 U/L      AST (SGOT) 22 U/L      Alkaline Phosphatase 43 U/L      Total Bilirubin 1.1 mg/dL      Globulin 2.7 gm/dL      A/G Ratio 1.5 g/dL      BUN/Creatinine Ratio 29.8     Anion Gap 15.0 mmol/L      eGFR 65.0 mL/min/1.73      Comment: National Kidney Foundation and American Society of Nephrology (ASN) Task Force recommended calculation based on the Chronic Kidney Disease Epidemiology Collaboration (CKD-EPI) equation refit without adjustment for race.       Narrative:      GFR Normal >60  Chronic Kidney Disease <60  Kidney Failure <15      CBC & Differential [[id number]]  (Abnormal) Collected: 04/14/22 1852    Specimen: Blood Updated: 04/14/22 1912    Narrative:      The following orders were created for panel order CBC & Differential.  Procedure                               Abnormality         Status                     ---------                               -----------         ------                     CBC Auto Differential[[id number]]        Abnormal            Final result                 Please view results for these tests on the individual orders.    CBC Auto Differential [[id number]]  (Abnormal) Collected: 04/14/22 1852    Specimen: Blood Updated: 04/14/22 1912     WBC 18.41 10*3/mm3      RBC 4.46 10*6/mm3      Hemoglobin 12.3 g/dL      Hematocrit 38.3 %      MCV 85.9 fL      MCH 27.6 pg      MCHC 32.1 g/dL      RDW 14.0 %      RDW-SD 43.7 fl      MPV 9.7 fL      Platelets 213 10*3/mm3      nRBC 0.0 /100 WBC           .  Imaging Results (Last 24 Hours)     ** No results found for the last 24 hours. **              Impression  1. Cognitive Impairment   for which he is on Namenda  Did not tolerate Aricept. He has frontal lobe release signs, atrophy and SVID based on MRI in past.  2. Previously documented visual and auditory hallucinations in chart would make diagnosis of Lewy Body Dementia strong in the differential if he hadn't imprved cognitvely so much as these were prominent while he was  on both benzo's and opioids (at least UDS + for this) and so hallucination may occur on these meds especially if they have an underlying dementia.or cognitive issue   3. He has Parkinsonian features of bradykinesia and stiffness, hypomimia, cogwheeling at bilateral wrists. Reportedly no resting tremor but when I observed him walking he had a decreased right arm swing and a typical Parkinsonian tremor of the left.  He is not shuffling per se when he walks and will scissor with turns--not clearly turning en bloc,. He is on Respirdal which can cause parkinsonian signs but that is typically at a much higher dose than what he is taking.   4. Currently he has a significant leukocytosis that has improved  5. He has more than one type of tremor--there is a parkinsonian tremor that becomes obvious in his left arm when he walks,  He has a postural tremor when hands outstretched and seems to have a tremor as glass approaches lips.   6. Atrial fibrillation  7. Fall risk--Parkinsonian features and gait imbalance.  Need to reduce fall risk as he has  atrial fibrillation and will need anticoagulation   Plan  Will need to get a better history from family  Obtain Autonomic orthostasis  Concern for a trial of sinemet as his issues with  hallucinations could worsen considerably  Most antipsychotics have some extrapyramidal side effects but most like Risperdal or Seroquel are dose dependent .   He was tried on Nuplazid in the past but unclear why this was not continued  He would need daily exercise to prevent worsening gait.  Very important in view of his new diagnosis of atrial fib and likely  need for chronic anticoagualtion  PT  Will check TSH in view of worsening tremor    I discussed the patients findings and my recommendations with patient, nursing staff and Dr Bryan Murphy MD  04/17/22  12:01 CDT

## 2022-04-17 NOTE — PROGRESS NOTES
[facility] Medicine Services  INPATIENT PROGRESS NOTE    Length of Stay: 3  Date of Admission: 4/14/2022  Primary Care Physician: Torey Amato DO    Subjective     Chief Complaint:     Confusion, declining physical status    HPI     No specific complaints today.  He is currently talking on the phone with his son.  Orientation appears to be improved.  Short acting Cardizem will be discontinued in favor of long-acting Cardizem 300 mg daily.  Heart rate is variable from the 90s to the 120s.  White blood cell count is improved to 12,700.  1% plasma cells noted on manual differential.  Appetite remains good however the patient is very tremulous particular with intention and finds difficulty feeding himself which may contribute to his currently malnourished state.  I will ask neurology to see and evaluate the patient regarding tremors.    Review of Systems     All pertinent negatives and positives are as above. All other systems have been reviewed and are negative unless otherwise stated.     Objective    Temp:  [97.6 °F (36.4 °C)-98.2 °F (36.8 °C)] 97.6 °F (36.4 °C)  Heart Rate:  [] 106  Resp:  [15-18] 16  BP: (124-129)/(62-72) 127/68    Lab Results (last 24 hours)     Procedure Component Value Units Date/Time    Manual Differential [[id number]]  (Abnormal) Collected: 04/17/22 0820    Specimen: Blood Updated: 04/17/22 0905     Neutrophil % 82.0 %      Lymphocyte % 9.0 %      Monocyte % 8.0 %      Plasma Cells % 1.0 %      Neutrophils Absolute 10.39 10*3/mm3      Lymphocytes Absolute 1.14 10*3/mm3      Monocytes Absolute 1.01 10*3/mm3      Poikilocytes Slight/1+     WBC Morphology Normal     Platelet Morphology Normal    Narrative:      Rare platelet clump seen on smear    CBC & Differential [[id number]]  (Abnormal) Collected: 04/17/22 0820    Specimen: Blood Updated: 04/17/22 0905    Narrative:      The following orders were created for panel order CBC &  Differential.  Procedure                               Abnormality         Status                     ---------                               -----------         ------                     CBC Auto Differential[[id number]]        Abnormal            Final result                 Please view results for these tests on the individual orders.    CBC Auto Differential [[id number]]  (Abnormal) Collected: 04/17/22 0820    Specimen: Blood Updated: 04/17/22 0905     WBC 12.67 10*3/mm3      RBC 4.55 10*6/mm3      Hemoglobin 12.6 g/dL      Hematocrit 39.1 %      MCV 85.9 fL      MCH 27.7 pg      MCHC 32.2 g/dL      RDW 14.6 %      RDW-SD 45.3 fl      MPV 10.2 fL      Platelets 193 10*3/mm3     Narrative:      The previously reported component NRBC is no longer being reported. Previous result was 0.0 /100 WBC (Reference Range: 0.0-0.2 /100 WBC) on 4/17/2022 at 0851 CDT.    Blood Culture - Blood, Arm, Right [[id number]]  (Normal) Collected: 04/14/22 2145    Specimen: Blood from Arm, Right Updated: 04/16/22 2216     Blood Culture No growth at 2 days    Blood Culture - Blood, Arm, Right [[id number]]  (Normal) Collected: 04/14/22 2145    Specimen: Blood from Arm, Right Updated: 04/16/22 2216     Blood Culture No growth at 2 days          Imaging Results (Last 24 Hours)     ** No results found for the last 24 hours. **             Intake/Output Summary (Last 24 hours) at 4/17/2022 1013  Last data filed at 4/17/2022 0909  Gross per 24 hour   Intake 360 ml   Output 700 ml   Net -340 ml       Physical Exam  Constitutional:       Appearance: He is ill-appearing.   HENT:      Head: Normocephalic and atraumatic.      Mouth/Throat:      Mouth: Mucous membranes are dry.      Pharynx: Oropharynx is clear.   Eyes:      Conjunctiva/sclera: Conjunctivae normal.      Pupils: Pupils are equal, round, and reactive to light.   Cardiovascular:      Rate and Rhythm: Normal rate and regular rhythm.   Pulmonary:      Effort: No respiratory distress.       Comments:  Rales noted on the right.  Abdominal:      General: There is no distension.      Palpations: Abdomen is soft.      Tenderness: There is no abdominal tenderness.   Musculoskeletal:      Cervical back: Neck supple.      Right lower leg: No edema.      Left lower leg: No edema.      Comments: Generalized weakness and debility   Skin:     General: Skin is warm and dry.   Neurological:      Mental Status: He is alert. Mental status is at baseline.      Comments: Tremors noted with intentional movements   Psychiatric:      Comments: Flat affect, no behavioral outburst     Results Review:  I have reviewed the labs, radiology results, and diagnostic studies since my last progress note and made treatment changes reflective of the results.   I have reviewed the current medications.    Assessment/Plan     Active Hospital Problems    Diagnosis    • **Pneumonia of right middle lobe due to infectious organism    • Chronic atrial fibrillation with rapid ventricular response (HCC)    • Metabolic encephalopathy    • Generalized weakness    • Elevated BUN    • Cachexia (HCC)    • Lewy body dementia without behavioral disturbance (HCC)    • Anemia        PLAN:  Discontinue short acting diltiazem in favor of diltiazem  mg daily  Neurology consultation regarding tremors  Continue PT/OT    Electronically signed by Rajan Adams DO, 04/17/22, 10:13 CDT.

## 2022-04-17 NOTE — PLAN OF CARE
Goal Outcome Evaluation:  Patient is alert and oriented to self, place, and situation. One assist to transfer to bedside commode. Antibiotics as ordered. 2 L nasal canula. Aflutter noted between 99 to 120 at rest. With activity heart rate goes into the 140's at times. Denies pain/shortness of breath/coughing. Fall precautions in place with bed alarm on.

## 2022-04-17 NOTE — THERAPY TREATMENT NOTE
Acute Care - Physical Therapy Treatment Note  [facility]     Patient Name: Edy Urbina  : 1940  MRN: [id number]  Today's Date: 2022      Visit Dx:     ICD-10-CM ICD-9-CM   1. Altered mental status, unspecified altered mental status type  R41.82 780.97   2. Pneumonia due to infectious organism, unspecified laterality, unspecified part of lung  J18.9 486   3. Dysphagia, unspecified type  R13.10 787.20   4. Impaired mobility  Z74.09 799.89     Patient Active Problem List   Diagnosis   • Anemia   • Lewy body dementia without behavioral disturbance (HCC)   • Cachexia (HCC)   • Pneumonia of right middle lobe due to infectious organism   • Generalized weakness   • Elevated BUN   • Metabolic encephalopathy   • Chronic atrial fibrillation with rapid ventricular response (HCC)     Past Medical History:   Diagnosis Date   • Cataracts, bilateral    • Lewy body dementia (HCC)    • Occasional tremors    • Prostate cancer ([facility]) 2011    t2c,[place]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    • Varicose vein of leg 2019   • Varicose veins of legs      Past Surgical History:   Procedure Laterality Date   • EYE SURGERY     • HERNIA REPAIR Left    • INGUINAL HERNIA REPAIR Right 2019    Procedure: OPEN RIGHT INGUINAL HERNIA REPAIR WITH MESH;  Surgeon: Alesia Mora MD;  Location: [facility];  Service: General   • PROSTATECTOMY  2011    [facility]     PT Assessment (last 12 hours)     PT Evaluation and Treatment     Row Name 22 1133          Physical Therapy Time and Intention    Subjective Information no complaints  -AB     Document Type therapy note (daily note)  -AB     Mode of Treatment physical therapy  -AB     Row Name 22 1133          General Information    Existing Precautions/Restrictions fall  -AB     Limitations/Impairments other (see comments)  watch HR  -AB     Row Name 22 1133          Bed Mobility    Supine-Sit [place] (Bed Mobility) supervision  -AB      Sit-Supine [place] (Bed Mobility) supervision  -AB     Assistive Device (Bed Mobility) bed rails;head of bed elevated  -AB     Row Name 04/17/22 1133          Transfers    Sit-Stand [place] (Transfers) verbal cues;contact guard;minimum assist (75% patient effort)  -AB     Row Name 04/17/22 1133          Gait/Stairs (Locomotion)    [place] Level (Gait) verbal cues;contact guard  -AB     Distance in Feet (Gait) steps to BSC and [place] to bed  -AB     Row Name 04/17/22 1133          Aerobic Exercise    Comment, Aerobic Exercise (Therapeutic Exercise) sitting David LE AROM 15 reps  -AB     Row Name 04/17/22 1133          Positioning and Restraints    Pre-Treatment Position in bed  -AB     Post Treatment Position bed  -AB     In Bed fowlers;call light within reach;exit alarm on  -AB           User Key  (r) = Recorded By, (t) = Taken By, (c) = Cosigned By    Initials Name Provider Type    AB Анна Pennington, PTA Physical Therapist Assistant                Physical Therapy Education                 Title: PT OT SLP Therapies (In Progress)     Topic: Physical Therapy (In Progress)     Point: Mobility training (In Progress)     Learning Progress Summary           Patient Acceptance, E, NR by ZHAO at 4/15/2022 1519    Comment: Pt educated on POC, and awareness for assistance needs.                   Point: Home exercise program (Not Started)     Learner Progress:  Not documented in this visit.          Point: Body mechanics (In Progress)     Learning Progress Summary           Patient Acceptance, E, NR by ZHAO at 4/15/2022 1519    Comment: Pt educated on POC, and awareness for assistance needs.                   Point: Precautions (In Progress)     Learning Progress Summary           Patient Acceptance, E, NR by ZHAO at 4/15/2022 1519    Comment: Pt educated on POC, and awareness for assistance needs.                               User Key     Initials Effective Dates Name Provider Type Discipline    ZHAO 03/07/22 -   David Boswell, PT Student PT Student PT              PT Recommendation and Plan             Time Calculation:    PT Charges     Row Name 04/17/22 1133             Time Calculation    Start Time 1133  -AB      Stop Time 1156  -AB      Time Calculation (min) 23 min  -AB      PT Received On 04/17/22  -AB              Time Calculation- PT    Total Timed Code Minutes- PT 23 minute(s)  -AB            User Key  (r) = Recorded By, (t) = Taken By, (c) = Cosigned By    Initials Name Provider Type    AB Анна Pennington, NORMAN Physical Therapist Assistant              Therapy Charges for Today     Code Description Service Date Service Provider Modifiers Qty    75747594956 HC PT THERAPEUTIC ACT EA 15 MIN 4/16/2022 Анна Pennington, PTA GP 1    38455065130 HC PT THER PROC EA 15 MIN 4/16/2022 Анна Pennington, PTA GP 1    12831309270 HC PT THER PROC EA 15 MIN 4/17/2022 Анна Pennington, PTA GP 1    56729808569 HC PT THERAPEUTIC ACT EA 15 MIN 4/17/2022 Анна Pennington, PTA GP 1          PT G-Codes  Outcome Measure Options: AM-PAC 6 Clicks Basic Mobility (PT)  AM-PAC 6 Clicks Score (PT): 16  AM-PAC 6 Clicks Score (OT): 12    Анна Pennington PTA  4/17/2022

## 2022-04-17 NOTE — PLAN OF CARE
Goal Outcome Evaluation:  Plan of Care Reviewed With: patient        Progress: improving  Outcome Evaluation: pt alert and oriented today, neuro consult ordered for tremors, hasnt been able to self feed due to the tremors being so bad,

## 2022-04-18 LAB
BASOPHILS # BLD AUTO: 0.03 10*3/MM3 (ref 0–0.2)
BASOPHILS NFR BLD AUTO: 0.4 % (ref 0–1.5)
DEPRECATED RDW RBC AUTO: 45.2 FL (ref 37–54)
EOSINOPHIL # BLD AUTO: 0.08 10*3/MM3 (ref 0–0.4)
EOSINOPHIL NFR BLD AUTO: 1 % (ref 0.3–6.2)
ERYTHROCYTE [DISTWIDTH] IN BLOOD BY AUTOMATED COUNT: 14.5 % (ref 12.3–15.4)
HCT VFR BLD AUTO: 38.7 % (ref 37.5–51)
HGB BLD-MCNC: 12.5 G/DL (ref 13–17.7)
IMM GRANULOCYTES # BLD AUTO: 0.05 10*3/MM3 (ref 0–0.05)
IMM GRANULOCYTES NFR BLD AUTO: 0.6 % (ref 0–0.5)
LYMPHOCYTES # BLD AUTO: 1.3 10*3/MM3 (ref 0.7–3.1)
LYMPHOCYTES NFR BLD AUTO: 15.7 % (ref 19.6–45.3)
MCH RBC QN AUTO: 27.7 PG (ref 26.6–33)
MCHC RBC AUTO-ENTMCNC: 32.3 G/DL (ref 31.5–35.7)
MCV RBC AUTO: 85.6 FL (ref 79–97)
MONOCYTES # BLD AUTO: 0.67 10*3/MM3 (ref 0.1–0.9)
MONOCYTES NFR BLD AUTO: 8.1 % (ref 5–12)
NEUTROPHILS NFR BLD AUTO: 6.13 10*3/MM3 (ref 1.7–7)
NEUTROPHILS NFR BLD AUTO: 74.2 % (ref 42.7–76)
NRBC BLD AUTO-RTO: 0 /100 WBC (ref 0–0.2)
PLATELET # BLD AUTO: 217 10*3/MM3 (ref 140–450)
PMV BLD AUTO: 9.8 FL (ref 6–12)
RBC # BLD AUTO: 4.52 10*6/MM3 (ref 4.14–5.8)
T4 FREE SERPL-MCNC: 1.1 NG/DL (ref 0.93–1.7)
TSH SERPL DL<=0.05 MIU/L-ACNC: 6.15 UIU/ML (ref 0.27–4.2)
WBC NRBC COR # BLD: 8.26 10*3/MM3 (ref 3.4–10.8)

## 2022-04-18 PROCEDURE — 84439 ASSAY OF FREE THYROXINE: CPT | Performed by: PSYCHIATRY & NEUROLOGY

## 2022-04-18 PROCEDURE — 92526 ORAL FUNCTION THERAPY: CPT

## 2022-04-18 PROCEDURE — 97116 GAIT TRAINING THERAPY: CPT

## 2022-04-18 PROCEDURE — 84443 ASSAY THYROID STIM HORMONE: CPT | Performed by: PSYCHIATRY & NEUROLOGY

## 2022-04-18 PROCEDURE — 84481 FREE ASSAY (FT-3): CPT | Performed by: PSYCHIATRY & NEUROLOGY

## 2022-04-18 PROCEDURE — 97110 THERAPEUTIC EXERCISES: CPT

## 2022-04-18 PROCEDURE — 97535 SELF CARE MNGMENT TRAINING: CPT

## 2022-04-18 PROCEDURE — 85025 COMPLETE CBC W/AUTO DIFF WBC: CPT | Performed by: FAMILY MEDICINE

## 2022-04-18 PROCEDURE — 25010000002 CEFTRIAXONE PER 250 MG: Performed by: NURSE PRACTITIONER

## 2022-04-18 RX ORDER — METOPROLOL TARTRATE 50 MG/1
50 TABLET, FILM COATED ORAL EVERY 12 HOURS SCHEDULED
Status: DISCONTINUED | OUTPATIENT
Start: 2022-04-18 | End: 2022-04-19

## 2022-04-18 RX ADMIN — APIXABAN 2.5 MG: 2.5 TABLET, FILM COATED ORAL at 23:34

## 2022-04-18 RX ADMIN — APIXABAN 2.5 MG: 2.5 TABLET, FILM COATED ORAL at 08:10

## 2022-04-18 RX ADMIN — Medication 10 ML: at 08:10

## 2022-04-18 RX ADMIN — DILTIAZEM HYDROCHLORIDE 300 MG: 300 CAPSULE, COATED, EXTENDED RELEASE ORAL at 08:10

## 2022-04-18 RX ADMIN — Medication 10 ML: at 23:35

## 2022-04-18 RX ADMIN — RISPERIDONE 0.5 MG: 1 TABLET, FILM COATED ORAL at 08:09

## 2022-04-18 RX ADMIN — MEMANTINE HYDROCHLORIDE 10 MG: 5 TABLET, FILM COATED ORAL at 08:09

## 2022-04-18 RX ADMIN — METOPROLOL TARTRATE 50 MG: 50 TABLET, FILM COATED ORAL at 23:34

## 2022-04-18 RX ADMIN — CEFTRIAXONE SODIUM 1 G: 10 INJECTION, POWDER, FOR SOLUTION INTRAVENOUS at 23:35

## 2022-04-18 RX ADMIN — Medication 1 TABLET: at 08:09

## 2022-04-18 RX ADMIN — RISPERIDONE 0.5 MG: 1 TABLET, FILM COATED ORAL at 23:34

## 2022-04-18 RX ADMIN — MEMANTINE HYDROCHLORIDE 10 MG: 5 TABLET, FILM COATED ORAL at 23:34

## 2022-04-18 NOTE — PLAN OF CARE
Goal Outcome Evaluation:  Plan of Care Reviewed With: patient        Progress: improving  Outcome Evaluation: Pt S to come to EOB. Pt CGA/SBA for transfers and ambulation in room/BR. Pt set up to wash face. Min A to don/doff gown. Pt reported he was able to feed himself with weighted utensil this AM at breakfast. Pt SBA for toileting. Continue OT POC

## 2022-04-18 NOTE — THERAPY TREATMENT NOTE
Acute Care - Speech Language Pathology   Swallow Treatment Note [facility]     Patient Name: Edy Urbina  : 1940  MRN: [id number]  Today's Date: 2022               Admit Date: 2022  SLP treatment complete. Pt is alert and oriented to person as well as as partially to place and time. Lingual and labial tremors are observed during ROM. Decreased rotary chew with regular solids as well as mandibular tremors noted. Audible swallows noted with thin liquids, but no overt s/s of aspiration noted. Pt to continue a mechanical soft diet with thin liquids. SLP will continue to follow and treat.  Marcelo Sosa MS CCC-SLP 2022 12:50 CDT    Visit Dx:     ICD-10-CM ICD-9-CM   1. Altered mental status, unspecified altered mental status type  R41.82 780.97   2. Pneumonia due to infectious organism, unspecified laterality, unspecified part of lung  J18.9 486   3. Dysphagia, unspecified type  R13.10 787.20   4. Impaired mobility  Z74.09 799.89   5. Decreased activities of daily living (ADL)  Z78.9 V49.89     Patient Active Problem List   Diagnosis   • Anemia   • Lewy body dementia without behavioral disturbance (HCC)   • Cachexia (HCC)   • Pneumonia of right middle lobe due to infectious organism   • Generalized weakness   • Elevated BUN   • Metabolic encephalopathy   • Chronic atrial fibrillation with rapid ventricular response (HCC)     Past Medical History:   Diagnosis Date   • Cataracts, bilateral    • Lewy body dementia (HCC)    • Occasional tremors    • Prostate cancer (HCC) 2011    t2c,[place]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    • Varicose vein of leg 2019   • Varicose veins of legs      Past Surgical History:   Procedure Laterality Date   • EYE SURGERY     • HERNIA REPAIR Left    • INGUINAL HERNIA REPAIR Right 2019    Procedure: OPEN RIGHT INGUINAL HERNIA REPAIR WITH MESH;  Surgeon: Alesia Mora MD;  Location: [facility];  Service: General   • PROSTATECTOMY   05/2011    [facility]       SLP Recommendation and Plan                                                                      Plan of Care Reviewed With: patient  Progress: no change      SWALLOW EVALUATION (last 72 hours)     SLP Adult Swallow Evaluation     Row Name 04/18/22 0842                   Rehab Evaluation    Document Type therapy note (daily note)  -CS        Subjective Information no complaints  -CS        Patient Observations alert;cooperative;agree to therapy  -CS        Patient/Family/Caregiver Comments/Observations No family present  -CS        Patient Effort good  -CS                  Pain    Additional Documentation Pain Scale: FACES Pre/Post-Treatment (Group)  -CS                  Pain Scale: FACES Pre/Post-Treatment    Pain: FACES Scale, Pretreatment 0-->no hurt  -CS        Posttreatment Pain Rating 0-->no hurt  -CS                  Swallow Goals (SLP)    Oral Nutrition/Hydration Goal Selection (SLP) oral nutrition/hydration, SLP goal 1  -CS                  Oral Nutrition/Hydration Goal 1 (SLP)    Oral Nutrition/Hydration Goal 1, SLP Pt will tolerate least restrictive diet with no overt s/s of aspiration.  -CS        Time Frame (Oral Nutrition/Hydration Goal 1, SLP) by discharge  -CS        Barriers (Oral Nutrition/Hydration Goal 1, SLP) n/a  -CS        Progress/Outcomes (Oral Nutrition/Hydration Goal 1, SLP) continuing progress toward goal  -CS              User Key  (r) = Recorded By, (t) = Taken By, (c) = Cosigned By    Initials Name Effective Dates    CS Marcelo Sosa, MS CCC-SLP 06/16/21 -                 EDUCATION  The patient has been educated in the following areas:   Dysphagia (Swallowing Impairment).        SLP GOALS     Row Name 04/18/22 0842             Oral Nutrition/Hydration Goal 1 (SLP)    Oral Nutrition/Hydration Goal 1, SLP Pt will tolerate least restrictive diet with no overt s/s of aspiration.  -CS      Time Frame (Oral Nutrition/Hydration Goal 1, SLP) by discharge  -CS       Barriers (Oral Nutrition/Hydration Goal 1, SLP) n/a  -CS      Progress/Outcomes (Oral Nutrition/Hydration Goal 1, SLP) continuing progress toward goal  -CS            User Key  (r) = Recorded By, (t) = Taken By, (c) = Cosigned By    Initials Name Provider Type    CS Marcelo Sosa MS CCC-SLP Speech and Language Pathologist                   Time Calculation:    Time Calculation- SLP     Row Name 04/18/22 1248             Time Calculation- SLP    SLP Start Time 0842  -CS      SLP Stop Time 0907  -CS      SLP Time Calculation (min) 25 min  -CS      SLP Received On 04/18/22  -CS              Untimed Charges    99591-BJ Treatment Swallow Minutes 25  -CS              Total Minutes    Untimed Charges Total Minutes 25  -CS       Total Minutes 25  -CS            User Key  (r) = Recorded By, (t) = Taken By, (c) = Cosigned By    Initials Name Provider Type     Marcelo Sosa MS CCC-SLP Speech and Language Pathologist                Therapy Charges for Today     Code Description Service Date Service Provider Modifiers Qty    46830038029  ST TREATMENT SWALLOW 2 4/18/2022 Marcelo Sosa MS CCC-SLP GN 1               Marcelo Sosa MS CCC-SLP  4/18/2022

## 2022-04-18 NOTE — PLAN OF CARE
Goal Outcome Evaluation:  Plan of Care Reviewed With: patient        Progress: no change  Outcome Evaluation: VSS. No change in neuro status this shift. Pt is a/o, answer questions appropriately yet is impulsive and forgetful. Does not call out for assist getting out of bed, not knowing where to go or why he got up. Tremor to ble, at rest and w/action, requires assist w/meals. Cueing/reminding pt neccessary. Safety maintained.

## 2022-04-18 NOTE — THERAPY TREATMENT NOTE
Acute Care - Occupational Therapy Treatment Note  [facility]     Patient Name: Edy Urbina  : 1940  MRN: [id number]  Today's Date: 2022             Admit Date: 2022       ICD-10-CM ICD-9-CM   1. Altered mental status, unspecified altered mental status type  R41.82 780.97   2. Pneumonia due to infectious organism, unspecified laterality, unspecified part of lung  J18.9 486   3. Dysphagia, unspecified type  R13.10 787.20   4. Impaired mobility  Z74.09 799.89   5. Decreased activities of daily living (ADL)  Z78.9 V49.89     Patient Active Problem List   Diagnosis   • Anemia   • Lewy body dementia without behavioral disturbance (HCC)   • Cachexia (HCC)   • Pneumonia of right middle lobe due to infectious organism   • Generalized weakness   • Elevated BUN   • Metabolic encephalopathy   • Chronic atrial fibrillation with rapid ventricular response ([facility])     Past Medical History:   Diagnosis Date   • Cataracts, bilateral    • Lewy body dementia ([facility])    • Occasional tremors    • Prostate cancer ([facility]) 2011    t2c,Sommer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    • Varicose vein of leg 2019   • Varicose veins of legs      Past Surgical History:   Procedure Laterality Date   • EYE SURGERY     • HERNIA REPAIR Left    • INGUINAL HERNIA REPAIR Right 2019    Procedure: OPEN RIGHT INGUINAL HERNIA REPAIR WITH MESH;  Surgeon: Alesia Mora MD;  Location: [facility];  Service: General   • PROSTATECTOMY  2011    RALP         OT ASSESSMENT FLOWSHEET (last 12 hours)     OT Evaluation and Treatment     Row Name 22 0927                   OT Time and Intention    Subjective Information no complaints  -TS        Document Type therapy note (daily note)  -TS        Mode of Treatment occupational therapy  -TS        Patient Effort good  -TS        Comment monitor HR  -TS                  General Information    Existing Precautions/Restrictions fall  -TS                  Pain  Assessment    Pretreatment Pain Rating 0/10 - no pain  -TS        Posttreatment Pain Rating 0/10 - no pain  -TS                  Cognition    Personal Safety Interventions fall prevention program maintained;gait belt;nonskid shoes/slippers when out of bed  -TS                  Activities of Daily Living    BADL Assessment/Intervention bathing;upper body dressing;toileting;grooming  -TS                  Bathing Assessment/Intervention    [place] Level (Bathing) upper extremities;chest/trunk;set up;standby assist  -TS        Position (Bathing) unsupported sitting  -TS                  Upper Body Dressing Assessment/Training    [place] Level (Upper Body Dressing) don;doff;minimum assist (75% patient effort)  -TS        Position (Upper Body Dressing) unsupported sitting  -TS                  Grooming Assessment/Training    [place] Level (Grooming) wash face, hands;set up;shave face;maximum assist (25% patient effort)  -TS        Position (Grooming) unsupported sitting  -TS                  Toileting Assessment/Training    [place] Level (Toileting) toileting skills;supervision;adjust/manage clothing;contact guard assist  -TS        Position (Toileting) supported standing;unsupported sitting  -TS                  Bed Mobility    Supine-Sit [place] (Bed Mobility) supervision  -TS                  Functional Mobility    Functional Mobility- Ind. Level contact guard assist  -TS        Functional Mobility- Comment room/BR  -TS                  Transfers    Sit-Stand [place] (Transfers) standby assist;contact guard  -TS        Stand-Sit [place] (Transfers) standby assist  -TS        [place] Level (Toilet Transfer) standby assist  -TS        Assistive Device (Toilet Transfer) commode;grab bars/safety frame  -TS                  Toilet Transfer    Type (Toilet Transfer) sit-stand;stand-sit  -TS                  Plan of Care Review    Plan of Care Reviewed With patient  -TS        Progress  improving  -TS        Outcome Evaluation Pt S to come to EOB. Pt CGA/SBA for transfers and ambulation in room/BR. Pt set up to wash face. Min A to don/doff gown. Pt reported he was able to feed himself with weighted utensil this AM at breakfast. Pt SBA for toileting. Continue OT POC  -TS                  Positioning and Restraints    Pre-Treatment Position in bed  -TS        Post Treatment Position chair  -TS        In Chair sitting;call light within reach;encouraged to call for assist;notified nsg;exit alarm on  -TS              User Key  (r) = Recorded By, (t) = Taken By, (c) = Cosigned By    Initials Name Effective Dates    TS Tere Power, SCAR 06/16/21 -                  Occupational Therapy Education                 Title: PT OT SLP Therapies (In Progress)     Topic: Occupational Therapy (In Progress)     Point: ADL training (Done)     Description:   Instruct learner(s) on proper safety adaptation and remediation techniques during self care or transfers.   Instruct in proper use of assistive devices.              Learning Progress Summary           Patient Acceptance, E,D, VU,NR by  at 4/15/2022 1511                   Point: Home exercise program (Not Started)     Description:   Instruct learner(s) on appropriate technique for monitoring, assisting and/or progressing therapeutic exercises/activities.              Learner Progress:  Not documented in this visit.          Point: Precautions (Not Started)     Description:   Instruct learner(s) on prescribed precautions during self-care and functional transfers.              Learner Progress:  Not documented in this visit.          Point: Body mechanics (Done)     Description:   Instruct learner(s) on proper positioning and spine alignment during self-care, functional mobility activities and/or exercises.              Learning Progress Summary           Patient Acceptance, E,D, VU,NR by  at 4/15/2022 1511                               User Key      Initials Effective Dates Name Provider Type Discipline     06/16/21 -  Noemi Mckay, OTR/L Occupational Therapist OT                  OT Recommendation and Plan     Plan of Care Review  Plan of Care Reviewed With: patient  Progress: improving  Outcome Evaluation: Pt S to come to EOB. Pt CGA/SBA for transfers and ambulation in room/BR. Pt set up to wash face. Min A to don/doff gown. Pt reported he was able to feed himself with weighted utensil this AM at breakfast. Pt SBA for toileting. Continue OT POC  Plan of Care Reviewed With: patient  Outcome Evaluation: Pt S to come to EOB. Pt CGA/SBA for transfers and ambulation in room/BR. Pt set up to wash face. Min A to don/doff gown. Pt reported he was able to feed himself with weighted utensil this AM at breakfast. Pt SBA for toileting. Continue OT POC        Time Calculation:    Time Calculation- OT     Row Name 04/18/22 1240 04/18/22 1113          Time Calculation- OT    OT Start Time 0930  -TS --     OT Stop Time 1038  -TS --     OT Time Calculation (min) 68 min  -TS --     Total Timed Code Minutes- OT 68 minute(s)  -TS --     OT Received On 04/18/22  -TS --            Timed Charges    27976 - Gait Training Minutes  -- 13  -MF     17865 - OT Self Care/Mgmt Minutes 68  -TS --            Total Minutes    Timed Charges Total Minutes 68  -TS 13  -MF      Total Minutes 68  -TS 13  -MF           User Key  (r) = Recorded By, (t) = Taken By, (c) = Cosigned By    Initials Name Provider Type    TS Tere Power COTA Occupational Therapist Assistant     Stacey Mahmood, NORMAN Physical Therapist Assistant              Therapy Charges for Today     Code Description Service Date Service Provider Modifiers Qty    29528377781 HC OT SELF CARE/MGMT/TRAIN EA 15 MIN 4/18/2022 Tere Power COTA GO 5               Tere GOLDSMITH. SCAR Power  4/18/2022

## 2022-04-18 NOTE — PLAN OF CARE
Goal Outcome Evaluation:              Outcome Evaluation: NTN Assessment due to BMI. Average % of meals; meeting EEN without ONS at this time. Observe SLP and OT recommendations. NTN following per protocol.

## 2022-04-18 NOTE — PLAN OF CARE
SLP treatment complete. Pt is alert and oriented to person as well as as partially to place and time. Lingual and labial tremors are observed during ROM. Decreased rotary chew with regular solids as well as mandibular tremors noted. Audible swallows noted with thin liquids, but no overt s/s of aspiration noted. Pt to continue a mechanical soft diet with thin liquids. SLP will continue to follow and treat.

## 2022-04-18 NOTE — PROGRESS NOTES
[facility]  INPATIENT PROGRESS NOTE    Patient Name: Edy Urbina  Date of Admission: 4/14/2022  Today's Date: 04/18/22  Length of Stay: 4  Primary Care Physician: Torey Amato DO    Subjective   Chief Complaint: confusion  HPI     Patient was seen and examined.  Patient answers all orienting questions appropriately.  Patient has diffuse tremor.  Patient has no complaints or concerns.  Patient would like to go home soon.     He has been in and out of atrial flutter.  With medication, rate is mostly controlled.  Would not want to increase meds too much as when he is in sinus has rates 60s-70s it appears.      Review of Systems   Constitutional: Negative for activity change, appetite change and fever.   Respiratory: Negative for cough and shortness of breath.    Cardiovascular: Negative for chest pain and palpitations.   Gastrointestinal: Negative for abdominal distention, abdominal pain, diarrhea, nausea and vomiting.   Neurological: Positive for weakness.        All pertinent negatives and positives are as above. All other systems have been reviewed and are negative unless otherwise stated.     Objective    Temp:  [98 °F (36.7 °C)-98.5 °F (36.9 °C)] 98.5 °F (36.9 °C)  Heart Rate:  [] 124  Resp:  [18-20] 18  BP: (124-154)/(66-92) 124/77  Physical Exam  Constitutional:       Appearance: He is ill-appearing (chronically;).      Comments: Muscle wasting noted in thenar, supraclavicular, and temporal; poor skin turger   HENT:      Nose: No congestion or rhinorrhea.      Mouth/Throat:      Mouth: Mucous membranes are dry.      Pharynx: Oropharynx is clear.   Eyes:      General: No scleral icterus.     Conjunctiva/sclera: Conjunctivae normal.   Cardiovascular:      Rate and Rhythm: Tachycardia present. Rhythm irregular.      Heart sounds: Murmur heard.   Pulmonary:      Effort: Pulmonary effort is normal.      Breath sounds: Normal breath sounds. No  wheezing.      Comments: Mildly diminished at bases  Abdominal:      General: Abdomen is flat. Bowel sounds are normal. There is no distension.      Palpations: Abdomen is soft.   Musculoskeletal:      Right lower leg: No edema.      Left lower leg: No edema.   Skin:     General: Skin is warm and dry.      Coloration: Skin is pale.   Neurological:      General: No focal deficit present.      Mental Status: He is alert and oriented to person, place, and time.      Comments: tremor   Psychiatric:         Mood and Affect: Mood normal.         Behavior: Behavior normal.             Results Review:  I have reviewed the labs, radiology results, and diagnostic studies.    Laboratory Data:   Results from last 7 days   Lab Units 04/18/22  0547 04/17/22  0820 04/16/22  0442   WBC 10*3/mm3 8.26 12.67* 15.41*   HEMOGLOBIN g/dL 12.5* 12.6* 11.5*   HEMATOCRIT % 38.7 39.1 35.8*   PLATELETS 10*3/mm3 217 193 169        Results from last 7 days   Lab Units 04/15/22  0607 04/14/22  1852   SODIUM mmol/L 141 144   POTASSIUM mmol/L 4.0 4.7   CHLORIDE mmol/L 104 107   CO2 mmol/L 26.0 22.0   BUN mg/dL 31* 34*   CREATININE mg/dL 0.92 1.14   CALCIUM mg/dL 8.9 9.2   BILIRUBIN mg/dL  --  1.1   ALK PHOS U/L  --  43   ALT (SGPT) U/L  --  13   AST (SGOT) U/L  --  22   GLUCOSE mg/dL 107* 112*       Culture Data:   Blood Culture   Date Value Ref Range Status   04/14/2022 No growth at 3 days  Preliminary   04/14/2022 No growth at 3 days  Preliminary       Radiology Data:   Imaging Results (Last 24 Hours)     ** No results found for the last 24 hours. **          I have reviewed the patient's current medications.     Assessment/Plan     Active Hospital Problems    Diagnosis    • **Pneumonia of right middle lobe due to infectious organism    • Chronic atrial fibrillation with rapid ventricular response (HCC)    • Metabolic encephalopathy, acute, due to pnuemonia and elevated BUN    • Generalized weakness    • Elevated BUN    • Cachexia (HCC)    • Lewy  "body dementia without behavioral disturbance (HCC)    • Anemia        Plan:  Patient was admitted on 4/14 by Dr. Landin for \"declining physical state\".  Patient was found to have an elevated BUN and a RML pneumonia.    For the patients pneumonia he was started on azithromycin and ceftriaxone.  COVID-19 was negative.  Patient will received a full course of these antibiotics.  Patient did not have urinary antigens checked upon admission, this late into the admission, unsure of the utility.  Duonebs.  Incentive spirometer.  Flutter valve.      For the patients confusion he has intermittent issues related to his dementia.  CT head was unremarkable for acute process.      Patient on Eliquis (dose-reduced due to Age and Weight) for chronic atrial fibrillation.  Patient did go into atrial fibrillation aid RVR and required diltiazem gtt and PO diltizem, patient is much better at this current juncture.  Switched to Diltiazem 300 mg extended release, however, does not seem to be working well at this time.  Will try beta blocker with metoprolol 50 mg BID.    Patient was having tremors that were reportedly worse than previous.  Neurology was consulted.  Appreciate assistance.  Feel as though this is just continued decline of his dementia.    Nutrition consult for MSA.  Patient noted to have temporal muscle wasting, thenar muscle wasting and significant muscle loss over supraclavicular area.  Suspect patient will be moderate to severe malnutrition from a nutritional assessment and could benefit from nutritional supplementation.    Patient requiring soft texture; ground; thin.    PT/OT - Patient would like to go home with home health              Discharge Planning: I expect the patient to be discharged to home with home health in 1-2 days.    Electronically signed by Dalton Rodrigues MD, 04/18/22, 18:53 CDT.  "

## 2022-04-18 NOTE — PLAN OF CARE
Goal Outcome Evaluation:  Plan of Care Reviewed With: patient        Progress: improving  Outcome Evaluation: Pt. agreeable to therapy. Pt. has a flat affect, but has no complaints. Pt. was CGA/SBA for transfers and gait. He was able to walk 50' at a time without any LOB. He was SBA while working on reaching  in all directions in standing. Pt's HR ranging fromo 140-146bpm at rest and with activity. Nsg aware. Will continue to work on balance and activity.

## 2022-04-18 NOTE — THERAPY TREATMENT NOTE
Acute Care - Physical Therapy Treatment Note  [facility]     Patient Name: Edy Urbina  : 1940  MRN: [id number]  Today's Date: 2022      Visit Dx:     ICD-10-CM ICD-9-CM   1. Altered mental status, unspecified altered mental status type  R41.82 780.97   2. Pneumonia due to infectious organism, unspecified laterality, unspecified part of lung  J18.9 486   3. Dysphagia, unspecified type  R13.10 787.20   4. Impaired mobility  Z74.09 799.89     Patient Active Problem List   Diagnosis   • Anemia   • Lewy body dementia without behavioral disturbance (HCC)   • Cachexia (HCC)   • Pneumonia of right middle lobe due to infectious organism   • Generalized weakness   • Elevated BUN   • Metabolic encephalopathy   • Chronic atrial fibrillation with rapid ventricular response (HCC)     Past Medical History:   Diagnosis Date   • Cataracts, bilateral    • Lewy body dementia (HCC)    • Occasional tremors    • Prostate cancer ([facility]) 2011    t2c,Sommer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    • Varicose vein of leg 2019   • Varicose veins of legs      Past Surgical History:   Procedure Laterality Date   • EYE SURGERY     • HERNIA REPAIR Left    • INGUINAL HERNIA REPAIR Right 2019    Procedure: OPEN RIGHT INGUINAL HERNIA REPAIR WITH MESH;  Surgeon: Alesia Mora MD;  Location: [facility];  Service: General   • PROSTATECTOMY  2011    RALP     PT Assessment (last 12 hours)     PT Evaluation and Treatment     Row Name 22 1042          Physical Therapy Time and Intention    Subjective Information no complaints  -     Document Type therapy note (daily note)  -     Mode of Treatment physical therapy  -     Row Name 22 1042          General Information    Existing Precautions/Restrictions fall  monitor HR  -     Row Name 22 1042          Pain    Pretreatment Pain Rating 0/10 - no pain  -     Posttreatment Pain Rating 0/10 - no pain  -     Row Name 22 1042           Transfers    Sit-Stand [place] (Transfers) contact guard  -     Stand-Sit [place] (Transfers) contact guard  -     [place] Level (Toilet Transfer) contact guard  -     Assistive Device (Toilet Transfer) commode  -     Row Name 04/18/22 1042          Toilet Transfer    Type (Toilet Transfer) sit-stand  -     Row Name 04/18/22 1042          Gait/Stairs (Locomotion)    [place] Level (Gait) contact guard;standby assist  -     Distance in Feet (Gait) 50'  x 3  -MF     Comment, (Gait/Stairs) Nsg aware of pt's HR. Pt. off cardizem drip. Pt's HR ranging from 140-146bpm at rest and with activity.   -     Row Name 04/18/22 1042          Balance    Comment, Balance Reaching in standing with B UEs.  -     Row Name 04/18/22 1042          Aerobic Exercise    Comment, Aerobic Exercise (Therapeutic Exercise) AROM x 20 reps B LEs in sitting  -     Row Name 04/18/22 1042          Plan of Care Review    Plan of Care Reviewed With patient  -MF     Progress improving  -     Outcome Evaluation Pt. agreeable to therapy. Pt. has a flat affect, but has no complaints. Pt. was CGA/SBA for transfers and gait. He was able to walk 50' at a time without any LOB. He was SBA while working on reaching  in all directions in standing. Pt's HR ranging fromo 140-146bpm at rest and with activity. Nsg aware. Will continue to work on balance and activity.  -     Row Name 04/18/22 1042          Vital Signs    Pretreatment Heart Rate (beats/min) 140  -     Intratreatment Heart Rate (beats/min) 146  -MF     Posttreatment Heart Rate (beats/min) 145  -MF     Pre Patient Position Sitting  -     Intra Patient Position Standing  -     Post Patient Position Sitting  -     Row Name 04/18/22 1042          Positioning and Restraints    Pre-Treatment Position bathroom  -     Post Treatment Position chair  -     In Chair sitting;call light within reach;exit alarm on;encouraged to call for assist  -            User Key  (r) = Recorded By, (t) = Taken By, (c) = Cosigned By    Initials Name Provider Type    Stacey Alvarenga PTA Physical Therapist Assistant                Physical Therapy Education                 Title: PT OT SLP Therapies (In Progress)     Topic: Physical Therapy (In Progress)     Point: Mobility training (In Progress)     Learning Progress Summary           Patient Acceptance, E, NR by  at 4/15/2022 1519    Comment: Pt educated on POC, and awareness for assistance needs.                   Point: Home exercise program (Not Started)     Learner Progress:  Not documented in this visit.          Point: Body mechanics (In Progress)     Learning Progress Summary           Patient Acceptance, E, NR by  at 4/15/2022 1519    Comment: Pt educated on POC, and awareness for assistance needs.                   Point: Precautions (In Progress)     Learning Progress Summary           Patient Acceptance, E, NR by  at 4/15/2022 1519    Comment: Pt educated on POC, and awareness for assistance needs.                               User Key     Initials Effective Dates Name Provider Type Discipline     03/07/22 -  David Boswell, PT Student PT Student PT              PT Recommendation and Plan     Plan of Care Reviewed With: patient  Progress: improving  Outcome Evaluation: Pt. agreeable to therapy. Pt. has a flat affect, but has no complaints. Pt. was CGA/SBA for transfers and gait. He was able to walk 50' at a time without any LOB. He was SBA while working on reaching  in all directions in standing. Pt's HR ranging fromo 140-146bpm at rest and with activity. Nsg aware. Will continue to work on balance and activity.       Time Calculation:    PT Charges     Row Name 04/18/22 1113             Time Calculation    Start Time 1042  -      Stop Time 1105  -      Time Calculation (min) 23 min  -      PT Received On 04/18/22  -              Time Calculation- PT    Total Timed Code Minutes- PT 23 minute(s)   -MF              Timed Charges    83532 - PT Therapeutic Exercise Minutes 10  -MF      83652 - Gait Training Minutes  13  -MF              Total Minutes    Timed Charges Total Minutes 23  -MF       Total Minutes 23  -MF            User Key  (r) = Recorded By, (t) = Taken By, (c) = Cosigned By    Initials Name Provider Type    Stacey Alvarenga PTA Physical Therapist Assistant              Therapy Charges for Today     Code Description Service Date Service Provider Modifiers Qty    90943268015 HC PT THER PROC EA 15 MIN 4/18/2022 Stacey Mahmood PTA GP 1    22059932642 HC GAIT TRAINING EA 15 MIN 4/18/2022 Stacey Mahmood PTA GP 1          PT G-Codes  Outcome Measure Options: AM-PAC 6 Clicks Basic Mobility (PT)  AM-PAC 6 Clicks Score (PT): 16  AM-PAC 6 Clicks Score (OT): 12    Stacey Mahmood PTA  4/18/2022

## 2022-04-18 NOTE — CASE MANAGEMENT/SOCIAL WORK
Continued Stay Note   [facility]     Patient Name: Edy Urbina  MRN: [id number]  Today's Date: 4/18/2022    Admit Date: 4/14/2022     Discharge Plan     Row Name 04/18/22 8339       Plan    Plan Comments Events noted. Pt is working with therapy and ambulated 150ft. Neuro has been consulted to see. Plan is for return to [facility] with HH. Not sure of what DME pt will need at [facility]. Will follow up with physical therapy in regards to DME needs.               Discharge Codes    No documentation.                     DUKE Rivera

## 2022-04-19 ENCOUNTER — READMISSION MANAGEMENT (OUTPATIENT)
Dept: CALL CENTER | Facility: HOSPITAL | Age: 82
End: 2022-04-19

## 2022-04-19 ENCOUNTER — HOME HEALTH ADMISSION (OUTPATIENT)
Dept: HOME HEALTH SERVICES | Facility: HOME HEALTHCARE | Age: 82
End: 2022-04-19

## 2022-04-19 ENCOUNTER — APPOINTMENT (OUTPATIENT)
Dept: CARDIOLOGY | Facility: HOSPITAL | Age: 82
End: 2022-04-19

## 2022-04-19 VITALS
HEIGHT: 73 IN | OXYGEN SATURATION: 96 % | DIASTOLIC BLOOD PRESSURE: 84 MMHG | TEMPERATURE: 98 F | HEART RATE: 90 BPM | RESPIRATION RATE: 18 BRPM | BODY MASS INDEX: 18.55 KG/M2 | WEIGHT: 140 LBS | SYSTOLIC BLOOD PRESSURE: 149 MMHG

## 2022-04-19 LAB
ALBUMIN SERPL-MCNC: 2.9 G/DL (ref 3.5–5.2)
ALBUMIN/GLOB SERPL: 1.1 G/DL
ALP SERPL-CCNC: 57 U/L (ref 39–117)
ALT SERPL W P-5'-P-CCNC: 37 U/L (ref 1–41)
ANION GAP SERPL CALCULATED.3IONS-SCNC: 9 MMOL/L (ref 5–15)
AST SERPL-CCNC: 40 U/L (ref 1–40)
BACTERIA SPEC AEROBE CULT: NORMAL
BACTERIA SPEC AEROBE CULT: NORMAL
BILIRUB SERPL-MCNC: 0.7 MG/DL (ref 0–1.2)
BUN SERPL-MCNC: 22 MG/DL (ref 8–23)
BUN/CREAT SERPL: 31.9 (ref 7–25)
CALCIUM SPEC-SCNC: 8.7 MG/DL (ref 8.6–10.5)
CHLORIDE SERPL-SCNC: 105 MMOL/L (ref 98–107)
CO2 SERPL-SCNC: 29 MMOL/L (ref 22–29)
CREAT SERPL-MCNC: 0.69 MG/DL (ref 0.76–1.27)
DEPRECATED RDW RBC AUTO: 45.1 FL (ref 37–54)
EGFRCR SERPLBLD CKD-EPI 2021: 93.6 ML/MIN/1.73
ERYTHROCYTE [DISTWIDTH] IN BLOOD BY AUTOMATED COUNT: 14.3 % (ref 12.3–15.4)
GLOBULIN UR ELPH-MCNC: 2.7 GM/DL
GLUCOSE SERPL-MCNC: 118 MG/DL (ref 65–99)
HCT VFR BLD AUTO: 36.2 % (ref 37.5–51)
HGB BLD-MCNC: 11.4 G/DL (ref 13–17.7)
MCH RBC QN AUTO: 27.2 PG (ref 26.6–33)
MCHC RBC AUTO-ENTMCNC: 31.5 G/DL (ref 31.5–35.7)
MCV RBC AUTO: 86.4 FL (ref 79–97)
PLATELET # BLD AUTO: 220 10*3/MM3 (ref 140–450)
PMV BLD AUTO: 10 FL (ref 6–12)
POTASSIUM SERPL-SCNC: 3.7 MMOL/L (ref 3.5–5.2)
PROCALCITONIN SERPL-MCNC: 2.91 NG/ML (ref 0–0.25)
PROT SERPL-MCNC: 5.6 G/DL (ref 6–8.5)
RBC # BLD AUTO: 4.19 10*6/MM3 (ref 4.14–5.8)
SODIUM SERPL-SCNC: 143 MMOL/L (ref 136–145)
T3FREE SERPL-MCNC: 2.02 PG/ML (ref 2–4.4)
WBC NRBC COR # BLD: 7.6 10*3/MM3 (ref 3.4–10.8)

## 2022-04-19 PROCEDURE — 85027 COMPLETE CBC AUTOMATED: CPT | Performed by: INTERNAL MEDICINE

## 2022-04-19 PROCEDURE — 84145 PROCALCITONIN (PCT): CPT | Performed by: INTERNAL MEDICINE

## 2022-04-19 PROCEDURE — 80053 COMPREHEN METABOLIC PANEL: CPT | Performed by: INTERNAL MEDICINE

## 2022-04-19 PROCEDURE — 25010000002 DIGOXIN PER 500 MCG: Performed by: INTERNAL MEDICINE

## 2022-04-19 PROCEDURE — 93246 EXT ECG>7D<15D RECORDING: CPT

## 2022-04-19 PROCEDURE — 25010000002 FUROSEMIDE PER 20 MG: Performed by: INTERNAL MEDICINE

## 2022-04-19 RX ORDER — CEFDINIR 300 MG/1
300 CAPSULE ORAL 2 TIMES DAILY
Qty: 6 CAPSULE | Refills: 0 | Status: SHIPPED | OUTPATIENT
Start: 2022-04-19 | End: 2022-04-22

## 2022-04-19 RX ORDER — FUROSEMIDE 10 MG/ML
20 INJECTION INTRAMUSCULAR; INTRAVENOUS ONCE
Status: COMPLETED | OUTPATIENT
Start: 2022-04-19 | End: 2022-04-19

## 2022-04-19 RX ORDER — DIGOXIN 250 MCG
250 TABLET ORAL ONCE
Status: DISCONTINUED | OUTPATIENT
Start: 2022-04-19 | End: 2022-04-19

## 2022-04-19 RX ORDER — DIGOXIN 0.25 MG/ML
125 INJECTION INTRAMUSCULAR; INTRAVENOUS ONCE
Status: COMPLETED | OUTPATIENT
Start: 2022-04-19 | End: 2022-04-19

## 2022-04-19 RX ORDER — METOPROLOL TARTRATE 75 MG/1
75 TABLET, FILM COATED ORAL EVERY 12 HOURS SCHEDULED
Qty: 60 TABLET | Refills: 0 | Status: SHIPPED | OUTPATIENT
Start: 2022-04-19 | End: 2022-05-19 | Stop reason: SDUPTHER

## 2022-04-19 RX ADMIN — FUROSEMIDE 20 MG: 10 INJECTION INTRAMUSCULAR; INTRAVENOUS at 11:11

## 2022-04-19 RX ADMIN — METOPROLOL TARTRATE 75 MG: 50 TABLET, FILM COATED ORAL at 08:22

## 2022-04-19 RX ADMIN — RISPERIDONE 0.5 MG: 1 TABLET, FILM COATED ORAL at 08:22

## 2022-04-19 RX ADMIN — MEMANTINE HYDROCHLORIDE 10 MG: 5 TABLET, FILM COATED ORAL at 08:22

## 2022-04-19 RX ADMIN — Medication 10 ML: at 08:24

## 2022-04-19 RX ADMIN — Medication 1 TABLET: at 08:22

## 2022-04-19 RX ADMIN — APIXABAN 2.5 MG: 2.5 TABLET, FILM COATED ORAL at 08:21

## 2022-04-19 RX ADMIN — DIGOXIN 125 MCG: 0.25 INJECTION INTRAMUSCULAR; INTRAVENOUS at 11:12

## 2022-04-19 NOTE — PAYOR COMM NOTE
"REF:  [id number]    [facility]  STEVE  [phone]  OR FAX  [phone]     Edy Ambriz (81 y.o. Male)             Date of Birth   1940    Social Security Number       Address   [address] [place] [address]    Home Phone   [phone]    MRN   [id number]       Buddhism   Yarsani    Marital Status                               Admission Date   4/14/22    Admission Type   Emergency    Admitting Provider   Dalton Rodrigues MD    Attending Provider       Department, Room/Bed   [facility] 3A, 340/1       Discharge Date   4/19/2022    Discharge Disposition   Home-Health Care Sv    Discharge Destination                               Attending Provider: (none)   Allergies: No Known Allergies    Isolation: None   Infection: None   Code Status: CPR   Advance Care Planning Activity    Ht: 185.4 cm (73\")   Wt: 63.5 kg (140 lb)    Admission Cmt: None   Principal Problem: Pneumonia of right middle lobe due to infectious organism [J18.9]                 Active Insurance as of 4/14/2022     Primary Coverage     Payor Plan Insurance Group Employer/Plan Group    HUMANA MEDICARE REPLACEMENT HUMANA MEDICARE REPLACEMENT [id number]     Payor Plan Address Payor Plan Phone Number Payor Plan Fax Number Effective Dates    [address] [address] [phone]  1/1/2021 - None Entered    [facility] [address]       Subscriber Name Subscriber Birth Date Member ID       EDY AMBRIZ 1940 [id number]                 Emergency Contacts      (Rel.) Home Phone Work Phone Mobile Phone    OLEGARIO AMBRIZ (Son) -- -- [phone]    Jennyfer Ambriz (Daughter) -- -- [phone]    AmparoDionne howe (Daughter) -- -- [phone]               Discharge Summary      Dalton Rodrigues MD at 04/19/22 0910                [facility] Medicine Services  DISCHARGE SUMMARY       Date of Admission: 4/14/2022  Date of Discharge:  " 4/19/2022  Primary Care Physician: Torey Amato DO    Presenting Problem/History of Present Illness:  Declining physical status     Final Discharge Diagnoses:  Active Hospital Problems    Diagnosis    • **Pneumonia of right middle lobe due to infectious organism    • Paroxysmal atrial fibrillation with rapid ventricular response (HCC)    • Metabolic encephalopathy, acute, due to pnuemonia and elevated BUN    • Generalized weakness    • Elevated BUN    • Cachexia (HCC)    • Lewy body dementia without behavioral disturbance (HCC)    • Anemia        Consults: Neurology     Procedures Performed: None    Pertinent Test Results:       Imaging Results (All)     Procedure Component Value Units Date/Time    CT Head Without Contrast [[id number]] Collected: 04/15/22 0407     Updated: 04/15/22 0413    Narrative:      EXAM/TECHNIQUE: CT of the head without contrast     INDICATION: Mental status change, unknown cause; R41.82-Altered mental  status, unspecified; J18.9-Pneumonia, unspecified organism     COMPARISON: 09/29/2021     DLP: 624 mGy cm. Automated exposure control was also utilized to  decrease patient radiation dose.     FINDINGS:     No evidence of intracranial hemorrhage. Gray-white differentiation is  maintained. Mild presumed chronic microvascular ischemic white matter  change and global cerebral volume loss. No midline shift or mass effect.  Lateral ventricles are nondilated. Basilar cisterns are patent. No acute  orbital finding. Mastoid air cells and visualized portion of the  paranasal sinuses are clear. No acute osseous finding.       Impression:         No acute intracranial findings.     These findings are in agreement with the critical and emergent findings  from the [facility] preliminary report.  This report was finalized on 04/15/2022 04:10 by Dr. Jessee Lopez MD.    XR Chest 1 View [[id number]] Collected: 04/14/22 2134     Updated: 04/14/22 2138    Narrative:      EXAM: XR CHEST 1 VW- 4/14/2022 9:26  PM CDT     HISTORY: Febrile       COMPARISON: September 29, 2021.     TECHNIQUE: Frontal radiograph of the chest     FINDINGS:   Vague groundglass opacities in the mid right lung. Left lung is clear..  Cardiac silhouette is normal. Vascular calcifications present aortic  arch..      The osseous structures and surrounding soft tissues demonstrate no acute  abnormality.          Impression:      1. Groundglass opacity in the mid right lung most likely representing  pneumonia.        This report was finalized on 04/14/2022 21:35 by Dr. Jakob Miranda MD.        LAB RESULTS:      Lab 04/19/22  0522 04/18/22  0547 04/17/22  0820 04/16/22  0442 04/15/22  0607 04/15/22  0119 04/14/22 2145 04/14/22 1852   WBC 7.60 8.26 12.67* 15.41* 17.52*  --   --  18.41*   HEMOGLOBIN 11.4* 12.5* 12.6* 11.5* 12.8*  --   --  12.3*   HEMATOCRIT 36.2* 38.7 39.1 35.8* 39.9  --   --  38.3   PLATELETS 220 217 193 169 191  --   --  213   NEUTROS ABS  --  6.13 10.39* 14.33*  --   --   --  15.22*   IMMATURE GRANS (ABS)  --  0.05  --   --   --   --   --   --    LYMPHS ABS  --  1.30  --   --   --   --   --   --    MONOS ABS  --  0.67  --   --   --   --   --   --    EOS ABS  --  0.08  --   --   --   --   --   --    MCV 86.4 85.6 85.9 86.7 86.4  --   --  85.9   SED RATE  --   --   --   --  9  --   --   --    CRP  --   --   --   --  25.72*  --   --  14.05*   PROCALCITONIN 2.91*  --   --   --   --   --   --  43.88*   LACTATE  --   --   --   --   --  3.7* 3.1*  --          Lab 04/19/22  0522 04/18/22  0547 04/15/22  0607 04/14/22  1852   SODIUM 143  --  141 144   POTASSIUM 3.7  --  4.0 4.7   CHLORIDE 105  --  104 107   CO2 29.0  --  26.0 22.0   ANION GAP 9.0  --  11.0 15.0   BUN 22  --  31* 34*   CREATININE 0.69*  --  0.92 1.14   EGFR 93.6  --  84.1 65.0   GLUCOSE 118*  --  107* 112*   CALCIUM 8.7  --  8.9 9.2   HEMOGLOBIN A1C  --   --  5.50  --    TSH  --  6.150*  --   --          Lab 04/19/22  0522 04/14/22 1852   TOTAL PROTEIN 5.6* 6.7   ALBUMIN  2.90* 4.00   GLOBULIN 2.7 2.7   ALT (SGPT) 37 13   AST (SGOT) 40 22   BILIRUBIN 0.7 1.1   ALK PHOS 57 43             Lab 04/15/22  0607   CHOLESTEROL 141   LDL CHOL 60   HDL CHOL 67*   TRIGLYCERIDES 68             Brief Urine Lab Results  (Last result in the past 365 days)      Color   Clarity   Blood   Leuk Est   Nitrite   Protein   CREAT   Urine HCG        04/14/22 2354 Dark Yellow   Clear   Negative   Negative   Negative   30 mg/dL (1+)               Microbiology Results (last 10 days)     Procedure Component Value - Date/Time    COVID PRE-OP / PRE-PROCEDURE SCREENING ORDER (NO ISOLATION) - Swab, Nasal Cavity [[id number]]  (Normal) Collected: 04/15/22 0646    Lab Status: Final result Specimen: Swab from Nasal Cavity Updated: 04/15/22 0730    Narrative:      The following orders were created for panel order COVID PRE-OP / PRE-PROCEDURE SCREENING ORDER (NO ISOLATION) - Swab, Nasal Cavity.  Procedure                               Abnormality         Status                     ---------                               -----------         ------                     COVID-19,[facility] [employer] IN-[facility]...[[id number]]  Normal              Final result                 Please view results for these tests on the individual orders.    COVID-19,[employer] IN-HOUSE,Nasal Swab No Transport Media 3-4 HR TAT - Swab, Nasal Cavity [[id number]]  (Normal) Collected: 04/15/22 0646    Lab Status: Final result Specimen: Swab from Nasal Cavity Updated: 04/15/22 0730     COVID19 Not Detected    Narrative:      Fact sheet for providers: https://www.fda.gov/media/727293/download     Fact sheet for patients: https://www.fda.gov/media/561980/download    Test performed by PCR.    Consider negative results in combination with clinical observations, patient history, and epidemiological information.    Blood Culture - Blood, Arm, Right [[id number]]  (Normal) Collected: 04/14/22 2145    Lab Status: Preliminary result Specimen: Blood from Arm, Right Updated: 04/18/22  "2217     Blood Culture No growth at 4 days    Blood Culture - Blood, Arm, Right [[id number]]  (Normal) Collected: 04/14/22 2145    Lab Status: Preliminary result Specimen: Blood from Arm, Right Updated: 04/18/22 2217     Blood Culture No growth at 4 days          Hospital Course:  Patient was admitted on 4/14 by Dr. Landin for \"declining physical state\".  Patient was found to have an elevated BUN and a RML pneumonia.     For the patients pneumonia he was started on azithromycin and ceftriaxone.  COVID-19 was negative.  Patient will received a full course of these antibiotics.  Patient did not have urinary antigens checked upon admission, this late into the admission, unsure of the utility.  Duonebs.  Incentive spirometer.  Flutter valve.       For the patients confusion he has intermittent issues related to his dementia.  CT head was unremarkable for acute process.       Patient on Eliquis (dose-reduced due to Age and Weight) for chronic atrial fibrillation.  Patient did go into atrial fibrillation aid RVR and required diltiazem gtt and PO diltizem, patient is much better at this current juncture.  Switched to Diltiazem 300 mg extended release, however, does not seem to be working well, beta blocker working better.  Metoprolol 75 mg BID.  Elected to do a ZioPatch because feel as though patient will continue to improve once back in normal environment and further away from his acute illness.    Discussed discharge and plan at length with patient's daughter, Jennyfer.       Patient was having tremors that were reportedly worse than previous.  Neurology was consulted.  Appreciate assistance.  Feel as though this is just continued decline of his dementia.     Nutrition consult for MSA.  Patient noted to have temporal muscle wasting, thenar muscle wasting and significant muscle loss over supraclavicular area.  Suspect patient will be moderate to severe malnutrition from a nutritional assessment and could benefit from " "nutritional supplementation.     Patient requiring soft texture; ground; thin.     PT/OT - Patient would like to go home with home health    Physical Exam on Discharge:  /84 (BP Location: Right arm, Patient Position: Lying)   Pulse (!) 144   Temp 98 °F (36.7 °C) (Oral)   Resp 18   Ht 185.4 cm (73\")   Wt 63.5 kg (140 lb)   SpO2 96%   BMI 18.47 kg/m²   Physical Exam  Appearance: He is ill-appearing (chronically).      Comments: Muscle wasting noted in thenar, supraclavicular, and temporal; poor skin turger   HENT:      Nose: No congestion or rhinorrhea.      Mouth/Throat:      Mouth: Mucous membranes are dry.      Pharynx: Oropharynx is clear.   Eyes:      General: No scleral icterus.     Conjunctiva/sclera: Conjunctivae normal.   Cardiovascular:      Rate and Rhythm: Tachycardia present. Rhythm irregular.      Heart sounds: Murmur heard.   Pulmonary:      Effort: Pulmonary effort is normal.      Breath sounds: Normal breath sounds. No wheezing.      Comments: Mildly diminished at bases  Abdominal:      General: Abdomen is flat. Bowel sounds are normal. There is no distension.      Palpations: Abdomen is soft.   Musculoskeletal:      Right lower leg: No edema.      Left lower leg: No edema.   Skin:     General: Skin is warm and dry.      Coloration: Skin is pale.   Neurological:      General: No focal deficit present.      Mental Status: He is alert and oriented to person, place, and time.      Comments: tremor   Psychiatric:         Mood and Affect: Mood normal.         Behavior: Behavior normal.     Condition on Discharge: Stable    Discharge Disposition:  Home-Health Care [facility] [place]    Discharge Medications:     Discharge Medications      New Medications      Instructions Start Date   apixaban 2.5 MG tablet tablet  Commonly known as: ELIQUIS   2.5 mg, Oral, Every 12 Hours Scheduled      Metoprolol Tartrate 75 MG tablet   75 mg, Oral, Every 12 Hours Scheduled         Continue These Medications      " Instructions Start Date   memantine 10 MG tablet  Commonly known as: NAMENDA   10 mg, Oral, 2 Times Daily, TAKE 1 TABLET TWICE DAILY      Multivitamin Men 50+ tablet tablet  Generic drug: multivitamin with minerals   1 tablet, Oral, Daily      risperiDONE 0.5 MG tablet  Commonly known as: risperDAL   0.5 mg, Oral, 2 Times Daily         Stop These Medications    NYQUIL MULTI-SYMPTOM PO            Discharge Diet:   Diet Instructions     Diet: Soft Texture; Thin Liquids, No Restrictions; Ground      Discharge Diet: Soft Texture    Fluid Consistency: Thin Liquids, No Restrictions    Soft Options: Ground          Activity at Discharge: As tolerated    Follow-up Appointments:   Future Appointments   Date Time Provider Department Center   7/15/2022  2:30 PM Bright Lewis MD [facility] PAD   9/23/2022  8:30 AM Dalton Laurent MD [facility] P[facility]   9/26/2022 10:00 AM Torey Amato DO [facility] PAD       Test Results Pending at Discharge: None    Electronically signed by Dalton Rodrigues MD, 04/19/22, 09:10 CDT.    Time: 35 minutes.           Electronically signed by Dalton Rodrigues MD at 04/19/22 1038       Discharge Order (From admission, onward)     Start     Ordered    04/19/22 0906  Discharge patient  Once        Expected Discharge Date: 04/19/22    Discharge Disposition: Home-Health Care [facility] [place]    Physician of Record for Attribution - Please select from Treatment Team: KARLA MEADE [[id number]]    Review needed by CMO to determine Physician of Record: No       Question Answer Comment   Physician of Record for Attribution - Please select from Treatment Team KARLA MEADE    Review needed by CMO to determine Physician of Record No        04/19/22 0999

## 2022-04-19 NOTE — PROGRESS NOTES
Malnutrition Severity Assessment    Patient Name:  Edy Urbina  YOB: 1940  MRN: [id number]  Admit Date:  4/14/2022    Patient meets criteria for : Moderate (non-severe) Malnutrition (Secondary signs: Generalized weakness,Advanced age,pale)      Malnutrition Severity Assessment  Malnutrition Type: Chronic Disease - Related Malnutrition  Malnutrition Type (last 8 hours)     Malnutrition Severity Assessment     Row Name 04/19/22 1326       Malnutrition Severity Assessment    Malnutrition Type Chronic Disease - Related Malnutrition    Row Name 04/19/22 1326       Insufficient Energy Intake     Insufficient Energy Intake Findings Severe  suspect    Insufficient Energy Intake  <75% of est. energy requirement for > or equal to 1 month    Row Name 04/19/22 1326       Muscle Loss    Loss of Muscle Mass Findings Moderate    [place] Region Moderate - slight depression    Patellar Region Moderate - patella more prominent, less muscle definition around patella    Anterior Thigh Region Moderate - mild depression on inner thigh    Posterior Calf Region Moderate - some roundness, slight firmness    Row Name 04/19/22 1326       Fat Loss    Subcutaneous Fat Loss Findings Moderate    Orbital Region  Moderate -  somewhat hollowness, slightly dark circles    Upper Arm Region Moderate - some fat tissue, not ample    Row Name 04/19/22 1326       Declining Functional Status    Declining Functional Status Findings Measurably Reduced  requires assist w/ all ADL's    Row Name 04/19/22 1326       Criteria Met (Must meet criteria for severity in at least 2 of these categories: M Wasting, Fat Loss, Fluid, Secondary Signs, Wt. Status, Intake)    Patient meets criteria for  Moderate (non-severe) Malnutrition  Secondary signs: Generalized weakness,Advanced age,pale              Electronically signed by:  Scarlett Iglesias MS,RDN,LD  04/19/22 13:40 CDT

## 2022-04-19 NOTE — THERAPY DISCHARGE NOTE
Acute Care - Speech Language Pathology Discharge Summary  [facility]       Patient Name: Edy Urbina  : 1940  MRN: [id number]    Today's Date: 2022                   Admit Date: 2022      SLP Recommendation and Plan  Mechanical soft diet with thin liquids.  Marcelo Sosa MS CCC-SLP 2022 15:45 CDT    Visit Dx:    ICD-10-CM ICD-9-CM   1. Altered mental status, unspecified altered mental status type  R41.82 780.97   2. Pneumonia due to infectious organism, unspecified laterality, unspecified part of lung  J18.9 486   3. Dysphagia, unspecified type  R13.10 787.20   4. Impaired mobility  Z74.09 799.89   5. Decreased activities of daily living (ADL)  Z78.9 V49.89                SLP GOALS     Row Name 22 1500 22 0842          Oral Nutrition/Hydration Goal 1 (SLP)    Oral Nutrition/Hydration Goal 1, SLP -- Pt will tolerate least restrictive diet with no overt s/s of aspiration.  -CS     Time Frame (Oral Nutrition/Hydration Goal 1, SLP) -- by discharge  -CS     Barriers (Oral Nutrition/Hydration Goal 1, SLP) -- n/a  -CS     Progress/Outcomes (Oral Nutrition/Hydration Goal 1, SLP) goal partially met;discharged from facility  -CS continuing progress toward goal  -CS           User Key  (r) = Recorded By, (t) = Taken By, (c) = Cosigned By    Initials Name Provider Type    CS Marcelo Sosa MS CCC-SLP Speech and Language Pathologist                  Therapy Charges for Today     Code Description Service Date Service Provider Modifiers Qty    [id number] HC ST TREATMENT SWALLOW 2 2022 Marcelo Sosa MS CCC-SLP GN 1            SLP Discharge Summary  Anticipated Discharge Disposition (SLP): unknown  Reason for Discharge: discharge from this facility  Progress Toward Achieving Short/long Term Goals: goals partially met within established timelines  Discharge Destination: home      Marcelo Sosa MS CCC-SLP  2022

## 2022-04-19 NOTE — CASE MANAGEMENT/SOCIAL WORK
Continued Stay Note   [place]     Patient Name: Edy Ambriz  MRN: [id number]  Today's Date: 4/19/2022    Admit Date: 4/14/2022     Discharge Plan     Row Name 04/19/22 0951       Plan    Plan [facility]    Provided Post Acute Provider List? Yes    Post Acute Provider List Home Health    Provided Post Acute Provider Quality & Resource List? Yes    Post Acute Provider Quality and Resource List Home Health    Delivered To Support Person    Method of Delivery Telephone    Patient/Family in Agreement with Plan yes    Final Discharge Disposition Code 06 - home with home health care    Final Note Spoke with OLEGARIO AMBRIZ Son   [phone] to get preferred provider for HH care, they prefer [facility] and if not taking pt's to use [facility]. Sent referral to [facility] via [employer].               Discharge Codes    No documentation.               Expected Discharge Date and Time     Expected Discharge Date Expected Discharge Time    Apr 19, 2022             HUBER Valdivia

## 2022-04-19 NOTE — DISCHARGE SUMMARY
[facility] Medicine Services  DISCHARGE SUMMARY       Date of Admission: 4/14/2022  Date of Discharge:  4/19/2022  Primary Care Physician: Torey Amato DO    Presenting Problem/History of Present Illness:  Declining physical status     Final Discharge Diagnoses:  Active Hospital Problems    Diagnosis    • **Pneumonia of right middle lobe due to infectious organism    • Paroxysmal atrial fibrillation with rapid ventricular response (HCC)    • Metabolic encephalopathy, acute, due to pnuemonia and elevated BUN    • Generalized weakness    • Elevated BUN    • Cachexia (HCC)    • Lewy body dementia without behavioral disturbance (HCC)    • Anemia        Consults: Neurology     Procedures Performed: None    Pertinent Test Results:       Imaging Results (All)     Procedure Component Value Units Date/Time    CT Head Without Contrast [[id number]] Collected: 04/15/22 0407     Updated: 04/15/22 0413    Narrative:      EXAM/TECHNIQUE: CT of the head without contrast     INDICATION: Mental status change, unknown cause; R41.82-Altered mental  status, unspecified; J18.9-Pneumonia, unspecified organism     COMPARISON: 09/29/2021     DLP: 624 mGy cm. Automated exposure control was also utilized to  decrease patient radiation dose.     FINDINGS:     No evidence of intracranial hemorrhage. Gray-white differentiation is  maintained. Mild presumed chronic microvascular ischemic white matter  change and global cerebral volume loss. No midline shift or mass effect.  Lateral ventricles are nondilated. Basilar cisterns are patent. No acute  orbital finding. Mastoid air cells and visualized portion of the  paranasal sinuses are clear. No acute osseous finding.       Impression:         No acute intracranial findings.     These findings are in agreement with the critical and emergent findings  from the [facility] preliminary report.  This report was finalized on 04/15/2022 04:10 by Dr. Hooks  MD Jessica.    XR Chest 1 View [[id number]] Collected: 04/14/22 2134     Updated: 04/14/22 2138    Narrative:      EXAM: XR CHEST 1 VW- 4/14/2022 9:26 PM CDT     HISTORY: Febrile       COMPARISON: September 29, 2021.     TECHNIQUE: Frontal radiograph of the chest     FINDINGS:   Vague groundglass opacities in the mid right lung. Left lung is clear..  Cardiac silhouette is normal. Vascular calcifications present aortic  arch..      The osseous structures and surrounding soft tissues demonstrate no acute  abnormality.          Impression:      1. Groundglass opacity in the mid right lung most likely representing  pneumonia.        This report was finalized on 04/14/2022 21:35 by Dr. Jakob Miranda MD.        LAB RESULTS:      Lab 04/19/22  0522 04/18/22  0547 04/17/22  0820 04/16/22  0442 04/15/22  0607 04/15/22  0119 04/14/22  2145 04/14/22  1852   WBC 7.60 8.26 12.67* 15.41* 17.52*  --   --  18.41*   HEMOGLOBIN 11.4* 12.5* 12.6* 11.5* 12.8*  --   --  12.3*   HEMATOCRIT 36.2* 38.7 39.1 35.8* 39.9  --   --  38.3   PLATELETS 220 217 193 169 191  --   --  213   NEUTROS ABS  --  6.13 10.39* 14.33*  --   --   --  15.22*   IMMATURE GRANS (ABS)  --  0.05  --   --   --   --   --   --    LYMPHS ABS  --  1.30  --   --   --   --   --   --    MONOS ABS  --  0.67  --   --   --   --   --   --    EOS ABS  --  0.08  --   --   --   --   --   --    MCV 86.4 85.6 85.9 86.7 86.4  --   --  85.9   SED RATE  --   --   --   --  9  --   --   --    CRP  --   --   --   --  25.72*  --   --  14.05*   PROCALCITONIN 2.91*  --   --   --   --   --   --  43.88*   LACTATE  --   --   --   --   --  3.7* 3.1*  --          Lab 04/19/22  0522 04/18/22  0547 04/15/22  0607 04/14/22  4687   SODIUM 143  --  141 144   POTASSIUM 3.7  --  4.0 4.7   CHLORIDE 105  --  104 107   CO2 29.0  --  26.0 22.0   ANION GAP 9.0  --  11.0 15.0   BUN 22  --  31* 34*   CREATININE 0.69*  --  0.92 1.14   EGFR 93.6  --  84.1 65.0   GLUCOSE 118*  --  107* 112*   CALCIUM 8.7  --   8.9 9.2   HEMOGLOBIN A1C  --   --  5.50  --    TSH  --  6.150*  --   --          Lab 04/19/22  0522 04/14/22  1852   TOTAL PROTEIN 5.6* 6.7   ALBUMIN 2.90* 4.00   GLOBULIN 2.7 2.7   ALT (SGPT) 37 13   AST (SGOT) 40 22   BILIRUBIN 0.7 1.1   ALK PHOS 57 43             Lab 04/15/22  0607   CHOLESTEROL 141   LDL CHOL 60   HDL CHOL 67*   TRIGLYCERIDES 68             Brief Urine Lab Results  (Last result in the past 365 days)      Color   Clarity   Blood   Leuk Est   Nitrite   Protein   CREAT   Urine HCG        04/14/22 2354 Dark Yellow   Clear   Negative   Negative   Negative   30 mg/dL (1+)               Microbiology Results (last 10 days)     Procedure Component Value - Date/Time    COVID PRE-OP / PRE-PROCEDURE SCREENING ORDER (NO ISOLATION) - Swab, Nasal Cavity [[id number]]  (Normal) Collected: 04/15/22 0646    Lab Status: Final result Specimen: Swab from Nasal Cavity Updated: 04/15/22 0730    Narrative:      The following orders were created for panel order COVID PRE-OP / PRE-PROCEDURE SCREENING ORDER (NO ISOLATION) - Swab, Nasal Cavity.  Procedure                               Abnormality         Status                     ---------                               -----------         ------                     COVID-19,[facility] [employer] [facility]...[[id number]]  Normal              Final result                 Please view results for these tests on the individual orders.    COVID-19,[facility] [employer] IN-HOUSE,Nasal Swab No Transport Media 3-4 HR TAT - Swab, Nasal Cavity [[id number]]  (Normal) Collected: 04/15/22 0646    Lab Status: Final result Specimen: Swab from Nasal Cavity Updated: 04/15/22 0730     COVID19 Not Detected    Narrative:      Fact sheet for providers: https://www.fda.gov/media/346398/download     Fact sheet for patients: https://www.fda.gov/media/255493/download    Test performed by PCR.    Consider negative results in combination with clinical observations, patient history, and epidemiological information.    Blood Culture  "- Blood, Arm, Right [[id number]]  (Normal) Collected: 04/14/22 2145    Lab Status: Preliminary result Specimen: Blood from Arm, Right Updated: 04/18/22 2217     Blood Culture No growth at 4 days    Blood Culture - Blood, Arm, Right [[id number]]  (Normal) Collected: 04/14/22 2145    Lab Status: Preliminary result Specimen: Blood from Arm, Right Updated: 04/18/22 2217     Blood Culture No growth at 4 days          Hospital Course:  Patient was admitted on 4/14 by Dr. Landin for \"declining physical state\".  Patient was found to have an elevated BUN and a RML pneumonia.     For the patients pneumonia he was started on azithromycin and ceftriaxone.  COVID-19 was negative.  Patient will received a full course of these antibiotics.  Patient did not have urinary antigens checked upon admission, this late into the admission, unsure of the utility.  Duonebs.  Incentive spirometer.  Flutter valve.       For the patients confusion he has intermittent issues related to his dementia.  CT head was unremarkable for acute process.       Patient on Eliquis (dose-reduced due to Age and Weight) for chronic atrial fibrillation.  Patient did go into atrial fibrillation aid RVR and required diltiazem gtt and PO diltizem, patient is much better at this current juncture.  Switched to Diltiazem 300 mg extended release, however, does not seem to be working well, beta blocker working better.  Metoprolol 75 mg BID.  Elected to do a ZioPatch because feel as though patient will continue to improve once back in normal environment and further away from his acute illness.    Discussed discharge and plan at length with patient's daughter, Jennyfer.       Patient was having tremors that were reportedly worse than previous.  Neurology was consulted.  Appreciate assistance.  Feel as though this is just continued decline of his dementia.     Nutrition consult for MSA.  Patient noted to have temporal muscle wasting, thenar muscle wasting and significant " "muscle loss over supraclavicular area.  Suspect patient will be moderate to severe malnutrition from a nutritional assessment and could benefit from nutritional supplementation.     Patient requiring soft texture; ground; thin.     PT/OT - Patient would like to go home with home health    Physical Exam on Discharge:  /84 (BP Location: Right arm, Patient Position: Lying)   Pulse (!) 144   Temp 98 °F (36.7 °C) (Oral)   Resp 18   Ht 185.4 cm (73\")   Wt 63.5 kg (140 lb)   SpO2 96%   BMI 18.47 kg/m²   Physical Exam  Appearance: He is ill-appearing (chronically).      Comments: Muscle wasting noted in thenar, supraclavicular, and temporal; poor skin turger   HENT:      Nose: No congestion or rhinorrhea.      Mouth/Throat:      Mouth: Mucous membranes are dry.      Pharynx: Oropharynx is clear.   Eyes:      General: No scleral icterus.     Conjunctiva/sclera: Conjunctivae normal.   Cardiovascular:      Rate and Rhythm: Tachycardia present. Rhythm irregular.      Heart sounds: Murmur heard.   Pulmonary:      Effort: Pulmonary effort is normal.      Breath sounds: Normal breath sounds. No wheezing.      Comments: Mildly diminished at bases  Abdominal:      General: Abdomen is flat. Bowel sounds are normal. There is no distension.      Palpations: Abdomen is soft.   Musculoskeletal:      Right lower leg: No edema.      Left lower leg: No edema.   Skin:     General: Skin is warm and dry.      Coloration: Skin is pale.   Neurological:      General: No focal deficit present.      Mental Status: He is alert and oriented to person, place, and time.      Comments: tremor   Psychiatric:         Mood and Affect: Mood normal.         Behavior: Behavior normal.     Condition on Discharge: Stable    Discharge Disposition:  Home-Health Care [facility]    Discharge Medications:     Discharge Medications      New Medications      Instructions Start Date   apixaban 2.5 MG tablet tablet  Commonly known as: ELIQUIS   2.5 mg, Oral, Every " 12 Hours Scheduled      Metoprolol Tartrate 75 MG tablet   75 mg, Oral, Every 12 Hours Scheduled         Continue These Medications      Instructions Start Date   memantine 10 MG tablet  Commonly known as: NAMENDA   10 mg, Oral, 2 Times Daily, TAKE 1 TABLET TWICE DAILY      Multivitamin Men 50+ tablet tablet  Generic drug: multivitamin with minerals   1 tablet, Oral, Daily      risperiDONE 0.5 MG tablet  Commonly known as: risperDAL   0.5 mg, Oral, 2 Times Daily         Stop These Medications    NYQUIL MULTI-SYMPTOM PO            Discharge Diet:   Diet Instructions     Diet: Soft Texture; Thin Liquids, No Restrictions; Ground      Discharge Diet: Soft Texture    Fluid Consistency: Thin Liquids, No Restrictions    Soft Options: Ground          Activity at Discharge: As tolerated    Follow-up Appointments:   Future Appointments   Date Time Provider Department Center   7/15/2022  2:30 PM Bright Lewis MD [facility] VS [facility] P[facility]   9/23/2022  8:30 AM Dalton Laurent MD [facility] PAD   9/26/2022 10:00 AM Torey Amato DO [facility] PAD       Test Results Pending at Discharge: None    Electronically signed by Dalton Rodrigues MD, 04/19/22, 09:10 CDT.    Time: 35 minutes.

## 2022-04-19 NOTE — PLAN OF CARE
Goal Outcome Evaluation:  Plan of Care Reviewed With: patient        Progress: no change  Outcome Evaluation: VSS. No change in neuro status this shift. HR 80- 120, aflutter. No s/s of soa,or pain noted. Continue to be impulsive and remind pt to call for assist. Safety maintained.

## 2022-04-19 NOTE — NURSING NOTE
Spoke with patient and patients son Raul regarding order for home health services  Patient  is agreeable to services.  Confirmed that phone number listed is correct. Pt lives at [address]. [place] [address]. Number left with patient to call if any needs or concerns arise.

## 2022-04-19 NOTE — PLAN OF CARE
Goal Outcome Evaluation:               Pt alert and oriented x4. VSS. No c/o pain. PIEDRA. PPP. SCDS for VTE. . Tolerating mech soft diet pt was able to feed himself today with cueing. Voiding via bsc. Daughter at bedside. Plan to d/c to [facility] living today. Call light within reach. Safety maintained.

## 2022-04-19 NOTE — THERAPY DISCHARGE NOTE
Acute Care - Physical Therapy Discharge Summary  [facility]       Patient Name: Edy Urbina  : 1940  MRN: [id number]    Today's Date: 2022                 Admit Date: 2022      PT Recommendation and Plan    Visit Dx:    ICD-10-CM ICD-9-CM   1. Altered mental status, unspecified altered mental status type  R41.82 780.97   2. Pneumonia due to infectious organism, unspecified laterality, unspecified part of lung  J18.9 486   3. Dysphagia, unspecified type  R13.10 787.20   4. Impaired mobility  Z74.09 799.89   5. Decreased activities of daily living (ADL)  Z78.9 V49.89                PT Rehab Goals     Row Name 22 1533             Bed Mobility Goal 1 (PT)    Activity/Assistive Device (Bed Mobility Goal 1, PT) bed mobility activities, all  -      Clare Level/Cues Needed (Bed Mobility Goal 1, PT) modified independence  -      Time Frame (Bed Mobility Goal 1, PT) long term goal (LTG)  -      Progress/Outcomes (Bed Mobility Goal 1, PT) goal met  -              Transfer Goal 1 (PT)    Activity/Assistive Device (Transfer Goal 1, PT) sit-to-stand/stand-to-sit;bed-to-chair/chair-to-bed;walker, rolling  -      Clare Level/Cues Needed (Transfer Goal 1, PT) minimum assist (75% or more patient effort)  -      Time Frame (Transfer Goal 1, PT) long term goal (LTG)  -      Progress/Outcome (Transfer Goal 1, PT) goal met  -              Gait Training Goal 1 (PT)    Activity/Assistive Device (Gait Training Goal 1, PT) gait (walking locomotion);assistive device use;improve balance and speed;increase endurance/gait distance;increase energy conservation;walker, rolling  -      Clare Level (Gait Training Goal 1, PT) minimum assist (75% or more patient effort)  -      Distance (Gait Training Goal 1, PT) 50'  -      Time Frame (Gait Training Goal 1, PT) long term goal (LTG)  -      Progress/Outcome (Gait Training Goal 1, PT) goal met  -            User Key  (r) =  Recorded By, (t) = Taken By, (c) = Cosigned By    Initials Name Provider Type Discipline    Corin Ortega PTA Physical Therapist Assistant PT                    PT Discharge Summary  Reason for Discharge: Discharge from facility  Outcomes Achieved: Refer to plan of care for updates on goals achieved  Discharge Destination: Home with home health      Corin Merritt, NORMAN   4/19/2022

## 2022-04-20 ENCOUNTER — TRANSITIONAL CARE MANAGEMENT TELEPHONE ENCOUNTER (OUTPATIENT)
Dept: CALL CENTER | Facility: HOSPITAL | Age: 82
End: 2022-04-20

## 2022-04-20 ENCOUNTER — TELEPHONE (OUTPATIENT)
Dept: INTERNAL MEDICINE | Facility: CLINIC | Age: 82
End: 2022-04-20

## 2022-04-20 DIAGNOSIS — R53.1 GENERALIZED WEAKNESS: ICD-10-CM

## 2022-04-20 DIAGNOSIS — F02.80 LEWY BODY DEMENTIA WITHOUT BEHAVIORAL DISTURBANCE: Primary | ICD-10-CM

## 2022-04-20 DIAGNOSIS — Z74.09 IMPAIRED MOBILITY: ICD-10-CM

## 2022-04-20 DIAGNOSIS — G31.83 LEWY BODY DEMENTIA WITHOUT BEHAVIORAL DISTURBANCE: Primary | ICD-10-CM

## 2022-04-20 NOTE — TELEPHONE ENCOUNTER
Patients daughter called she states that the patient needs a portable potty chair sent to [facility] please. Thank you

## 2022-04-20 NOTE — THERAPY DISCHARGE NOTE
Acute Care - Occupational Therapy Discharge Summary  [facility]     Patient Name: Edy Urbina  : 1940  MRN: [id number]    Today's Date: 2022                 Admit Date: 2022        OT Recommendation and Plan    Visit Dx:    ICD-10-CM ICD-9-CM   1. Altered mental status, unspecified altered mental status type  R41.82 780.97   2. Pneumonia due to infectious organism, unspecified laterality, unspecified part of lung  J18.9 486   3. Dysphagia, unspecified type  R13.10 787.20   4. Impaired mobility  Z74.09 799.89   5. Decreased activities of daily living (ADL)  Z78.9 V49.89                OT Rehab Goals     Row Name 22 0800             Transfer Goal 1 (OT)    Activity/Assistive Device (Transfer Goal 1, OT) sit-to-stand/stand-to-sit;bed-to-chair/chair-to-bed;toilet;walker, rolling;commode, 3-in-1  -TS      Silt Level/Cues Needed (Transfer Goal 1, OT) contact guard required  -TS      Time Frame (Transfer Goal 1, OT) long term goal (LTG);by discharge  -TS      Progress/Outcome (Transfer Goal 1, OT) goal met  -TS              Toileting Goal 1 (OT)    Activity/Device (Toileting Goal 1, OT) toileting skills, all;adjust/manage clothing;perform perineal hygiene  -TS      Silt Level/Cues Needed (Toileting Goal 1, OT) contact guard required  -TS      Time Frame (Toileting Goal 1, OT) long term goal (LTG);by discharge  -TS      Progress/Outcome (Toileting Goal 1, OT) goal not met  -TS              Self-Feeding Goal 1 (OT)    Activity/Device (Self-Feeding Goal 1, OT) self-feeding skills, all;scoop food and bring to mouth;adapted cup;scoop dish/plate guard;nonslip mat  -TS      Silt Level/Cues Needed (Self-Feeding Goal 1, OT) set-up required  -TS      Time Frame (Self-Feeding Goal 1, OT) long term goal (LTG);by discharge  -TS      Progress/Outcomes (Self-Feeding Goal 1, OT) goal met  -TS            User Key  (r) = Recorded By, (t) = Taken By, (c) = Cosigned By    Initials Name  Provider Type Discipline    TS Tere Power COTA Occupational Therapist Assistant OT                    Timed Therapy Charges  Total Units: 5    Charges  Total Units: 5    Procedure Name Documented Minutes Units Code    HC OT SELF CARE/MGMT/TRAIN EA 15 MIN 68  5    21625 (CPT®)               Documented Minutes  Total Minutes: 68    Therapy Provided Minutes    32020 - OT Self Care/Mgmt Minutes 68                    OT Discharge Summary  Anticipated Discharge Disposition (OT): home with assist  Reason for Discharge: Discharge from facility  Outcomes Achieved: Refer to plan of care for updates on goals achieved  Discharge Destination: Home with assist, Home with home health      SCAR Patel  4/20/2022

## 2022-04-20 NOTE — OUTREACH NOTE
Call Center TCM Note    Flowsheet Row Responses   [facility] patient discharged from? [facility]   Does the patient have one of the following disease processes/diagnoses(primary or secondary)? COPD/Pneumonia   Was the primary reason for admission: Pneumonia   TCM attempt successful? Yes   Discharge diagnosis PNA   Is patient permission given to speak with other caregiver? Yes   Person spoke with today (if not patient) and relationship Brian DTLLOYD   Meds reviewed with patient/caregiver? Yes   Is the patient having any side effects they believe may be caused by any medication additions or changes? No   Does the patient have all medications ordered at discharge? Yes   Is the patient taking all medications as directed (includes completed medication regime)? Yes   Does the patient have a primary care provider?  Yes   Does the patient have an appointment with their PCP or specialist within 7 days of discharge? Yes   Comments regarding PCP TCM APPT with PCP Dr Amato is 04/26/2022.   Has the patient kept scheduled appointments due by today? N/A   What is the Home health agency?  [facility]    Has home health visited the patient within 72 hours of discharge? Yes   Pulse Ox monitoring None   Psychosocial issues? No   Did the patient receive a copy of their discharge instructions? Yes   Nursing interventions Reviewed instructions with patient   What is the patient's perception of their health status since discharge? Improving   If the patient is a current smoker, are they able to teach back resources for cessation? Not a smoker   Is the patient/caregiver able to teach back the hierarchy of who to call/visit for symptoms/problems? PCP, Specialist, Home health nurse, Urgent Care, ED, 911 Yes   Is the patient/caregiver able to teach back signs and symptoms of worsening condition: Fever/chills, Shortness of breath, Chest pain   Is the patient/caregiver able to teach back importance of completing antibiotic course of  treatment? Yes   TCM call completed? Yes   Wrap up additional comments Pt dtr Jennyfer states pt doing fair. HH should be starting tomorrow. All medications in place. No questions at this time. TCM APPT with PCP Dr Amato is 04/26/2022.          Mesha Mackey MA    4/20/2022, 14:51 CDT

## 2022-04-20 NOTE — OUTREACH NOTE
Prep Survey    Flowsheet Row Responses   Congregational facility patient discharged from? [place]   Is LACE score < 7 ? No   Emergency Room discharge w/ pulse ox? No   Eligibility [facility]   [facility] [place]   Date of Admission 04/14/22   Date of Discharge 04/19/22   Discharge Disposition Home-Health Care Svc   Discharge diagnosis PNA   Does the patient have one of the following disease processes/diagnoses(primary or secondary)? COPD/Pneumonia   Does the patient have Home health ordered? Yes   What is the Home health agency?  [facility]   Is there a DME ordered? No   Prep survey completed? Yes          SHAI A - Registered Nurse

## 2022-04-21 ENCOUNTER — HOME CARE VISIT (OUTPATIENT)
Dept: HOME HEALTH SERVICES | Facility: CLINIC | Age: 82
End: 2022-04-21

## 2022-04-21 VITALS
BODY MASS INDEX: 17.23 KG/M2 | RESPIRATION RATE: 16 BRPM | DIASTOLIC BLOOD PRESSURE: 68 MMHG | HEIGHT: 73 IN | WEIGHT: 130 LBS | TEMPERATURE: 99.1 F | HEART RATE: 85 BPM | SYSTOLIC BLOOD PRESSURE: 102 MMHG | OXYGEN SATURATION: 98 %

## 2022-04-21 PROCEDURE — G0299 HHS/HOSPICE OF RN EA 15 MIN: HCPCS

## 2022-04-21 NOTE — HOME HEALTH
FOCUS OF CARE/SKILLED NEED: Educate on pulmonary function and SS to report to HH and/or PCP. Assessment of lungs to monitor for continued healing of pneumonia.     TEACHING/INTERVENTIONS: Pt/cg agreeable to homecare admission under the care Lidia Soares APRN   of Admission agreement, and safety plan reviewed and signed by the pt. Medication reconciliation completed and no issues found. Pt had no further questions regarding medication and/or plan of care.    PROGRESS TOWARD GOALS: pt progressing towards goal    PHYSICIAN CONTACT: none needed    RECENT INSURANCE CHANGES? no    RECENT FALLS? denies any recent falls since returning from hospital    RECENT MEDICATION CHANGES? none    PLAN FOR NEXT VISIT: follow up with mobility, pain and pulmonary status.

## 2022-04-22 ENCOUNTER — HOME CARE VISIT (OUTPATIENT)
Dept: HOME HEALTH SERVICES | Facility: CLINIC | Age: 82
End: 2022-04-22

## 2022-04-22 PROCEDURE — G0151 HHCP-SERV OF PT,EA 15 MIN: HCPCS

## 2022-04-24 ENCOUNTER — APPOINTMENT (OUTPATIENT)
Dept: ULTRASOUND IMAGING | Facility: HOSPITAL | Age: 82
End: 2022-04-24

## 2022-04-24 ENCOUNTER — HOSPITAL ENCOUNTER (EMERGENCY)
Facility: HOSPITAL | Age: 82
Discharge: HOME | End: 2022-04-25
Attending: STUDENT IN AN ORGANIZED HEALTH CARE EDUCATION/TRAINING PROGRAM | Admitting: STUDENT IN AN ORGANIZED HEALTH CARE EDUCATION/TRAINING PROGRAM

## 2022-04-24 DIAGNOSIS — R53.1 WEAKNESS GENERALIZED: Primary | ICD-10-CM

## 2022-04-24 DIAGNOSIS — R79.89 ELEVATED TSH: ICD-10-CM

## 2022-04-24 LAB
ALBUMIN SERPL-MCNC: 3.1 G/DL (ref 3.5–5.2)
ALBUMIN/GLOB SERPL: 1.1 G/DL
ALP SERPL-CCNC: 98 U/L (ref 39–117)
ALT SERPL W P-5'-P-CCNC: 149 U/L (ref 1–41)
ANION GAP SERPL CALCULATED.3IONS-SCNC: 7 MMOL/L (ref 5–15)
AST SERPL-CCNC: 82 U/L (ref 1–40)
BASOPHILS # BLD AUTO: 0.04 10*3/MM3 (ref 0–0.2)
BASOPHILS NFR BLD AUTO: 0.7 % (ref 0–1.5)
BILIRUB SERPL-MCNC: 0.2 MG/DL (ref 0–1.2)
BILIRUB UR QL STRIP: NEGATIVE
BUN SERPL-MCNC: 24 MG/DL (ref 8–23)
BUN/CREAT SERPL: 27.9 (ref 7–25)
CALCIUM SPEC-SCNC: 8.4 MG/DL (ref 8.6–10.5)
CHLORIDE SERPL-SCNC: 107 MMOL/L (ref 98–107)
CLARITY UR: ABNORMAL
CO2 SERPL-SCNC: 27 MMOL/L (ref 22–29)
COLOR UR: ABNORMAL
CREAT SERPL-MCNC: 0.86 MG/DL (ref 0.76–1.27)
DEPRECATED RDW RBC AUTO: 46.1 FL (ref 37–54)
EGFRCR SERPLBLD CKD-EPI 2021: 87.5 ML/MIN/1.73
EOSINOPHIL # BLD AUTO: 0.13 10*3/MM3 (ref 0–0.4)
EOSINOPHIL NFR BLD AUTO: 2.2 % (ref 0.3–6.2)
ERYTHROCYTE [DISTWIDTH] IN BLOOD BY AUTOMATED COUNT: 14.5 % (ref 12.3–15.4)
GLOBULIN UR ELPH-MCNC: 2.7 GM/DL
GLUCOSE SERPL-MCNC: 97 MG/DL (ref 65–99)
GLUCOSE UR STRIP-MCNC: NEGATIVE MG/DL
HCT VFR BLD AUTO: 30.1 % (ref 37.5–51)
HGB BLD-MCNC: 9.7 G/DL (ref 13–17.7)
HGB UR QL STRIP.AUTO: NEGATIVE
IMM GRANULOCYTES # BLD AUTO: 0.03 10*3/MM3 (ref 0–0.05)
IMM GRANULOCYTES NFR BLD AUTO: 0.5 % (ref 0–0.5)
KETONES UR QL STRIP: ABNORMAL
LEUKOCYTE ESTERASE UR QL STRIP.AUTO: NEGATIVE
LYMPHOCYTES # BLD AUTO: 1.32 10*3/MM3 (ref 0.7–3.1)
LYMPHOCYTES NFR BLD AUTO: 22.6 % (ref 19.6–45.3)
MAGNESIUM SERPL-MCNC: 2.3 MG/DL (ref 1.6–2.4)
MCH RBC QN AUTO: 28.3 PG (ref 26.6–33)
MCHC RBC AUTO-ENTMCNC: 32.2 G/DL (ref 31.5–35.7)
MCV RBC AUTO: 87.8 FL (ref 79–97)
MONOCYTES # BLD AUTO: 0.57 10*3/MM3 (ref 0.1–0.9)
MONOCYTES NFR BLD AUTO: 9.7 % (ref 5–12)
NEUTROPHILS NFR BLD AUTO: 3.76 10*3/MM3 (ref 1.7–7)
NEUTROPHILS NFR BLD AUTO: 64.3 % (ref 42.7–76)
NITRITE UR QL STRIP: NEGATIVE
NRBC BLD AUTO-RTO: 0 /100 WBC (ref 0–0.2)
PH UR STRIP.AUTO: <=5 [PH] (ref 5–8)
PLATELET # BLD AUTO: 331 10*3/MM3 (ref 140–450)
PMV BLD AUTO: 10.3 FL (ref 6–12)
POTASSIUM SERPL-SCNC: 4.2 MMOL/L (ref 3.5–5.2)
PROT SERPL-MCNC: 5.8 G/DL (ref 6–8.5)
PROT UR QL STRIP: NEGATIVE
RBC # BLD AUTO: 3.43 10*6/MM3 (ref 4.14–5.8)
SODIUM SERPL-SCNC: 141 MMOL/L (ref 136–145)
SP GR UR STRIP: >1.03 (ref 1–1.03)
T4 FREE SERPL-MCNC: 1.2 NG/DL (ref 0.93–1.7)
TSH SERPL DL<=0.05 MIU/L-ACNC: 5.03 UIU/ML (ref 0.27–4.2)
UROBILINOGEN UR QL STRIP: ABNORMAL
WBC NRBC COR # BLD: 5.85 10*3/MM3 (ref 3.4–10.8)

## 2022-04-24 PROCEDURE — 36415 COLL VENOUS BLD VENIPUNCTURE: CPT

## 2022-04-24 PROCEDURE — 84443 ASSAY THYROID STIM HORMONE: CPT | Performed by: STUDENT IN AN ORGANIZED HEALTH CARE EDUCATION/TRAINING PROGRAM

## 2022-04-24 PROCEDURE — 84439 ASSAY OF FREE THYROXINE: CPT | Performed by: STUDENT IN AN ORGANIZED HEALTH CARE EDUCATION/TRAINING PROGRAM

## 2022-04-24 PROCEDURE — 99284 EMERGENCY DEPT VISIT MOD MDM: CPT

## 2022-04-24 PROCEDURE — 80053 COMPREHEN METABOLIC PANEL: CPT | Performed by: STUDENT IN AN ORGANIZED HEALTH CARE EDUCATION/TRAINING PROGRAM

## 2022-04-24 PROCEDURE — 83735 ASSAY OF MAGNESIUM: CPT | Performed by: STUDENT IN AN ORGANIZED HEALTH CARE EDUCATION/TRAINING PROGRAM

## 2022-04-24 PROCEDURE — 81003 URINALYSIS AUTO W/O SCOPE: CPT | Performed by: STUDENT IN AN ORGANIZED HEALTH CARE EDUCATION/TRAINING PROGRAM

## 2022-04-24 PROCEDURE — 93971 EXTREMITY STUDY: CPT

## 2022-04-24 PROCEDURE — 93971 EXTREMITY STUDY: CPT | Performed by: SURGERY

## 2022-04-24 PROCEDURE — 85025 COMPLETE CBC W/AUTO DIFF WBC: CPT | Performed by: STUDENT IN AN ORGANIZED HEALTH CARE EDUCATION/TRAINING PROGRAM

## 2022-04-24 RX ADMIN — SODIUM CHLORIDE, POTASSIUM CHLORIDE, SODIUM LACTATE AND CALCIUM CHLORIDE 1000 ML: 600; 310; 30; 20 INJECTION, SOLUTION INTRAVENOUS at 20:59

## 2022-04-25 ENCOUNTER — HOME CARE VISIT (OUTPATIENT)
Dept: HOME HEALTH SERVICES | Facility: HOME HEALTHCARE | Age: 82
End: 2022-04-25

## 2022-04-25 ENCOUNTER — HOME CARE VISIT (OUTPATIENT)
Dept: HOME HEALTH SERVICES | Facility: CLINIC | Age: 82
End: 2022-04-25

## 2022-04-25 VITALS
HEART RATE: 69 BPM | DIASTOLIC BLOOD PRESSURE: 76 MMHG | BODY MASS INDEX: 17.23 KG/M2 | HEIGHT: 73 IN | WEIGHT: 130 LBS | RESPIRATION RATE: 20 BRPM | SYSTOLIC BLOOD PRESSURE: 120 MMHG | TEMPERATURE: 98.8 F | OXYGEN SATURATION: 100 %

## 2022-04-25 PROCEDURE — G0152 HHCP-SERV OF OT,EA 15 MIN: HCPCS

## 2022-04-26 ENCOUNTER — HOME CARE VISIT (OUTPATIENT)
Dept: HOME HEALTH SERVICES | Facility: CLINIC | Age: 82
End: 2022-04-26

## 2022-04-26 ENCOUNTER — HOME CARE VISIT (OUTPATIENT)
Dept: HOME HEALTH SERVICES | Facility: HOME HEALTHCARE | Age: 82
End: 2022-04-26

## 2022-04-26 VITALS
RESPIRATION RATE: 16 BRPM | HEART RATE: 60 BPM | DIASTOLIC BLOOD PRESSURE: 60 MMHG | SYSTOLIC BLOOD PRESSURE: 110 MMHG | OXYGEN SATURATION: 98 % | TEMPERATURE: 98.8 F

## 2022-04-26 PROCEDURE — G0155 HHCP-SVS OF CSW,EA 15 MIN: HCPCS

## 2022-04-26 NOTE — HOME HEALTH
"REASON FOR REFERRAL: Patient referred for skilled OT following   PRIMARY DIAGNOSIS: lobar pneumonia  SECONDARY DIAGNOSIS: a-fib, weakness, Cachexia, dementia with Lewy bodies, dementia in other diseases classified elsewhere without behavioral disturbance, anemia    PREVIOUS OCCUPATIONAL THERAPY: The patient has not received skilled OT in the past.  FALL PREVENTION EDUCATION: Patient verbalizes understanding of fall prevention education  DIABETIC TEACHING: Patient reports he is not diabetic.  MEDICATION TEACHING: Patient reports a staff member at [facility] gives him his medication.    SUBJECTIVE: Patient's daughter reports via telephone that patient was left on the commode for 2 hours.  Patient reports went to the ER yesterday.  Staff at [facility] reports patient would not eat lunch, but did drink some today and reports patient needed assistance with dressing on this date.  Staff also reports that patient's dementia is worse than when patient went into the hospital.    FALLS REPORTED: None  MEDICATIONS CHANGES: None reported    ASSISTIVE DEVICE/DME: BSC, grab bar by the shower, handheld shower head    MEDICAL NECESSITY:  Skilled OT evaluation is reasonable and medically necessary to assess safety and independence with ADL tasks.  PATIENT GOAL FOR THIS EPISODE OF CARE: Pt reports seems like everything is alright, like before.  TODAY'S INTERVENTIONS: OT evaluation, including upper body strength testing, activity tolerance testing, dressing, toileting, shower chair transfers assessed; patient education,   ASSESSMENT OF ONGOING NEED FOR SKILLED OT: The patient was assessed using clinical observation, strength measurements, and endurance tolerance.  OT evaluation completed.  Patient would benefit from 24 hour care for supervision and verbal cues for completing tasks.    DATE OF NEXT APPOINTMENT WITH DOCTOR: Patient reports \"not that I know of.\"    PATIENT / CAREGIVER AGREE WITH DISCHARGE PLAN: Yes  COMMUNICATION / " CARE COORDINATION: [facility]

## 2022-04-27 ENCOUNTER — HOME CARE VISIT (OUTPATIENT)
Dept: HOME HEALTH SERVICES | Facility: CLINIC | Age: 82
End: 2022-04-27

## 2022-04-27 PROCEDURE — G0299 HHS/HOSPICE OF RN EA 15 MIN: HCPCS

## 2022-04-27 NOTE — HOME HEALTH
SKILLED SERVICES PROVIDED / MEDICAL   ASSESSMENT OF SOCIAL AND EMOTIONAL FACTORS  COUNSELING FOR LONG RANGE PLANNING AND DECISION MAKING  COMMUNITY RESOURCE PLANNING    HOME/SOCIAL ENVIRONMENT- Patient living alone in [facility] with assistance from home care service. Patient's daughter was present with patient's son on the phone. Patient's son lives in [place]. They have concerns regarding patient and his care needs. Discussed options for care. At this time, they are not interested in SNF if patient can remain in the facility. MSW spoke with the home care service staff and patient's family re patient's increased needs.      CASE COMMUNICATION with Corin Clinical Manager

## 2022-04-28 VITALS
DIASTOLIC BLOOD PRESSURE: 60 MMHG | RESPIRATION RATE: 20 BRPM | OXYGEN SATURATION: 99 % | SYSTOLIC BLOOD PRESSURE: 108 MMHG | TEMPERATURE: 99.4 F | HEART RATE: 51 BPM

## 2022-04-28 NOTE — HOME HEALTH
FOCUS OF CARE/SKILLED NEED: asses pulmonary fucntion pain and medication knowledge     TEACHING/INTERVENTIONS: education provided about medication timing and usage, coughing and deep breathing exercises.     PROGRESS TOWARD GOALS: progressing, improved understanding of medication     PHYSICIAN CONTACT: none    INSURANCE CHANGES? none    FALLS SINCE LAST VISIT? no falls reported or observed at this visit     MEDICATION CHANGES SINCE LAST VISIT? no changes     PLAN FOR NEXT VISIT: assess medication effictiveness and pulmonary status

## 2022-04-29 PROCEDURE — G0180 MD CERTIFICATION HHA PATIENT: HCPCS | Performed by: INTERNAL MEDICINE

## 2022-05-03 ENCOUNTER — HOME CARE VISIT (OUTPATIENT)
Dept: HOME HEALTH SERVICES | Facility: CLINIC | Age: 82
End: 2022-05-03

## 2022-05-03 VITALS
DIASTOLIC BLOOD PRESSURE: 52 MMHG | RESPIRATION RATE: 16 BRPM | OXYGEN SATURATION: 98 % | HEART RATE: 51 BPM | SYSTOLIC BLOOD PRESSURE: 108 MMHG | TEMPERATURE: 99.4 F

## 2022-05-03 PROCEDURE — G0299 HHS/HOSPICE OF RN EA 15 MIN: HCPCS

## 2022-05-04 NOTE — HOME HEALTH
FOCUS OF CARE/SKILLED NEED: Assesment of pulmonary status.     TEACHING/INTERVENTIONS: Education on pulmonary exercises     PROGRESS TOWARD GOALS: progressing     PHYSICIAN CONTACT: none needed      INSURANCE CHANGES? none     FALLS SINCE LAST VISIT? Denies    MEDICATION CHANGES SINCE LAST VISIT? no changes    PLAN FOR NEXT VISIT: Assessment of pulmonary sattus and nutrition

## 2022-05-05 ENCOUNTER — HOME CARE VISIT (OUTPATIENT)
Dept: HOME HEALTH SERVICES | Facility: CLINIC | Age: 82
End: 2022-05-05

## 2022-05-05 VITALS
DIASTOLIC BLOOD PRESSURE: 60 MMHG | OXYGEN SATURATION: 98 % | HEART RATE: 49 BPM | TEMPERATURE: 97.9 F | SYSTOLIC BLOOD PRESSURE: 114 MMHG | RESPIRATION RATE: 16 BRPM

## 2022-05-05 PROCEDURE — G0153 HHCP-SVS OF S/L PATH,EA 15MN: HCPCS

## 2022-05-05 NOTE — HOME HEALTH
COVID SCREENING: NEGATIVE    PRIMAY DIAGNOSIS:  LOBAR PNEUMONIA    SECONDARY DIAGNOSIS: COGNITIVE DEFICIT    REASON FOR REFERRAL: DECREASED COGNITIVE FUNCTIONING    SUBJECTIVE: PT. IS ALERT, DRESSED AND READY FOR ST VISIT. CECILIA CONNELL STATES THAT SINCE PT. WAS HOSPITALIZED FOR PNEUMONIA THAT HE HAS DEMONSTRATES SIGNIFICANTLY DECREASED ATTENTENTION, MEMORY AND HAS BECOME INCONTINENT OF BOWEL.      PREVIOUS ST:  NONE    INSURANCE CHANGES:  NONE    MEDICATION CHANGES:  NONE    FALL REPORTED:  NONE    PATIENT STATED GOAL:    MEDICAL NECESSITY:  SKILLED ST NECESSARY TO MAXIMIZE COGNITIVE FUNCTION, RELIABLE COMMUNICATION AND SAFETY IN ASSISTED ENVIRONMENT.    NEXT DOCTOR APPOINTMENT: WK OF 5/09/22    REHAB POTENTIAL: [facility]    DISCHARGE PLAN: D/C WHEN GOALS ARE MET OR WHEN MAX FUNCTIONAL GAIN IS ACHIEVED.    FREQUENCY AND DURATION: 2WK3 BEGINNING WK OF 05/09/22    PLAN FOR NEXT VISIT: COGNTIIVE RETRAINING TASKS; TR/ED/INSTRUCTION AS NECESSARY WITH AVAILABLE STAFF

## 2022-05-10 ENCOUNTER — APPOINTMENT (OUTPATIENT)
Dept: GENERAL RADIOLOGY | Facility: HOSPITAL | Age: 82
End: 2022-05-10

## 2022-05-10 ENCOUNTER — APPOINTMENT (OUTPATIENT)
Dept: CT IMAGING | Facility: HOSPITAL | Age: 82
End: 2022-05-10

## 2022-05-10 ENCOUNTER — HOME CARE VISIT (OUTPATIENT)
Dept: HOME HEALTH SERVICES | Facility: CLINIC | Age: 82
End: 2022-05-10

## 2022-05-10 ENCOUNTER — HOSPITAL ENCOUNTER (OUTPATIENT)
Facility: HOSPITAL | Age: 82
Setting detail: OBSERVATION
Discharge: HOME | End: 2022-05-18
Attending: EMERGENCY MEDICINE | Admitting: FAMILY MEDICINE

## 2022-05-10 VITALS
OXYGEN SATURATION: 97 % | DIASTOLIC BLOOD PRESSURE: 66 MMHG | TEMPERATURE: 97.7 F | RESPIRATION RATE: 16 BRPM | HEART RATE: 77 BPM | SYSTOLIC BLOOD PRESSURE: 124 MMHG

## 2022-05-10 DIAGNOSIS — U07.1 COVID-19: ICD-10-CM

## 2022-05-10 DIAGNOSIS — G31.83 LEWY BODY DEMENTIA WITHOUT BEHAVIORAL DISTURBANCE: ICD-10-CM

## 2022-05-10 DIAGNOSIS — Z78.9 IMPAIRED MOBILITY AND ADLS: ICD-10-CM

## 2022-05-10 DIAGNOSIS — Z74.09 IMPAIRED MOBILITY AND ADLS: ICD-10-CM

## 2022-05-10 DIAGNOSIS — R13.10 DYSPHAGIA, UNSPECIFIED TYPE: ICD-10-CM

## 2022-05-10 DIAGNOSIS — Z74.09 IMPAIRED MOBILITY: ICD-10-CM

## 2022-05-10 DIAGNOSIS — F02.80 LEWY BODY DEMENTIA WITHOUT BEHAVIORAL DISTURBANCE: ICD-10-CM

## 2022-05-10 DIAGNOSIS — G93.41 ACUTE METABOLIC ENCEPHALOPATHY: Primary | ICD-10-CM

## 2022-05-10 PROBLEM — I48.91 ATRIAL FIBRILLATION (HCC): Status: ACTIVE | Noted: 2022-05-10

## 2022-05-10 LAB
ALBUMIN SERPL-MCNC: 3.8 G/DL (ref 3.5–5.2)
ALBUMIN/GLOB SERPL: 1.4 G/DL
ALP SERPL-CCNC: 68 U/L (ref 39–117)
ALT SERPL W P-5'-P-CCNC: 25 U/L (ref 1–41)
ANION GAP SERPL CALCULATED.3IONS-SCNC: 12 MMOL/L (ref 5–15)
AST SERPL-CCNC: 54 U/L (ref 1–40)
BACTERIA UR QL AUTO: ABNORMAL /HPF
BASOPHILS # BLD AUTO: 0.03 10*3/MM3 (ref 0–0.2)
BASOPHILS NFR BLD AUTO: 0.6 % (ref 0–1.5)
BILIRUB SERPL-MCNC: 0.5 MG/DL (ref 0–1.2)
BILIRUB UR QL STRIP: NEGATIVE
BUN SERPL-MCNC: 27 MG/DL (ref 8–23)
BUN/CREAT SERPL: 26.5 (ref 7–25)
CALCIUM SPEC-SCNC: 9.2 MG/DL (ref 8.6–10.5)
CHLORIDE SERPL-SCNC: 103 MMOL/L (ref 98–107)
CLARITY UR: CLEAR
CO2 SERPL-SCNC: 28 MMOL/L (ref 22–29)
COLOR UR: ABNORMAL
CREAT SERPL-MCNC: 1.02 MG/DL (ref 0.76–1.27)
CRP SERPL-MCNC: 1.69 MG/DL (ref 0–0.5)
D-LACTATE SERPL-SCNC: 1.7 MMOL/L (ref 0.5–2)
DEPRECATED RDW RBC AUTO: 49.5 FL (ref 37–54)
EGFRCR SERPLBLD CKD-EPI 2021: 74.3 ML/MIN/1.73
EOSINOPHIL # BLD AUTO: 0.01 10*3/MM3 (ref 0–0.4)
EOSINOPHIL NFR BLD AUTO: 0.2 % (ref 0.3–6.2)
ERYTHROCYTE [DISTWIDTH] IN BLOOD BY AUTOMATED COUNT: 15.1 % (ref 12.3–15.4)
GLOBULIN UR ELPH-MCNC: 2.8 GM/DL
GLUCOSE SERPL-MCNC: 91 MG/DL (ref 65–99)
GLUCOSE UR STRIP-MCNC: NEGATIVE MG/DL
HCT VFR BLD AUTO: 39.6 % (ref 37.5–51)
HGB BLD-MCNC: 12.3 G/DL (ref 13–17.7)
HGB UR QL STRIP.AUTO: NEGATIVE
HYALINE CASTS UR QL AUTO: ABNORMAL /LPF
IMM GRANULOCYTES # BLD AUTO: 0.02 10*3/MM3 (ref 0–0.05)
IMM GRANULOCYTES NFR BLD AUTO: 0.4 % (ref 0–0.5)
KETONES UR QL STRIP: ABNORMAL
LEUKOCYTE ESTERASE UR QL STRIP.AUTO: ABNORMAL
LYMPHOCYTES # BLD AUTO: 0.74 10*3/MM3 (ref 0.7–3.1)
LYMPHOCYTES NFR BLD AUTO: 14.4 % (ref 19.6–45.3)
MAGNESIUM SERPL-MCNC: 2.3 MG/DL (ref 1.6–2.4)
MCH RBC QN AUTO: 28 PG (ref 26.6–33)
MCHC RBC AUTO-ENTMCNC: 31.1 G/DL (ref 31.5–35.7)
MCV RBC AUTO: 90.2 FL (ref 79–97)
MONOCYTES # BLD AUTO: 0.89 10*3/MM3 (ref 0.1–0.9)
MONOCYTES NFR BLD AUTO: 17.3 % (ref 5–12)
NEUTROPHILS NFR BLD AUTO: 3.44 10*3/MM3 (ref 1.7–7)
NEUTROPHILS NFR BLD AUTO: 67.1 % (ref 42.7–76)
NITRITE UR QL STRIP: NEGATIVE
NRBC BLD AUTO-RTO: 0 /100 WBC (ref 0–0.2)
PH UR STRIP.AUTO: 7 [PH] (ref 5–8)
PLATELET # BLD AUTO: 186 10*3/MM3 (ref 140–450)
PMV BLD AUTO: 10.4 FL (ref 6–12)
POTASSIUM SERPL-SCNC: 4.6 MMOL/L (ref 3.5–5.2)
PROCALCITONIN SERPL-MCNC: 0.39 NG/ML (ref 0–0.25)
PROT SERPL-MCNC: 6.6 G/DL (ref 6–8.5)
PROT UR QL STRIP: ABNORMAL
RBC # BLD AUTO: 4.39 10*6/MM3 (ref 4.14–5.8)
RBC # UR STRIP: ABNORMAL /HPF
REF LAB TEST METHOD: ABNORMAL
SARS-COV-2 RNA PNL SPEC NAA+PROBE: DETECTED
SODIUM SERPL-SCNC: 143 MMOL/L (ref 136–145)
SP GR UR STRIP: 1.03 (ref 1–1.03)
SQUAMOUS #/AREA URNS HPF: ABNORMAL /HPF
UROBILINOGEN UR QL STRIP: ABNORMAL
WBC # UR STRIP: ABNORMAL /HPF
WBC NRBC COR # BLD: 5.13 10*3/MM3 (ref 3.4–10.8)

## 2022-05-10 PROCEDURE — G0378 HOSPITAL OBSERVATION PER HR: HCPCS

## 2022-05-10 PROCEDURE — 81001 URINALYSIS AUTO W/SCOPE: CPT | Performed by: EMERGENCY MEDICINE

## 2022-05-10 PROCEDURE — 84145 PROCALCITONIN (PCT): CPT | Performed by: EMERGENCY MEDICINE

## 2022-05-10 PROCEDURE — 93005 ELECTROCARDIOGRAM TRACING: CPT | Performed by: EMERGENCY MEDICINE

## 2022-05-10 PROCEDURE — M0222 HC INJECTION BEBTELOVIMAB: HCPCS | Performed by: EMERGENCY MEDICINE

## 2022-05-10 PROCEDURE — 83605 ASSAY OF LACTIC ACID: CPT | Performed by: EMERGENCY MEDICINE

## 2022-05-10 PROCEDURE — 99285 EMERGENCY DEPT VISIT HI MDM: CPT

## 2022-05-10 PROCEDURE — 96360 HYDRATION IV INFUSION INIT: CPT

## 2022-05-10 PROCEDURE — 93010 ELECTROCARDIOGRAM REPORT: CPT | Performed by: INTERNAL MEDICINE

## 2022-05-10 PROCEDURE — 87040 BLOOD CULTURE FOR BACTERIA: CPT | Performed by: EMERGENCY MEDICINE

## 2022-05-10 PROCEDURE — 71045 X-RAY EXAM CHEST 1 VIEW: CPT

## 2022-05-10 PROCEDURE — 86140 C-REACTIVE PROTEIN: CPT | Performed by: EMERGENCY MEDICINE

## 2022-05-10 PROCEDURE — P9612 CATHETERIZE FOR URINE SPEC: HCPCS

## 2022-05-10 PROCEDURE — G0153 HHCP-SVS OF S/L PATH,EA 15MN: HCPCS

## 2022-05-10 PROCEDURE — 87635 SARS-COV-2 COVID-19 AMP PRB: CPT | Performed by: EMERGENCY MEDICINE

## 2022-05-10 PROCEDURE — 96361 HYDRATE IV INFUSION ADD-ON: CPT

## 2022-05-10 PROCEDURE — 80053 COMPREHEN METABOLIC PANEL: CPT | Performed by: EMERGENCY MEDICINE

## 2022-05-10 PROCEDURE — 85025 COMPLETE CBC W/AUTO DIFF WBC: CPT | Performed by: EMERGENCY MEDICINE

## 2022-05-10 PROCEDURE — 83735 ASSAY OF MAGNESIUM: CPT | Performed by: EMERGENCY MEDICINE

## 2022-05-10 PROCEDURE — 70450 CT HEAD/BRAIN W/O DYE: CPT

## 2022-05-10 PROCEDURE — 25010000002 INJECTION, BEBTELOVIMAB, 175 MG: Performed by: EMERGENCY MEDICINE

## 2022-05-10 RX ORDER — BEBTELOVIMAB 87.5 MG/ML
175 INJECTION, SOLUTION INTRAVENOUS ONCE
Status: COMPLETED | OUTPATIENT
Start: 2022-05-10 | End: 2022-05-10

## 2022-05-10 RX ORDER — ACETAMINOPHEN 160 MG/5ML
650 SOLUTION ORAL EVERY 4 HOURS PRN
Status: DISCONTINUED | OUTPATIENT
Start: 2022-05-10 | End: 2022-05-18 | Stop reason: HOSPADM

## 2022-05-10 RX ORDER — ZINC SULFATE 50(220)MG
220 CAPSULE ORAL DAILY
Status: DISCONTINUED | OUTPATIENT
Start: 2022-05-10 | End: 2022-05-18 | Stop reason: HOSPADM

## 2022-05-10 RX ORDER — DIPHENHYDRAMINE HYDROCHLORIDE 50 MG/ML
50 INJECTION INTRAMUSCULAR; INTRAVENOUS ONCE AS NEEDED
Status: DISCONTINUED | OUTPATIENT
Start: 2022-05-10 | End: 2022-05-10

## 2022-05-10 RX ORDER — SODIUM CHLORIDE 0.9 % (FLUSH) 0.9 %
10 SYRINGE (ML) INJECTION AS NEEDED
Status: DISCONTINUED | OUTPATIENT
Start: 2022-05-10 | End: 2022-05-18 | Stop reason: HOSPADM

## 2022-05-10 RX ORDER — ASCORBIC ACID 500 MG
500 TABLET ORAL DAILY
Status: DISCONTINUED | OUTPATIENT
Start: 2022-05-10 | End: 2022-05-18 | Stop reason: HOSPADM

## 2022-05-10 RX ORDER — METHYLPREDNISOLONE SODIUM SUCCINATE 125 MG/2ML
125 INJECTION, POWDER, LYOPHILIZED, FOR SOLUTION INTRAMUSCULAR; INTRAVENOUS ONCE AS NEEDED
Status: DISCONTINUED | OUTPATIENT
Start: 2022-05-10 | End: 2022-05-10

## 2022-05-10 RX ORDER — DIPHENHYDRAMINE HCL 50 MG
50 CAPSULE ORAL ONCE AS NEEDED
Status: DISCONTINUED | OUTPATIENT
Start: 2022-05-10 | End: 2022-05-10

## 2022-05-10 RX ORDER — MULTIPLE VITAMINS W/ MINERALS TAB 9MG-400MCG
1 TAB ORAL DAILY
Status: DISCONTINUED | OUTPATIENT
Start: 2022-05-10 | End: 2022-05-18 | Stop reason: HOSPADM

## 2022-05-10 RX ORDER — SODIUM CHLORIDE 0.9 % (FLUSH) 0.9 %
10 SYRINGE (ML) INJECTION EVERY 12 HOURS SCHEDULED
Status: DISCONTINUED | OUTPATIENT
Start: 2022-05-10 | End: 2022-05-18 | Stop reason: HOSPADM

## 2022-05-10 RX ORDER — FAMOTIDINE 20 MG/1
20 TABLET, FILM COATED ORAL
Status: DISCONTINUED | OUTPATIENT
Start: 2022-05-11 | End: 2022-05-18 | Stop reason: HOSPADM

## 2022-05-10 RX ORDER — MELATONIN
1000 DAILY
Status: DISCONTINUED | OUTPATIENT
Start: 2022-05-10 | End: 2022-05-18 | Stop reason: HOSPADM

## 2022-05-10 RX ORDER — ACETAMINOPHEN 650 MG/1
650 SUPPOSITORY RECTAL EVERY 4 HOURS PRN
Status: DISCONTINUED | OUTPATIENT
Start: 2022-05-10 | End: 2022-05-18 | Stop reason: HOSPADM

## 2022-05-10 RX ORDER — SODIUM CHLORIDE, SODIUM LACTATE, POTASSIUM CHLORIDE, CALCIUM CHLORIDE 600; 310; 30; 20 MG/100ML; MG/100ML; MG/100ML; MG/100ML
100 INJECTION, SOLUTION INTRAVENOUS CONTINUOUS
Status: DISPENSED | OUTPATIENT
Start: 2022-05-10 | End: 2022-05-11

## 2022-05-10 RX ORDER — ONDANSETRON 2 MG/ML
4 INJECTION INTRAMUSCULAR; INTRAVENOUS EVERY 6 HOURS PRN
Status: DISCONTINUED | OUTPATIENT
Start: 2022-05-10 | End: 2022-05-18 | Stop reason: HOSPADM

## 2022-05-10 RX ORDER — MEMANTINE HYDROCHLORIDE 5 MG/1
10 TABLET ORAL 2 TIMES DAILY
Status: DISCONTINUED | OUTPATIENT
Start: 2022-05-10 | End: 2022-05-18 | Stop reason: HOSPADM

## 2022-05-10 RX ORDER — ACETAMINOPHEN 325 MG/1
650 TABLET ORAL EVERY 4 HOURS PRN
Status: DISCONTINUED | OUTPATIENT
Start: 2022-05-10 | End: 2022-05-18 | Stop reason: HOSPADM

## 2022-05-10 RX ORDER — EPINEPHRINE 0.3 MG/.3ML
0.3 INJECTION SUBCUTANEOUS ONCE AS NEEDED
Status: DISCONTINUED | OUTPATIENT
Start: 2022-05-10 | End: 2022-05-10

## 2022-05-10 RX ORDER — RISPERIDONE 1 MG/1
0.5 TABLET ORAL 2 TIMES DAILY
Status: DISCONTINUED | OUTPATIENT
Start: 2022-05-10 | End: 2022-05-11

## 2022-05-10 RX ORDER — SODIUM CHLORIDE 9 MG/ML
30 INJECTION, SOLUTION INTRAVENOUS ONCE
Status: DISCONTINUED | OUTPATIENT
Start: 2022-05-10 | End: 2022-05-18 | Stop reason: HOSPADM

## 2022-05-10 RX ADMIN — APIXABAN 2.5 MG: 2.5 TABLET, FILM COATED ORAL at 22:45

## 2022-05-10 RX ADMIN — SODIUM CHLORIDE, POTASSIUM CHLORIDE, SODIUM LACTATE AND CALCIUM CHLORIDE 100 ML/HR: 600; 310; 30; 20 INJECTION, SOLUTION INTRAVENOUS at 22:03

## 2022-05-10 RX ADMIN — RISPERIDONE 0.5 MG: 1 TABLET, FILM COATED ORAL at 22:33

## 2022-05-10 RX ADMIN — BEBTELOVIMAB 175 MG: 87.5 INJECTION, SOLUTION INTRAVENOUS at 15:56

## 2022-05-10 RX ADMIN — METOPROLOL TARTRATE 75 MG: 50 TABLET, FILM COATED ORAL at 22:13

## 2022-05-10 RX ADMIN — Medication 10 ML: at 22:38

## 2022-05-10 RX ADMIN — Medication 1 TABLET: at 22:33

## 2022-05-10 RX ADMIN — OXYCODONE HYDROCHLORIDE AND ACETAMINOPHEN 500 MG: 500 TABLET ORAL at 22:34

## 2022-05-10 RX ADMIN — ZINC SULFATE 220 MG (50 MG) CAPSULE 220 MG: CAPSULE at 22:46

## 2022-05-10 RX ADMIN — MEMANTINE HYDROCHLORIDE 10 MG: 5 TABLET, FILM COATED ORAL at 22:13

## 2022-05-10 RX ADMIN — ACETAMINOPHEN 650 MG: 325 TABLET ORAL at 22:00

## 2022-05-10 RX ADMIN — Medication 1000 UNITS: at 22:33

## 2022-05-10 NOTE — HOME HEALTH
COVID SCREENING: NEGATIVE    FOCUS OF CARE/SKILLED NEED: SKILLED ST NECESSARY TO MAXIMIZE RELIABLE COGNITIVE COMMUNICATION AND IMPROVED SAFETY IN [facility] ENVIRONMENT.    TEACHING/INTERVENTIONS: COGNITIVE STIMULATION TASKS    PROGRESS TOWARD GOALS: LIMITED PROGRESS DUE TO NATURE AND SEVERITY OF DEFICITS, HOWEVER, PT. GIVES GOOD ATTEMPTS AT ALL TASKS    PHYSICIAN CONTACT:  NONE    INSURANCE CHANGES? NONE    FALLS SINCE LAST VISIT? NONE    MEDICATION CHANGES SINCE LAST VISIT?  NONE    PLAN FOR NEXT VISIT: COGNITIVE STIMULATION TASKS AND COGNITIVE CUEING HIERARCHY INSTRUCTION IF CG IS PRESENT.

## 2022-05-10 NOTE — ED PROVIDER NOTES
Subjective   81-year-old male presents to the ER with altered mental status.  He has a history of Lewy body dementia, movement disorder, prostate cancer, paroxysmal A. fib on Eliquis.  Patient lives alone, does have daytime caregivers.  This morning the caregiver showed up they noticed the patient was not acting like himself.  They felt he was more confused than baseline.  They also state the patient had turned his thermostat up to 90 degrees.  Patient does complain of feeling cold and has some tremulousness/shaking, but states he always shakes.  He denies headache, sore throat, runny nose, cough congestion, chest pain, shortness of breath, abdominal pain, nausea and vomiting.   Did have a recent admission  through  for pneumonia and metabolic encephalpathy.      History provided by:  Patient      Review of Systems   All other systems reviewed and are negative.      Past Medical History:   Diagnosis Date   • Cataracts, bilateral    • Lewy body dementia (HCC)    • Occasional tremors    • Prostate cancer (HCC) 2011    t2c,Sommer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    • Varicose vein of leg 2019   • Varicose veins of legs        No Known Allergies    Past Surgical History:   Procedure Laterality Date   • EYE SURGERY     • HERNIA REPAIR Left    • INGUINAL HERNIA REPAIR Right 2019    Procedure: OPEN RIGHT INGUINAL HERNIA REPAIR WITH MESH;  Surgeon: Alesia Mora MD;  Location: [facility] OR;  Service: General   • PROSTATECTOMY  2011    RALP       Family History   Problem Relation Age of Onset   • Stroke Mother        Social History     Socioeconomic History   • Marital status:    Tobacco Use   • Smoking status: Former Smoker     Years: 50.00     Types: Cigarettes     Quit date: 2002     Years since quittin.9   • Smokeless tobacco: Never Used   Substance and Sexual Activity   • Alcohol use: No     Comment: QUIT DRINKING IN    • Drug use: No   • Sexual activity:  Defer           Objective   Physical Exam  Vitals and nursing note reviewed.   Constitutional:       Appearance: Normal appearance.      Comments: Generally weak appearing elderly male, eyes closed will walk to the room but will open them to voice and follow commands.  Oriented x2, thinks is 1940    Patient does have tremors, he states this is baseline   HENT:      Head: Normocephalic and atraumatic.      Nose: Nose normal.      Mouth/Throat:      Mouth: Mucous membranes are moist.      Pharynx: Oropharynx is clear. No oropharyngeal exudate or posterior oropharyngeal erythema.   Eyes:      Extraocular Movements: Extraocular movements intact.      Conjunctiva/sclera: Conjunctivae normal.      Pupils: Pupils are equal, round, and reactive to light.   Cardiovascular:      Rate and Rhythm: Normal rate and regular rhythm.      Pulses: Normal pulses.      Heart sounds: Normal heart sounds.   Pulmonary:      Effort: Pulmonary effort is normal.      Breath sounds: Normal breath sounds. No wheezing, rhonchi or rales.   Abdominal:      General: Abdomen is flat. Bowel sounds are normal. There is no distension.      Palpations: Abdomen is soft.      Tenderness: There is no abdominal tenderness.   Musculoskeletal:         General: No tenderness. Normal range of motion.      Cervical back: Normal range of motion and neck supple. No rigidity. No muscular tenderness.      Right lower leg: No edema.      Left lower leg: No edema.   Skin:     General: Skin is warm and dry.      Capillary Refill: Capillary refill takes less than 2 seconds.      Findings: No rash.   Neurological:      General: No focal deficit present.      Mental Status: He is alert. He is disoriented and confused.      Cranial Nerves: No cranial nerve deficit.      Sensory: No sensory deficit.      Motor: No weakness.   Psychiatric:         Mood and Affect: Mood normal.         Behavior: Behavior normal.         Thought Content: Thought content normal.          Procedures       Lab Results (last 24 hours)     Procedure Component Value Units Date/Time    Blood Culture - Blood, Arm, Left [[id number]] Collected: 05/10/22 1220    Specimen: Blood from Arm, Left Updated: 05/10/22 1311    CBC & Differential [[id number]]  (Abnormal) Collected: 05/10/22 1230    Specimen: Blood Updated: 05/10/22 1248    Narrative:      The following orders were created for panel order CBC & Differential.  Procedure                               Abnormality         Status                     ---------                               -----------         ------                     CBC Auto Differential[[id number]]        Abnormal            Final result                 Please view results for these tests on the individual orders.    Comprehensive Metabolic Panel [[id number]]  (Abnormal) Collected: 05/10/22 1230    Specimen: Blood Updated: 05/10/22 1307     Glucose 91 mg/dL      BUN 27 mg/dL      Creatinine 1.02 mg/dL      Sodium 143 mmol/L      Potassium 4.6 mmol/L      Chloride 103 mmol/L      CO2 28.0 mmol/L      Calcium 9.2 mg/dL      Total Protein 6.6 g/dL      Albumin 3.80 g/dL      ALT (SGPT) 25 U/L      AST (SGOT) 54 U/L      Alkaline Phosphatase 68 U/L      Total Bilirubin 0.5 mg/dL      Globulin 2.8 gm/dL      A/G Ratio 1.4 g/dL      BUN/Creatinine Ratio 26.5     Anion Gap 12.0 mmol/L      eGFR 74.3 mL/min/1.73      Comment: National Kidney Foundation and American Society of Nephrology (ASN) Task Force recommended calculation based on the Chronic Kidney Disease Epidemiology Collaboration (CKD-EPI) equation refit without adjustment for race.       Narrative:      GFR Normal >60  Chronic Kidney Disease <60  Kidney Failure <15      Magnesium [[id number]]  (Normal) Collected: 05/10/22 1230    Specimen: Blood Updated: 05/10/22 1301     Magnesium 2.3 mg/dL     CBC Auto Differential [[id number]]  (Abnormal) Collected: 05/10/22 1230    Specimen: Blood Updated: 05/10/22 1248     WBC 5.13 10*3/mm3       "RBC 4.39 10*6/mm3      Hemoglobin 12.3 g/dL      Hematocrit 39.6 %      MCV 90.2 fL      MCH 28.0 pg      MCHC 31.1 g/dL      RDW 15.1 %      RDW-SD 49.5 fl      MPV 10.4 fL      Platelets 186 10*3/mm3      Neutrophil % 67.1 %      Lymphocyte % 14.4 %      Monocyte % 17.3 %      Eosinophil % 0.2 %      Basophil % 0.6 %      Immature Grans % 0.4 %      Neutrophils, Absolute 3.44 10*3/mm3      Lymphocytes, Absolute 0.74 10*3/mm3      Monocytes, Absolute 0.89 10*3/mm3      Eosinophils, Absolute 0.01 10*3/mm3      Basophils, Absolute 0.03 10*3/mm3      Immature Grans, Absolute 0.02 10*3/mm3      nRBC 0.0 /100 WBC     Procalcitonin [[id number]]  (Abnormal) Collected: 05/10/22 1230    Specimen: Blood Updated: 05/10/22 1313     Procalcitonin 0.39 ng/mL     Narrative:      As a Marker for Sepsis (Non-Neonates):    1. <0.5 ng/mL represents a low risk of severe sepsis and/or septic shock.  2. >2 ng/mL represents a high risk of severe sepsis and/or septic shock.    As a Marker for Lower Respiratory Tract Infections that require antibiotic therapy:    PCT on Admission    Antibiotic Therapy       6-12 Hrs later    >0.5                Strongly Recommended  >0.25 - <0.5        Recommended   0.1 - 0.25          Discouraged              Remeasure/reassess PCT  <0.1                Strongly Discouraged     Remeasure/reassess PCT    As 28 day mortality risk marker: \"Change in Procalcitonin Result\" (>80% or <=80%) if Day 0 (or Day 1) and Day 4 values are available. Refer to http://www.[facility]-pct-calculator.com    Change in PCT <=80%  A decrease of PCT levels below or equal to 80% defines a positive change in PCT test result representing a higher risk for 28-day all-cause mortality of patients diagnosed with severe sepsis for septic shock.    Change in PCT >80%  A decrease of PCT levels of more than 80% defines a negative change in PCT result representing a lower risk for 28-day all-cause mortality of patients diagnosed with severe sepsis " or septic shock.       Lactic Acid, Plasma [[id number]]  (Normal) Collected: 05/10/22 1230    Specimen: Blood Updated: 05/10/22 1303     Lactate 1.7 mmol/L     Blood Culture - Blood, Arm, Left [[id number]] Collected: 05/10/22 1230    Specimen: Blood from Arm, Left Updated: 05/10/22 1311    C-reactive Protein [[id number]]  (Abnormal) Collected: 05/10/22 1230    Specimen: Blood Updated: 05/10/22 1309     C-Reactive Protein 1.69 mg/dL     COVID-19,[facility] IN-HOUSE,Nasal Swab No Transport Media 3-4 HR TAT - Swab, Nasal Cavity [[id number]]  (Abnormal) Collected: 05/10/22 1236    Specimen: Swab from Nasal Cavity Updated: 05/10/22 1358     COVID19 Detected    Narrative:      Fact sheet for providers: https://www.fda.gov/media/229082/download     Fact sheet for patients: https://www.fda.gov/media/750231/download    Test performed by PCR.    Consider negative results in combination with clinical observations, patient history, and epidemiological information.    Urinalysis With Culture If Indicated - Urine, Catheter In/Out [[id number]]  (Abnormal) Collected: 05/10/22 1406    Specimen: Urine, Catheter In/Out Updated: 05/10/22 1443     Color, UA Dark Yellow     Appearance, UA Clear     pH, UA 7.0     Specific Gravity, UA 1.028     Glucose, UA Negative     Ketones, UA 15 mg/dL (1+)     Bilirubin, UA Negative     Blood, UA Negative     Protein, UA Trace     Leuk Esterase, UA Trace     Nitrite, UA Negative     Urobilinogen, UA 1.0 E.U./dL    Urinalysis, Microscopic Only - Urine, Catheter In/Out [[id number]]  (Abnormal) Collected: 05/10/22 1406    Specimen: Urine, Catheter In/Out Updated: 05/10/22 1445     RBC, UA 0-2 /HPF      WBC, UA 0-2 /HPF      Comment: Urine culture not indicated.        Bacteria, UA None Seen /HPF      Squamous Epithelial Cells, UA 0-2 /HPF      Hyaline Casts, UA None Seen /LPF      Methodology Manual Light Microscopy      CT Head Without Contrast    Result Date: 5/10/2022  EXAMINATION: CT HEAD WO CONTRAST-  5/10/2022 1:33 PM CDT  HISTORY: Altered mental status.  DOSE: 648 mGycm (Automatic exposure control technique was implemented in an effort to keep the radiation dose as low as possible without compromising image quality)  REPORT: Axial CT of the head was performed without contrast, reconstructed coronal and sagittal images are also reviewed.  Comparison: CT head without contrast 4/14/2022.  No intracranial hemorrhage, mass or mass effect is identified. The ventricles and basal cisterns are within normal limits. There is mild diffuse atrophy. There is decreased attenuation in the periventricular white matter tracts compatible with chronic small vessel white matter ischemic disease. The extracranial structures appear within normal limits.      No acute intracranial abnormality. There are chronic findings associated with aging.  This report was finalized on 05/10/2022 13:36 by Dr. Jacques Gomez MD.    XR Chest 1 View    Result Date: 5/10/2022  XR CHEST 1 VW- 5/10/2022 12:31 PM CDT  HISTORY: ams  COMPARISON: Chest exam dated 4/14/2022.  FINDINGS:  Minimal scarring in the right lung base. Lungs are well expanded. No consolidation. No pleural effusion or pneumothorax. The cardiomediastinal silhouette and pulmonary vascularity are within normal limits. The osseous structures and surrounding soft tissues demonstrate no acute abnormality.      1. No radiographic evidence of acute cardiopulmonary process. 2. Underlying fibrotic changes in the right lung base.   This report was finalized on 05/10/2022 12:40 by Dr August Richardson, .      ED Course  ED Course as of 05/10/22 1558   Tue May 10, 2022   1455 81-year-old male with history of Lewy body dementia, movement disorder, prostate cancer, paroxysmal A. fib on Eliquis presents to the ED with confusion and feeling cold.  Did not have a fever when he got to the hospital.  Did have some similar symptoms but states he has a movement disorder at baseline.  COVID-19 was detected.   Additional labs are reassuring.  Oxygen level was 99 to 100% on room air. [AW]   1546 When I spoke to the patient's son earlier, I misunderstood him and assumed the patient resided at an assisted living facility with CNA staff onsite he would be able to monitor his oxygen level and vital signs.  However, did spoke with a doctor and the patient lives independently, was receiving home health care but that has since .  He does not have any family in town who can help take care of the patient and therefore is not a good candidate for discharge home.  I did discuss the case with our  to see if he would be able to be placed from the emergency department, but unfortunately patient will need precertification.    He does have COVID as well as a metabolic encephalopathy.  Daughter states the patient is nowhere near his baseline.  We will speak to the hospitalist for inpatient evaluation and treatment. [AW]      ED Course User Index  [AW] Tom Sullivan MD                                                 [facility]    Final diagnoses:   COVID-19   Acute metabolic encephalopathy       ED Disposition  ED Disposition     ED Disposition   Decision to Admit    Condition   --    Comment   Level of Care: Remote Telemetry [26]   Diagnosis: Acute metabolic encephalopathy [[id number]]               Torey Amato,   [address] [facility] [address] [address]  [phone]    Schedule an appointment as soon as possible for a visit   As needed         Medication List      No changes were made to your prescriptions during this visit.          Tom Sullivan MD  05/10/22 0895       Tom Sullivan MD  05/10/22 [id number]

## 2022-05-10 NOTE — ED NOTES
Pt in bed. No s/s distress noted. Alert to verbal stimuli. Denies any needs. Pt asked to attempt to void via urinal- pt agreed to try but unsuccessful. Straight cath completed per sterile technique. Tolerated well. 200ml output. POC dicussed. Will cont monitoring. Advised to call any needs. Call light in reach.

## 2022-05-10 NOTE — ED NOTES
Pt in bed. No s/s distress/pain/discomforts noted. Appears to be sleeping. VSS. Will cont monitoring. Call light in reach.

## 2022-05-10 NOTE — ED NOTES
"Pt in bed. No s/s distress noted. Denies pain/discomforts/needs. Asmt completed. Pt placed on continuous monitoring. See triage note. While Dr. Sullivan at bedside- pt more awake when answering questions. AAOx2. States turned heat up \"because I was cold.\" POC discussed. Verbalized understanding. Will cont monitoring. Call light in reach. Advised to call any needs.   "

## 2022-05-10 NOTE — H&P
[facility] Medicine Services  HISTORY AND PHYSICAL    Date of Admission: 5/10/2022  Primary Care Physician: Torey Amato DO    Subjective     Chief Complaint: Reported altered mental status    History of Present Illness  Patient is an 81-year-old  male with past medical history significant for paroxysmal atrial fibrillation, and history of Lewy body dementia, the presented to our hospital with reported altered mental status.  Patient was hospitalized at our facility from 4/14 through 4/19 with a diagnosis of metabolic encephalopathy due to pneumonia.  He also had a diagnosis of cachexia in addition to Lewy body dementia without behavioral disturbance, and was evaluated by neurology during that hospitalization.  When I evaluated patient in the emergency department, I cannot get the sense that he had any acute change in his mental status.  He was able to answer my questions well.  He reports that he felt cold in his apartment at the [facility], turned up the heat, and when someone went to check on him they thought that he was confused and not acting like himself.  Evidently, he had turned his thermostat up to 90 degrees.  For that reason he was sent to our facility.  Patient tells me that he has not had fevers or chills recently.  He denies any shortness of breath.  He denies any cough or congestion.  He denies any change in taste or smell.  He does report having some GI upset and nausea recently, but denies any vomiting.  He cleared his throat a number of times during my assessment and I asked him if he was having any trouble swallowing, and he denied this.  He incidentally tested positive for COVID in the emergency department.  He has not required any supplemental oxygen.  His chest x-ray has been clear.  He did receive a dose of the monoclonal antibody, bebtelovimab, in the ED. Per report, I daughter presented to the emergency department and stated that he  would not be able to return to the [facility], where he lives alone, but per report does have caregiver support.  For that reason he has been placed in observation as we continue to look into disposition options.    Review of Systems     Otherwise complete ROS reviewed and negative except as mentioned in the HPI.    Past Medical History:   Past Medical History:   Diagnosis Date   • Cataracts, bilateral    • Lewy body dementia (HCC)    • Occasional tremors    • Prostate cancer (HCC) 05/2011    t2c,[place]mer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    • Varicose vein of leg 06/05/2019   • Varicose veins of legs      Past Surgical History:  Past Surgical History:   Procedure Laterality Date   • EYE SURGERY     • HERNIA REPAIR Left    • INGUINAL HERNIA REPAIR Right 6/28/2019    Procedure: OPEN RIGHT INGUINAL HERNIA REPAIR WITH MESH;  Surgeon: Alesia Mora MD;  Location: [facility];  Service: General   • PROSTATECTOMY  05/2011    RALP     Social History:  reports that he quit smoking about 19 years ago. His smoking use included cigarettes. He quit after 50.00 years of use. He has never used smokeless tobacco. He reports that he does not drink alcohol and does not use drugs.    Family History: family history includes Stroke in his mother.       Allergies:  No Known Allergies    Medications:  Prior to Admission medications    Medication Sig Start Date End Date Taking? Authorizing Provider   apixaban (ELIQUIS) 2.5 MG tablet tablet Take 1 tablet by mouth Every 12 (Twelve) Hours for 30 days. Indications: Atrial Fibrillation 4/19/22 5/19/22  Dalton Rodrigues MD   memantine (NAMENDA) 10 MG tablet Take 1 tablet by mouth 2 (Two) Times a Day. TAKE 1 TABLET TWICE DAILY 3/25/22   Dalton Laurent MD   metoprolol tartrate 75 MG tablet Take 75 mg by mouth Every 12 (Twelve) Hours for 30 days. 4/19/22 5/19/22  Dalton Rodrigues MD   Multiple Vitamins-Minerals (MULTIVITAMIN MEN 50+) tablet Take 1 tablet by mouth  Daily.    Provider, MD Xi   risperiDONE (risperDAL) 0.5 MG tablet Take 1 tablet by mouth 2 (Two) Times a Day. 3/25/22   Torey Amato, DO     I have utilized all available immediate resources to obtain, update, and review the patient's current medications.    Objective     Vital Signs: /70   Pulse 60   Temp 98.8 °F (37.1 °C) (Oral)   Resp 17   Wt 59 kg (130 lb)   SpO2 99%   BMI 17.16 kg/m²   Physical Exam  Vitals reviewed.   Constitutional:       General: He is not in acute distress.     Appearance: He is ill-appearing. He is not toxic-appearing.      Comments: Thin and frail appearing   HENT:      Head: Normocephalic.      Mouth/Throat:      Pharynx: No oropharyngeal exudate.      Comments: Clears his throat a lot during my assessment  Eyes:      Pupils: Pupils are equal, round, and reactive to light.   Cardiovascular:      Rate and Rhythm: Normal rate.   Pulmonary:      Effort: Pulmonary effort is normal. No respiratory distress.      Breath sounds: No wheezing or rales.      Comments: On room air  Abdominal:      Palpations: Abdomen is soft.      Comments: thin   Musculoskeletal:         General: Swelling present.      Cervical back: Neck supple.      Comments: Compression stockings in place   Skin:     General: Skin is warm.      Capillary Refill: Capillary refill takes less than 2 seconds.   Neurological:      Mental Status: He is alert.      Motor: Weakness present.      Comments: Able to answer yes/no questions relatively well; some confusion at baseline   Psychiatric:         Mood and Affect: Mood normal.          Results Reviewed:  Lab Results (last 24 hours)     Procedure Component Value Units Date/Time    Urinalysis, Microscopic Only - Urine, Catheter In/Out [[id number]]  (Abnormal) Collected: 05/10/22 1406    Specimen: Urine, Catheter In/Out Updated: 05/10/22 1445     RBC, UA 0-2 /HPF      WBC, UA 0-2 /HPF      Comment: Urine culture not indicated.        Bacteria, UA None Seen  "/HPF      Squamous Epithelial Cells, UA 0-2 /HPF      Hyaline Casts, UA None Seen /LPF      Methodology Manual Light Microscopy    Urinalysis With Culture If Indicated - Urine, Catheter In/Out [[id number]]  (Abnormal) Collected: 05/10/22 1406    Specimen: Urine, Catheter In/Out Updated: 05/10/22 1443     Color, UA Dark Yellow     Appearance, UA Clear     pH, UA 7.0     Specific Gravity, UA 1.028     Glucose, UA Negative     Ketones, UA 15 mg/dL (1+)     Bilirubin, UA Negative     Blood, UA Negative     Protein, UA Trace     Leuk Esterase, UA Trace     Nitrite, UA Negative     Urobilinogen, UA 1.0 E.U./dL    COVID-19,[facility] IN-HOUSE,Nasal Swab No Transport Media 3-4 HR TAT - Swab, Nasal Cavity [[id number]]  (Abnormal) Collected: 05/10/22 1236    Specimen: Swab from Nasal Cavity Updated: 05/10/22 1358     COVID19 Detected    Narrative:      Fact sheet for providers: https://www.fda.gov/media/998752/download     Fact sheet for patients: https://www.fda.gov/media/902546/download    Test performed by PCR.    Consider negative results in combination with clinical observations, patient history, and epidemiological information.    Procalcitonin [[id number]]  (Abnormal) Collected: 05/10/22 1230    Specimen: Blood Updated: 05/10/22 1313     Procalcitonin 0.39 ng/mL     Narrative:      As a Marker for Sepsis (Non-Neonates):    1. <0.5 ng/mL represents a low risk of severe sepsis and/or septic shock.  2. >2 ng/mL represents a high risk of severe sepsis and/or septic shock.    As a Marker for Lower Respiratory Tract Infections that require antibiotic therapy:    PCT on Admission    Antibiotic Therapy       6-12 Hrs later    >0.5                Strongly Recommended  >0.25 - <0.5        Recommended   0.1 - 0.25          Discouraged              Remeasure/reassess PCT  <0.1                Strongly Discouraged     Remeasure/reassess PCT    As 28 day mortality risk marker: \"Change in Procalcitonin Result\" (>80% or <=80%) if Day 0 (or " Day 1) and Day 4 values are available. Refer to http://www.[facility]-pct-calculator.com    Change in PCT <=80%  A decrease of PCT levels below or equal to 80% defines a positive change in PCT test result representing a higher risk for 28-day all-cause mortality of patients diagnosed with severe sepsis for septic shock.    Change in PCT >80%  A decrease of PCT levels of more than 80% defines a negative change in PCT result representing a lower risk for 28-day all-cause mortality of patients diagnosed with severe sepsis or septic shock.       Blood Culture - Blood, Arm, Left [[id number]] Collected: 05/10/22 1220    Specimen: Blood from Arm, Left Updated: 05/10/22 1311    Blood Culture - Blood, Arm, Left [[id number]] Collected: 05/10/22 1230    Specimen: Blood from Arm, Left Updated: 05/10/22 1311    C-reactive Protein [[id number]]  (Abnormal) Collected: 05/10/22 1230    Specimen: Blood Updated: 05/10/22 1309     C-Reactive Protein 1.69 mg/dL     Comprehensive Metabolic Panel [[id number]]  (Abnormal) Collected: 05/10/22 1230    Specimen: Blood Updated: 05/10/22 1307     Glucose 91 mg/dL      BUN 27 mg/dL      Creatinine 1.02 mg/dL      Sodium 143 mmol/L      Potassium 4.6 mmol/L      Chloride 103 mmol/L      CO2 28.0 mmol/L      Calcium 9.2 mg/dL      Total Protein 6.6 g/dL      Albumin 3.80 g/dL      ALT (SGPT) 25 U/L      AST (SGOT) 54 U/L      Alkaline Phosphatase 68 U/L      Total Bilirubin 0.5 mg/dL      Globulin 2.8 gm/dL      A/G Ratio 1.4 g/dL      BUN/Creatinine Ratio 26.5     Anion Gap 12.0 mmol/L      eGFR 74.3 mL/min/1.73      Comment: National Kidney Foundation and American Society of Nephrology (ASN) Task Force recommended calculation based on the Chronic Kidney Disease Epidemiology Collaboration (CKD-EPI) equation refit without adjustment for race.       Narrative:      GFR Normal >60  Chronic Kidney Disease <60  Kidney Failure <15      Lactic Acid, Plasma [[id number]]  (Normal) Collected: 05/10/22 1230     Specimen: Blood Updated: 05/10/22 1303     Lactate 1.7 mmol/L     Magnesium [[id number]]  (Normal) Collected: 05/10/22 1230    Specimen: Blood Updated: 05/10/22 1301     Magnesium 2.3 mg/dL     CBC & Differential [[id number]]  (Abnormal) Collected: 05/10/22 1230    Specimen: Blood Updated: 05/10/22 1248    Narrative:      The following orders were created for panel order CBC & Differential.  Procedure                               Abnormality         Status                     ---------                               -----------         ------                     CBC Auto Differential[[id number]]        Abnormal            Final result                 Please view results for these tests on the individual orders.    CBC Auto Differential [[id number]]  (Abnormal) Collected: 05/10/22 1230    Specimen: Blood Updated: 05/10/22 1248     WBC 5.13 10*3/mm3      RBC 4.39 10*6/mm3      Hemoglobin 12.3 g/dL      Hematocrit 39.6 %      MCV 90.2 fL      MCH 28.0 pg      MCHC 31.1 g/dL      RDW 15.1 %      RDW-SD 49.5 fl      MPV 10.4 fL      Platelets 186 10*3/mm3      Neutrophil % 67.1 %      Lymphocyte % 14.4 %      Monocyte % 17.3 %      Eosinophil % 0.2 %      Basophil % 0.6 %      Immature Grans % 0.4 %      Neutrophils, Absolute 3.44 10*3/mm3      Lymphocytes, Absolute 0.74 10*3/mm3      Monocytes, Absolute 0.89 10*3/mm3      Eosinophils, Absolute 0.01 10*3/mm3      Basophils, Absolute 0.03 10*3/mm3      Immature Grans, Absolute 0.02 10*3/mm3      nRBC 0.0 /100 WBC         Imaging Results (Last 24 Hours)     Procedure Component Value Units Date/Time    CT Head Without Contrast [[id number]] Collected: 05/10/22 1333     Updated: 05/10/22 1339    Narrative:      EXAMINATION: CT HEAD WO CONTRAST- 5/10/2022 1:33 PM CDT     HISTORY: Altered mental status.     DOSE: 648 mGycm (Automatic exposure control technique was implemented in  an effort to keep the radiation dose as low as possible without  compromising image quality)     REPORT:  Axial CT of the head was performed without contrast,  reconstructed coronal and sagittal images are also reviewed.     Comparison: CT head without contrast 4/14/2022.     No intracranial hemorrhage, mass or mass effect is identified. The  ventricles and basal cisterns are within normal limits. There is mild  diffuse atrophy. 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 The extracranial structures appear within normal  limits.       Impression:      No acute intracranial abnormality. There are chronic  findings associated with aging.     This report was finalized on 05/10/2022 13:36 by Dr. Jacques Gomez MD.    XR Chest 1 View [[id number]] Collected: 05/10/22 1239     Updated: 05/10/22 1243    Narrative:      XR CHEST 1 VW- 5/10/2022 12:31 PM CDT     HISTORY: ams     COMPARISON: Chest exam dated 4/14/2022.     FINDINGS:      Minimal scarring in the right lung base. Lungs are well expanded. No  consolidation. No pleural effusion or pneumothorax. The  cardiomediastinal silhouette and pulmonary vascularity are within normal  limits. The osseous structures and surrounding soft tissues demonstrate  no acute abnormality.       Impression:      1. No radiographic evidence of acute cardiopulmonary process.  2. Underlying fibrotic changes in the right lung base.        This report was finalized on 05/10/2022 12:40 by Dr August Richardson, .        I have personally reviewed and interpreted the radiology studies and ECG obtained at time of admission.     Assessment / Plan     Assessment:   Active Hospital Problems    Diagnosis    • COVID-19 virus detected    • Atrial fibrillation (HCC)    • Generalized weakness    • Cachexia (HCC)    • Lewy body dementia without behavioral disturbance (HCC)      Plan:   1.  Patient received the monoclonal antibody infusion in the emergency department (bebtelovimab)  2.  Not requiring any supplemental  oxygen; currently on room air.  CXR clear.  I am going to repeat COVID test tomorrow.  3.  I will ask for a speech therapy assessment to evaluate his swallowing  4.  PT and OT assessment  5.  Supplements including zinc sulfate, vitamin C, and vitamin D  6.  Trial of Pepcid  7.  Intravenous fluids  8.  IV Zofran as needed  9.  Nutrition consultation; would benefit from supplementation  10.  Dispo planning: Patient my discussion with the ED provider and bedside nurse, it sounds like family would like us to look into placement options.  Patient is [facility] a [facility] the [facility].    Electronically signed by Lenard Landa MD, 05/10/22, 16:47 CDT.

## 2022-05-11 ENCOUNTER — HOME CARE VISIT (OUTPATIENT)
Dept: HOME HEALTH SERVICES | Facility: CLINIC | Age: 82
End: 2022-05-11

## 2022-05-11 LAB
QT INTERVAL: 426 MS
QTC INTERVAL: 426 MS
SARS-COV-2 RNA PNL SPEC NAA+PROBE: DETECTED

## 2022-05-11 PROCEDURE — 96361 HYDRATE IV INFUSION ADD-ON: CPT

## 2022-05-11 PROCEDURE — 92610 EVALUATE SWALLOWING FUNCTION: CPT

## 2022-05-11 PROCEDURE — G0378 HOSPITAL OBSERVATION PER HR: HCPCS

## 2022-05-11 PROCEDURE — 87635 SARS-COV-2 COVID-19 AMP PRB: CPT | Performed by: INTERNAL MEDICINE

## 2022-05-11 PROCEDURE — 97166 OT EVAL MOD COMPLEX 45 MIN: CPT | Performed by: OCCUPATIONAL THERAPIST

## 2022-05-11 RX ORDER — RISPERIDONE 0.25 MG/1
0.25 TABLET ORAL 2 TIMES DAILY
Status: DISCONTINUED | OUTPATIENT
Start: 2022-05-11 | End: 2022-05-18 | Stop reason: HOSPADM

## 2022-05-11 RX ORDER — TAMSULOSIN HYDROCHLORIDE 0.4 MG/1
0.4 CAPSULE ORAL DAILY
Status: DISCONTINUED | OUTPATIENT
Start: 2022-05-11 | End: 2022-05-18 | Stop reason: HOSPADM

## 2022-05-11 RX ADMIN — APIXABAN 2.5 MG: 2.5 TABLET, FILM COATED ORAL at 21:48

## 2022-05-11 RX ADMIN — METOPROLOL TARTRATE 75 MG: 50 TABLET, FILM COATED ORAL at 09:15

## 2022-05-11 RX ADMIN — RISPERIDONE 0.25 MG: 0.25 TABLET, FILM COATED ORAL at 21:47

## 2022-05-11 RX ADMIN — METOPROLOL TARTRATE 75 MG: 50 TABLET, FILM COATED ORAL at 21:47

## 2022-05-11 RX ADMIN — Medication 1000 UNITS: at 09:15

## 2022-05-11 RX ADMIN — FAMOTIDINE 20 MG: 20 TABLET, FILM COATED ORAL at 09:16

## 2022-05-11 RX ADMIN — SODIUM CHLORIDE, POTASSIUM CHLORIDE, SODIUM LACTATE AND CALCIUM CHLORIDE 100 ML/HR: 600; 310; 30; 20 INJECTION, SOLUTION INTRAVENOUS at 17:28

## 2022-05-11 RX ADMIN — APIXABAN 2.5 MG: 2.5 TABLET, FILM COATED ORAL at 09:14

## 2022-05-11 RX ADMIN — ZINC SULFATE 220 MG (50 MG) CAPSULE 220 MG: CAPSULE at 09:15

## 2022-05-11 RX ADMIN — MEMANTINE HYDROCHLORIDE 10 MG: 5 TABLET, FILM COATED ORAL at 21:47

## 2022-05-11 RX ADMIN — FAMOTIDINE 20 MG: 20 TABLET, FILM COATED ORAL at 18:39

## 2022-05-11 RX ADMIN — OXYCODONE HYDROCHLORIDE AND ACETAMINOPHEN 500 MG: 500 TABLET ORAL at 09:15

## 2022-05-11 RX ADMIN — Medication 10 ML: at 21:48

## 2022-05-11 RX ADMIN — RISPERIDONE 0.5 MG: 1 TABLET, FILM COATED ORAL at 09:15

## 2022-05-11 RX ADMIN — Medication 1 TABLET: at 09:15

## 2022-05-11 RX ADMIN — TAMSULOSIN HYDROCHLORIDE 0.4 MG: 0.4 CAPSULE ORAL at 13:02

## 2022-05-11 RX ADMIN — MEMANTINE HYDROCHLORIDE 10 MG: 5 TABLET, FILM COATED ORAL at 09:15

## 2022-05-11 RX ADMIN — SODIUM CHLORIDE, POTASSIUM CHLORIDE, SODIUM LACTATE AND CALCIUM CHLORIDE 100 ML/HR: 600; 310; 30; 20 INJECTION, SOLUTION INTRAVENOUS at 09:06

## 2022-05-11 NOTE — THERAPY DISCHARGE NOTE
Acute Care - Speech Language Pathology   Swallow Initial Evaluation/Discharge [facility]     Patient Name: Edy Urbina  : 1940  MRN: [id number]  Today's Date: 2022               Admit Date: 5/10/2022    SPEECH-LANGUAGE PATHOLOGY EVALUATION - SWALLOW  Subjective: The patient was seen on this date for a Clinical Swallow evaluation.  Patient was alert and cooperative.  Significant history: COVID, metabolic encephalopathy, CXR: no acute process, underlying fibrotic changes in right lung base. Hx Lewy body dementia, movement disorder, prostate cancer, paroxysmal a-fib.  Objective: Textures given included thin liquid, puree consistency and regular consistency.  Assessment: Prolonged mastication of the regular solid, but adequate clearance of residue. No overt s/s of aspiration.   SLP Findings:  Patient presents with functional swallow, without esophageal component.   Recommendations: Diet Textures: thin liquid, regular consistency food.  Medications should be taken whole with thin liquids.   Recommended Strategies: Upright for PO, small bites and sips and alternate liquids and solids. Oral care before breakfast, after all meals and PRN.  Dysphagia therapy is not recommended.   Bill Carpenter, CONY-SLP 2022 10:54 CDT    Visit Dx:    ICD-10-CM ICD-9-CM   1. Acute metabolic encephalopathy  G93.41 348.31   2. COVID-19  U07.1 079.89   3. Dysphagia, unspecified type  R13.10 787.20   4. Impaired mobility and ADLs  Z74.09 V49.89    Z78.9      Patient Active Problem List   Diagnosis   • Anemia   • Lewy body dementia without behavioral disturbance (HCC)   • Cachexia (HCC)   • Pneumonia of right middle lobe due to infectious organism   • Generalized weakness   • Elevated BUN   • Metabolic encephalopathy, acute, due to pnuemonia and elevated BUN   • Paroxysmal atrial fibrillation with rapid ventricular response (HCC)   • Acute metabolic encephalopathy   • COVID-19 virus detected   • Atrial fibrillation (HCC)      Past Medical History:   Diagnosis Date   • Cataracts, bilateral    • Lewy body dementia (HCC)    • Occasional tremors    • Prostate cancer (HCC) 05/2011    t2c,[place]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    • Varicose vein of leg 06/05/2019   • Varicose veins of legs      Past Surgical History:   Procedure Laterality Date   • EYE SURGERY     • HERNIA REPAIR Left    • INGUINAL HERNIA REPAIR Right 6/28/2019    Procedure: OPEN RIGHT INGUINAL HERNIA REPAIR WITH MESH;  Surgeon: Alesia Mora MD;  Location: [facility] OR;  Service: General   • PROSTATECTOMY  05/2011    RALP       SLP Recommendation and Plan  SLP Swallowing Diagnosis: swallow WFL (05/11/22 0908)  SLP Diet Recommendation: regular textures, thin liquids (05/11/22 0908)     Monitor for Signs of Aspiration: yes, cough, gurgly voice, throat clearing, pneumonia, notify SLP if any concerns (05/11/22 0908)        Anticipated Discharge Disposition (SLP): skilled nursing facility (05/11/22 1050)     Therapy Frequency (Swallow): evaluation only (05/11/22 0908)              Anticipated Discharge Disposition (SLP): skilled nursing facility (05/11/22 1050)        Reason for Discharge: other (see comments) (Swallow function at baseline) (05/11/22 1050)             Plan of Care Reviewed With: patient (05/11/22 1049)  Outcome Evaluation: See note (05/11/22 1049)    SWALLOW EVALUATION (last 72 hours)     SLP Adult Swallow Evaluation     Row Name 05/11/22 0908                   Rehab Evaluation    Document Type evaluation  -MB        Subjective Information no complaints  -MB        Patient Observations alert;cooperative  -MB        Patient/Family/Caregiver Comments/Observations No family present, enhanced isolation precautions  -MB                  General Information    Patient Profile Reviewed yes  -MB        Pertinent History Of Current Problem COVID, metabolic encephalopathy, CXR: no acute process, underlying fibrotic changes in right lung base. Hx  Lewy body dementia, movement disorder, prostate cancer, paroxysmal a-fib.  -MB        Current Method of Nutrition regular textures;thin liquids  -MB        Precautions/Limitations, Vision WFL  -MB        Precautions/Limitations, Hearing WFL  -MB        Prior Level of Function-Communication cognitive-linguistic impairment;other (see comments)  Dementia, but not currently at baseline per family  -MB        Prior Level of Function-Swallowing mechanical soft textures;thin liquids;other (see comments)  during last admission at this facility  -MB        Plans/Goals Discussed with patient  -MB        Barriers to Rehab cognitive status  -MB        Patient's Goals for Discharge patient did not state  -MB                  Pain    Additional Documentation Pain Scale: FACES Pre/Post-Treatment (Group)  -MB                  Pain Scale: FACES Pre/Post-Treatment    Pain: FACES Scale, Pretreatment 2-->hurts little bit  -MB                  Oral Motor Structure and Function    Dentition Assessment upper dentures/partial in place;other (see comments)  Pt states he doesn't have lower dentures  -MB        Secretion Management WNL/WFL  -MB        Mucosal Quality moist, healthy  -MB                  Oral Musculature and Cranial Nerve Assessment    Oral Motor General Assessment generalized oral motor weakness;other (see comments)  Mandibular and lingual tremors at rest  -MB                  General Eating/Swallowing Observations    Eating/Swallowing Skills fed by SLP  -MB        Positioning During Eating upright in bed  -MB        Utensils Used spoon;straw  -MB        Consistencies Trialed regular textures;pureed;thin liquids  -MB                  Clinical Swallow Eval    Oral Prep Phase impaired  -MB        Oral Transit WFL  -MB        Oral Residue WFL  -MB        Pharyngeal Phase no overt signs/symptoms of pharyngeal impairment  -MB        Esophageal Phase unremarkable  -MB        Clinical Swallow Evaluation Summary See note  -MB                   Oral Prep Concerns    Oral Prep Concerns prolonged mastication  -MB        Prolonged Mastication regular consistencies  -MB                  SLP Evaluation Clinical Impression    SLP Swallowing Diagnosis swallow WFL  -MB        Functional Impact no impact on function  -MB                  Recommendations    Therapy Frequency (Swallow) evaluation only  -MB        SLP Diet Recommendation regular textures;thin liquids  -MB        Recommended Precautions and Strategies upright posture during/after eating;small bites of food and sips of liquid;alternate between small bites of food and sips of liquid;general aspiration precautions  -MB        Oral Care Recommendations Oral Care BID/PRN  -MB        SLP Rec. for Method of Medication Administration meds whole;with thin liquids  -MB        Monitor for Signs of Aspiration yes;cough;gurgly voice;throat clearing;pneumonia;notify SLP if any concerns  -MB        Anticipated Discharge Disposition (SLP) skilled nursing facility  -MB              User Key  (r) = Recorded By, (t) = Taken By, (c) = Cosigned By    Initials Name Effective Dates    Bill Mckeon CCC-SLP 06/16/21 -                 EDUCATION  The patient has been educated in the following areas:   Dysphagia (Swallowing Impairment).                 Time Calculation:    Time Calculation- SLP     Row Name 05/11/22 1053             Time Calculation- SLP    SLP Start Time 0908  -MB      SLP Stop Time 1006  -MB      SLP Time Calculation (min) 58 min  -MB      SLP Received On 05/11/22  -MB              Untimed Charges    [id number] Eval Oral Pharyng Swallow Minutes 58  -MB              Total Minutes    Untimed Charges Total Minutes 58  -MB       Total Minutes 58  -MB            User Key  (r) = Recorded By, (t) = Taken By, (c) = Cosigned By    Initials Name Provider Type    Bill Mckeon CCC-SLP Speech and Language Pathologist                Therapy Charges for Today     Code Description Service Date  Service Provider Modifiers Qty    [id number]  ST EVAL ORAL PHARYNG SWALLOW 4 5/11/2022 Bill Carpenter, CCC-SLP GN 1               SLP Discharge Summary  Anticipated Discharge Disposition (SLP): skilled nursing facility  Reason for Discharge: other (see comments) (Swallow function at baseline)  Progress Toward Achieving Short/long Term Goals: other (see comments) (Swallow function at baseline)  Discharge Destination: other (see comments) (Still in acute care)    Bill Carpenter CCC-SLP  5/11/2022

## 2022-05-11 NOTE — PROGRESS NOTES
Discharge Planning Assessment  [facility]     Patient Name: Edy Urbina  MRN: [id number]  Today's Date: 5/11/2022    Admit Date: 5/10/2022     Discharge Needs Assessment     Row Name 05/11/22 1224       Living Environment    People in Home alone    Current Living Arrangements independent living facility    Primary Care Provided by self    Provides Primary Care For no one    Family Caregiver if Needed child(lakia), adult    Quality of Family Relationships helpful;involved;supportive       Resource/Environmental Concerns    Resource/Environmental Concerns none    Transportation Concerns none       Transition Planning    Patient/Family Anticipates Transition to home    Patient/Family Anticipated Services at Transition none    Transportation Anticipated family or friend will provide       Discharge Needs Assessment    Readmission Within the Last 30 Days no previous admission in last 30 days    Equipment Currently Used at Home commode    Concerns to be Addressed denies needs/concerns at this time    Anticipated Changes Related to Illness none    Equipment Needed After Discharge none    Discharge Facility/Level of Care Needs independent living facility    Provided Post Acute Provider List? N/A    Provided Post Acute Provider Quality & Resource List? N/A    Current Discharge Risk lives alone    Discharge Coordination/Progress I TALKED WITH PT DAUGHTER XOCHITL Fabian [phone]  PT IS IN COVID ISOLATION. PT LIV[facility] AT [facility] AND HIS DAUGHTER WILL PICK HIM UP UPON D/C. PT HAS RX DRUG PLAN AND ABLE TO AFFORD HIS MEDICATION. [facility] AND OTHER THINGS ACCORDING TO DAUGHTER. PT GOES TO THE [facility]/ WILL CONTINUE TO FOLLOW-SHO HOLDER RN  5/11 @ 1230               Discharge Plan    No documentation.               Continued Care and Services - Admitted Since 5/10/2022    Coordination has not been started for this encounter.     Selected Continued Care - Prior Encounters Includes selections from prior  encounters from 2/9/2022 to 5/11/2022    Discharged on 4/19/2022 Admission date: 4/14/2022 - Discharge disposition: Home-Health Care Svc    Home Medical Care     Service Provider Selected Services Address Phone Fax Patient Preferred    [facility]  Home Health Services [address] [facility], [facility] [id number] [phone] --                       Demographic Summary    No documentation.                Functional Status    No documentation.                Psychosocial    No documentation.                Abuse/Neglect    No documentation.                Legal    No documentation.                Substance Abuse    No documentation.                Patient Forms    No documentation.                   Roxy Fang RN

## 2022-05-11 NOTE — ED NOTES
Daughter arrived outside of room. Spoke with Dr. Sullivan. Informing him she is not comfortable with pt to be dcd due to pt lives at home alone besides occasional care takers check on pt. Dr. Sullivan discussed with daughter.

## 2022-05-11 NOTE — PLAN OF CARE
Goal Outcome Evaluation:           Progress: no change  Outcome Evaluation: Initial RDN eval. BMI 17.16. He is on a regular diet, no PO intake recorded at this time. Reported ate some of breakfast. Unable to complete NFPE today d/t isolation status, however NFPE completed during prior admission (4/19/22) indicated moderate muscle and fat wasting. Have added Boost Plus BID and will continue to follow.

## 2022-05-11 NOTE — THERAPY EVALUATION
Patient Name: Edy Urbina  : 1940    MRN: [id number]                              Today's Date: 2022       Admit Date: 5/10/2022    Visit Dx:     ICD-10-CM ICD-9-CM   1. Acute metabolic encephalopathy  G93.41 348.31   2. COVID-19  U07.1 079.89   3. Dysphagia, unspecified type  R13.10 787.20   4. Impaired mobility and ADLs  Z74.09 V49.89    Z78.9      Patient Active Problem List   Diagnosis   • Anemia   • Lewy body dementia without behavioral disturbance (HCC)   • Cachexia (HCC)   • Pneumonia of right middle lobe due to infectious organism   • Generalized weakness   • Elevated BUN   • Metabolic encephalopathy, acute, due to pnuemonia and elevated BUN   • Paroxysmal atrial fibrillation with rapid ventricular response (HCC)   • Acute metabolic encephalopathy   • COVID-19 virus detected   • Atrial fibrillation (HCC)     Past Medical History:   Diagnosis Date   • Cataracts, bilateral    • Lewy body dementia (HCC)    • Occasional tremors    • Prostate cancer (HCC) 2011    t2c,Sommer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    • Varicose vein of leg 2019   • Varicose veins of legs      Past Surgical History:   Procedure Laterality Date   • EYE SURGERY     • HERNIA REPAIR Left    • INGUINAL HERNIA REPAIR Right 2019    Procedure: OPEN RIGHT INGUINAL HERNIA REPAIR WITH MESH;  Surgeon: Alesia Mora MD;  Location: [facility];  Service: General   • PROSTATECTOMY  2011    RALP      General Information     Row Name 22 0824          OT Time and Intention    Document Type evaluation  Admitted 2' being cold and increased confusion. Dx: COVID-19 virus, acute metabolic encephalopathy, Atrial fibrillation, Cachexia, Lewy body dementia. Patient received the monoclonal antibody infusion in the emergency department. H/o movement disorder.  -MM     Mode of Treatment occupational therapy  -MM     Row Name 22 0824          General Information    Patient Profile Reviewed yes  -MM      Prior Level of Function independent:;all household mobility;ADL's  -MM     Existing Precautions/Restrictions fall  -MM     Row Name 05/11/22 0824          Living Environment    People in Home alone;other (see comments)  caregivers present at home, walk in shower, shower chair  -MM     Row Name 05/11/22 0824          Home Main Entrance    Number of Stairs, Main Entrance none  -MM     Stair Railings, Main Entrance none  -MM     Row Name 05/11/22 0824          Stairs Within Home, Primary    Number of Stairs, Within Home, Primary none  -MM     Stair Railings, Within Home, Primary none  -MM     Row Name 05/11/22 0824          Cognition    Orientation Status (Cognition) other (see comments);oriented to;person;place;time  conversationally confused, flat affect  -MM     Row Name 05/11/22 0824          Safety Issues, Functional Mobility    Safety Issues Affecting Function (Mobility) ability to follow commands;at risk behavior observed;awareness of need for assistance;friction/shear risk;insight into deficits/self-awareness;problem-solving;safety precaution awareness;safety precautions follow-through/compliance  -MM     Impairments Affecting Function (Mobility) balance;cognition;coordination;endurance/activity tolerance;motor control;motor planning;range of motion (ROM);shortness of breath;strength  -MM     Cognitive Impairments, Mobility Safety/Performance attention;awareness, need for assistance;insight into deficits/self-awareness;judgment;problem-solving/reasoning;safety precaution awareness;safety precaution follow-through  -MM           User Key  (r) = Recorded By, (t) = Taken By, (c) = Cosigned By    Initials Name Provider Type    MM Rick Herebrt, OTR/L Occupational Therapist                 Mobility/ADL's     Row Name 05/11/22 0824          Bed Mobility    Bed Mobility scooting/bridging  -MM     Scooting/Bridging [place] (Bed Mobility) dependent (less than 25% patient effort);verbal cues  -MM     Assistive  Device (Bed Mobility) draw sheet;other (see comments);head of bed elevated  trendelenburg for scooting  -MM     Comment, (Bed Mobility) attempt to sit EOB with HOB elevated max-dep assist unable to get fully EOB d/t weakness. Further mobility deferred  -MM     Row Name 05/11/22 0824          Activities of Daily Living    BADL Assessment/Intervention grooming;feeding;toileting  -MM     Row Name 05/11/22 0824          Grooming Assessment/Training    [place] Level (Grooming) dependent (less than 25% patient effort);wash face, hands;set up;verbal cues  -MM     Position (Grooming) sitting up in bed  -MM     Row Name 05/11/22 0824          Self-Feeding Assessment/Training    [place] Level (Feeding) liquids to mouth;maximum assist (25% patient effort);dependent (less than 25% patient effort);verbal cues  -MM     Position (Self-Feeding) sitting up in bed  -MM     Row Name 05/11/22 0824          Toileting Assessment/Training    [place] Level (Toileting) toileting skills;dependent (less than 25% patient effort)  -MM     Assistive Devices (Toileting) urinal  -MM     Position (Toileting) sitting up in bed  -MM     Comment, (Toileting) unable to void  -MM           User Key  (r) = Recorded By, (t) = Taken By, (c) = Cosigned By    Initials Name Provider Type    MM Rick Herbert, OTR/L Occupational Therapist               Obj/Interventions     Row Name 05/11/22 0824          Sensory Assessment (Somatosensory)    Sensory Assessment (Somatosensory) UE sensation intact  -     Row Name 05/11/22 0824          Range of Motion Comprehensive    Comment, General Range of Motion BUE AROM WFL except B shoulders 60% impaired  -MM     Row Name 05/11/22 0824          Strength Comprehensive (MMT)    Comment, General Manual Muscle Testing (MMT) Assessment BUE 4-/5 within available ROM  -MM     Row Name 05/11/22 0824          Motor Skills    Motor Skills coordination;neuro-muscular function  -     Coordination fine motor  deficit;gross motor deficit;bilateral;upper extremity  difficult to assess level of impairment 2' tremors  -MM     Neuromuscular Function bilateral;upper extremity;tremor, resting;tremor, intention  -MM           User Key  (r) = Recorded By, (t) = Taken By, (c) = Cosigned By    Initials Name Provider Type    Rick Villalobos, OTR/L Occupational Therapist               Goals/Plan     Row Name 05/11/22 0824          Dressing Goal 1 (OT)    Activity/Device (Dressing Goal 1, OT) upper body dressing  -MM     [place]/Cues Needed (Dressing Goal 1, OT) supervision required;set-up required;verbal cues required  -MM     Time Frame (Dressing Goal 1, OT) long term goal (LTG);by discharge  -MM     Progress/Outcome (Dressing Goal 1, OT) goal ongoing  -MM     Row Name 05/11/22 0824          Grooming Goal 1 (OT)    Activity/Device (Grooming Goal 1, OT) grooming skills, all  -MM     [place] (Grooming Goal 1, OT) supervision required;set-up required;verbal cues required  -MM     Time Frame (Grooming Goal 1, OT) long term goal (LTG);by discharge  -MM     Progress/Outcome (Grooming Goal 1, OT) goal ongoing  -MM     Row Name 05/11/22 0824          Self-Feeding Goal 1 (OT)    Activity/Device (Self-Feeding Goal 1, OT) self-feeding skills, all  -MM     [place] Level/Cues Needed (Self-Feeding Goal 1, OT) supervision required;set-up required;verbal cues required  -MM     Time Frame (Self-Feeding Goal 1, OT) long term goal (LTG);by discharge  -MM     Progress/Outcomes (Self-Feeding Goal 1, OT) goal ongoing  -MM     Row Name 05/11/22 0824          Therapy Assessment/Plan (OT)    Planned Therapy Interventions (OT) activity tolerance training;adaptive equipment training;BADL retraining;cognitive/visual perception retraining;functional balance retraining;neuromuscular control/coordination retraining;occupation/activity based interventions;passive ROM/stretching;patient/caregiver education/training;ROM/therapeutic  exercise;strengthening exercise;transfer/mobility retraining  -MM           User Key  (r) = Recorded By, (t) = Taken By, (c) = Cosigned By    Initials Name Provider Type    MM Rick Herbert, OTR/L Occupational Therapist               Clinical Impression     Row Name 05/11/22 0824          Pain Assessment    Pretreatment Pain Rating 0/10 - no pain  -MM     Posttreatment Pain Rating 0/10 - no pain  -MM     Row Name 05/11/22 0824          Plan of Care Review    Plan of Care Reviewed With patient  -MM     Progress no change  -MM     Outcome Evaluation OT eval completed. Pt reports no pain, only being cold. Per pt report he is normally independent but has family/caregivers present at times. Pt is oriented x3. Pt is conversationally confused with flat affect noted. Pt with BUE resting tremors that increased with movement. Pt reports his tremors appear to be at their baseline. Pt was max-dep for attempting to sit EOB, pt limited by weakness, being cold and tremors. Pt was dep in trendelenburg to scoot with draw sheet. Pt was dependent to place urinal, and unable to void. pt was max-dep to wash face and bring liquids to mouth. Pt on RA and O2 remains WNL throughout session. Pt appreciative of assistance. Skilled OT recommended to address adls, functional mobility and endurance. Recommended d/c to SNF.  -MM     Row Name 05/11/22 0824          Therapy Assessment/Plan (OT)    Patient/Family Therapy Goal Statement (OT) pt does not state  -MM     Rehab Potential (OT) good, to achieve stated therapy goals  -MM     Criteria for Skilled Therapeutic Interventions Met (OT) yes;skilled treatment is necessary  -MM     Therapy Frequency (OT) 5 times/wk  -MM     Predicted Duration of Therapy Intervention (OT) until d/c  -MM     Row Name 05/11/22 0824          Vital Signs    O2 Delivery Pre Treatment room air  -MM     O2 Delivery Intra Treatment room air  -MM     O2 Delivery Post Treatment room air  O2 remains WNL throughout session   -MM     Pre Patient Position Supine  -MM     Intra Patient Position Supine  -MM     Post Patient Position Supine  -MM     Row Name 05/11/22 0824          Positioning and Restraints    Pre-Treatment Position in bed  -MM     Post Treatment Position bed  -MM     In Bed fowlers;call light within reach;encouraged to call for assist;exit alarm on;with nsg;side rails up x2;SCD pump applied  -MM           User Key  (r) = Recorded By, (t) = Taken By, (c) = Cosigned By    Initials Name Provider Type    Rick Villalobos OTR/L Occupational Therapist               Outcome Measures     Row Name 05/11/22 0824          How much help from another is currently needed...    Putting on and taking off regular lower body clothing? 1  -MM     Bathing (including washing, rinsing, and drying) 1  -MM     Toileting (which includes using toilet bed pan or urinal) 1  -MM     Putting on and taking off regular upper body clothing 1  -MM     Taking care of personal grooming (such as brushing teeth) 1  -MM     Eating meals 1  -MM     AM-PAC 6 Clicks Score (OT) 6  -MM     Row Name 05/11/22 0824          Functional Assessment    Outcome Measure Options AM-PAC 6 Clicks Daily Activity (OT)  -MM           User Key  (r) = Recorded By, (t) = Taken By, (c) = Cosigned By    Initials Name Provider Type    Rick Villalobos OTR/L Occupational Therapist                Occupational Therapy Education                 Title: PT OT SLP Therapies (In Progress)     Topic: Occupational Therapy (In Progress)     Point: ADL training (In Progress)     Description:   Instruct learner(s) on proper safety adaptation and remediation techniques during self care or transfers.   Instruct in proper use of assistive devices.              Learning Progress Summary           Patient Acceptance, E, NR by CHRISTOS at 5/11/2022 0953    Comment: OT role, benefits, POC, need for assist                   Point: Home exercise program (Not Started)     Description:   Instruct learner(s)  on appropriate technique for monitoring, assisting and/or progressing therapeutic exercises/activities.              Learner Progress:  Not documented in this visit.          Point: Precautions (In Progress)     Description:   Instruct learner(s) on prescribed precautions during self-care and functional transfers.              Learning Progress Summary           Patient Acceptance, E, NR by MM at 5/11/2022 0945    Comment: OT role, benefits, POC, need for assist                   Point: Body mechanics (In Progress)     Description:   Instruct learner(s) on proper positioning and spine alignment during self-care, functional mobility activities and/or exercises.              Learning Progress Summary           Patient Acceptance, E, NR by MM at 5/11/2022 0945    Comment: OT role, benefits, POC, need for assist                               User Key     Initials Effective Dates Name Provider Type Discipline    CHRISTOS 06/16/21 -  Rick Herbert, OTR/L Occupational Therapist OT              OT Recommendation and Plan  Planned Therapy Interventions (OT): activity tolerance training, adaptive equipment training, BADL retraining, cognitive/visual perception retraining, functional balance retraining, neuromuscular control/coordination retraining, occupation/activity based interventions, passive ROM/stretching, patient/caregiver education/training, ROM/therapeutic exercise, strengthening exercise, transfer/mobility retraining  Therapy Frequency (OT): 5 times/wk  Plan of Care Review  Plan of Care Reviewed With: patient  Progress: no change  Outcome Evaluation: OT eval completed. Pt reports no pain, only being cold. Per pt report he is normally independent but has family/caregivers present at times. Pt is oriented x3. Pt is conversationally confused with flat affect noted. Pt with BUE resting tremors that increased with movement. Pt reports his tremors appear to be at their baseline. Pt was max-dep for attempting to sit EOB, pt  limited by weakness, being cold and tremors. Pt was dep in trendelenburg to scoot with draw sheet. Pt was dependent to place urinal, and unable to void. pt was max-dep to wash face and bring liquids to mouth. Pt on RA and O2 remains WNL throughout session. Pt appreciative of assistance. Skilled OT recommended to address adls, functional mobility and endurance. Recommended d/c to SNF.     Time Calculation:    Time Calculation- OT     Row Name 05/11/22 0824             Time Calculation- OT    OT Start Time 0824  +7 minutes chart review  -MM      OT Stop Time 0916  -MM      OT Time Calculation (min) 52 min  -MM      OT Received On 05/11/22  -MM      OT Goal Re-Cert Due Date 05/21/22  -MM            User Key  (r) = Recorded By, (t) = Taken By, (c) = Cosigned By    Initials Name Provider Type    MM Rick Herbert, OTR/L Occupational Therapist              Therapy Charges for Today     Code Description Service Date Service Provider Modifiers Qty    [id number] HC OT EVAL MOD COMPLEXITY 4 5/11/2022 Rick Herbert OTR/L GO 1               Rick Herbert OTR/KURTIS  5/11/2022

## 2022-05-11 NOTE — ED NOTES
Pt in bed. No s/s distress/pain/discomforts noted. Resp even/unlabored. Appears to be sleeping. VSS. POC dicussed. Will cont monitoring. Advised to call any needs. Call light in reach.

## 2022-05-11 NOTE — PROGRESS NOTES
[facility] Medicine Services  INPATIENT PROGRESS NOTE    Patient Name: Edy Urbina  Date of Admission: 5/10/2022  Today's Date: 05/11/22  Length of Stay: 0  Primary Care Physician: Torey Amato DO    Subjective   Chief Complaint: follow-up COVID  HPI   Patient resting in bed.  Hospital staff currently in the room with him, and he is getting cleaned up.  Patient denies being in any pain.  He denies feeling any shortness of breath.  He did have a little bit of breakfast this morning, but required help.  He reports that he is not really hungry for lunch, but will try to eat some.  Sounds like he may be having some issues with urinary retention; he is currently trying to void into urinal but per nursing report it sounds like his been having some difficulty.  I asked him if he is ever received the COVID-vaccine, and he responded yes.  He thought that he had received the Pfizer vaccine in the past.  He did have a T-max of 102.4 overnight    Review of Systems     All pertinent negatives and positives are as above. All other systems have been reviewed and are negative unless otherwise stated.     Objective    Temp:  [98.7 °F (37.1 °C)-102.4 °F (39.1 °C)] 98.7 °F (37.1 °C)  Heart Rate:  [56-86] 61  Resp:  [16-19] 18  BP: (114-154)/(58-94) 132/60  Physical Exam  Vitals reviewed.   Constitutional:       Appearance: He is ill-appearing.      Comments: Thin and frail   HENT:      Head: Normocephalic.   Eyes:      Comments: Wearing glasses but keeps eyes closed   Pulmonary:      Effort: Pulmonary effort is normal. No respiratory distress.      Comments: On room air  Musculoskeletal:         General: Swelling present.   Skin:     General: Skin is warm.      Capillary Refill: Capillary refill takes less than 2 seconds.   Neurological:      Motor: Weakness present.      Comments: Keeps eyes closed; answers yes/no questions relatively well.  Speech clear.  Tremor and some rigidity in  extremities; generalized weakness   Psychiatric:         Mood and Affect: Mood normal.         Results Review:  I have reviewed the labs, radiology results, and diagnostic studies.    Laboratory Data:   Results from last 7 days   Lab Units 05/10/22  1230   WBC 10*3/mm3 5.13   HEMOGLOBIN g/dL 12.3*   HEMATOCRIT % 39.6   PLATELETS 10*3/mm3 186        Results from last 7 days   Lab Units 05/10/22  1230   SODIUM mmol/L 143   POTASSIUM mmol/L 4.6   CHLORIDE mmol/L 103   CO2 mmol/L 28.0   BUN mg/dL 27*   CREATININE mg/dL 1.02   CALCIUM mg/dL 9.2   BILIRUBIN mg/dL 0.5   ALK PHOS U/L 68   ALT (SGPT) U/L 25   AST (SGOT) U/L 54*   GLUCOSE mg/dL 91       Culture Data:   No results found for: BLOODCX, URINECX, WOUNDCX, MRSACX, RESPCX, STOOLCX    Radiology Data:   Imaging Results (Last 24 Hours)     Procedure Component Value Units Date/Time    CT Head Without Contrast [[id number]] Collected: 05/10/22 1333     Updated: 05/10/22 1339    Narrative:      EXAMINATION: CT HEAD WO CONTRAST- 5/10/2022 1:33 PM CDT     HISTORY: Altered mental status.     DOSE: 648 mGycm (Automatic exposure control technique was implemented in  an effort to keep the radiation dose as low as possible without  compromising image quality)     REPORT: Axial CT of the head was performed without contrast,  reconstructed coronal and sagittal images are also reviewed.     Comparison: CT head without contrast 4/14/2022.     No intracranial hemorrhage, mass or mass effect is identified. The  ventricles and basal cisterns are within normal limits. There is mild  diffuse atrophy. 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 The extracranial structures appear within normal  limits.       Impression:      No acute intracranial abnormality. There are chronic  findings associated with aging.     This report was finalized on 05/10/2022 13:36 by Dr. Jacques Gomez MD.    XR  Chest 1 View [[id number]] Collected: 05/10/22 1239     Updated: 05/10/22 1243    Narrative:      XR CHEST 1 VW- 5/10/2022 12:31 PM CDT     HISTORY: ams     COMPARISON: Chest exam dated 4/14/2022.     FINDINGS:      Minimal scarring in the right lung base. Lungs are well expanded. No  consolidation. No pleural effusion or pneumothorax. The  cardiomediastinal silhouette and pulmonary vascularity are within normal  limits. The osseous structures and surrounding soft tissues demonstrate  no acute abnormality.       Impression:      1. No radiographic evidence of acute cardiopulmonary process.  2. Underlying fibrotic changes in the right lung base.        This report was finalized on 05/10/2022 12:40 by Dr August Richardson, .          I have reviewed the patient's current medications.     Assessment/Plan     Active Hospital Problems    Diagnosis    • COVID-19 virus detected    • Atrial fibrillation (HCC)    • Generalized weakness    • Cachexia (HCC)    • Lewy body dementia without behavioral disturbance (HCC)        Plan:  1.  Patient to receive the monoclonal antibody infusion with bebtelovimab on 5/10  2.  Remains on room air  3.  Most recent chest x-ray clear  4.  I did repeat a COVID test today given a lack of COVID symptoms (other than fever).  Repeat COVID test did return positive for confirmation.  5.  Enhanced contact and droplet precautions  6.  Speech therapy recommendations reviewed; appreciate their assistance  7.  Patient will need physical therapy and Occupational Therapy  8.  Continue supplements including zinc sulfate, vitamin C, vitamin D  9.  Nutrition consultation  10.  Recommendations from neurology reviewed during his previous hospitalization.  They comment on suspected Lewy body dementia, but also state concerns for parkinsonian features as well.  11.  Would like to reduce dose of Risperdal to 0.25mg  12.  Bladder scan and monitor for urinary retention; add trial of Flomax  13.  Incentive spirometer at  bedside-would like to encourage use although admittedly I think patient is going to have some difficulty participating.  14.  Disposition planning: Patient lives alone at the [facility] in [place].      Electronically signed by Lenard Landa MD, 05/11/22, 11:40 CDT.

## 2022-05-11 NOTE — PLAN OF CARE
Goal Outcome Evaluation:  Plan of Care Reviewed With: patient           Outcome Evaluation: Patient with tremors at rest. No c/o pain/nausea. IVF, speech following - regular thin diet with boost supplements. Assistance with feeding. Repeat Covid test +, cough noted. Contact precautions. Encourage IS use. Unable to tolerate proning. Patient having difficulty urinating. Voiding small amount/ - I/O cath x1. Flomax given. Turn q2 hrs. Safety maintained

## 2022-05-11 NOTE — ED NOTES
Pt in bed. No s/s distress noted. Alert to verbal stimuli. Denies any needs/pain/discomforts. POC dicussed. Will cont monitoring. Advised to call any needs. Call light in reach.

## 2022-05-11 NOTE — PLAN OF CARE
Goal Outcome Evaluation:  Plan of Care Reviewed With: patient           Outcome Evaluation: See note

## 2022-05-11 NOTE — PLAN OF CARE
Goal Outcome Evaluation:  Plan of Care Reviewed With: patient        Progress: no change  Outcome Evaluation: Patient is disoriented to time and situation. IVF infusing per order. Incontient. Turn q 2. Safety maintained at this time.

## 2022-05-11 NOTE — PLAN OF CARE
Goal Outcome Evaluation:  Plan of Care Reviewed With: patient        Progress: no change  Outcome Evaluation: OT eval completed. Pt reports no pain, only being cold. Per pt report he is normally independent but has family/caregivers present at times. Pt is oriented x3. Pt is conversationally confused with flat affect noted. Pt with BUE resting tremors that increased with movement. Pt reports his tremors appear to be at their baseline. Pt was max-dep for attempting to sit EOB, pt limited by weakness, being cold and tremors. Pt was dep in trendelenburg to scoot with draw sheet. Pt was dependent to place urinal, and unable to void. pt was max-dep to wash face and bring liquids to mouth. Pt on RA and O2 remains WNL throughout session. Pt appreciative of assistance. Skilled OT recommended to address adls, functional mobility and endurance. Recommended d/c to SNF.

## 2022-05-11 NOTE — ED NOTES
Pt in bed. Alert to verbal stimuli. No s/s distress noted. Denies any needs/pain/discomforts. Slight tremors noted still but lessoned. POC dicussed. Will cont monitoring. Advised to call any needs. Call light in reach.

## 2022-05-11 NOTE — NURSING NOTE
Patient is current with [facility] services and services will continue upon hospital discharge if appropriate.

## 2022-05-12 PROCEDURE — 96361 HYDRATE IV INFUSION ADD-ON: CPT

## 2022-05-12 PROCEDURE — 99214 OFFICE O/P EST MOD 30 MIN: CPT | Performed by: PSYCHIATRY & NEUROLOGY

## 2022-05-12 PROCEDURE — 97161 PT EVAL LOW COMPLEX 20 MIN: CPT | Performed by: PHYSICAL THERAPIST

## 2022-05-12 PROCEDURE — G0378 HOSPITAL OBSERVATION PER HR: HCPCS

## 2022-05-12 PROCEDURE — 97535 SELF CARE MNGMENT TRAINING: CPT | Performed by: OCCUPATIONAL THERAPIST

## 2022-05-12 RX ADMIN — CARBIDOPA AND LEVODOPA 1 TABLET: 25; 100 TABLET ORAL at 17:57

## 2022-05-12 RX ADMIN — APIXABAN 2.5 MG: 2.5 TABLET, FILM COATED ORAL at 08:20

## 2022-05-12 RX ADMIN — ZINC SULFATE 220 MG (50 MG) CAPSULE 220 MG: CAPSULE at 08:22

## 2022-05-12 RX ADMIN — Medication 10 ML: at 08:22

## 2022-05-12 RX ADMIN — METOPROLOL TARTRATE 75 MG: 50 TABLET, FILM COATED ORAL at 20:34

## 2022-05-12 RX ADMIN — Medication 10 ML: at 20:47

## 2022-05-12 RX ADMIN — FAMOTIDINE 20 MG: 20 TABLET, FILM COATED ORAL at 08:20

## 2022-05-12 RX ADMIN — TAMSULOSIN HYDROCHLORIDE 0.4 MG: 0.4 CAPSULE ORAL at 08:20

## 2022-05-12 RX ADMIN — RISPERIDONE 0.25 MG: 0.25 TABLET, FILM COATED ORAL at 08:20

## 2022-05-12 RX ADMIN — Medication 1000 UNITS: at 08:20

## 2022-05-12 RX ADMIN — METOPROLOL TARTRATE 75 MG: 50 TABLET, FILM COATED ORAL at 08:20

## 2022-05-12 RX ADMIN — FAMOTIDINE 20 MG: 20 TABLET, FILM COATED ORAL at 17:57

## 2022-05-12 RX ADMIN — APIXABAN 2.5 MG: 2.5 TABLET, FILM COATED ORAL at 20:34

## 2022-05-12 RX ADMIN — MEMANTINE HYDROCHLORIDE 10 MG: 5 TABLET, FILM COATED ORAL at 20:34

## 2022-05-12 RX ADMIN — OXYCODONE HYDROCHLORIDE AND ACETAMINOPHEN 500 MG: 500 TABLET ORAL at 08:20

## 2022-05-12 RX ADMIN — Medication 1 TABLET: at 08:20

## 2022-05-12 RX ADMIN — RISPERIDONE 0.25 MG: 0.25 TABLET, FILM COATED ORAL at 20:34

## 2022-05-12 RX ADMIN — MEMANTINE HYDROCHLORIDE 10 MG: 5 TABLET, FILM COATED ORAL at 08:20

## 2022-05-12 NOTE — THERAPY EVALUATION
Patient Name: Edy Urbina  : 1940    MRN: [id number]                              Today's Date: 2022       Admit Date: 5/10/2022    Visit Dx:     ICD-10-CM ICD-9-CM   1. Acute metabolic encephalopathy  G93.41 348.31   2. COVID-19  U07.1 079.89   3. Dysphagia, unspecified type  R13.10 787.20   4. Impaired mobility and ADLs  Z74.09 V49.89    Z78.9    5. Impaired mobility  Z74.09 799.89     Patient Active Problem List   Diagnosis   • Anemia   • Lewy body dementia without behavioral disturbance (HCC)   • Cachexia (HCC)   • Pneumonia of right middle lobe due to infectious organism   • Generalized weakness   • Elevated BUN   • Metabolic encephalopathy, acute, due to pnuemonia and elevated BUN   • Paroxysmal atrial fibrillation with rapid ventricular response (HCC)   • Acute metabolic encephalopathy   • COVID-19 virus detected   • Atrial fibrillation (HCC)     Past Medical History:   Diagnosis Date   • Cataracts, bilateral    • Lewy body dementia (HCC)    • Occasional tremors    • Prostate cancer (HCC) 2011    t2c,Sommer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    • Varicose vein of leg 2019   • Varicose veins of legs      Past Surgical History:   Procedure Laterality Date   • EYE SURGERY     • HERNIA REPAIR Left    • INGUINAL HERNIA REPAIR Right 2019    Procedure: OPEN RIGHT INGUINAL HERNIA REPAIR WITH MESH;  Surgeon: Alesia Mora MD;  Location: [facility];  Service: General   • PROSTATECTOMY  2011    RALP      General Information     Row Name 22 1057          Physical Therapy Time and Intention    Document Type evaluation  lewy body dementia, cachexia, generalized weakness, COVID-19  -MS     Mode of Treatment physical therapy;co-treatment  -MS     Row Name 22 1050          General Information    Patient Profile Reviewed yes  -MS     Prior Level of Function independent:;all household mobility;ADL's  -MS     Existing Precautions/Restrictions fall  -MS      Barriers to Rehab cognitive status  -MS     Row Name 05/12/22 1057          Living Environment    People in Home alone;facility resident  assistive living  -MS     Row Name 05/12/22 1057          Home Main Entrance    Number of Stairs, Main Entrance none  -MS     Row Name 05/12/22 1057          Stairs Within Home, Primary    Number of Stairs, Within Home, Primary none  -MS     Stair Railings, Within Home, Primary none  -MS     Row Name 05/12/22 1057          Cognition    Orientation Status (Cognition) oriented x 4  -MS     Row Name 05/12/22 1057          Safety Issues, Functional Mobility    Safety Issues Affecting Function (Mobility) ability to follow commands;at risk behavior observed;awareness of need for assistance;impulsivity;insight into deficits/self-awareness;judgment;problem-solving  -MS     Impairments Affecting Function (Mobility) balance;cognition;endurance/activity tolerance  -MS     Cognitive Impairments, Mobility Safety/Performance attention;awareness, need for assistance;impulsivity;insight into deficits/self-awareness;judgment;problem-solving/reasoning;sequencing abilities  -MS           User Key  (r) = Recorded By, (t) = Taken By, (c) = Cosigned By    Initials Name Provider Type    Henrietta Sharma, PT, DPT, NCS Physical Therapist               Mobility     Row Name 05/12/22 1057          Bed Mobility    Bed Mobility supine-sit;sit-supine  -MS     Supine-Sit [place] (Bed Mobility) contact guard;verbal cues;nonverbal cues (demo/gesture)  -MS     Sit-Supine [place] (Bed Mobility) contact guard;verbal cues;nonverbal cues (demo/gesture)  -MS     Assistive Device (Bed Mobility) head of bed elevated;bed rails  -MS     Row Name 05/12/22 1057          Sit-Stand Transfer    Sit-Stand [place] (Transfers) contact guard;verbal cues;nonverbal cues (demo/gesture)  -MS     Assistive Device (Sit-Stand Transfers) --  HHA  -MS     Row Name 05/12/22 1057          Gait/Stairs (Locomotion)     [place] Level (Gait) minimum assist (75% patient effort);verbal cues;nonverbal cues (demo/gesture)  -MS     Assistive Device (Gait) --  HHA  -MS     Distance in Feet (Gait) 20ft x2 with 3 LOB requiring min A to correct  -MS           User Key  (r) = Recorded By, (t) = Taken By, (c) = Cosigned By    Initials Name Provider Type    Henrietta Sharma LLOYD, PT, DPT, NCS Physical Therapist               Obj/Interventions     Row Name 05/12/22 1057          Range of Motion Comprehensive    General Range of Motion no range of motion deficits identified  -MS     Row Name 05/12/22 1057          Strength Comprehensive (MMT)    Comment, General Manual Muscle Testing (MMT) Assessment grossly 4+/5  -MS     [facility] Name 05/12/22 1057          Motor Skills    Motor Skills coordination;neuro-muscular function  -MS     Neuromuscular Function tremor, resting  -MS     Row Name 05/12/22 1057          Balance    Balance Assessment sitting static balance;sitting dynamic balance;standing static balance;standing dynamic balance  -MS     Static Sitting Balance supervision  -MS     Dynamic Sitting Balance supervision  -MS     Position, Sitting Balance unsupported  -MS     Static Standing Balance minimal assist;verbal cues;non-verbal cues (demo/gesture)  -MS     Dynamic Standing Balance verbal cues;non-verbal cues (demo/gesture);minimal assist  -MS           User Key  (r) = Recorded By, (t) = Taken By, (c) = Cosigned By    Initials Name Provider Type    Henrietta Sharma LLOYD, PT, DPT, NCS Physical Therapist               Goals/Plan     Row Name 05/12/22 1057          Bed Mobility Goal 1 (PT)    Activity/Assistive Device (Bed Mobility Goal 1, PT) bed mobility activities, all  -MS     [place] Level/Cues Needed (Bed Mobility Goal 1, PT) modified independence  -MS     Time Frame (Bed Mobility Goal 1, PT) long term goal (LTG);by discharge  -MS     Progress/Outcomes (Bed Mobility Goal 1, PT) goal ongoing  -MS     Row Name 05/12/22 1057           Transfer Goal 1 (PT)    Activity/Assistive Device (Transfer Goal 1, PT) sit-to-stand/stand-to-sit;bed-to-chair/chair-to-bed  -MS     [place] Level/Cues Needed (Transfer Goal 1, PT) contact guard required  -MS     Time Frame (Transfer Goal 1, PT) long term goal (LTG);by discharge  -MS     Progress/Outcome (Transfer Goal 1, PT) goal ongoing  -MS     Row Name 05/12/22 1057          Gait Training Goal 1 (PT)    Activity/Assistive Device (Gait Training Goal 1, PT) gait (walking locomotion);assistive device use;diminish gait deviation;improve balance and speed;increase endurance/gait distance;walker, rolling  -MS     [place] Level (Gait Training Goal 1, PT) contact guard required;verbal cues required  -MS     Distance (Gait Training Goal 1, PT) 50ft  -MS     Time Frame (Gait Training Goal 1, PT) long term goal (LTG);by discharge  -MS     Progress/Outcome (Gait Training Goal 1, PT) goal ongoing  -MS     Row Name 05/12/22 1057          Therapy Assessment/Plan (PT)    Planned Therapy Interventions (PT) balance training;bed mobility training;gait training;patient/family education;transfer training  -MS           User Key  (r) = Recorded By, (t) = Taken By, (c) = Cosigned By    Initials Name Provider Type    Choco Sharmaanda LLOYD, PT, DPT, NCS Physical Therapist               Clinical Impression     Row Name 05/12/22 1057          Pain    Pretreatment Pain Rating 0/10 - no pain  -MS     Posttreatment Pain Rating 0/10 - no pain  -MS     Row Name 05/12/22 1057          Plan of Care Review    Plan of Care Reviewed With patient  -MS     Progress no change  -MS     Outcome Evaluation The patient presents alert and oriented x4 lying in bed. He demonstrates no focal neurological deficits in strength, coordination, or sensation. He does demonstrate general confusion and difficulty with following directions at times. He required cues for need for self care by OT. He is unsteady in standing and during gait with multiple LOB. He  lives alone and this is not safe for him at this time. He demonstrates no difficulty with breathing or SOA during the evaluation with maintaining his O2 saturation in the 90's on room air.PT will continue to work with the patient to work on balance and increase activity tolerance. Recommend discharge to SNF.  -MS     Row Name 05/12/22 1057          Therapy Assessment/Plan (PT)    Patient/Family Therapy Goals Statement (PT) get better  -MS     Rehab Potential (PT) good, to achieve stated therapy goals  -MS     Criteria for Skilled Interventions Met (PT) yes;meets criteria;skilled treatment is necessary  -MS     Therapy Frequency (PT) 2 times/day  -MS     Predicted Duration of Therapy Intervention (PT) until discharge  -MS     Row Name 05/12/22 1057          Vital Signs    Pre SpO2 (%) 93  -MS     O2 Delivery Pre Treatment room air  -MS     O2 Delivery Intra Treatment room air  -MS     Post SpO2 (%) 93  -MS     O2 Delivery Post Treatment room air  -MS     Pre Patient Position Supine  -MS     Intra Patient Position Sitting  -MS     Post Patient Position Sitting  -MS     Row Name 05/12/22 1057          Positioning and Restraints    Post Treatment Position bed  -MS     In Bed fowlers;call light within reach;encouraged to call for assist;exit alarm on;side rails up x2  -MS           User Key  (r) = Recorded By, (t) = Taken By, (c) = Cosigned By    Initials Name Provider Type    Henrietta Sharma R, PT, DPT, NCS Physical Therapist               Outcome Measures     Row Name 05/12/22 1057          How much help from another person do you currently need...    Turning from your back to your side while in flat bed without using bedrails? 3  -MS     Moving from lying on back to sitting on the side of a flat bed without bedrails? 3  -MS     Moving to and from a bed to a chair (including a wheelchair)? 3  -MS     Standing up from a chair using your arms (e.g., wheelchair, bedside chair)? 3  -MS     Climbing 3-5 steps with a  railing? 1  -MS     To walk in hospital room? 3  -MS     AM-PAC 6 Clicks Score (PT) 16  -MS     Highest level of mobility 5 --> Static standing  -MS     Row Name 05/12/22 1057 05/12/22 1050       Functional Assessment    Outcome Measure Options AM-PAC 6 Clicks Basic Mobility (PT)  -MS AM-PAC 6 Clicks Daily Activity (OT)  -          User Key  (r) = Recorded By, (t) = Taken By, (c) = Cosigned By    Initials Name Provider Type    CH Noemi Mckay, OTR/L Occupational Therapist    MS Henrietta Hooks, PT, DPT, NCS Physical Therapist                             Physical Therapy Education                 Title: PT OT SLP Therapies (In Progress)     Topic: Physical Therapy (In Progress)     Point: Mobility training (In Progress)     Learning Progress Summary           Patient Acceptance, E, NR by MS at 5/12/2022 1058    Comment: role of PT in his care                   Point: Home exercise program (Not Started)     Learner Progress:  Not documented in this visit.          Point: Body mechanics (Not Started)     Learner Progress:  Not documented in this visit.          Point: Precautions (Not Started)     Learner Progress:  Not documented in this visit.                      User Key     Initials Effective Dates Name Provider Type Discipline    MS 06/19/18 -  Henrietta Hooks, PT, DPT, NCS Physical Therapist PT              PT Recommendation and Plan  Planned Therapy Interventions (PT): balance training, bed mobility training, gait training, patient/family education, transfer training  Plan of Care Reviewed With: patient  Progress: no change  Outcome Evaluation: The patient presents alert and oriented x4 lying in bed. He demonstrates no focal neurological deficits in strength, coordination, or sensation. He does demonstrate general confusion and difficulty with following directions at times. He required cues for need for self care by OT. He is unsteady in standing and during gait with multiple LOB. He lives alone and this  is not safe for him at this time. He demonstrates no difficulty with breathing or SOA during the evaluation with maintaining his O2 saturation in the 90's on room air.PT will continue to work with the patient to work on balance and increase activity tolerance. Recommend discharge to SNF.     Time Calculation:    PT Charges     Row Name 05/12/22 1057             Time Calculation    Start Time 1048  -MS      Stop Time 1130  -MS      Time Calculation (min) 42 min  -MS      PT Received On 05/12/22  -MS      PT Goal Re-Cert Due Date 05/22/22  -MS              Untimed Charges    PT Eval/Re-eval Minutes 42  -MS              Total Minutes    Untimed Charges Total Minutes 42  -MS       Total Minutes 42  -MS            User Key  (r) = Recorded By, (t) = Taken By, (c) = Cosigned By    Initials Name Provider Type    Henrietta Sharma, PT, DPT, NCS Physical Therapist              Therapy Charges for Today     Code Description Service Date Service Provider Modifiers Qty    [id number] HC PT EVAL LOW COMPLEXITY 3 5/12/2022 Henrietta Hooks PT, DPT, NCS GP 1          PT G-Codes  Outcome Measure Options: AM-PAC 6 Clicks Basic Mobility (PT)  AM-PAC 6 Clicks Score (PT): 16  AM-PAC 6 Clicks Score (OT): 12    Henrietta Hooks, PT, DPT, NCS  5/12/2022

## 2022-05-12 NOTE — PLAN OF CARE
Goal Outcome Evaluation:  Plan of Care Reviewed With: patient        Progress: no change  Outcome Evaluation: OT tx completed.  OTR assisted with t/f, HHA amb & toileting with sponge bathing.   Pt. had multiple epsiodes of LOB during in room mobility.  While seated on toilet, pt physically able to complete with more independence but he demo's significant deficits in taks initiation & sequencing.  He is perseverative and requires increased assist to complete tasks.  Mr Urbina would benefit from cont'd OT tx & SNF placement.

## 2022-05-12 NOTE — PROGRESS NOTES
[facility] Medicine Services  INPATIENT PROGRESS NOTE    Patient Name: Edy Urbina  Date of Admission: 5/10/2022  Today's Date: 05/12/22  Length of Stay: 0  Primary Care Physician: Torey Amato DO    Subjective   Chief Complaint: follow-up COVID  HPI   Patient resting in bed.  He denies any pain.  He denies any shortness of breath.  He reports no cough or congestion.  He did indicate that he lives at home alone to the [facility].  He reports that one of his sons no longer lives in this area, and moved to [place].  He does report that there is a daughter that lives here.  He is more alert and talkative this morning.  No acute events from overnight.  Events from overnight reviewed and it sounds like that he did get out of bed, and also did pull out an IV.  He is currently calm and cooperative.  When I asked him if his resting tremor seems like it is getting any worse, he denied this.  He also denied having any visual hallucinations.    Review of Systems     All pertinent negatives and positives are as above. All other systems have been reviewed and are negative unless otherwise stated.     Objective    Temp:  [97.8 °F (36.6 °C)-99.8 °F (37.7 °C)] 98.5 °F (36.9 °C)  Heart Rate:  [55-67] 60  Resp:  [16-18] 16  BP: (112-144)/(49-68) 142/68  Physical Exam  Vitals reviewed.   Constitutional:       Appearance: He is ill-appearing.      Comments: Thin and frail   HENT:      Head: Normocephalic.   Eyes:      Comments: Wearing glasses   Pulmonary:      Effort: Pulmonary effort is normal. No respiratory distress.      Comments: On room air  Musculoskeletal:         General: Swelling present.      Comments: SCDs   Skin:     General: Skin is warm.      Capillary Refill: Capillary refill takes less than 2 seconds.   Neurological:      Motor: Weakness present.      Comments: Eyes open; answers yes/no questions relatively well.  Speech clear.  Resting tremor in upper extremities  more noticeable today   Psychiatric:         Mood and Affect: Mood normal.         Results Review:  I have reviewed the labs, radiology results, and diagnostic studies.    Laboratory Data:   Results from last 7 days   Lab Units 05/10/22  1230   WBC 10*3/mm3 5.13   HEMOGLOBIN g/dL 12.3*   HEMATOCRIT % 39.6   PLATELETS 10*3/mm3 186        Results from last 7 days   Lab Units 05/10/22  1230   SODIUM mmol/L 143   POTASSIUM mmol/L 4.6   CHLORIDE mmol/L 103   CO2 mmol/L 28.0   BUN mg/dL 27*   CREATININE mg/dL 1.02   CALCIUM mg/dL 9.2   BILIRUBIN mg/dL 0.5   ALK PHOS U/L 68   ALT (SGPT) U/L 25   AST (SGOT) U/L 54*   GLUCOSE mg/dL 91       Culture Data:   No results found for: BLOODCX, URINECX, WOUNDCX, MRSACX, RESPCX, STOOLCX    Radiology Data:   Imaging Results (Last 24 Hours)     ** No results found for the last 24 hours. **          I have reviewed the patient's current medications.     Assessment/Plan     Active Hospital Problems    Diagnosis    • COVID-19 virus detected    • Atrial fibrillation (HCC)    • Generalized weakness    • Cachexia (HCC)    • Lewy body dementia without behavioral disturbance (HCC)        Plan:  1.  Patient to receive the monoclonal antibody infusion with bebtelovimab on 5/10  2.  Remains on room air  3.  Most recent chest x-ray clear  4.  I did repeat a COVID test today given a lack of COVID symptoms (other than fever).  Repeat COVID test did return positive for confirmation.  5.  Enhanced contact and droplet precautions  6.  Patient does have a diagnosis of Lewy body dementia, but also appears to have some Parkinson's features.  I question if a trial of Sinemet would be worthwhile in this setting.  I am going to ask for a neurology consultation, and appreciate their recommendations.  7.  Physical therapy and Occupational Therapy  8.  Continue supplements including zinc sulfate, vitamin C, vitamin D  9.  Nutrition supplements  10.  I reduced dose of Risperdal to 0.25mg yesterday  11.  Monitor  for urinary retention; trial of Flomax  12.  Incentive spirometer as possible  13.  I will check labs in AM tomorrow including ferritin and CRP  14.  Disposition planning: Patient lives alone at the [facility] in [place].  I think patient will require placement and based on his current condition I do not believe he would be safe to live alone      Electronically signed by Lenard Landa MD, 05/12/22, 10:37 CDT.

## 2022-05-12 NOTE — CONSULTS
"Neurology Consult Note    Referring Provider: Momo Landa MD  Reason for Consultation: Lewy Body dementia; also with Parkinson's symptoms; ? trial of Sinemet      History of present illness:    This is a 81 y.o. right handed male patient with PMH of dementia (lLewy Body dementia), tremors, prostate Cancer,  TIA, PAF on Eliquis. Patient sees Dr. BESS Laurent for dementia. Patient hospitalized in 4/2022 for pneumonia. Neurology was consulted at that time for tremors and at that time decision not to start sinemet as concern tremors from Risperdal although he at a low dose. He was noted to have ortho stasis as well. He resides at [facility]. There are no family members at bedside as patient is in isolation for Covid +. Patient does have video monitor at the bedside.   From Dr. Laurent note 3/22/2022 and Dr. Gregorio Murphy consult note 4/2022  Patient has been stable.  Patient could not tolerate Aricept because of sleepiness noted by the people in the assisted living he was living with.  That was stopped several months ago.  Patient continues Namenda 10 mg p.o. twice daily.  MMSE today actually is improved to 25/30\"  Previous MMSE was 28 on 11/4/21  MMSE 20/30 on August 2021  MMSE 26/30 on September 2019--off of opioids. But patient was on Seroquel and Risperdal.  In October 2019 he had improved dramatically with hallucination and memory and his MMSE 30/30.      Patient presented to [facility] on 5/10/2022 for confusion and incidentally found to be Covid +. He has not required oxygen. Patient has some confusion but no reported hallucinations. Continues with resting tremors. Neurology has been consulted to assess for Juan José Body vs Parkinson and if Sinemet trial would be beneficial. Just before I entered the room, video monitor showed patient not in his bed. Staff found him at the bedside urinating in the trash can with urinal on the bedside table.     Labs show CBC unremarkable, CMP with BUN 27.   CRP 1.69  Mg+ 2.3  CXR- no acute " process with underlying fibrotic changes right lung base  CT head showed chronic changes of aging.         Past Medical History:   Diagnosis Date   • Cataracts, bilateral    • Lewy body dementia (HCC)    • Occasional tremors    • Prostate cancer (HCC) 05/2011    t2c,[place]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    • Varicose vein of leg 06/05/2019   • Varicose veins of legs        No Known Allergies  No current facility-administered medications on file prior to encounter.     Current Outpatient Medications on File Prior to Encounter   Medication Sig   • apixaban (ELIQUIS) 2.5 MG tablet tablet Take 1 tablet by mouth Every 12 (Twelve) Hours for 30 days. Indications: Atrial Fibrillation   • memantine (NAMENDA) 10 MG tablet Take 1 tablet by mouth 2 (Two) Times a Day. TAKE 1 TABLET TWICE DAILY   • metoprolol tartrate 75 MG tablet Take 75 mg by mouth Every 12 (Twelve) Hours for 30 days.   • Multiple Vitamins-Minerals (MULTIVITAMIN MEN 50+) tablet Take 1 tablet by mouth Daily.   • risperiDONE (risperDAL) 0.5 MG tablet Take 1 tablet by mouth 2 (Two) Times a Day.       Current Facility-Administered Medications:   •  acetaminophen (TYLENOL) tablet 650 mg, 650 mg, Oral, Q4H PRN, 650 mg at 05/10/22 2200 **OR** acetaminophen (TYLENOL) 160 MG/5ML solution 650 mg, 650 mg, Oral, Q4H PRN **OR** acetaminophen (TYLENOL) suppository 650 mg, 650 mg, Rectal, Q4H PRN, Lenard Landa MD  •  apixaban (ELIQUIS) tablet 2.5 mg, 2.5 mg, Oral, Q12H, Lenard Landa MD, 2.5 mg at 05/12/22 0820  •  ascorbic acid (VITAMIN C) tablet 500 mg, 500 mg, Oral, Daily, Lenard Landa MD, 500 mg at 05/12/22 0820  •  carbidopa-levodopa (SINEMET)  MG per tablet 1 tablet, 1 tablet, Oral, TID, Christina Dunne APRN  •  cholecalciferol (VITAMIN D3) tablet 1,000 Units, 1,000 Units, Oral, Daily, Lenard Landa MD, 1,000 Units at 05/12/22 0820  •  famotidine (PEPCID) tablet 20 mg, 20 mg, Oral, BID Cordell BUSBY  Lenard AGUILERA MD, 20 mg at 22  •  memantine (NAMENDA) tablet 10 mg, 10 mg, Oral, BID, Lenard Landa MD, 10 mg at 22  •  metoprolol tartrate (LOPRESSOR) tablet 75 mg, 75 mg, Oral, Q12H, Lenard Landa MD, 75 mg at 22  •  multivitamin with minerals 1 tablet, 1 tablet, Oral, Daily, Lenard Landa MD, 1 tablet at 22  •  ondansetron (ZOFRAN) injection 4 mg, 4 mg, Intravenous, Q6H PRN, Lenard Landa MD  •  risperiDONE (risperDAL) tablet 0.25 mg, 0.25 mg, Oral, BID, Lenard Landa MD, 0.25 mg at 22  •  [COMPLETED] Insert peripheral IV, , , Once **AND** sodium chloride 0.9 % flush 10 mL, 10 mL, Intravenous, PRN, Lenard Landa MD  •  sodium chloride 0.9 % flush 10 mL, 10 mL, Intravenous, Q12H, Lenard Landa MD, 10 mL at 22  •  sodium chloride 0.9 % flush 10 mL, 10 mL, Intravenous, PRN, Lenard Landa MD  •  sodium chloride 0.9 % infusion 30 mL, 30 mL, Intravenous, Once, Tom Sullivan MD  •  tamsulosin (FLOMAX) 24 hr capsule 0.4 mg, 0.4 mg, Oral, Daily, Lenard Landa MD, 0.4 mg at 22  •  zinc sulfate (ZINCATE) capsule 220 mg, 220 mg, Oral, Daily, Lenard Landa MD, 220 mg at 22  Social History     Socioeconomic History   • Marital status:    Tobacco Use   • Smoking status: Former Smoker     Years: 50.00     Types: Cigarettes     Quit date: 2002     Years since quittin.9   • Smokeless tobacco: Never Used   Substance and Sexual Activity   • Alcohol use: No     Comment: QUIT DRINKING IN    • Drug use: No   • Sexual activity: Defer     Family History   Problem Relation Age of Onset   • Stroke Mother        Review of Systems  A 14 point review of systems was reviewed and was negative except for tremors    Vital Signs   Temp:  [97.8 °F (36.6 °C)-99.8 °F (37.7 °C)] 98.7 °F (37.1 °C)  Heart Rate:  [55-70] 68  Resp:  [16-18] 16  BP:  (110-144)/(49-70) 130/60    General Exam:  Head:  Normal cephalic, atraumatic  HEENT:  Neck supple  Fundoscopic Exam:  No signs of disc edema  CVS:  Regular rate and rhythm.  No murmurs  Carotid Examination:  No bruits  Lungs:  Clear to auscultation  Abdomen:  Non-tender, Non-distended  Extremities:  No signs of peripheral edema  Skin:  No rashes    Neurologic Exam:    Mental Status:    -Awake, Alert, Oriented X to person and place stating he is in a hospital in [place]. Cannot tell me the reason he in the hospital.   -No word finding difficulties  -No aphasia  -No dysarthria  -Follows simple and complex commands  Voice tremor, hypomimia  Palmomental reflex.    CN II:  Visual fields full.  Pupils equally reactive to light  CN III, IV, VI:  Extraocular Muscles full with no signs of nystagmus  CN V:  Facial sensory is symmetric with no asymmetries.  CN VII:  Facial motor symmetric  CN VIII:  Gross hearing intact bilaterally  CN IX:  Palate elevates symmetrically  CN X:  Palate elevates symmetrically  CN XI:  Shoulder shrug symmetric  CN XII:  Tongue is midline on protrusion    Motor: (strength out of 5:  1= minimal movement, 2 = movement in plane of gravity, 3 = movement against gravity, 4 = movement against some resistance, 5 = full strength)    -Right Upper Ext: Proximal: 5 Distal: 5  -Left Upper Ext: Proximal: 5 Distal: 5    -Right Lower Ext: Proximal: 5 Distal: 5  -Left Lower Ext: Proximal: 5 Distal: 5    DTR:  -Right   Bicep: 2+ Tricep: 2+ Brachioradialis: 2+   Patella: 2+ Ankle: 2+ Neg Babinski  -Left   Bicep: 2+ Tricep: 2+ Brachioradialis: 2+   Patella: 2+ Ankle: 2+ Neg Babinski    Sensory:  -Intact to light touch, pinprick, temperature, pain, and proprioception    Coordination:  -Finger to nose intact  -Heel to shin intact  -No ataxia  Resting tremor with cog wheeling bilateral upper extremities.      Gait  -not tested.    Results Review:  Lab Results (last 24 hours)     Procedure Component Value Units  Date/Time    Blood Culture - Blood, Arm, Left [[id number]]  (Normal) Collected: 05/10/22 1220    Specimen: Blood from Arm, Left Updated: 05/12/22 1315     Blood Culture No growth at 2 days    Blood Culture - Blood, Arm, Left [[id number]]  (Normal) Collected: 05/10/22 1230    Specimen: Blood from Arm, Left Updated: 05/12/22 1315     Blood Culture No growth at 2 days          .  Imaging Results (Last 24 Hours)     ** No results found for the last 24 hours. **          CT Head Without Contrast    Result Date: 5/10/2022  No acute intracranial abnormality. There are chronic findings associated with aging.  This report was finalized on 05/10/2022 13:36 by Dr. Jacques Gomez MD.    XR Chest 1 View    Result Date: 5/10/2022  1. No radiographic evidence of acute cardiopulmonary process. 2. Underlying fibrotic changes in the right lung base.   This report was finalized on 05/10/2022 12:40 by Dr August Richardson, .      Active Hospital Problems    Diagnosis  POA   • COVID-19 virus detected [U07.1]  Unknown   • Atrial fibrillation (HCC) [I48.91]  Unknown   • Generalized weakness [R53.1]  Yes   • Cachexia (HCC) [R64]  Yes   • Lewy body dementia without behavioral disturbance (HCC) [G31.83, F02.80]  Yes       Impression  1. Covid 19 positive.  2. Lewy Body without behavior disturbances.  3. Tremor on exam and previous consult 4/2022 was concern for some Parkinsonism features.  4. Orthostatic BP.  5. Cachexia and malnutrition.  6. PAF on chronic anticoagulation.    Plan  1. Orthostatic BP today before first dose of Sinemet.  2. Begin Sinemet trial 25/100 TID at 0700, 1100, and 1600. First dose tonight 5/12/2022.  3. OT/ST/PT  4. Continue Namenda 10 mg BID.  5. Risperdal 0.25 mg BID.  6. Patient on Flomax which may worsen orthostasis, will monitor.       I discussed the patients findings and my recommendations with patient    Christina THAKKAR Uzair, APRN  05/12/22  16:54 CDT

## 2022-05-12 NOTE — PLAN OF CARE
Goal Outcome Evaluation:  Plan of Care Reviewed With: patient        Progress: no change  Outcome Evaluation: The patient presents alert and oriented x4 lying in bed. He demonstrates no focal neurological deficits in strength, coordination, or sensation. He does demonstrate general confusion and difficulty with following directions at times. He required cues for need for self care by OT. He is unsteady in standing and during gait with multiple LOB. He lives alone and this is not safe for him at this time. He demonstrates no difficulty with breathing or SOA during the evaluation with maintaining his O2 saturation in the 90's on room air.PT will continue to work with the patient to work on balance and increase activity tolerance. Recommend discharge to SNF.

## 2022-05-12 NOTE — CASE MANAGEMENT/SOCIAL WORK
Continued Stay Note  [facility]     Patient Name: Edy Urbina  MRN: [id number]  Today's Date: 5/12/2022    Admit Date: 5/10/2022     Discharge Plan     Row Name 05/12/22 1347       Plan    Plan SNF Referrals    Provided Post Acute Provider List? Yes    Provided Post Acute Provider Quality & Resource List? Yes    Post Acute Provider Quality and Resource List Nursing Home    Delivered To Support Person    Method of Delivery Telephone    Plan Comments Pt unable to return home alone. Spoke with his son, Raul [phone]. The only known option for covid pts at this time is in [place]. He is okay with checking into those places ([facility] [phone], Fax: [phone] and [facility] [phone], Fax: [phone]) so referrals being made. Will continue to look for closer options.               Discharge Codes    No documentation.                     HUBER Ventura

## 2022-05-12 NOTE — PLAN OF CARE
Goal Outcome Evaluation:  Plan of Care Reviewed With: patient        Progress: no change  Outcome Evaluation: Pt disoriented to situation. Bed alarm set for safety. He has gotten out of bed a couple of times throughout the night and pulled out his IV. VSS. Tolerating RA. Infrequent cough noted. Tremors at rest. No c/o pain. BM x1. Voiding. Incontinent at times. IV fluids D/C'd. SCDs in place. SB-NSR on tele. Safety maintained.

## 2022-05-12 NOTE — THERAPY TREATMENT NOTE
Patient Name: Edy Urbina  : 1940    MRN: [id number]                              Today's Date: 2022       Admit Date: 5/10/2022    Visit Dx:     ICD-10-CM ICD-9-CM   1. Acute metabolic encephalopathy  G93.41 348.31   2. COVID-19  U07.1 079.89   3. Dysphagia, unspecified type  R13.10 787.20   4. Impaired mobility and ADLs  Z74.09 V49.89    Z78.9      Patient Active Problem List   Diagnosis   • Anemia   • Lewy body dementia without behavioral disturbance (HCC)   • Cachexia (HCC)   • Pneumonia of right middle lobe due to infectious organism   • Generalized weakness   • Elevated BUN   • Metabolic encephalopathy, acute, due to pnuemonia and elevated BUN   • Paroxysmal atrial fibrillation with rapid ventricular response (HCC)   • Acute metabolic encephalopathy   • COVID-19 virus detected   • Atrial fibrillation (HCC)     Past Medical History:   Diagnosis Date   • Cataracts, bilateral    • Lewy body dementia (HCC)    • Occasional tremors    • Prostate cancer (HCC) 2011    t2c,[place]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    • Varicose vein of leg 2019   • Varicose veins of legs      Past Surgical History:   Procedure Laterality Date   • EYE SURGERY     • HERNIA REPAIR Left    • INGUINAL HERNIA REPAIR Right 2019    Procedure: OPEN RIGHT INGUINAL HERNIA REPAIR WITH MESH;  Surgeon: Alesia Mora MD;  Location: [facility];  Service: General   • PROSTATECTOMY  2011    RALP      General Information     Row Name 22 1050          OT Time and Intention    Document Type therapy note (daily note)  -CH     Mode of Treatment occupational therapy  -     Row Name 22 1050          General Information    Patient Profile Reviewed yes  -     Existing Precautions/Restrictions fall  -     Row Name 22 1050          Living Environment    People in Home facility resident  -     Row Name 22 1050          Safety Issues, Functional Mobility    Safety Issues Affecting  Function (Mobility) ability to follow commands;at risk behavior observed;awareness of need for assistance;insight into deficits/self-awareness;safety precautions follow-through/compliance;sequencing abilities;friction/shear risk;problem-solving;safety precaution awareness  -     Impairments Affecting Function (Mobility) balance;cognition;endurance/activity tolerance  -     Cognitive Impairments, Mobility Safety/Performance insight into deficits/self-awareness;awareness, need for assistance;problem-solving/reasoning;safety precaution awareness;safety precaution follow-through;sequencing abilities;attention  -           User Key  (r) = Recorded By, (t) = Taken By, (c) = Cosigned By    Initials Name Provider Type     Noemi Mckay, OTR/L Occupational Therapist                 Mobility/ADL's     Row Name 05/12/22 1050          Bed Mobility    Bed Mobility supine-sit  -     Scooting/Bridging [place] (Bed Mobility) standby assist;contact guard  -     Supine-Sit [place] (Bed Mobility) standby assist;contact guard  -     Sit-Supine [place] (Bed Mobility) verbal cues  -     Bed Mobility, Safety Issues cognitive deficits limit understanding  -     Assistive Device (Bed Mobility) head of bed elevated;bed rails  -     Row Name 05/12/22 1050          Transfers    Transfers sit-stand transfer;toilet transfer;stand-sit transfer  -     Sit-Stand [place] (Transfers) contact guard;verbal cues;nonverbal cues (demo/gesture)  -     Stand-Sit [place] (Transfers) contact guard;verbal cues;nonverbal cues (demo/gesture)  -     [place] Level (Toilet Transfer) contact guard;verbal cues;nonverbal cues (demo/gesture)  -     Assistive Device (Toilet Transfer) commode;grab bars/safety frame  -     Row Name 05/12/22 1050          Sit-Stand Transfer    Comment, (Sit-Stand Transfer) HHA  -     Row Name 05/12/22 1050          Toilet Transfer    Type (Toilet Transfer) sit-stand;stand-sit   -[facility] Name 05/12/22 1050          Functional Mobility    Functional Mobility- Ind. Level contact guard assist;verbal cues required;nonverbal cues required (demo/gesture)  -[facility] Name 05/12/22 1050          Activities of Daily Living    BADL Assessment/Intervention bathing;upper body dressing;toileting  -[facility] Name 05/12/22 1050          Grooming Assessment/Training    [place] Level (Grooming) dependent (less than 25% patient effort);hair care, combing/brushing;wash face, hands;verbal cues;nonverbal cues (demo/gesture)  -     Position (Grooming) unsupported sitting  -     Comment, (Grooming) pt. has significant initiation deficits  -[facility] Name 05/12/22 1050          Toileting Assessment/Training    [place] Level (Toileting) moderate assist (50% patient effort);adjust/manage clothing;maximum assist (25% patient effort);perform perineal hygiene;verbal cues;nonverbal cues (demo/gesture)  -     Assistive Devices (Toileting) commode  -     Position (Toileting) unsupported sitting;supported standing  -     Comment, (Toileting) S during sitting, significant deficits with task initiation & sequencing  -[facility] Name 05/12/22 1050          Bathing Assessment/Intervention    [place] Level (Bathing) maximum assist (25% patient effort);upper body;lower body  -     Comment, (Bathing) sponge bathing while seated on toilet, pt physically able to complete with more independence but he demo's significant deficits in taks initiation & sequencing.  He is perseverative and requires increased assist to complete task  -[facility] Name 05/12/22 1050          Upper Body Dressing Assessment/Training    [place] Level (Upper Body Dressing) maximum assist (25% patient effort);don;doff;verbal cues;nonverbal cues (demo/gesture)  -     Position (Upper Body Dressing) unsupported sitting  -     Comment, (Upper Body Dressing) hospital gown  -           User Key  (r) = Recorded By, (t) =  Taken By, (c) = Cosigned By    Initials Name Provider Type     Noemi Mckay, OTR/L Occupational Therapist               Obj/Interventions     Row Name 05/12/22 1050          Sensory Assessment (Somatosensory)    Sensory Assessment (Somatosensory) UE sensation intact  -     Row Name 05/12/22 1050          Motor Skills    Neuromuscular Function tremor, resting  -     Row Name 05/12/22 1050          Balance    Balance Interventions sitting;standing;sit to stand;supported;static;dynamic;occupation based/functional task  -           User Key  (r) = Recorded By, (t) = Taken By, (c) = Cosigned By    Initials Name Provider Type     Noemi Mckay, OTR/L Occupational Therapist               Goals/Plan    No documentation.                Clinical Impression     Row Name 05/12/22 1050          Pain Assessment    Pretreatment Pain Rating 0/10 - no pain  -     Posttreatment Pain Rating 0/10 - no pain  -[facility] Name 05/12/22 1050          Plan of Care Review    Plan of Care Reviewed With patient  -     Progress no change  -     Outcome Evaluation OT tx completed.  OTR assisted with t/f, HHA amb & toileting with sponge bathing.   Pt. had multiple epsiodes of LOB during in room mobility.  While seated on toilet, pt physically able to complete with more independence but he demo's significant deficits in taks initiation & sequencing.  He is perseverative and requires increased assist to complete tasks.  Mr Urbina would benefit from cont'd OT tx & SNF placement.  -     Row Name 05/12/22 1050          Therapy Assessment/Plan (OT)    Rehab Potential (OT) fair, will monitor progress closely  -     Criteria for Skilled Therapeutic Interventions Met (OT) yes;skilled treatment is necessary  -     Therapy Frequency (OT) 5 times/wk  -     Row Name 05/12/22 1050          Therapy Plan Review/Discharge Plan (OT)    Anticipated Discharge Disposition (OT) skilled nursing facility  -     Row Name 05/12/22 1050           Positioning and Restraints    Pre-Treatment Position in bed  -CH     Post Treatment Position bed  -CH     In Bed fowlers;call light within reach;encouraged to call for assist;side rails up x2;notified [employer] [facility]  -           User Key  (r) = Recorded By, (t) = Taken By, (c) = Cosigned By    Initials Name Provider Type    Noemi Patel, OTR/L Occupational Therapist               Outcome Measures     Row Name 05/12/22 1050          How much help from another is currently needed...    Putting on and taking off regular lower body clothing? 2  -CH     Bathing (including washing, rinsing, and drying) 2  -CH     Toileting (which includes using toilet bed pan or urinal) 2  -CH     Putting on and taking off regular upper body clothing 2  -CH     Taking care of personal grooming (such as brushing teeth) 2  -CH     Eating meals 2  -CH     AM-PAC 6 Clicks Score (OT)   -     Row Name 05/12/22 1057          How much help from another person do you currently need...    Turning from your back to your side while in flat bed without using bedrails? 3  -MS     Moving from lying on back to sitting on the side of a flat bed without bedrails? 3  -MS     Moving to and from a bed to a chair (including a wheelchair)? 3  -MS     Standing up from a chair using your arms (e.g., wheelchair, bedside chair)? 3  -MS     Climbing 3-5 steps with a railing? 1  -MS     To walk in hospital room? 3  -MS     AM-PAC 6 Clicks Score (PT) 16  -MS     Highest level of mobility 5 --> Static standing  -MS     Row Name 05/12/22 1057 05/12/22 1050       Functional Assessment    Outcome Measure Options AM-PAC 6 Clicks Basic Mobility (PT)  -MS AM-PAC 6 Clicks Daily Activity (OT)  -          User Key  (r) = Recorded By, (t) = Taken By, (c) = Cosigned By    Initials Name Provider Type    Noemi Patel, OTR/L Occupational Therapist    Henrietta Sharma, PT, DPT, NCS Physical Therapist                Occupational Therapy Education                 Title: PT  OT SLP Therapies (In Progress)     Topic: Occupational Therapy (In Progress)     Point: ADL training (Done)     Description:   Instruct learner(s) on proper safety adaptation and remediation techniques during self care or transfers.   Instruct in proper use of assistive devices.              Learning Progress Summary           Patient Acceptance, E,D, VU,NR by  at 5/12/2022 1159    Acceptance, E, NR by MM at 5/11/2022 0945    Comment: OT role, benefits, POC, need for assist                   Point: Home exercise program (Not Started)     Description:   Instruct learner(s) on appropriate technique for monitoring, assisting and/or progressing therapeutic exercises/activities.              Learner Progress:  Not documented in this visit.          Point: Precautions (In Progress)     Description:   Instruct learner(s) on prescribed precautions during self-care and functional transfers.              Learning Progress Summary           Patient Acceptance, E, NR by MM at 5/11/2022 0945    Comment: OT role, benefits, POC, need for assist                   Point: Body mechanics (Done)     Description:   Instruct learner(s) on proper positioning and spine alignment during self-care, functional mobility activities and/or exercises.              Learning Progress Summary           Patient Acceptance, E,TB, VU,NR by  at 5/12/2022 0449    Acceptance, E, NR by MM at 5/11/2022 0945    Comment: OT role, benefits, POC, need for assist                               User Key     Initials Effective Dates Name Provider Type Discipline     06/16/21 -  Noemi Mckay, OTR/L Occupational Therapist OT    MM 06/16/21 -  Rick Herbert, OTR/L Occupational Therapist OT    EB 06/16/21 -  Tiffany Echeverria, RN Registered Nurse Nurse              OT Recommendation and Plan  Therapy Frequency (OT): 5 times/wk  Plan of Care Review  Plan of Care Reviewed With: patient  Progress: no change  Outcome Evaluation: OT tx completed.  OTR assisted  with t/f, HHA amb & toileting with sponge bathing.   Pt. had multiple epsiodes of LOB during in room mobility.  While seated on toilet, pt physically able to complete with more independence but he demo's significant deficits in taks initiation & sequencing.  He is perseverative and requires increased assist to complete tasks.  Mr Urbina would benefit from cont'd OT tx & SNF placement.     Time Calculation:    Time Calculation- OT     Row Name 05/12/22 1050             Time Calculation- OT    OT Start Time 1050  -CH      OT Stop Time 1128  -CH      OT Time Calculation (min) 38 min  -CH      Total Timed Code Minutes- OT 38 minute(s)  -CH      OT Received On 05/12/22  -CH              Timed Charges    79299 - OT Self Care/Mgmt Minutes 38  -CH              Total Minutes    Timed Charges Total Minutes 38  -CH       Total Minutes 38  -CH            User Key  (r) = Recorded By, (t) = Taken By, (c) = Cosigned By    Initials Name Provider Type     Noemi Mckay OTR/L Occupational Therapist              Therapy Charges for Today     Code Description Service Date Service Provider Modifiers Qty    [id number]  OT SELF CARE/MGMT/TRAIN EA 15 MIN 5/12/2022 Noemi Mckay OTR/L GO 3               UDAY Clarke/KURTIS  5/12/2022

## 2022-05-12 NOTE — PLAN OF CARE
Goal Outcome Evaluation:      Pt denies pain or discomfort this shift. Pt is on RA and tolerating well. Pt has exited the bed several times. Pt reoriented and escorted back to bed. Pt has c/o difficulty chewing; mechanical soft diet ordered. VSS; safety maintained.

## 2022-05-13 LAB
ALBUMIN SERPL-MCNC: 3.2 G/DL (ref 3.5–5.2)
ALBUMIN/GLOB SERPL: 1.5 G/DL
ALP SERPL-CCNC: 56 U/L (ref 39–117)
ALT SERPL W P-5'-P-CCNC: 8 U/L (ref 1–41)
ANION GAP SERPL CALCULATED.3IONS-SCNC: 6 MMOL/L (ref 5–15)
AST SERPL-CCNC: 36 U/L (ref 1–40)
BASOPHILS # BLD AUTO: 0.02 10*3/MM3 (ref 0–0.2)
BASOPHILS NFR BLD AUTO: 0.4 % (ref 0–1.5)
BILIRUB SERPL-MCNC: 0.4 MG/DL (ref 0–1.2)
BUN SERPL-MCNC: 16 MG/DL (ref 8–23)
BUN/CREAT SERPL: 23.5 (ref 7–25)
CALCIUM SPEC-SCNC: 8.3 MG/DL (ref 8.6–10.5)
CHLORIDE SERPL-SCNC: 103 MMOL/L (ref 98–107)
CO2 SERPL-SCNC: 29 MMOL/L (ref 22–29)
CREAT SERPL-MCNC: 0.68 MG/DL (ref 0.76–1.27)
CRP SERPL-MCNC: 4.65 MG/DL (ref 0–0.5)
DEPRECATED RDW RBC AUTO: 50 FL (ref 37–54)
EGFRCR SERPLBLD CKD-EPI 2021: 94 ML/MIN/1.73
EOSINOPHIL # BLD AUTO: 0.14 10*3/MM3 (ref 0–0.4)
EOSINOPHIL NFR BLD AUTO: 3.1 % (ref 0.3–6.2)
ERYTHROCYTE [DISTWIDTH] IN BLOOD BY AUTOMATED COUNT: 15.3 % (ref 12.3–15.4)
FERRITIN SERPL-MCNC: 218.4 NG/ML (ref 30–400)
GLOBULIN UR ELPH-MCNC: 2.1 GM/DL
GLUCOSE SERPL-MCNC: 94 MG/DL (ref 65–99)
HCT VFR BLD AUTO: 35.5 % (ref 37.5–51)
HGB BLD-MCNC: 11 G/DL (ref 13–17.7)
IMM GRANULOCYTES # BLD AUTO: 0.02 10*3/MM3 (ref 0–0.05)
IMM GRANULOCYTES NFR BLD AUTO: 0.4 % (ref 0–0.5)
LYMPHOCYTES # BLD AUTO: 1.07 10*3/MM3 (ref 0.7–3.1)
LYMPHOCYTES NFR BLD AUTO: 23.7 % (ref 19.6–45.3)
MCH RBC QN AUTO: 27.6 PG (ref 26.6–33)
MCHC RBC AUTO-ENTMCNC: 31 G/DL (ref 31.5–35.7)
MCV RBC AUTO: 89.2 FL (ref 79–97)
MONOCYTES # BLD AUTO: 0.49 10*3/MM3 (ref 0.1–0.9)
MONOCYTES NFR BLD AUTO: 10.9 % (ref 5–12)
NEUTROPHILS NFR BLD AUTO: 2.77 10*3/MM3 (ref 1.7–7)
NEUTROPHILS NFR BLD AUTO: 61.5 % (ref 42.7–76)
NRBC BLD AUTO-RTO: 0 /100 WBC (ref 0–0.2)
PLATELET # BLD AUTO: 130 10*3/MM3 (ref 140–450)
PMV BLD AUTO: 10.8 FL (ref 6–12)
POTASSIUM SERPL-SCNC: 3.7 MMOL/L (ref 3.5–5.2)
PROCALCITONIN SERPL-MCNC: 0.28 NG/ML (ref 0–0.25)
PROT SERPL-MCNC: 5.3 G/DL (ref 6–8.5)
RBC # BLD AUTO: 3.98 10*6/MM3 (ref 4.14–5.8)
SODIUM SERPL-SCNC: 138 MMOL/L (ref 136–145)
WBC NRBC COR # BLD: 4.51 10*3/MM3 (ref 3.4–10.8)

## 2022-05-13 PROCEDURE — 99214 OFFICE O/P EST MOD 30 MIN: CPT | Performed by: PHYSICIAN ASSISTANT

## 2022-05-13 PROCEDURE — 82728 ASSAY OF FERRITIN: CPT | Performed by: INTERNAL MEDICINE

## 2022-05-13 PROCEDURE — G0378 HOSPITAL OBSERVATION PER HR: HCPCS

## 2022-05-13 PROCEDURE — 97535 SELF CARE MNGMENT TRAINING: CPT

## 2022-05-13 PROCEDURE — 97116 GAIT TRAINING THERAPY: CPT

## 2022-05-13 PROCEDURE — 84145 PROCALCITONIN (PCT): CPT | Performed by: INTERNAL MEDICINE

## 2022-05-13 PROCEDURE — 85025 COMPLETE CBC W/AUTO DIFF WBC: CPT | Performed by: INTERNAL MEDICINE

## 2022-05-13 PROCEDURE — 80053 COMPREHEN METABOLIC PANEL: CPT | Performed by: INTERNAL MEDICINE

## 2022-05-13 PROCEDURE — 97110 THERAPEUTIC EXERCISES: CPT

## 2022-05-13 PROCEDURE — 86140 C-REACTIVE PROTEIN: CPT | Performed by: INTERNAL MEDICINE

## 2022-05-13 RX ADMIN — METOPROLOL TARTRATE 75 MG: 50 TABLET, FILM COATED ORAL at 09:30

## 2022-05-13 RX ADMIN — Medication 10 ML: at 09:31

## 2022-05-13 RX ADMIN — OXYCODONE HYDROCHLORIDE AND ACETAMINOPHEN 500 MG: 500 TABLET ORAL at 09:30

## 2022-05-13 RX ADMIN — RISPERIDONE 0.25 MG: 0.25 TABLET, FILM COATED ORAL at 09:30

## 2022-05-13 RX ADMIN — RISPERIDONE 0.25 MG: 0.25 TABLET, FILM COATED ORAL at 20:26

## 2022-05-13 RX ADMIN — MEMANTINE HYDROCHLORIDE 10 MG: 5 TABLET, FILM COATED ORAL at 09:30

## 2022-05-13 RX ADMIN — MEMANTINE HYDROCHLORIDE 10 MG: 5 TABLET, FILM COATED ORAL at 20:26

## 2022-05-13 RX ADMIN — CARBIDOPA AND LEVODOPA 1 TABLET: 25; 100 TABLET ORAL at 06:37

## 2022-05-13 RX ADMIN — Medication 1000 UNITS: at 09:30

## 2022-05-13 RX ADMIN — APIXABAN 2.5 MG: 2.5 TABLET, FILM COATED ORAL at 20:26

## 2022-05-13 RX ADMIN — FAMOTIDINE 20 MG: 20 TABLET, FILM COATED ORAL at 09:30

## 2022-05-13 RX ADMIN — APIXABAN 2.5 MG: 2.5 TABLET, FILM COATED ORAL at 09:30

## 2022-05-13 RX ADMIN — CARBIDOPA AND LEVODOPA 1 TABLET: 25; 100 TABLET ORAL at 17:10

## 2022-05-13 RX ADMIN — CARBIDOPA AND LEVODOPA 1 TABLET: 25; 100 TABLET ORAL at 11:01

## 2022-05-13 RX ADMIN — Medication 1 TABLET: at 09:30

## 2022-05-13 RX ADMIN — TAMSULOSIN HYDROCHLORIDE 0.4 MG: 0.4 CAPSULE ORAL at 09:30

## 2022-05-13 RX ADMIN — Medication 10 ML: at 20:27

## 2022-05-13 RX ADMIN — FAMOTIDINE 20 MG: 20 TABLET, FILM COATED ORAL at 17:10

## 2022-05-13 RX ADMIN — ZINC SULFATE 220 MG (50 MG) CAPSULE 220 MG: CAPSULE at 09:30

## 2022-05-13 NOTE — CASE MANAGEMENT/SOCIAL WORK
Continued Stay Note  [facility]     Patient Name: Edy Urbina  MRN: [id number]  Today's Date: 5/13/2022    Admit Date: 5/10/2022     Discharge Plan     Row Name 05/13/22 1101       Plan    Plan SNF    Plan Comments [facility] no longer taking covid pts. Left a message for admissions at [facility] [phone] to find out referral status. Did speak with Megan [phone] at [facility] and they do have a covid unit but it is empty because they had considering closing it. They are willing to look at the referral. Faxed to her at [phone].               Discharge Codes    No documentation.                     HUBER Ventura

## 2022-05-13 NOTE — PLAN OF CARE
Goal Outcome Evaluation:  Plan of Care Reviewed With: patient        Progress: no change  Outcome Evaluation: Pt remains disoriented to situation. Bed check on. VSS. No respiratory symptoms noted. SB-NSR on tele. Voiding. Isolation precautions continue. Safety maintained.

## 2022-05-13 NOTE — PLAN OF CARE
Goal Outcome Evaluation:  Plan of Care Reviewed With: other (see comments)        Progress: no change  Outcome Evaluation: Nutrition follow up. Pt had difficulty with chewing and diet has been downgraded to soft texture, ground meat diet for ease of chewing. He requires assistance with meals. He has consumed 55% of the last 4 meals. He is disoriented. He is receiving Boost Plus BID. Due to being in COVID isolation, unable to complete NFPE. Cont with current nutrition POC. All staff to encourage intake with interaction and assist as needed with meals.

## 2022-05-13 NOTE — THERAPY TREATMENT NOTE
Acute Care - Physical Therapy Treatment Note  [facility]     Patient Name: Edy Urbina  : 1940  MRN: [id number]  Today's Date: 2022      Visit Dx:     ICD-10-CM ICD-9-CM   1. Acute metabolic encephalopathy  G93.41 348.31   2. COVID-19  U07.1 079.89   3. Dysphagia, unspecified type  R13.10 787.20   4. Impaired mobility and ADLs  Z74.09 V49.89    Z78.9    5. Impaired mobility  Z74.09 799.89     Patient Active Problem List   Diagnosis   • Anemia   • Lewy body dementia without behavioral disturbance (HCC)   • Cachexia (HCC)   • Pneumonia of right middle lobe due to infectious organism   • Generalized weakness   • Elevated BUN   • Metabolic encephalopathy, acute, due to pnuemonia and elevated BUN   • Paroxysmal atrial fibrillation with rapid ventricular response (HCC)   • Acute metabolic encephalopathy   • COVID-19 virus detected   • Atrial fibrillation (HCC)     Past Medical History:   Diagnosis Date   • Cataracts, bilateral    • Lewy body dementia (HCC)    • Occasional tremors    • Prostate cancer (HCC) 2011    t2c,[place]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    • Varicose vein of leg 2019   • Varicose veins of legs      Past Surgical History:   Procedure Laterality Date   • EYE SURGERY     • HERNIA REPAIR Left    • INGUINAL HERNIA REPAIR Right 2019    Procedure: OPEN RIGHT INGUINAL HERNIA REPAIR WITH MESH;  Surgeon: Alesia Mora MD;  Location: [facility];  Service: General   • PROSTATECTOMY  2011    RALP     PT Assessment (last 12 hours)     PT Evaluation and Treatment     Row Name 22 1106          Physical Therapy Time and Intention    Subjective Information no complaints  -AE     Document Type therapy note (daily note)  -AE     Mode of Treatment physical therapy  -AE     Row Name 22 1106          General Information    Existing Precautions/Restrictions fall  -AE     Row Name 22 1106          Pain    Pretreatment Pain Rating 0/10 - no pain  -AE      Posttreatment Pain Rating 0/10 - no pain  -AE     Row Name 05/13/22 1106          Bed Mobility    Supine-Sit [place] (Bed Mobility) supervision  -AE     Row Name 05/13/22 1106          Transfers    Sit-Stand [place] (Transfers) contact guard;verbal cues  Practiced sit to stand x 5 reps  -AE     Stand-Sit [place] (Transfers) contact guard;verbal cues  -AE     Row Name 05/13/22 1106          Gait/Stairs (Locomotion)    [place] Level (Gait) minimum assist (75% patient effort)  -AE     Assistive Device (Gait) --  HHA  -AE     Distance in Feet (Gait) 20 x 4  -AE     Row Name 05/13/22 1106          Hip (Therapeutic Exercise)    Hip (Therapeutic Exercise) AROM (active range of motion)  standing mini squats x 20 reps. cga-min x 1  -AE     Hip AROM (Therapeutic Exercise) flexion;10 repetitions  -AE     [facility] Name 05/13/22 1106          Knee (Therapeutic Exercise)    Knee (Therapeutic Exercise) AROM (active range of motion)  -AE     Knee AROM (Therapeutic Exercise) LAQ (long arc quad);10 repetitions  -AE     Row Name 05/13/22 1106          Ankle (Therapeutic Exercise)    Ankle (Therapeutic Exercise) AROM (active range of motion)  -AE     Ankle AROM (Therapeutic Exercise) 10 repetitions;plantarflexion;dorsiflexion  -AE     Row Name 05/13/22 1106          Positioning and Restraints    Pre-Treatment Position in bed  -AE     Post Treatment Position bed  -AE     In Bed fowlers;call light within reach  -AE           User Key  (r) = Recorded By, (t) = Taken By, (c) = Cosigned By    Initials Name Provider Type    AE Yohana Dozier, PTA Physical Therapist Assistant                Physical Therapy Education                 Title: PT OT SLP Therapies (In Progress)     Topic: Physical Therapy (In Progress)     Point: Mobility training (In Progress)     Learning Progress Summary           Patient Acceptance, E, NR by MS at 5/12/2022 2410    Comment: role of PT in his care                   Point: Home exercise program  (Not Started)     Learner Progress:  Not documented in this visit.          Point: Body mechanics (Not Started)     Learner Progress:  Not documented in this visit.          Point: Precautions (Not Started)     Learner Progress:  Not documented in this visit.                      User Key     Initials Effective Dates Name Provider Type Discipline    MS 06/19/18 -  Henrietta Hooks, PT, DPT, NCS Physical Therapist PT              PT Recommendation and Plan     Plan of Care Reviewed With: patient  Progress: improving  Outcome Evaluation: Pt was SBA supine to sit and cga to stand. Pt walked cga-min x 1 HHA 20ft x 4 reps with no LOB. Pt was cga-min x 1 for standing ex's. Pt improving but continues to bed weak and would benefit from continued PT at SNF.       Time Calculation:         PT G-Codes  Outcome Measure Options: AM-PAC 6 Clicks Basic Mobility (PT)  AM-PAC 6 Clicks Score (PT): 22  AM-PAC 6 Clicks Score (OT): 12    Yohana Dozier, PTA  5/13/2022

## 2022-05-13 NOTE — THERAPY TREATMENT NOTE
Acute Care - Occupational Therapy Treatment Note  [facility]     Patient Name: Edy Urbina  : 1940  MRN: [id number]  Today's Date: 2022             Admit Date: 5/10/2022       ICD-10-CM ICD-9-CM   1. Acute metabolic encephalopathy  G93.41 348.31   2. COVID-19  U07.1 079.89   3. Dysphagia, unspecified type  R13.10 787.20   4. Impaired mobility and ADLs  Z74.09 V49.89    Z78.9    5. Impaired mobility  Z74.09 799.89     Patient Active Problem List   Diagnosis   • Anemia   • Lewy body dementia without behavioral disturbance (HCC)   • Cachexia (HCC)   • Pneumonia of right middle lobe due to infectious organism   • Generalized weakness   • Elevated BUN   • Metabolic encephalopathy, acute, due to pnuemonia and elevated BUN   • Paroxysmal atrial fibrillation with rapid ventricular response (HCC)   • Acute metabolic encephalopathy   • COVID-19 virus detected   • Atrial fibrillation (HCC)     Past Medical History:   Diagnosis Date   • Cataracts, bilateral    • Lewy body dementia (HCC)    • Occasional tremors    • Prostate cancer (HCC) 2011    t2c,Sommer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    • Varicose vein of leg 2019   • Varicose veins of legs      Past Surgical History:   Procedure Laterality Date   • EYE SURGERY     • HERNIA REPAIR Left    • INGUINAL HERNIA REPAIR Right 2019    Procedure: OPEN RIGHT INGUINAL HERNIA REPAIR WITH MESH;  Surgeon: Alesia Mora MD;  Location: [facility];  Service: General   • PROSTATECTOMY  2011    RALP         OT ASSESSMENT FLOWSHEET (last 12 hours)     OT Evaluation and Treatment     Row Name 22 1327                   OT Time and Intention    Subjective Information complains of;weakness  -MW        Document Type therapy note (daily note)  -MW        Mode of Treatment occupational therapy  -MW                  General Information    Existing Precautions/Restrictions fall  -MW                  Pain Assessment    Pretreatment Pain  Rating 0/10 - no pain  -MW        Posttreatment Pain Rating 0/10 - no pain  -                  Activities of Daily Living    BADL Assessment/Intervention grooming;toileting  -                  Grooming Assessment/Training    [place] Level (Grooming) oral care regimen;contact guard assist  -        Position (Grooming) sink side  -        Comment, (Grooming) performed task standing sink side with no LOB, some difficulty with opening 2/3 containers  -                  Toileting Assessment/Training    [place] Level (Toileting) perform perineal hygiene;standby assist  -        Assistive Devices (Toileting) commode  -        Position (Toileting) unsupported sitting  -        Comment, (Toileting) SBA for dynamic sitting balance; cues for sequencing for handwashing following task  -                  Bed Mobility    Bed Mobility supine-sit  -        Supine-Sit [place] (Bed Mobility) standby assist  -        Assistive Device (Bed Mobility) head of bed elevated  -                  Functional Mobility    Functional Mobility- Ind. Level contact guard assist  -        Functional Mobility- Comment amb to BR and back to bed  -                  Transfer Assessment/Treatment    Transfers sit-stand transfer;toilet transfer  -                  Transfers    Sit-Stand [place] (Transfers) contact guard  -        [place] Level (Toilet Transfer) contact guard;verbal cues  -        Assistive Device (Toilet Transfer) commode;grab bars/safety frame  -                  Toilet Transfer    Type (Toilet Transfer) stand pivot/stand step  -                  Plan of Care Review    Plan of Care Reviewed With patient  -        Progress improving  -        Outcome Evaluation OT txt completed. Pt awake and alert in fowlers, just finished with lunch tray. Reports feeling stronger than yesterday, no pain. SBA for bed mobility with HOB elevated. CGA for fxl transfers and fxl mobility within  room. Stood sink side for about 7 min for oral hygiene with no LOB. Min difficulty with FMC and opening containers. Toilets with S for balance. Pt demos much improvement compared to previous notes, still with weakness and cognitive limitations impacting independence and increasing fall risk. Cont OT POC. Recommend d/c SNF.  -MW                  Positioning and Restraints    Pre-Treatment Position in bed  -MW        Post Treatment Position bed  -MW        In Bed fowlers;call light within reach;encouraged to call for assist;exit alarm on;patient within staff view;side rails up x2  -MW                  Therapy Plan Review/Discharge Plan (OT)    Anticipated Discharge Disposition (OT) skilled nursing facility  -              User Key  (r) = Recorded By, (t) = Taken By, (c) = Cosigned By    Initials Name Effective Dates    MW Gin Funes, OTR/L 08/28/18 -                  Occupational Therapy Education                 Title: PT OT SLP Therapies (In Progress)     Topic: Occupational Therapy (In Progress)     Point: ADL training (Done)     Description:   Instruct learner(s) on proper safety adaptation and remediation techniques during self care or transfers.   Instruct in proper use of assistive devices.              Learning Progress Summary           Patient Acceptance, E,D, VU,NR by  at 5/12/2022 1159    Acceptance, E, NR by MM at 5/11/2022 0945    Comment: OT role, benefits, POC, need for assist                   Point: Home exercise program (Not Started)     Description:   Instruct learner(s) on appropriate technique for monitoring, assisting and/or progressing therapeutic exercises/activities.              Learner Progress:  Not documented in this visit.          Point: Precautions (In Progress)     Description:   Instruct learner(s) on prescribed precautions during self-care and functional transfers.              Learning Progress Summary           Patient Acceptance, E, NR by MM at 5/11/2022 0911     Comment: OT role, benefits, POC, need for assist                   Point: Body mechanics (Done)     Description:   Instruct learner(s) on proper positioning and spine alignment during self-care, functional mobility activities and/or exercises.              Learning Progress Summary           Patient Acceptance, E,TB, VU,NR by EB at 5/12/2022 0449    Acceptance, E, NR by MM at 5/11/2022 0945    Comment: OT role, benefits, POC, need for assist                               User Key     Initials Effective Dates Name Provider Type Discipline     06/16/21 -  Noemi Mckay, OTR/L Occupational Therapist OT    MM 06/16/21 -  Rick Herbert, OTR/L Occupational Therapist OT    EB 06/16/21 -  Tiffany Echeverria, RN Registered Nurse Nurse                  OT Recommendation and Plan     Plan of Care Review  Plan of Care Reviewed With: patient  Progress: improving  Outcome Evaluation: OT txt completed. Pt awake and alert in fowlers, just finished with lunch tray. Reports feeling stronger than yesterday, no pain. SBA for bed mobility with HOB elevated. CGA for fxl transfers and fxl mobility within room. Stood sink side for about 7 min for oral hygiene with no LOB. Min difficulty with FMC and opening containers. Toilets with S for balance. Pt demos much improvement compared to previous notes, still with weakness and cognitive limitations impacting independence and increasing fall risk. Cont OT POC. Recommend d/c SNF.  Plan of Care Reviewed With: patient  Outcome Evaluation: OT txt completed. Pt awake and alert in fowlers, just finished with lunch tray. Reports feeling stronger than yesterday, no pain. SBA for bed mobility with HOB elevated. CGA for fxl transfers and fxl mobility within room. Stood sink side for about 7 min for oral hygiene with no LOB. Min difficulty with FMC and opening containers. Toilets with S for balance. Pt demos much improvement compared to previous notes, still with weakness and cognitive limitations  impacting independence and increasing fall risk. Cont OT POC. Recommend d/c SNF.        Time Calculation:    Time Calculation- OT     Row Name 05/13/22 1354             Time Calculation- OT    OT Start Time 1327  -MW      OT Stop Time 1411  -MW      OT Time Calculation (min) 44 min  -MW      Total Timed Code Minutes- OT 44 minute(s)  -MW      OT Received On 05/13/22  -            User Key  (r) = Recorded By, (t) = Taken By, (c) = Cosigned By    Initials Name Provider Type     Gin Funes OTR/L Occupational Therapist              Therapy Charges for Today     Code Description Service Date Service Provider Modifiers Qty    [id number] HC OT SELF CARE/MGMT/TRAIN EA 15 MIN 5/13/2022 Gin Funes OTR/L GO 3               UDAY Johansen/KURTIS  5/13/2022

## 2022-05-13 NOTE — PLAN OF CARE
Goal Outcome Evaluation:  Plan of Care Reviewed With: patient        Progress: improving  Outcome Evaluation: Pt was SBA supine to sit and cga to stand. Pt walked cga-min x 1 HHA 20ft x 4 reps with no LOB. Pt was cga-min x 1 for standing ex's. Pt improving but continues to bed weak and would benefit from continued PT at SNF.

## 2022-05-13 NOTE — CASE MANAGEMENT/SOCIAL WORK
Continued Stay Note  [facility]     Patient Name: Edy Urbnia  MRN: [id number]  Today's Date: 5/13/2022    Admit Date: 5/10/2022     Discharge Plan     Row Name 05/13/22 1443       Plan    Plan SNF    Plan Comments Spoke with Megan [phone] from [facility]. They are still reviewing but she is not sure if they will be able to take due to dementia dx. She mentioned that [facility] was taking covid pts so checked with them and they no longer have a covid unit. Noted pt may be doing a little better so hopefully he can be strong enough to go home soon because snf options are limited due to covid.               Discharge Codes    No documentation.                     HUBER Ventura

## 2022-05-13 NOTE — PLAN OF CARE
Goal Outcome Evaluation:  Plan of Care Reviewed With: patient        Progress: improving  Outcome Evaluation: OT txt completed. Pt awake and alert in fowlers, just finished with lunch tray. Reports feeling stronger than yesterday, no pain. SBA for bed mobility with HOB elevated. CGA for fxl transfers and fxl mobility within room. Stood sink side for about 7 min for oral hygiene with no LOB. Min difficulty with FMC and opening containers. Toilets with S for balance. Pt demos much improvement compared to previous notes, still with weakness and cognitive limitations impacting independence and increasing fall risk. Cont OT POC. Recommend d/c SNF.

## 2022-05-13 NOTE — PROGRESS NOTES
[facility] Medicine Services  INPATIENT PROGRESS NOTE    Patient Name: Edy Urbina  Date of Admission: 5/10/2022  Today's Date: 05/13/22  Length of Stay: 0  Primary Care Physician: Torey Amato DO    Subjective   Chief Complaint: follow-up COVID  Altered Mental Status       Patient walked with therapy to the bathroom, and is actually currently on the commode trying to have a bowel movement.  He denies any new symptoms.  Feels like that he is getting stronger.  Denies feeling shortness of breath.  Reports that he has been eating okay.  Voices no new complaints at this time.    In my discussion with the therapist at bedside, she reports that he did markedly better today.  Was able to stand up at the sink to get cleaned up for 6-7 minutes, ambulated much better today.      Review of Systems     All pertinent negatives and positives are as above. All other systems have been reviewed and are negative unless otherwise stated.     Objective    Temp:  [98.1 °F (36.7 °C)-99.2 °F (37.3 °C)] 98.7 °F (37.1 °C)  Heart Rate:  [54-68] 58  Resp:  [16] 16  BP: (107-131)/(52-62) 107/52  Physical Exam  Vitals reviewed.   Constitutional:       Appearance: He is not toxic-appearing.      Comments: Thin and frail   HENT:      Head: Normocephalic.      Mouth/Throat:      Mouth: Mucous membranes are moist.   Eyes:      Comments: Wearing glasses   Pulmonary:      Effort: Pulmonary effort is normal. No respiratory distress.      Comments: Remains on room air  Musculoskeletal:         General: Swelling present.      Comments: SCDs   Skin:     General: Skin is warm.      Capillary Refill: Capillary refill takes less than 2 seconds.   Neurological:      Motor: Weakness present.      Comments: Resting tremor in upper extremities    Psychiatric:         Mood and Affect: Mood normal.         Results Review:  I have reviewed the labs, radiology results, and diagnostic studies.    Laboratory Data:    Results from last 7 days   Lab Units 05/13/22  0547 05/10/22  1230   WBC 10*3/mm3 4.51 5.13   HEMOGLOBIN g/dL 11.0* 12.3*   HEMATOCRIT % 35.5* 39.6   PLATELETS 10*3/mm3 130* 186        Results from last 7 days   Lab Units 05/13/22  0547 05/10/22  1230   SODIUM mmol/L 138 143   POTASSIUM mmol/L 3.7 4.6   CHLORIDE mmol/L 103 103   CO2 mmol/L 29.0 28.0   BUN mg/dL 16 27*   CREATININE mg/dL 0.68* 1.02   CALCIUM mg/dL 8.3* 9.2   BILIRUBIN mg/dL 0.4 0.5   ALK PHOS U/L 56 68   ALT (SGPT) U/L 8 25   AST (SGOT) U/L 36 54*   GLUCOSE mg/dL 94 91       Culture Data:   No results found for: BLOODCX, URINECX, WOUNDCX, MRSACX, RESPCX, STOOLCX    Radiology Data:   Imaging Results (Last 24 Hours)     ** No results found for the last 24 hours. **          I have reviewed the patient's current medications.     Assessment/Plan     Active Hospital Problems    Diagnosis    • COVID-19 virus detected    • Atrial fibrillation (HCC)    • Generalized weakness    • Cachexia (HCC)    • Lewy body dementia without behavioral disturbance (HCC)        Plan:  1.  Patient to receive the monoclonal antibody infusion with bebtelovimab on 5/10  2.  Remains on room air  3.  Most recent chest x-ray clear  4.  Appreciate Neurology recommendations  5.  Trial of Sinemet  6.  Physical therapy and Occupational Therapy  7.  Continue supplements including zinc sulfate, vitamin C, vitamin D  8.  Nutrition supplements  9.  I reduced dose of Risperdal to 0.25mg yesterday  10.  Monitor for urinary retention; trial of Flomax (agree regarding monitoring for s/sxs of orthostasis with this medication)  11.  Incentive spirometer as possible  12.  Labs reviewed; stable.  CRP 4.65  13.  Continue Metoprolol; Eliquis  14.  Disposition planning:      Electronically signed by Lenard Landa MD, 05/13/22, 13:37 CDT.

## 2022-05-13 NOTE — PROGRESS NOTES
Neurology Progress Note      Chief Complaint:    Parkinsonism    Subjective     Subjective:  Patient appears to be somewhat improved today.  Although I have not personally examined him previously, therapy reports that he did well with ambulation as only contact-guard assist.  Patient feels subjectively that he is doing better today than yesterday.  He feels febrile.      Past Medical History:   Diagnosis Date   • Cataracts, bilateral    • Lewy body dementia (HCC)    • Occasional tremors    • Prostate cancer (HCC) 2011    t2c,[place]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    • Varicose vein of leg 2019   • Varicose veins of legs      Past Surgical History:   Procedure Laterality Date   • EYE SURGERY     • HERNIA REPAIR Left    • INGUINAL HERNIA REPAIR Right 2019    Procedure: OPEN RIGHT INGUINAL HERNIA REPAIR WITH MESH;  Surgeon: Alesia Mora MD;  Location: [facility] OR;  Service: General   • PROSTATECTOMY  2011    RALP     Family History   Problem Relation Age of Onset   • Stroke Mother      Social History     Tobacco Use   • Smoking status: Former Smoker     Years: 50.00     Types: Cigarettes     Quit date: 2002     Years since quittin.9   • Smokeless tobacco: Never Used   Substance Use Topics   • Alcohol use: No     Comment: QUIT DRINKING IN    • Drug use: No       Medications:  Current Facility-Administered Medications   Medication Dose Route Frequency Provider Last Rate Last Admin   • acetaminophen (TYLENOL) tablet 650 mg  650 mg Oral Q4H PRN Lenard Landa MD   650 mg at 05/10/22 2200    Or   • acetaminophen (TYLENOL) 160 MG/5ML solution 650 mg  650 mg Oral Q4H PRN Lenard Landa MD        Or   • acetaminophen (TYLENOL) suppository 650 mg  650 mg Rectal Q4H PRN Lenard Landa MD       • apixaban (ELIQUIS) tablet 2.5 mg  2.5 mg Oral Q12H Lenard Landa MD   2.5 mg at 22 0930   • ascorbic acid (VITAMIN C) tablet 500 mg  500 mg Oral  Daily Lenard Landa MD   500 mg at 05/13/22 0930   • carbidopa-levodopa (SINEMET)  MG per tablet 1 tablet  1 tablet Oral TID Christina Dunne APRN   1 tablet at 05/13/22 1101   • cholecalciferol (VITAMIN D3) tablet 1,000 Units  1,000 Units Oral Daily Lenard Landa MD   1,000 Units at 05/13/22 0930   • famotidine (PEPCID) tablet 20 mg  20 mg Oral BID AC Lenard Landa MD   20 mg at 05/13/22 0930   • memantine (NAMENDA) tablet 10 mg  10 mg Oral BID Lenard Landa MD   10 mg at 05/13/22 0930   • metoprolol tartrate (LOPRESSOR) tablet 75 mg  75 mg Oral Q12H Lenard Landa MD   75 mg at 05/13/22 0930   • multivitamin with minerals 1 tablet  1 tablet Oral Daily Lenard Landa MD   1 tablet at 05/13/22 0930   • ondansetron (ZOFRAN) injection 4 mg  4 mg Intravenous Q6H PRN Lenard Landa MD       • risperiDONE (risperDAL) tablet 0.25 mg  0.25 mg Oral BID Lenard Landa MD   0.25 mg at 05/13/22 0930   • sodium chloride 0.9 % flush 10 mL  10 mL Intravenous PRN Lenard Landa MD       • sodium chloride 0.9 % flush 10 mL  10 mL Intravenous Q12H Lenard Landa MD   10 mL at 05/13/22 0931   • sodium chloride 0.9 % flush 10 mL  10 mL Intravenous PRN Lenard Landa MD       • sodium chloride 0.9 % infusion 30 mL  30 mL Intravenous Once Tom Sullivan MD       • tamsulosin (FLOMAX) 24 hr capsule 0.4 mg  0.4 mg Oral Daily Lenard Landa MD   0.4 mg at 05/13/22 0930   • zinc sulfate (ZINCATE) capsule 220 mg  220 mg Oral Daily Lenard Landa MD   220 mg at 05/13/22 0930       Allergies:    Patient has no known allergies.    Review of Systems:   -A 14 point review of systems is completed and is negative.      Objective      Vital Signs  Temp:  [98.1 °F (36.7 °C)-99.2 °F (37.3 °C)] 98.7 °F (37.1 °C)  Heart Rate:  [54-68] 58  Resp:  [16] 16  BP: (107-131)/(52-62) 107/52    Physical Exam:    General Exam:  Head:  Normal cephalic,  atraumatic  HEENT:  Neck supple  Fundoscopic Exam:  No signs of disc edema  CVS:  Regular rate and rhythm.  No murmurs  Carotid Examination:  No bruits  Lungs:  Clear to auscultation  Abdomen:  Non-tender, Non-distended  Extremities:  No signs of peripheral edema  Skin:  No rashes     Neurologic Exam:     Mental Status:    -Awake, Alert, Oriented X to person and place stating he is in a hospital in [place]. Cannot tell me the reason he in the hospital.   -No word finding difficulties  -No aphasia  -No dysarthria  -Follows simple and complex commands  -Voice tremor, hypomimia  -Palmomental reflex present     CN II:  Visual fields full.  Pupils equally reactive to light  CN III, IV, VI:  Extraocular Muscles full with no signs of nystagmus  CN V:  Facial sensory is symmetric with no asymmetries.  CN VII:  Facial motor symmetric  CN VIII:  Gross hearing intact bilaterally  CN IX:  Palate elevates symmetrically  CN X:  Palate elevates symmetrically  CN XI:  Shoulder shrug symmetric  CN XII:  Tongue is midline on protrusion     Motor: (strength out of 5:  1= minimal movement, 2 = movement in plane of gravity, 3 = movement against gravity, 4 = movement against some resistance, 5 = full strength)     -Right Upper Ext: Proximal: 5 Distal: 5  -Left Upper Ext: Proximal: 5   Distal: 5     -Right Lower Ext: Proximal: 5 Distal: 5  -Left Lower Ext: Proximal: 5   Distal: 5     DTR:  -Right              Bicep: 2+         Tricep: 2+        Brachioradialis: 2+              Patella: 2+       Ankle: 2+         Neg Babinski  -Left              Bicep: 2+         Tricep: 2+        Brachioradialis: 2+              Patella: 2+       Ankle: 2+         Neg Babinski     Sensory:  -Intact to light touch, pinprick, temperature, pain, and proprioception     Coordination:  -Finger to nose intact  -Heel to shin intact  -No ataxia  -Resting tremor with cog wheeling bilateral upper extremities.  -Significant rigidity in bilateral upper extremities  especially in the biceps and triceps.      Gait  -Not observed.       Results Review:    I reviewed the patient's new clinical results and findings.    Lab Results (last 24 hours)     Procedure Component Value Units Date/Time    Blood Culture - Blood, Arm, Left [[id number]]  (Normal) Collected: 05/10/22 1220    Specimen: Blood from Arm, Left Updated: 05/13/22 1316     Blood Culture No growth at 3 days    Blood Culture - Blood, Arm, Left [[id number]]  (Normal) Collected: 05/10/22 1230    Specimen: Blood from Arm, Left Updated: 05/13/22 1316     Blood Culture No growth at 3 days    Comprehensive Metabolic Panel [[id number]]  (Abnormal) Collected: 05/13/22 0547    Specimen: Blood Updated: 05/13/22 0625     Glucose 94 mg/dL      BUN 16 mg/dL      Creatinine 0.68 mg/dL      Sodium 138 mmol/L      Potassium 3.7 mmol/L      Comment: Slight hemolysis detected by analyzer. Results may be affected.        Chloride 103 mmol/L      CO2 29.0 mmol/L      Calcium 8.3 mg/dL      Total Protein 5.3 g/dL      Albumin 3.20 g/dL      ALT (SGPT) 8 U/L      AST (SGOT) 36 U/L      Alkaline Phosphatase 56 U/L      Total Bilirubin 0.4 mg/dL      Globulin 2.1 gm/dL      A/G Ratio 1.5 g/dL      BUN/Creatinine Ratio 23.5     Anion Gap 6.0 mmol/L      eGFR 94.0 mL/min/1.73      Comment: National Kidney Foundation and American Society of Nephrology (ASN) Task Force recommended calculation based on the Chronic Kidney Disease Epidemiology Collaboration (CKD-EPI) equation refit without adjustment for race.       Narrative:      GFR Normal >60  Chronic Kidney Disease <60  Kidney Failure <15      C-reactive Protein [[id number]]  (Abnormal) Collected: 05/13/22 0547    Specimen: Blood Updated: 05/13/22 0625     C-Reactive Protein 4.65 mg/dL     Procalcitonin [[id number]]  (Abnormal) Collected: 05/13/22 0547    Specimen: Blood Updated: 05/13/22 0625     Procalcitonin 0.28 ng/mL     Narrative:      As a Marker for Sepsis (Non-Neonates):    1. <0.5 ng/mL  "represents a low risk of severe sepsis and/or septic shock.  2. >2 ng/mL represents a high risk of severe sepsis and/or septic shock.    As a Marker for Lower Respiratory Tract Infections that require antibiotic therapy:    PCT on Admission    Antibiotic Therapy       6-12 Hrs later    >0.5                Strongly Recommended  >0.25 - <0.5        Recommended   0.1 - 0.25          Discouraged              Remeasure/reassess PCT  <0.1                Strongly Discouraged     Remeasure/reassess PCT    As 28 day mortality risk marker: \"Change in Procalcitonin Result\" (>80% or <=80%) if Day 0 (or Day 1) and Day 4 values are available. Refer to http://[email or web address]ct-[email or web address]    Change in PCT <=80%  A decrease of PCT levels below or equal to 80% defines a positive change in PCT test result representing a higher risk for 28-day all-cause mortality of patients diagnosed with severe sepsis for septic shock.    Change in PCT >80%  A decrease of PCT levels of more than 80% defines a negative change in PCT result representing a lower risk for 28-day all-cause mortality of patients diagnosed with severe sepsis or septic shock.       Ferritin [[id number]]  (Normal) Collected: 05/13/22 0547    Specimen: Blood Updated: 05/13/22 0625     Ferritin 218.40 ng/mL     Narrative:      Results may be falsely decreased if patient taking Biotin.      CBC & Differential [[id number]]  (Abnormal) Collected: 05/13/22 0547    Specimen: Blood Updated: 05/13/22 0603    Narrative:      The following orders were created for panel order CBC & Differential.  Procedure                               Abnormality         Status                     ---------                               -----------         ------                     CBC Auto Differential[[id number]]        Abnormal            Final result                 Please view results for these tests on the individual orders.    CBC Auto Differential [[id number]]  (Abnormal) Collected: 05/13/22 0547 "    Specimen: Blood Updated: 05/13/22 0603     WBC 4.51 10*3/mm3      RBC 3.98 10*6/mm3      Hemoglobin 11.0 g/dL      Hematocrit 35.5 %      MCV 89.2 fL      MCH 27.6 pg      MCHC 31.0 g/dL      RDW 15.3 %      RDW-SD 50.0 fl      MPV 10.8 fL      Platelets 130 10*3/mm3      Neutrophil % 61.5 %      Lymphocyte % 23.7 %      Monocyte % 10.9 %      Eosinophil % 3.1 %      Basophil % 0.4 %      Immature Grans % 0.4 %      Neutrophils, Absolute 2.77 10*3/mm3      Lymphocytes, Absolute 1.07 10*3/mm3      Monocytes, Absolute 0.49 10*3/mm3      Eosinophils, Absolute 0.14 10*3/mm3      Basophils, Absolute 0.02 10*3/mm3      Immature Grans, Absolute 0.02 10*3/mm3      nRBC 0.0 /100 WBC           Imaging Results (Last 24 Hours)     ** No results found for the last 24 hours. **          Assessment/Plan     Impression:  1.  Parkinsonism  2.  Presumed Lewy body dementia  3.  COVID-19  4.  Orthostatic hypotension  5.  Cachexia and malnutrition  6.  Paroxysmal atrial fibrillation      Plan:  1.  Continue Sinemet at 0 700, 1100 & 1600.  2.  Caution with orthostatic hypotension.  Rise slowly.  Likely will be too difficult to don and doff NAZIA hose.  May need to reevaluate Flomax as it may contribute to orthostasis.  3.  Continue PT, OT.  4.  Continue anticoagulation.  5.  Continue nutritional support through dietary.          Benjamin Ng PA-C  05/13/22  14:25 CDT

## 2022-05-13 NOTE — PLAN OF CARE
Goal Outcome Evaluation:      Pt denies pain or discomfort this shift. Pt is on RA and tolerating well. Pt IID in ZAIDA. Pt exited bed once this shift. Pt is up x 1 and voiding. Pt able to feed self this shift. VSS; safety maintained.

## 2022-05-14 PROCEDURE — 97116 GAIT TRAINING THERAPY: CPT

## 2022-05-14 PROCEDURE — G0378 HOSPITAL OBSERVATION PER HR: HCPCS

## 2022-05-14 PROCEDURE — 99214 OFFICE O/P EST MOD 30 MIN: CPT | Performed by: PHYSICIAN ASSISTANT

## 2022-05-14 PROCEDURE — 97110 THERAPEUTIC EXERCISES: CPT

## 2022-05-14 RX ADMIN — CARBIDOPA AND LEVODOPA 1 TABLET: 25; 100 TABLET ORAL at 12:08

## 2022-05-14 RX ADMIN — APIXABAN 2.5 MG: 2.5 TABLET, FILM COATED ORAL at 20:47

## 2022-05-14 RX ADMIN — MEMANTINE HYDROCHLORIDE 10 MG: 5 TABLET, FILM COATED ORAL at 20:47

## 2022-05-14 RX ADMIN — ZINC SULFATE 220 MG (50 MG) CAPSULE 220 MG: CAPSULE at 08:52

## 2022-05-14 RX ADMIN — OXYCODONE HYDROCHLORIDE AND ACETAMINOPHEN 500 MG: 500 TABLET ORAL at 08:52

## 2022-05-14 RX ADMIN — MEMANTINE HYDROCHLORIDE 10 MG: 5 TABLET, FILM COATED ORAL at 08:53

## 2022-05-14 RX ADMIN — Medication 1 TABLET: at 08:52

## 2022-05-14 RX ADMIN — RISPERIDONE 0.25 MG: 0.25 TABLET, FILM COATED ORAL at 20:47

## 2022-05-14 RX ADMIN — FAMOTIDINE 20 MG: 20 TABLET, FILM COATED ORAL at 17:58

## 2022-05-14 RX ADMIN — Medication 10 ML: at 20:47

## 2022-05-14 RX ADMIN — APIXABAN 2.5 MG: 2.5 TABLET, FILM COATED ORAL at 08:52

## 2022-05-14 RX ADMIN — CARBIDOPA AND LEVODOPA 1 TABLET: 25; 100 TABLET ORAL at 06:41

## 2022-05-14 RX ADMIN — FAMOTIDINE 20 MG: 20 TABLET, FILM COATED ORAL at 08:53

## 2022-05-14 RX ADMIN — METOPROLOL TARTRATE 75 MG: 50 TABLET, FILM COATED ORAL at 08:53

## 2022-05-14 RX ADMIN — RISPERIDONE 0.25 MG: 0.25 TABLET, FILM COATED ORAL at 08:52

## 2022-05-14 RX ADMIN — CARBIDOPA AND LEVODOPA 1 TABLET: 25; 100 TABLET ORAL at 17:58

## 2022-05-14 RX ADMIN — TAMSULOSIN HYDROCHLORIDE 0.4 MG: 0.4 CAPSULE ORAL at 08:52

## 2022-05-14 RX ADMIN — Medication 10 ML: at 08:56

## 2022-05-14 RX ADMIN — Medication 1000 UNITS: at 08:52

## 2022-05-14 RX ADMIN — METOPROLOL TARTRATE 75 MG: 50 TABLET, FILM COATED ORAL at 20:47

## 2022-05-14 NOTE — PLAN OF CARE
Goal Outcome Evaluation:  Plan of Care Reviewed With: patient        Progress: improving  Outcome Evaluation: Pt is intermittently confused to situation and very forgetful. Bed check on. Infrequent nonproductive cough continues, otherwise no respiratory symptoms noted. Voiding per urinal. VSS. SB-NSR on tele. Safety maintained.

## 2022-05-14 NOTE — PROGRESS NOTES
Neurology Progress Note      Chief Complaint:    Parkinsonism    Subjective     Subjective:  Patient doing well today.  Did very well with PTA and continues CGA/Vish x 1 for virtually all levels of mobility and transfers.  Has a son and daughter locally who visit him and check on him regularly every couple of days.  He would really like to go home at discharge.  He is responding to therapy and possible to Sinemet as well.      Past Medical History:   Diagnosis Date   • Cataracts, bilateral    • Lewy body dementia (HCC)    • Occasional tremors    • Prostate cancer (HCC) 2011    t2c,[place]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    • Varicose vein of leg 2019   • Varicose veins of legs      Past Surgical History:   Procedure Laterality Date   • EYE SURGERY     • HERNIA REPAIR Left    • INGUINAL HERNIA REPAIR Right 2019    Procedure: OPEN RIGHT INGUINAL HERNIA REPAIR WITH MESH;  Surgeon: Alesia Mora MD;  Location: [facility] OR;  Service: General   • PROSTATECTOMY  2011    RALP     Family History   Problem Relation Age of Onset   • Stroke Mother      Social History     Tobacco Use   • Smoking status: Former Smoker     Years: 50.00     Types: Cigarettes     Quit date: 2002     Years since quittin.9   • Smokeless tobacco: Never Used   Substance Use Topics   • Alcohol use: No     Comment: QUIT DRINKING IN    • Drug use: No       Medications:  Current Facility-Administered Medications   Medication Dose Route Frequency Provider Last Rate Last Admin   • acetaminophen (TYLENOL) tablet 650 mg  650 mg Oral Q4H PRN Lenard Landa MD   650 mg at 05/10/22 2200    Or   • acetaminophen (TYLENOL) 160 MG/5ML solution 650 mg  650 mg Oral Q4H PRN Lenard Landa MD        Or   • acetaminophen (TYLENOL) suppository 650 mg  650 mg Rectal Q4H PRN Lenard Landa MD       • apixaban (ELIQUIS) tablet 2.5 mg  2.5 mg Oral Q12H Lenard Landa MD   2.5 mg at 22 0802    • ascorbic acid (VITAMIN C) tablet 500 mg  500 mg Oral Daily Lenard Landa MD   500 mg at 05/14/22 0852   • carbidopa-levodopa (SINEMET)  MG per tablet 1 tablet  1 tablet Oral TID Palestinian ChristinaDARLING Bush   1 tablet at 05/14/22 1208   • cholecalciferol (VITAMIN D3) tablet 1,000 Units  1,000 Units Oral Daily Lenard Landa MD   1,000 Units at 05/14/22 0852   • famotidine (PEPCID) tablet 20 mg  20 mg Oral BID AC Lenard Landa MD   20 mg at 05/14/22 0853   • memantine (NAMENDA) tablet 10 mg  10 mg Oral BID Lenard Landa MD   10 mg at 05/14/22 0853   • metoprolol tartrate (LOPRESSOR) tablet 75 mg  75 mg Oral Q12H Lenard Landa MD   75 mg at 05/14/22 0853   • multivitamin with minerals 1 tablet  1 tablet Oral Daily Lenard Landa MD   1 tablet at 05/14/22 0852   • ondansetron (ZOFRAN) injection 4 mg  4 mg Intravenous Q6H PRN Lenard Landa MD       • risperiDONE (risperDAL) tablet 0.25 mg  0.25 mg Oral BID Lenard Landa MD   0.25 mg at 05/14/22 0852   • sodium chloride 0.9 % flush 10 mL  10 mL Intravenous PRN Lenard Landa MD       • sodium chloride 0.9 % flush 10 mL  10 mL Intravenous Q12H Lenard Landa MD   10 mL at 05/14/22 0856   • sodium chloride 0.9 % flush 10 mL  10 mL Intravenous PRN Lenard Landa MD       • sodium chloride 0.9 % infusion 30 mL  30 mL Intravenous Once Tom Sullivan MD       • tamsulosin (FLOMAX) 24 hr capsule 0.4 mg  0.4 mg Oral Daily Lenard Landa MD   0.4 mg at 05/14/22 0852   • zinc sulfate (ZINCATE) capsule 220 mg  220 mg Oral Daily Lenard Landa MD   220 mg at 05/14/22 0852       Allergies:    Patient has no known allergies.    Review of Systems:   -A 14 point review of systems is completed and is negative.      Objective      Vital Signs  Temp:  [95.4 °F (35.2 °C)-99 °F (37.2 °C)] 95.4 °F (35.2 °C)  Heart Rate:  [50-64] 50  Resp:  [16] 16  BP: ()/(49-63)  115/60    Physical Exam:    Mental Status:    -Awake, Alert, Oriented X to person and place stating he is in a hospital in [place]. Cannot tell me the reason he in the hospital.   -No word finding difficulties  -No aphasia  -No dysarthria  -Follows simple and complex commands  -Voice tremor, hypomimia  -Palmomental reflex present     CN II:  Visual fields full.  Pupils equally reactive to light  CN III, IV, VI:  Extraocular Muscles full with no signs of nystagmus  CN V:  Facial sensory is symmetric with no asymmetries.  CN VII:  Facial motor symmetric  CN VIII:  Gross hearing intact bilaterally  CN IX:  Palate elevates symmetrically  CN X:  Palate elevates symmetrically  CN XI:  Shoulder shrug symmetric  CN XII:  Tongue is midline on protrusion     Motor: (strength out of 5:  1= minimal movement, 2 = movement in plane of gravity, 3 = movement against gravity, 4 = movement against some resistance, 5 = full strength)     -Right Upper Ext: Proximal: 5 Distal: 5  -Left Upper Ext: Proximal: 5   Distal: 5     -Right Lower Ext: Proximal: 5 Distal: 5  -Left Lower Ext: Proximal: 5   Distal: 5     DTR:  -Right              Bicep: 2+         Tricep: 2+        Brachioradialis: 2+              Patella: 2+       Ankle: 2+         Neg Babinski  -Left              Bicep: 2+         Tricep: 2+        Brachioradialis: 2+              Patella: 2+       Ankle: 2+         Neg Babinski     Sensory:  -Intact to light touch, pinprick, temperature, pain, and proprioception     Coordination:  -Finger to nose intact  -Heel to shin intact  -No ataxia  -Resting tremor with cog wheeling bilateral upper extremities.  -Significant rigidity in bilateral upper extremities especially in the biceps and triceps.  -Rigidity seems to be overall less severe than yesterday.      Gait  -Not observed.       Results Review:    I reviewed the patient's new clinical results and findings.    Lab Results (last 24 hours)     Procedure Component Value Units  Date/Time    Blood Culture - Blood, Arm, Left [[id number]]  (Normal) Collected: 05/10/22 1220    Specimen: Blood from Arm, Left Updated: 05/14/22 1318     Blood Culture No growth at 4 days    Blood Culture - Blood, Arm, Left [[id number]]  (Normal) Collected: 05/10/22 1230    Specimen: Blood from Arm, Left Updated: 05/14/22 1317     Blood Culture No growth at 4 days          Imaging Results (Last 24 Hours)     ** No results found for the last 24 hours. **          Assessment/Plan     Impression:  1.  Parkinsonism  2.  Presumed Lewy body dementia  3.  COVID-19  4.  Orthostatic hypotension  5.  Cachexia and malnutrition  6.  Paroxysmal atrial fibrillation      Plan:  1.  Continue Sinemet at 0700, 1100 & 1600.  2.  Caution with orthostatic hypotension.  Rise slowly.  Likely will be too difficult to don and doff NAZIA hose.  May need to reevaluate Flomax as it may contribute to orthostasis.  3.  Continue PT, OT.  4.  Continue anticoagulation.  5.  May be worth considering discharge to home with therapies and home health aids, etc.  I suspect he may remain more functional, longer by doing so than discharging to a SNF.  SW and family will need to work together in the decision making process.  6.  Neurology will sign off for now and see in outpatient setting for follow up.          Benjamin Ng PA-C  05/14/22  15:01 CDT

## 2022-05-14 NOTE — PLAN OF CARE
Goal Outcome Evaluation:  Plan of Care Reviewed With: patient        Progress: improving  Outcome Evaluation: Pt pleasantly confused. Bed mobiltiy supervision w/ extra time due to understanding of the task. sit-stand cga Pt amb in room 50' sitting rest then 20' HH cga Pt also tolerated AROM BLEs x10. Feel due to pt cognition pt not safe for d/c home would benfit from snf

## 2022-05-14 NOTE — THERAPY TREATMENT NOTE
Acute Care - Physical Therapy Treatment Note  [facility]     Patient Name: Edy Urbina  : 1940  MRN: [id number]  Today's Date: 2022      Visit Dx:     ICD-10-CM ICD-9-CM   1. Acute metabolic encephalopathy  G93.41 348.31   2. COVID-19  U07.1 079.89   3. Dysphagia, unspecified type  R13.10 787.20   4. Impaired mobility and ADLs  Z74.09 V49.89    Z78.9    5. Impaired mobility  Z74.09 799.89     Patient Active Problem List   Diagnosis   • Anemia   • Lewy body dementia without behavioral disturbance (HCC)   • Cachexia (HCC)   • Pneumonia of right middle lobe due to infectious organism   • Generalized weakness   • Elevated BUN   • Metabolic encephalopathy, acute, due to pnuemonia and elevated BUN   • Paroxysmal atrial fibrillation with rapid ventricular response (HCC)   • Acute metabolic encephalopathy   • COVID-19 virus detected   • Atrial fibrillation (HCC)     Past Medical History:   Diagnosis Date   • Cataracts, bilateral    • Lewy body dementia (HCC)    • Occasional tremors    • Prostate cancer (HCC) 2011    t2c,[place]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    • Varicose vein of leg 2019   • Varicose veins of legs      Past Surgical History:   Procedure Laterality Date   • EYE SURGERY     • HERNIA REPAIR Left    • INGUINAL HERNIA REPAIR Right 2019    Procedure: OPEN RIGHT INGUINAL HERNIA REPAIR WITH MESH;  Surgeon: Alesia Mora MD;  Location: [facility];  Service: General   • PROSTATECTOMY  2011    RALP     PT Assessment (last 12 hours)     PT Evaluation and Treatment     Row Name 22 1411          Physical Therapy Time and Intention    Subjective Information no complaints  -NW     Document Type therapy note (daily note)  -NW     Mode of Treatment physical therapy  -NW     Comment pleasantly confused  -NW     Row Name 22 1411          General Information    Existing Precautions/Restrictions fall  -NW     Row Name 22 1411          Pain     Pretreatment Pain Rating 0/10 - no pain  -NW     Posttreatment Pain Rating 0/10 - no pain  -NW     Row Name 05/14/22 1411          Bed Mobility    Supine-Sit [place] (Bed Mobility) supervision  -NW     Comment, (Bed Mobility) needed continued cues for understanding of what i was asking  -NW     Row Name 05/14/22 1411          Transfers    Sit-Stand [place] (Transfers) contact guard;verbal cues;standby assist  -NW     Stand-Sit [place] (Transfers) verbal cues;standby assist  -NW     Row Name 05/14/22 1411          Gait/Stairs (Locomotion)    [place] Level (Gait) contact guard  -NW     Assistive Device (Gait) --  HHA  -NW     Distance in Feet (Gait) 50' + 20'  sitting rest  -NW     Pattern (Gait) step-through  -NW     Deviations/Abnormal Patterns (Gait) sasha decreased  -NW     Row Name 05/14/22 1411          Safety Issues, Functional Mobility    Impairments Affecting Function (Mobility) balance;cognition  -NW     Row Name 05/14/22 1411          Motor Skills    Therapeutic Exercise aerobic  -NW     Row Name 05/14/22 1411          Aerobic Exercise    Comment, Aerobic Exercise (Therapeutic Exercise) AROM BLEs x10  -NW     [facility] Name 05/14/22 1411          Positioning and Restraints    Pre-Treatment Position in bed  -NW     Post Treatment Position bed  -NW     In Bed fowlers;encouraged to call for assist;call light within reach;exit alarm on  -NW           User Key  (r) = Recorded By, (t) = Taken By, (c) = Cosigned By    Initials Name Provider Type    Marjan Sanabria, PTA Physical Therapist Assistant                Physical Therapy Education                 Title: PT OT SLP Therapies (In Progress)     Topic: Physical Therapy (In Progress)     Point: Mobility training (In Progress)     Learning Progress Summary           Patient Acceptance, E, NR by MS at 5/12/2022 1055    Comment: role of PT in his care                   Point: Home exercise program (Not Started)     Learner Progress:  Not  documented in this visit.          Point: Body mechanics (Not Started)     Learner Progress:  Not documented in this visit.          Point: Precautions (Not Started)     Learner Progress:  Not documented in this visit.                      User Key     Initials Effective Dates Name Provider Type Discipline    MS 06/19/18 -  Henrietta Hooks, PT, DPT, NCS Physical Therapist PT              PT Recommendation and Plan     Plan of Care Reviewed With: patient  Progress: improving  Outcome Evaluation: Pt pleasantly confused. Bed mobiltiy supervision w/ extra time due to understanding of the task. sit-stand cga Pt amb in room 50' sitting rest then 20' HH cga Pt also tolerated AROM BLEs x10. Feel due to pt cognition pt not safe for d/c home would benfit from snf       Time Calculation:    PT Charges     Row Name 05/14/22 1440             Time Calculation    Start Time 1411  -NW      Stop Time 1440  -NW      Time Calculation (min) 29 min  -NW      PT Received On 05/14/22  -NW      PT Goal Re-Cert Due Date 05/22/22  -NW              Time Calculation- PT    Total Timed Code Minutes- PT 29 minute(s)  -NW              Timed Charges    52773 - PT Therapeutic Exercise Minutes 14  -NW      36125 - Gait Training Minutes  15  -NW              Total Minutes    Timed Charges Total Minutes 29  -NW       Total Minutes 29  -NW            User Key  (r) = Recorded By, (t) = Taken By, (c) = Cosigned By    Initials Name Provider Type    NW Marjan Maldonado PTA Physical Therapist Assistant              Therapy Charges for Today     Code Description Service Date Service Provider Modifiers Qty    53557370729 HC PT THER PROC EA 15 MIN 5/14/2022 Marjan Maldonado PTA GP 1    86989615678 HC GAIT TRAINING EA 15 MIN 5/14/2022 Marjan Maldonado PTA GP 1          PT G-Codes  Outcome Measure Options: AM-PAC 6 Clicks Basic Mobility (PT)  AM-PAC 6 Clicks Score (PT): 22  AM-PAC 6 Clicks Score (OT): 12    Marjan Maldonado PTA  5/14/2022

## 2022-05-14 NOTE — PLAN OF CARE
Goal Outcome Evaluation:   Patient has been repositioning himself this shift. He has been standing to void in the urinal this shift. No complaints of pain this shift. Patient safety has been maintained. Continue to monitor and report abnormals

## 2022-05-14 NOTE — PROGRESS NOTES
[facility] Medicine Services  INPATIENT PROGRESS NOTE    Patient Name: Edy Urbina  Date of Admission: 5/10/2022  Today's Date: 05/14/22  Length of Stay: 0  Primary Care Physician: Torey Amato DO    Subjective   Chief Complaint: follow up covid-19  HPI   Doing ok.  Afebrile.  No SOA.  Tolerating room air.  Sitting up in bed eating breakfast.        Review of Systems   Unable to perform ROS: Dementia       All pertinent negatives and positives are as above. All other systems have been reviewed and are negative unless otherwise stated.     Objective    Temp:  [98.5 °F (36.9 °C)-99 °F (37.2 °C)] 98.6 °F (37 °C)  Heart Rate:  [51-64] 64  Resp:  [16] 16  BP: ()/(42-63) 132/62  Physical Exam  Vitals reviewed.   Constitutional:       Appearance: He is well-developed.   HENT:      Head: Normocephalic and atraumatic.      Right Ear: External ear normal.      Left Ear: External ear normal.      Nose: Nose normal.   Eyes:      General: No scleral icterus.        Right eye: No discharge.         Left eye: No discharge.      Conjunctiva/sclera: Conjunctivae normal.      Pupils: Pupils are equal, round, and reactive to light.   Neck:      Thyroid: No thyromegaly.      Trachea: No tracheal deviation.   Cardiovascular:      Rate and Rhythm: Normal rate and regular rhythm.      Heart sounds: Normal heart sounds. No murmur heard.    No friction rub. No gallop.   Pulmonary:      Effort: Pulmonary effort is normal. No respiratory distress.      Breath sounds: Normal breath sounds. No stridor. No wheezing or rales.   Chest:      Chest wall: No tenderness.   Abdominal:      General: Bowel sounds are normal. There is no distension.      Palpations: Abdomen is soft. There is no mass.      Tenderness: There is no abdominal tenderness. There is no guarding or rebound.      Hernia: No hernia is present.   Musculoskeletal:         General: No deformity. Normal range of motion.       Cervical back: Normal range of motion and neck supple.   Lymphadenopathy:      Cervical: No cervical adenopathy.   Skin:     General: Skin is warm and dry.      Coloration: Skin is not pale.      Findings: No erythema or rash.   Neurological:      Mental Status: He is alert. He is disoriented.      Cranial Nerves: No cranial nerve deficit.      Motor: No abnormal muscle tone.      Coordination: Coordination normal.      Deep Tendon Reflexes: Reflexes are normal and symmetric. Reflexes normal.   Psychiatric:         Behavior: Behavior normal.         Cognition and Memory: Cognition is impaired. Memory is impaired.           Results Review:  I have reviewed the labs, radiology results, and diagnostic studies.    Laboratory Data:   Results from last 7 days   Lab Units 05/13/22  0547 05/10/22  1230   WBC 10*3/mm3 4.51 5.13   HEMOGLOBIN g/dL 11.0* 12.3*   HEMATOCRIT % 35.5* 39.6   PLATELETS 10*3/mm3 130* 186        Results from last 7 days   Lab Units 05/13/22  0547 05/10/22  1230   SODIUM mmol/L 138 143   POTASSIUM mmol/L 3.7 4.6   CHLORIDE mmol/L 103 103   CO2 mmol/L 29.0 28.0   BUN mg/dL 16 27*   CREATININE mg/dL 0.68* 1.02   CALCIUM mg/dL 8.3* 9.2   BILIRUBIN mg/dL 0.4 0.5   ALK PHOS U/L 56 68   ALT (SGPT) U/L 8 25   AST (SGOT) U/L 36 54*   GLUCOSE mg/dL 94 91       Culture Data:   Blood Culture   Date Value Ref Range Status   05/10/2022 No growth at 3 days  Preliminary   05/10/2022 No growth at 3 days  Preliminary       Radiology Data:   Imaging Results (Last 24 Hours)     ** No results found for the last 24 hours. **          I have reviewed the patient's current medications.     Assessment/Plan     Active Hospital Problems    Diagnosis    • COVID-19 virus detected    • Atrial fibrillation (HCC)    • Generalized weakness    • Cachexia (HCC)    • Lewy body dementia without behavioral disturbance (HCC)        1.  Covid-19  -supportive care    2.  Parkinsonism  -Sinemet  -Neuro following  -supportive care    3.  Lewy  Body Dementia  -Namenda  -Neuro following  -supportive care    4.  A-fib  -Metoprolol  -Eliquis              Discharge Planning: I expect the patient to be discharged to SNF in 2-3 days    Electronically signed by Storm Moran MD, 05/14/22, 09:40 CDT.

## 2022-05-15 LAB
BACTERIA SPEC AEROBE CULT: NORMAL
BACTERIA SPEC AEROBE CULT: NORMAL

## 2022-05-15 PROCEDURE — 97116 GAIT TRAINING THERAPY: CPT

## 2022-05-15 PROCEDURE — G0378 HOSPITAL OBSERVATION PER HR: HCPCS

## 2022-05-15 RX ADMIN — CARBIDOPA AND LEVODOPA 1 TABLET: 25; 100 TABLET ORAL at 16:55

## 2022-05-15 RX ADMIN — Medication 1 TABLET: at 09:41

## 2022-05-15 RX ADMIN — FAMOTIDINE 20 MG: 20 TABLET, FILM COATED ORAL at 16:55

## 2022-05-15 RX ADMIN — RISPERIDONE 0.25 MG: 0.25 TABLET, FILM COATED ORAL at 09:41

## 2022-05-15 RX ADMIN — METOPROLOL TARTRATE 75 MG: 50 TABLET, FILM COATED ORAL at 09:40

## 2022-05-15 RX ADMIN — FAMOTIDINE 20 MG: 20 TABLET, FILM COATED ORAL at 09:41

## 2022-05-15 RX ADMIN — RISPERIDONE 0.25 MG: 0.25 TABLET, FILM COATED ORAL at 20:58

## 2022-05-15 RX ADMIN — Medication 10 ML: at 09:41

## 2022-05-15 RX ADMIN — APIXABAN 2.5 MG: 2.5 TABLET, FILM COATED ORAL at 09:40

## 2022-05-15 RX ADMIN — ZINC SULFATE 220 MG (50 MG) CAPSULE 220 MG: CAPSULE at 09:40

## 2022-05-15 RX ADMIN — CARBIDOPA AND LEVODOPA 1 TABLET: 25; 100 TABLET ORAL at 09:41

## 2022-05-15 RX ADMIN — TAMSULOSIN HYDROCHLORIDE 0.4 MG: 0.4 CAPSULE ORAL at 09:42

## 2022-05-15 RX ADMIN — APIXABAN 2.5 MG: 2.5 TABLET, FILM COATED ORAL at 20:59

## 2022-05-15 RX ADMIN — Medication 10 ML: at 20:59

## 2022-05-15 RX ADMIN — METOPROLOL TARTRATE 75 MG: 50 TABLET, FILM COATED ORAL at 20:58

## 2022-05-15 RX ADMIN — CARBIDOPA AND LEVODOPA 1 TABLET: 25; 100 TABLET ORAL at 06:00

## 2022-05-15 RX ADMIN — OXYCODONE HYDROCHLORIDE AND ACETAMINOPHEN 500 MG: 500 TABLET ORAL at 09:41

## 2022-05-15 RX ADMIN — MEMANTINE HYDROCHLORIDE 10 MG: 5 TABLET, FILM COATED ORAL at 20:59

## 2022-05-15 RX ADMIN — MEMANTINE HYDROCHLORIDE 10 MG: 5 TABLET, FILM COATED ORAL at 09:41

## 2022-05-15 RX ADMIN — Medication 1000 UNITS: at 09:41

## 2022-05-15 NOTE — PLAN OF CARE
Goal Outcome Evaluation:  Plan of Care Reviewed With: patient        Progress: improving  Outcome Evaluation: PT tx completed. Pt cont to be pleasantly confused. Required max cues for sequencing when transitioning to EOB as well as extensive time. Once in standing pt able to amb 50' x4 reps with SBA and seated/standing rest breaks. SBA for toilet t/f. Returned to bed with CGA and required less cues for sequencing. Will cont to follow for improved mobility.

## 2022-05-15 NOTE — PROGRESS NOTES
[facility] Medicine Services  INPATIENT PROGRESS NOTE    Patient Name: Edy Urbina  Date of Admission: 5/10/2022  Today's Date: 05/15/22  Length of Stay: 0  Primary Care Physician: Torey Amato DO    Subjective   Chief Complaint: follow up covid-19  HPI   Doing ok.  Afebrile.   Tolerating room air  Pleasantly confused        Review of Systems   Unable to perform ROS: Dementia       All pertinent negatives and positives are as above. All other systems have been reviewed and are negative unless otherwise stated.     Objective    Temp:  [97.6 °F (36.4 °C)-98.5 °F (36.9 °C)] 98.1 °F (36.7 °C)  Heart Rate:  [48-59] 50  Resp:  [16] 16  BP: (107-146)/(47-60) 126/58  Physical Exam  Vitals reviewed.   Constitutional:       Appearance: He is well-developed.   HENT:      Head: Normocephalic and atraumatic.      Right Ear: External ear normal.      Left Ear: External ear normal.      Nose: Nose normal.   Eyes:      General: No scleral icterus.        Right eye: No discharge.         Left eye: No discharge.      Conjunctiva/sclera: Conjunctivae normal.      Pupils: Pupils are equal, round, and reactive to light.   Neck:      Thyroid: No thyromegaly.      Trachea: No tracheal deviation.   Cardiovascular:      Rate and Rhythm: Normal rate and regular rhythm.      Heart sounds: Normal heart sounds. No murmur heard.    No friction rub. No gallop.   Pulmonary:      Effort: Pulmonary effort is normal. No respiratory distress.      Breath sounds: Normal breath sounds. No stridor. No wheezing or rales.   Chest:      Chest wall: No tenderness.   Abdominal:      General: Bowel sounds are normal. There is no distension.      Palpations: Abdomen is soft. There is no mass.      Tenderness: There is no abdominal tenderness. There is no guarding or rebound.      Hernia: No hernia is present.   Musculoskeletal:         General: No deformity. Normal range of motion.      Cervical back: Normal  range of motion and neck supple.   Lymphadenopathy:      Cervical: No cervical adenopathy.   Skin:     General: Skin is warm and dry.      Coloration: Skin is not pale.      Findings: No erythema or rash.   Neurological:      Mental Status: He is alert. He is disoriented.      Cranial Nerves: No cranial nerve deficit.      Motor: No abnormal muscle tone.      Coordination: Coordination normal.      Deep Tendon Reflexes: Reflexes are normal and symmetric. Reflexes normal.   Psychiatric:         Behavior: Behavior normal.         Cognition and Memory: Cognition is impaired. Memory is impaired.           Results Review:  I have reviewed the labs, radiology results, and diagnostic studies.    Laboratory Data:   Results from last 7 days   Lab Units 05/13/22  0547 05/10/22  1230   WBC 10*3/mm3 4.51 5.13   HEMOGLOBIN g/dL 11.0* 12.3*   HEMATOCRIT % 35.5* 39.6   PLATELETS 10*3/mm3 130* 186        Results from last 7 days   Lab Units 05/13/22  0547 05/10/22  1230   SODIUM mmol/L 138 143   POTASSIUM mmol/L 3.7 4.6   CHLORIDE mmol/L 103 103   CO2 mmol/L 29.0 28.0   BUN mg/dL 16 27*   CREATININE mg/dL 0.68* 1.02   CALCIUM mg/dL 8.3* 9.2   BILIRUBIN mg/dL 0.4 0.5   ALK PHOS U/L 56 68   ALT (SGPT) U/L 8 25   AST (SGOT) U/L 36 54*   GLUCOSE mg/dL 94 91       Culture Data:   Blood Culture   Date Value Ref Range Status   05/10/2022 No growth at 3 days  Preliminary   05/10/2022 No growth at 3 days  Preliminary       Radiology Data:   Imaging Results (Last 24 Hours)     ** No results found for the last 24 hours. **          I have reviewed the patient's current medications.     Assessment/Plan     Active Hospital Problems    Diagnosis    • COVID-19 virus detected    • Atrial fibrillation (HCC)    • Generalized weakness    • Cachexia (HCC)    • Lewy body dementia without behavioral disturbance (HCC)        1.  Covid-19  -supportive care    2.  Parkinsonism  -Sinemet  -Neuro following  -supportive care    3.  Lewy Body  Dementia  -Namenda  -Neuro following  -supportive care    4.  A-fib  -Metoprolol  -Eliquis              Discharge Planning: I expect the patient to be discharged to SNF in 2-3 days    Electronically signed by Storm Moran MD, 05/15/22, 17:30 CDT.

## 2022-05-15 NOTE — THERAPY TREATMENT NOTE
Acute Care - Physical Therapy Treatment Note  [facility]     Patient Name: Edy Urbina  : 1940  MRN: [id number]  Today's Date: 5/15/2022      Visit Dx:     ICD-10-CM ICD-9-CM   1. Acute metabolic encephalopathy  G93.41 348.31   2. COVID-19  U07.1 079.89   3. Dysphagia, unspecified type  R13.10 787.20   4. Impaired mobility and ADLs  Z74.09 V49.89    Z78.9    5. Impaired mobility  Z74.09 799.89     Patient Active Problem List   Diagnosis   • Anemia   • Lewy body dementia without behavioral disturbance (HCC)   • Cachexia (HCC)   • Pneumonia of right middle lobe due to infectious organism   • Generalized weakness   • Elevated BUN   • Metabolic encephalopathy, acute, due to pnuemonia and elevated BUN   • Paroxysmal atrial fibrillation with rapid ventricular response (HCC)   • Acute metabolic encephalopathy   • COVID-19 virus detected   • Atrial fibrillation (HCC)     Past Medical History:   Diagnosis Date   • Cataracts, bilateral    • Lewy body dementia (HCC)    • Occasional tremors    • Prostate cancer (HCC) 2011    t2c,[place]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    • Varicose vein of leg 2019   • Varicose veins of legs      Past Surgical History:   Procedure Laterality Date   • EYE SURGERY     • HERNIA REPAIR Left    • INGUINAL HERNIA REPAIR Right 2019    Procedure: OPEN RIGHT INGUINAL HERNIA REPAIR WITH MESH;  Surgeon: Alesia Mora MD;  Location: [facility];  Service: General   • PROSTATECTOMY  2011    RALP     PT Assessment (last 12 hours)     PT Evaluation and Treatment     Row Name 05/15/22 1137          Physical Therapy Time and Intention    Subjective Information no complaints  -     Document Type therapy note (daily note)  -     Mode of Treatment physical therapy  -     Row Name 05/15/22 1137          General Information    Existing Precautions/Restrictions fall  -     Row Name 05/15/22 1137          Pain    Pretreatment Pain Rating 0/10 - no pain  -LC      Posttreatment Pain Rating 0/10 - no pain  -     Row Name 05/15/22 1137          Bed Mobility    Supine-Sit [place] (Bed Mobility) verbal cues;contact guard  -     Bed Mobility, Safety Issues cognitive deficits limit understanding  -     Assistive Device (Bed Mobility) bed rails;head of bed elevated  -     Comment, (Bed Mobility) required max cues for sequencing with supine to sit  -     Row Name 05/15/22 1137          Transfers    Sit-Stand [place] (Transfers) standby assist  -     Stand-Sit [place] (Transfers) standby assist  -     [place] Level (Toilet Transfer) standby assist  -     Assistive Device (Toilet Transfer) commode;grab bars/safety frame  -     Row Name 05/15/22 1137          Toilet Transfer    Type (Toilet Transfer) sit-stand;stand-sit  -     Row Name 05/15/22 1137          Gait/Stairs (Locomotion)    [place] Level (Gait) verbal cues;contact guard  -     Distance in Feet (Gait) 50' x4 with seated/standing rest breaks  -     Pattern (Gait) step-through  -     Deviations/Abnormal Patterns (Gait) gait speed decreased  -     Row Name 05/15/22 1137          Plan of Care Review    Plan of Care Reviewed With patient  -     Progress improving  -     Outcome Evaluation PT tx completed. Pt cont to be pleasantly confused. Required max cues for sequencing when transitioning to EOB as well as extensive time. Once in standing pt able to amb 50' x4 reps with SBA and seated/standing rest breaks. SBA for toilet t/f. Returned to bed with CGA and required less cues for sequencing. Will cont to follow for improved mobility.  -     Row Name 05/15/22 1137          Positioning and Restraints    Pre-Treatment Position in bed  -     Post Treatment Position bed  -     In Bed fowlers;call light within reach;encouraged to call for assist;exit alarm on  -           User Key  (r) = Recorded By, (t) = Taken By, (c) = Cosigned By    Initials Name Provider Type      Maki Valdivia PTA Physical Therapist Assistant                Physical Therapy Education                 Title: PT OT SLP Therapies (In Progress)     Topic: Physical Therapy (In Progress)     Point: Mobility training (In Progress)     Learning Progress Summary           Patient Acceptance, E, NR by MS at 5/12/2022 1052    Comment: role of PT in his care                   Point: Home exercise program (Not Started)     Learner Progress:  Not documented in this visit.          Point: Body mechanics (Not Started)     Learner Progress:  Not documented in this visit.          Point: Precautions (Not Started)     Learner Progress:  Not documented in this visit.                      User Key     Initials Effective Dates Name Provider Type Discipline    MS 06/19/18 -  Henrietta Hooks, PT, DPT, NCS Physical Therapist PT              PT Recommendation and Plan     Plan of Care Reviewed With: patient  Progress: improving  Outcome Evaluation: PT tx completed. Pt cont to be pleasantly confused. Required max cues for sequencing when transitioning to EOB as well as extensive time. Once in standing pt able to amb 50' x4 reps with SBA and seated/standing rest breaks. SBA for toilet t/f. Returned to bed with CGA and required less cues for sequencing. Will cont to follow for improved mobility.       Time Calculation:    PT Charges     Row Name 05/15/22 1325             Time Calculation    Start Time 1137  -      Stop Time 1202  -      Time Calculation (min) 25 min  -      PT Received On 05/15/22  -              Time Calculation- PT    Total Timed Code Minutes- PT 25 minute(s)  -LC              Timed Charges    10979 - Gait Training Minutes  25  -LC              Total Minutes    Timed Charges Total Minutes 25  -LC       Total Minutes 25  -LC            User Key  (r) = Recorded By, (t) = Taken By, (c) = Cosigned By    Initials Name Provider Type     Maki Valdivia PTA Physical Therapist Assistant              Therapy Charges  for Today     Code Description Service Date Service Provider Modifiers Qty    87064168756 HC GAIT TRAINING EA 15 MIN 5/15/2022 Maki Valdivia, PTA GP 2          PT G-Codes  Outcome Measure Options: AM-PAC 6 Clicks Basic Mobility (PT)  AM-PAC 6 Clicks Score (PT): 22  AM-PAC 6 Clicks Score (OT): 12    Maki Valdivia PTA  5/15/2022

## 2022-05-15 NOTE — PLAN OF CARE
Goal Outcome Evaluation: Patient has been more confused this shift. Patient has been reminded multiple times to call out when he needs to use the bathroom and not to get up by himself.

## 2022-05-15 NOTE — PLAN OF CARE
Goal Outcome Evaluation:  Plan of Care Reviewed With: patient           Outcome Evaluation: Bed alarm on; pt. confused. Voiding per urinal. Room air. Nonproductive cough present. Incentive spirometer encouraged. VSS. Safety maintained.

## 2022-05-16 PROCEDURE — G0378 HOSPITAL OBSERVATION PER HR: HCPCS

## 2022-05-16 PROCEDURE — 97116 GAIT TRAINING THERAPY: CPT

## 2022-05-16 PROCEDURE — 97535 SELF CARE MNGMENT TRAINING: CPT | Performed by: OCCUPATIONAL THERAPIST

## 2022-05-16 PROCEDURE — 97110 THERAPEUTIC EXERCISES: CPT

## 2022-05-16 RX ADMIN — APIXABAN 2.5 MG: 2.5 TABLET, FILM COATED ORAL at 21:56

## 2022-05-16 RX ADMIN — CARBIDOPA AND LEVODOPA 1 TABLET: 25; 100 TABLET ORAL at 16:41

## 2022-05-16 RX ADMIN — OXYCODONE HYDROCHLORIDE AND ACETAMINOPHEN 500 MG: 500 TABLET ORAL at 08:19

## 2022-05-16 RX ADMIN — Medication 1000 UNITS: at 08:19

## 2022-05-16 RX ADMIN — TAMSULOSIN HYDROCHLORIDE 0.4 MG: 0.4 CAPSULE ORAL at 08:19

## 2022-05-16 RX ADMIN — ZINC SULFATE 220 MG (50 MG) CAPSULE 220 MG: CAPSULE at 08:19

## 2022-05-16 RX ADMIN — Medication 10 ML: at 08:20

## 2022-05-16 RX ADMIN — FAMOTIDINE 20 MG: 20 TABLET, FILM COATED ORAL at 08:19

## 2022-05-16 RX ADMIN — CARBIDOPA AND LEVODOPA 1 TABLET: 25; 100 TABLET ORAL at 12:04

## 2022-05-16 RX ADMIN — MEMANTINE HYDROCHLORIDE 10 MG: 5 TABLET, FILM COATED ORAL at 08:19

## 2022-05-16 RX ADMIN — MEMANTINE HYDROCHLORIDE 10 MG: 5 TABLET, FILM COATED ORAL at 21:56

## 2022-05-16 RX ADMIN — FAMOTIDINE 20 MG: 20 TABLET, FILM COATED ORAL at 16:41

## 2022-05-16 RX ADMIN — RISPERIDONE 0.25 MG: 0.25 TABLET, FILM COATED ORAL at 08:19

## 2022-05-16 RX ADMIN — RISPERIDONE 0.25 MG: 0.25 TABLET, FILM COATED ORAL at 21:56

## 2022-05-16 RX ADMIN — Medication 1 TABLET: at 08:19

## 2022-05-16 RX ADMIN — APIXABAN 2.5 MG: 2.5 TABLET, FILM COATED ORAL at 08:19

## 2022-05-16 RX ADMIN — Medication 10 ML: at 21:56

## 2022-05-16 RX ADMIN — CARBIDOPA AND LEVODOPA 1 TABLET: 25; 100 TABLET ORAL at 06:06

## 2022-05-16 NOTE — THERAPY TREATMENT NOTE
Patient Name: Edy Urbina  : 1940    MRN: [id number]                              Today's Date: 2022       Admit Date: 5/10/2022    Visit Dx:     ICD-10-CM ICD-9-CM   1. Acute metabolic encephalopathy  G93.41 348.31   2. COVID-19  U07.1 079.89   3. Dysphagia, unspecified type  R13.10 787.20   4. Impaired mobility and ADLs  Z74.09 V49.89    Z78.9    5. Impaired mobility  Z74.09 799.89     Patient Active Problem List   Diagnosis   • Anemia   • Lewy body dementia without behavioral disturbance (HCC)   • Cachexia (HCC)   • Pneumonia of right middle lobe due to infectious organism   • Generalized weakness   • Elevated BUN   • Metabolic encephalopathy, acute, due to pnuemonia and elevated BUN   • Paroxysmal atrial fibrillation with rapid ventricular response (HCC)   • Acute metabolic encephalopathy   • COVID-19 virus detected   • Atrial fibrillation (HCC)     Past Medical History:   Diagnosis Date   • Cataracts, bilateral    • Lewy body dementia (HCC)    • Occasional tremors    • Prostate cancer (HCC) 2011    t2c,[place]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    • Varicose vein of leg 2019   • Varicose veins of legs      Past Surgical History:   Procedure Laterality Date   • EYE SURGERY     • HERNIA REPAIR Left    • INGUINAL HERNIA REPAIR Right 2019    Procedure: OPEN RIGHT INGUINAL HERNIA REPAIR WITH MESH;  Surgeon: Alesia Mora MD;  Location: [facility];  Service: General   • PROSTATECTOMY  2011    RALP      General Information     Row Name 22 1400          OT Time and Intention    Document Type therapy note (daily note)  -CH     Mode of Treatment occupational therapy  -     Row Name 22 1400          General Information    Patient Profile Reviewed yes  -CH     Existing Precautions/Restrictions fall  -     Row Name 22 1400          Safety Issues, Functional Mobility    Impairments Affecting Function (Mobility) balance;endurance/activity  tolerance;strength  -           User Key  (r) = Recorded By, (t) = Taken By, (c) = Cosigned By    Initials Name Provider Type    Noemi Patel OTR/L Occupational Therapist                 Mobility/ADL's     Row Name 05/16/22 1400          Bed Mobility    Bed Mobility supine-sit  -     Supine-Sit [place] (Bed Mobility) verbal cues;standby assist  -     Sit-Supine [place] (Bed Mobility) standby assist  -     Assistive Device (Bed Mobility) bed rails;head of bed elevated  -     Row Name 05/16/22 1400          Transfers    Transfers sit-stand transfer;stand-sit transfer  -     Sit-Stand [place] (Transfers) verbal cues;standby assist  -     Stand-Sit [place] (Transfers) standby assist;verbal cues  -     [place] Level (Toilet Transfer) standby assist  -     Assistive Device (Toilet Transfer) commode;grab bars/safety frame  -     Row Name 05/16/22 1400          Toilet Transfer    Type (Toilet Transfer) sit-stand;stand-sit  -     Row Name 05/16/22 1400          Functional Mobility    Functional Mobility- Ind. Level contact guard assist;verbal cues required  -     Functional Mobility- Comment HHA  -     Row Name 05/16/22 1400          Toileting Assessment/Training    [place] Level (Toileting) perform perineal hygiene;standby assist  -     Assistive Devices (Toileting) commode  -     Position (Toileting) unsupported sitting  -           User Key  (r) = Recorded By, (t) = Taken By, (c) = Cosigned By    Initials Name Provider Type    Noemi Patel OTR/L Occupational Therapist               Obj/Interventions     Row Name 05/16/22 1400          Balance    Balance Interventions sitting;standing;sit to stand;supported;static;dynamic  -           User Key  (r) = Recorded By, (t) = Taken By, (c) = Cosigned By    Initials Name Provider Type    Noemi Patel OTR/L Occupational Therapist               Goals/Plan    No documentation.                Clinical  Impression     Row Name 05/16/22 1400          Pain Assessment    Pretreatment Pain Rating 0/10 - no pain  -     Posttreatment Pain Rating 0/10 - no pain  -     Row Name 05/16/22 1400          Plan of Care Review    Outcome Evaluation Pt. able to mobilize & toilet self at S/SBA/CGA.  1 episode of LOB.  Significant improvement noted as compared to time this therapist previously treated pt.  -     Row Name 05/16/22 1400          Therapy Assessment/Plan (OT)    Rehab Potential (OT) good, to achieve stated therapy goals  -     Criteria for Skilled Therapeutic Interventions Met (OT) yes;skilled treatment is necessary  -     Therapy Frequency (OT) 5 times/wk  -     Row Name 05/16/22 1400          Positioning and Restraints    Pre-Treatment Position in bed  -     Post Treatment Position bed  -     In Bed fowlers;call light within reach;encouraged to call for assist;side rails up x2  -           User Key  (r) = Recorded By, (t) = Taken By, (c) = Cosigned By    Initials Name Provider Type     Noemi Mckay, OTR/L Occupational Therapist               Outcome Measures     Row Name 05/16/22 1400          How much help from another is currently needed...    Putting on and taking off regular lower body clothing? 3  -CH     Bathing (including washing, rinsing, and drying) 3  -CH     Toileting (which includes using toilet bed pan or urinal) 3  -CH     Putting on and taking off regular upper body clothing 3  -CH     Taking care of personal grooming (such as brushing teeth) 3  -CH     Eating meals 3  -CH     AM-PAC 6 Clicks Score (OT) 18  -     Row Name 05/16/22 0923          How much help from another person do you currently need...    Turning from your back to your side while in flat bed without using bedrails? 4  -KLAUS     Moving from lying on back to sitting on the side of a flat bed without bedrails? 4  -KLAUS     Moving to and from a bed to a chair (including a wheelchair)? 4  -KLAUS     Standing up from a chair  using your arms (e.g., wheelchair, bedside chair)? 4  -KLAUS     Climbing 3-5 steps with a railing? 3  -KLAUS     To walk in hospital room? 3  -KLAUS     AM-PAC 6 Clicks Score (PT) 22  -KLAUS     Highest level of mobility 7 --> Walked 25 feet or more  -KLAUS     Row Name 05/16/22 1400 05/16/22 0923       Functional Assessment    Outcome Measure Options AM-PAC 6 Clicks Daily Activity (OT)  - AM-PAC 6 Clicks Basic Mobility (PT)  -          User Key  (r) = Recorded By, (t) = Taken By, (c) = Cosigned By    Initials Name Provider Type     Noemi Mckay, OTR/L Occupational Therapist    Young Slater, PTA Physical Therapist Assistant                Occupational Therapy Education                 Title: PT OT SLP Therapies (In Progress)     Topic: Occupational Therapy (In Progress)     Point: ADL training (Done)     Description:   Instruct learner(s) on proper safety adaptation and remediation techniques during self care or transfers.   Instruct in proper use of assistive devices.              Learning Progress Summary           Patient Acceptance, E,D, VU,NR by  at 5/16/2022 1558    Acceptance, E, NR by AO at 5/14/2022 1515    Acceptance, E,D, VU,NR by  at 5/12/2022 1159    Acceptance, E, NR by MM at 5/11/2022 0945    Comment: OT role, benefits, POC, need for assist                   Point: Home exercise program (Not Started)     Description:   Instruct learner(s) on appropriate technique for monitoring, assisting and/or progressing therapeutic exercises/activities.              Learner Progress:  Not documented in this visit.          Point: Precautions (In Progress)     Description:   Instruct learner(s) on prescribed precautions during self-care and functional transfers.              Learning Progress Summary           Patient Acceptance, E, NR by MM at 5/11/2022 0945    Comment: OT role, benefits, POC, need for assist                   Point: Body mechanics (Done)     Description:   Instruct learner(s) on proper  positioning and spine alignment during self-care, functional mobility activities and/or exercises.              Learning Progress Summary           Patient Acceptance, E,TB, VU,NR by EB at 5/12/2022 0449    Acceptance, E, NR by MM at 5/11/2022 0945    Comment: OT role, benefits, POC, need for assist                               User Key     Initials Effective Dates Name Provider Type Discipline     06/16/21 -  Noemi Mckay, OTR/L Occupational Therapist OT    AO 03/28/22 -  Caroline Skinner, RN Registered Nurse Nurse    MM 06/16/21 -  Rick Herbert, OTR/L Occupational Therapist OT    EB 06/16/21 -  Tiffany Echeverria RN Registered Nurse Nurse              OT Recommendation and Plan  Therapy Frequency (OT): 5 times/wk  Plan of Care Review  Plan of Care Reviewed With: patient  Progress: no change  Outcome Evaluation: Pt. able to mobilize & toilet self at S/SBA/CGA.  1 episode of LOB.  Significant improvement noted as compared to time this therapist previously treated pt.     Time Calculation:    Time Calculation- OT     Row Name 05/16/22 1400 05/16/22 0923          Time Calculation- OT    OT Start Time 1400  - --     OT Stop Time 1428  - --     OT Time Calculation (min) 28 min  - --     Total Timed Code Minutes- OT 28 minute(s)  - --     OT Received On 05/16/22  - --            Timed Charges    41834 - Gait Training Minutes  -- 13  -KLAUS     63274 - OT Self Care/Mgmt Minutes 28  -CH --            Total Minutes    Timed Charges Total Minutes 28  -CH 13  -KLAUS      Total Minutes 28  - 13  -KLAUS           User Key  (r) = Recorded By, (t) = Taken By, (c) = Cosigned By    Initials Name Provider Type     Noemi Mckay, OTR/L Occupational Therapist    Young Slater, PTA Physical Therapist Assistant              Therapy Charges for Today     Code Description Service Date Service Provider Modifiers Qty    76108414716  OT SELF CARE/MGMT/TRAIN EA 15 MIN 5/16/2022 Noemi Mckay, OTR/L GO 2                Noemi Mckay, OTR/L  5/16/2022

## 2022-05-16 NOTE — PLAN OF CARE
Goal Outcome Evaluation:  Plan of Care Reviewed With: patient        Progress: no change  Outcome Evaluation: Pt denies pain. Pt on room air. Pt more alert and oriented today. Safety maintained.

## 2022-05-16 NOTE — PLAN OF CARE
Goal Outcome Evaluation:  Plan of Care Reviewed With: patient        Progress: improving  Outcome Evaluation: Pt continues to be pleasantly confused.  Performed bed mobility with verbal cues and SBA.  Transfered sit to stand with SBA.  Amb 200' in the room with CGA.

## 2022-05-16 NOTE — THERAPY TREATMENT NOTE
Acute Care - Physical Therapy Treatment Note  [facility]     Patient Name: Edy Urbina  : 1940  MRN: [id number]  Today's Date: 2022      Visit Dx:     ICD-10-CM ICD-9-CM   1. Acute metabolic encephalopathy  G93.41 348.31   2. COVID-19  U07.1 079.89   3. Dysphagia, unspecified type  R13.10 787.20   4. Impaired mobility and ADLs  Z74.09 V49.89    Z78.9    5. Impaired mobility  Z74.09 799.89     Patient Active Problem List   Diagnosis   • Anemia   • Lewy body dementia without behavioral disturbance (HCC)   • Cachexia (HCC)   • Pneumonia of right middle lobe due to infectious organism   • Generalized weakness   • Elevated BUN   • Metabolic encephalopathy, acute, due to pnuemonia and elevated BUN   • Paroxysmal atrial fibrillation with rapid ventricular response (HCC)   • Acute metabolic encephalopathy   • COVID-19 virus detected   • Atrial fibrillation (HCC)     Past Medical History:   Diagnosis Date   • Cataracts, bilateral    • Lewy body dementia (HCC)    • Occasional tremors    • Prostate cancer (HCC) 2011    t2c,[place]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    • Varicose vein of leg 2019   • Varicose veins of legs      Past Surgical History:   Procedure Laterality Date   • EYE SURGERY     • HERNIA REPAIR Left    • INGUINAL HERNIA REPAIR Right 2019    Procedure: OPEN RIGHT INGUINAL HERNIA REPAIR WITH MESH;  Surgeon: Alesia Mora MD;  Location: [facility];  Service: General   • PROSTATECTOMY  2011    RALP     PT Assessment (last 12 hours)     PT Evaluation and Treatment     Row Name 22 0923          Physical Therapy Time and Intention    Subjective Information no complaints  -KLAUS     Document Type therapy note (daily note)  -KLAUS     Mode of Treatment physical therapy  -KLAUS     Row Name 22 0923          General Information    Existing Precautions/Restrictions fall  -KLAUS     Row Name 22 0923          Pain    Pretreatment Pain Rating 0/10 - no pain  -KLAUS      Posttreatment Pain Rating 0/10 - no pain  -KLAUS     Row Name 05/16/22 0923          Bed Mobility    Supine-Sit [place] (Bed Mobility) verbal cues;standby assist  -KLAUS     Sit-Supine [place] (Bed Mobility) standby assist  -KLAUS     Bed Mobility, Safety Issues cognitive deficits limit understanding  -KLAUS     Row Name 05/16/22 0923          Transfers    Sit-Stand [place] (Transfers) verbal cues;standby assist  -KLAUS     Stand-Sit [place] (Transfers) standby assist  -KLAUS     Row Name 05/16/22 0923          Gait/Stairs (Locomotion)    [place] Level (Gait) contact guard  -KLAUS     Distance in Feet (Gait) 200' in the room  -KLAUS     Row Name 05/16/22 0923          Aerobic Exercise    Comment, Aerobic Exercise (Therapeutic Exercise) sitting AROM BLE X 20  -KLAUS     Row Name 05/16/22 0923          Positioning and Restraints    Pre-Treatment Position in bed  -KLAUS     Post Treatment Position bed  -KLAUS     In Bed fowlers;call light within reach;encouraged to call for assist;side rails up x2  -KLAUS           User Key  (r) = Recorded By, (t) = Taken By, (c) = Cosigned By    Initials Name Provider Type    KLAUS Young Brito, PTA Physical Therapist Assistant                Physical Therapy Education                 Title: PT OT SLP Therapies (In Progress)     Topic: Physical Therapy (In Progress)     Point: Mobility training (In Progress)     Learning Progress Summary           Patient Acceptance, E, NR by MS at 5/12/2022 1058    Comment: role of PT in his care                   Point: Home exercise program (Not Started)     Learner Progress:  Not documented in this visit.          Point: Body mechanics (Not Started)     Learner Progress:  Not documented in this visit.          Point: Precautions (Not Started)     Learner Progress:  Not documented in this visit.                      User Key     Initials Effective Dates Name Provider Type Discipline    MS 06/19/18 -  Henrietta Hooks, PT, DPT, NCS Physical Therapist PT               PT Recommendation and Plan     Plan of Care Reviewed With: patient  Progress: improving  Outcome Evaluation: Pt continues to be pleasantly confused.  Performed bed mobility with verbal cues and SBA.  Transfered sit to stand with SBA.  Amb 200' in the room with CGA.   Outcome Measures     Row Name 05/16/22 0923             How much help from another person do you currently need...    Turning from your back to your side while in flat bed without using bedrails? 4  -KLAUS      Moving from lying on back to sitting on the side of a flat bed without bedrails? 4  -KLAUS      Moving to and from a bed to a chair (including a wheelchair)? 4  -KLAUS      Standing up from a chair using your arms (e.g., wheelchair, bedside chair)? 4  -KLAUS      Climbing 3-5 steps with a railing? 3  -KLAUS      To walk in hospital room? 3  -KLAUS      AM-PAC 6 Clicks Score (PT) 22  -KLAUS              Functional Assessment    Outcome Measure Options AM-PAC 6 Clicks Basic Mobility (PT)  -KLAUS            User Key  (r) = Recorded By, (t) = Taken By, (c) = Cosigned By    Initials Name Provider Type    Young Slater PTA Physical Therapist Assistant                 Time Calculation:    PT Charges     Row Name 05/16/22 0923             Time Calculation    Start Time 0923  -KLAUS      Stop Time 0947  -KLAUS      Time Calculation (min) 24 min  -KLAUS      PT Received On 05/16/22  -KLAUS              Time Calculation- PT    Total Timed Code Minutes- PT 24 minute(s)  -KLAUS              Timed Charges    38185 - PT Therapeutic Exercise Minutes 11  -KLAUS      66314 - Gait Training Minutes  13  -KLAUS              Total Minutes    Timed Charges Total Minutes 24  -KLAUS       Total Minutes 24  -KLAUS            User Key  (r) = Recorded By, (t) = Taken By, (c) = Cosigned By    Initials Name Provider Type    Young Slater PTA Physical Therapist Assistant              Therapy Charges for Today     Code Description Service Date Service Provider Modifiers Qty    80826469929 HC GAIT  TRAINING EA 15 MIN 5/16/2022 Young Brito, PTA GP 1    90277366091 HC PT THER PROC EA 15 MIN 5/16/2022 Young Brito, NORMAN GP 1          PT G-Codes  Outcome Measure Options: AM-PAC 6 Clicks Basic Mobility (PT)  AM-PAC 6 Clicks Score (PT): 22  AM-PAC 6 Clicks Score (OT): 12    Young Brito PTA  5/16/2022

## 2022-05-16 NOTE — PLAN OF CARE
Goal Outcome Evaluation:  Plan of Care Reviewed With: patient           Outcome Evaluation: Pt. confused and resting in bed. Bed alarm on. Up out of bed often to use urinal; average 100ml each time. Bladder scanned and no urine holding within bladder. Room air. Tele on. Repositioned often. VSS. Safety maintained.

## 2022-05-16 NOTE — PROGRESS NOTES
[facility] Medicine Services  INPATIENT PROGRESS NOTE    Patient Name: Edy Urbina  Date of Admission: 5/10/2022  Today's Date: 05/16/22  Length of Stay: 0  Primary Care Physician: Torey Amato DO    Subjective   Chief Complaint: follow up covid-19  Altered Mental Status       Doing ok.  Afebrile.   No SOA, tolerating room air  Pleasantly confused        Review of Systems   Unable to perform ROS: Dementia       All pertinent negatives and positives are as above. All other systems have been reviewed and are negative unless otherwise stated.     Objective    Temp:  [97.6 °F (36.4 °C)-98.5 °F (36.9 °C)] 97.6 °F (36.4 °C)  Heart Rate:  [50-56] 56  Resp:  [16] 16  BP: (126-150)/(56-70) 128/60  Physical Exam  Vitals reviewed.   Constitutional:       Appearance: He is well-developed.   HENT:      Head: Normocephalic and atraumatic.      Right Ear: External ear normal.      Left Ear: External ear normal.      Nose: Nose normal.   Eyes:      General: No scleral icterus.        Right eye: No discharge.         Left eye: No discharge.      Conjunctiva/sclera: Conjunctivae normal.      Pupils: Pupils are equal, round, and reactive to light.   Neck:      Thyroid: No thyromegaly.      Trachea: No tracheal deviation.   Cardiovascular:      Rate and Rhythm: Normal rate and regular rhythm.      Heart sounds: Normal heart sounds. No murmur heard.    No friction rub. No gallop.   Pulmonary:      Effort: Pulmonary effort is normal. No respiratory distress.      Breath sounds: Normal breath sounds. No stridor. No wheezing or rales.   Chest:      Chest wall: No tenderness.   Abdominal:      General: Bowel sounds are normal. There is no distension.      Palpations: Abdomen is soft. There is no mass.      Tenderness: There is no abdominal tenderness. There is no guarding or rebound.      Hernia: No hernia is present.   Musculoskeletal:         General: No deformity. Normal range of motion.       Cervical back: Normal range of motion and neck supple.   Lymphadenopathy:      Cervical: No cervical adenopathy.   Skin:     General: Skin is warm and dry.      Coloration: Skin is not pale.      Findings: No erythema or rash.   Neurological:      Mental Status: He is alert. He is disoriented.      Cranial Nerves: No cranial nerve deficit.      Motor: No abnormal muscle tone.      Coordination: Coordination normal.      Deep Tendon Reflexes: Reflexes are normal and symmetric. Reflexes normal.   Psychiatric:         Behavior: Behavior normal.         Cognition and Memory: Cognition is impaired. Memory is impaired.           Results Review:  I have reviewed the labs, radiology results, and diagnostic studies.    Laboratory Data:   Results from last 7 days   Lab Units 05/13/22  0547 05/10/22  1230   WBC 10*3/mm3 4.51 5.13   HEMOGLOBIN g/dL 11.0* 12.3*   HEMATOCRIT % 35.5* 39.6   PLATELETS 10*3/mm3 130* 186        Results from last 7 days   Lab Units 05/13/22  0547 05/10/22  1230   SODIUM mmol/L 138 143   POTASSIUM mmol/L 3.7 4.6   CHLORIDE mmol/L 103 103   CO2 mmol/L 29.0 28.0   BUN mg/dL 16 27*   CREATININE mg/dL 0.68* 1.02   CALCIUM mg/dL 8.3* 9.2   BILIRUBIN mg/dL 0.4 0.5   ALK PHOS U/L 56 68   ALT (SGPT) U/L 8 25   AST (SGOT) U/L 36 54*   GLUCOSE mg/dL 94 91       Culture Data:   Blood Culture   Date Value Ref Range Status   05/10/2022 No growth at 3 days  Preliminary   05/10/2022 No growth at 3 days  Preliminary       Radiology Data:   Imaging Results (Last 24 Hours)     ** No results found for the last 24 hours. **          I have reviewed the patient's current medications.     Assessment/Plan     Active Hospital Problems    Diagnosis    • COVID-19 virus detected    • Atrial fibrillation (HCC)    • Generalized weakness    • Cachexia (HCC)    • Lewy body dementia without behavioral disturbance (HCC)        1.  Covid-19  -supportive care    2.  Parkinsonism  -Sinemet  -Neuro following  -supportive care    3.   Lewy Body Dementia  -Namenda  -Neuro following  -supportive care    4.  A-fib  -Metoprolol  -Eliquis              Discharge Planning: I expect the patient to be discharged to SNF in 2-3 days    Electronically signed by Storm Moran MD, 05/16/22, 10:23 CDT.

## 2022-05-17 LAB
MAXIMAL PREDICTED HEART RATE: 140 BPM
STRESS TARGET HR: 119 BPM

## 2022-05-17 PROCEDURE — 93248 EXT ECG>7D<15D REV&INTERPJ: CPT | Performed by: EMERGENCY MEDICINE

## 2022-05-17 PROCEDURE — G0378 HOSPITAL OBSERVATION PER HR: HCPCS

## 2022-05-17 PROCEDURE — 97116 GAIT TRAINING THERAPY: CPT

## 2022-05-17 PROCEDURE — 97110 THERAPEUTIC EXERCISES: CPT

## 2022-05-17 RX ADMIN — MEMANTINE HYDROCHLORIDE 10 MG: 5 TABLET, FILM COATED ORAL at 10:33

## 2022-05-17 RX ADMIN — APIXABAN 2.5 MG: 2.5 TABLET, FILM COATED ORAL at 21:14

## 2022-05-17 RX ADMIN — APIXABAN 2.5 MG: 2.5 TABLET, FILM COATED ORAL at 10:33

## 2022-05-17 RX ADMIN — Medication 1000 UNITS: at 10:33

## 2022-05-17 RX ADMIN — METOPROLOL TARTRATE 75 MG: 50 TABLET, FILM COATED ORAL at 10:33

## 2022-05-17 RX ADMIN — Medication 10 ML: at 21:15

## 2022-05-17 RX ADMIN — FAMOTIDINE 20 MG: 20 TABLET, FILM COATED ORAL at 06:47

## 2022-05-17 RX ADMIN — ZINC SULFATE 220 MG (50 MG) CAPSULE 220 MG: CAPSULE at 10:33

## 2022-05-17 RX ADMIN — FAMOTIDINE 20 MG: 20 TABLET, FILM COATED ORAL at 17:44

## 2022-05-17 RX ADMIN — MEMANTINE HYDROCHLORIDE 10 MG: 5 TABLET, FILM COATED ORAL at 21:14

## 2022-05-17 RX ADMIN — RISPERIDONE 0.25 MG: 0.25 TABLET, FILM COATED ORAL at 21:14

## 2022-05-17 RX ADMIN — CARBIDOPA AND LEVODOPA 1 TABLET: 25; 100 TABLET ORAL at 10:33

## 2022-05-17 RX ADMIN — OXYCODONE HYDROCHLORIDE AND ACETAMINOPHEN 500 MG: 500 TABLET ORAL at 10:33

## 2022-05-17 RX ADMIN — RISPERIDONE 0.25 MG: 0.25 TABLET, FILM COATED ORAL at 10:33

## 2022-05-17 RX ADMIN — TAMSULOSIN HYDROCHLORIDE 0.4 MG: 0.4 CAPSULE ORAL at 10:33

## 2022-05-17 RX ADMIN — Medication 1 TABLET: at 10:33

## 2022-05-17 RX ADMIN — CARBIDOPA AND LEVODOPA 1 TABLET: 25; 100 TABLET ORAL at 17:44

## 2022-05-17 RX ADMIN — CARBIDOPA AND LEVODOPA 1 TABLET: 25; 100 TABLET ORAL at 06:47

## 2022-05-17 NOTE — PLAN OF CARE
Goal Outcome Evaluation:  Plan of Care Reviewed With: patient        Progress: improving  Outcome Evaluation: Pt denies pain. Tolerating diet. Pt took a shower today with miminal assist. Room air. Pt is more alert today. Safety maintained.

## 2022-05-17 NOTE — CASE MANAGEMENT/SOCIAL WORK
Continued Stay Note   [facility]     Patient Name: Edy Urbina  MRN: [id number]  Today's Date: 5/17/2022    Admit Date: 5/10/2022     Discharge Plan     Row Name 05/17/22 1142       Plan    Plan Comments MICHELLE has just spoken with Megan with [facility]. She has advised that they are prepared to make a bed offer; however; PT will have a significant out of pocket expense to go to this facility. He will be responsible for 50% for the first 100 days and will also be responsible for 50% of any therapy. He would require a pre-cert that would take at least a day if not two to approve and he would only require 1 or 2 more days of COVID isolation by that time. [facility] is prepared to offer, but believe that there are better options available that would not be so costly to PT. MICHELLE will call to speak with PT and discuss other options/plans.               Discharge Codes    No documentation.               Expected Discharge Date and Time     Expected Discharge Date Expected Discharge Time    May 18, 2022             SHEFALI Munoz

## 2022-05-17 NOTE — PROGRESS NOTES
Malnutrition Severity Assessment    Patient Name:  Edy Urbina  YOB: 1940  MRN: [id number]  Admit Date:  5/10/2022    Patient meets criteria for : Moderate (non-severe) Malnutrition    Comments:  Pt is receiving Boost Plus BID. Intake has shown some improvement.     Malnutrition Severity Assessment  Malnutrition Type: Chronic Disease - Related Malnutrition  Malnutrition Type (last 8 hours)     Malnutrition Severity Assessment     Row Name 05/17/22 1242       Malnutrition Severity Assessment    Malnutrition Type Chronic Disease - Related Malnutrition    Row Name 05/17/22 1242       Insufficient Energy Intake     Insufficient Energy Intake Findings Moderate    Insufficient Energy Intake  <75% of est. energy requirement for > or equal to 1 month    Row Name 05/17/22 1242       Unintentional Weight Loss     Unintentional Weight Loss Findings Severe    Unintentional Weight Loss  Weight loss greater than 5% in one month  ~6.9% weight loss in the last month    Row Name 05/17/22 1242       Muscle Loss    Loss of Muscle Mass Findings --  ELIO d/t COVID isolation - Per MD notes, cachectic    Row Name 05/17/22 1242       Fat Loss    Subcutaneous Fat Loss Findings --  ELIO d/t COVID isolation - per MD notes, cachectic    Row Name 05/17/22 1242       Fluid Accumulation (Edema)    Fluid Accumulation  Moderate equals 2+ pitting edema  Per NN's 2+ to legs, ankles, feet    Row Name 05/17/22 1242       Declining Functional Status    Declining Functional Status Findings Measurably Reduced    Row Name 05/17/22 1242       Criteria Met (Must meet criteria for severity in at least 2 of these categories: M Wasting, Fat Loss, Fluid, Secondary Signs, Wt. Status, Intake)    Patient meets criteria for  Moderate (non-severe) Malnutrition                Electronically signed by:  Radha Wan RDN, LD  05/17/22 12:57 CDT

## 2022-05-17 NOTE — PROGRESS NOTES
[facility] Medicine Services  INPATIENT PROGRESS NOTE    Patient Name: Edy Urbina  Date of Admission: 5/10/2022  Today's Date: 05/17/22  Length of Stay: 0  Primary Care Physician: Torey Amato DO    Subjective   Chief Complaint: follow up covid-19  Altered Mental Status       Doing ok.  Afebrile.   No SOA, tolerating room air  Sitting up in chair.          Review of Systems   Unable to perform ROS: Dementia       All pertinent negatives and positives are as above. All other systems have been reviewed and are negative unless otherwise stated.     Objective    Temp:  [97.5 °F (36.4 °C)-98.5 °F (36.9 °C)] 97.5 °F (36.4 °C)  Heart Rate:  [60-70] 70  Resp:  [16] 16  BP: (118-156)/(56-66) 118/56  Physical Exam  Vitals reviewed.   Constitutional:       Appearance: He is well-developed.   HENT:      Head: Normocephalic and atraumatic.      Right Ear: External ear normal.      Left Ear: External ear normal.      Nose: Nose normal.   Eyes:      General: No scleral icterus.        Right eye: No discharge.         Left eye: No discharge.      Conjunctiva/sclera: Conjunctivae normal.      Pupils: Pupils are equal, round, and reactive to light.   Neck:      Thyroid: No thyromegaly.      Trachea: No tracheal deviation.   Cardiovascular:      Rate and Rhythm: Normal rate and regular rhythm.      Heart sounds: Normal heart sounds. No murmur heard.    No friction rub. No gallop.   Pulmonary:      Effort: Pulmonary effort is normal. No respiratory distress.      Breath sounds: Normal breath sounds. No stridor. No wheezing or rales.   Chest:      Chest wall: No tenderness.   Abdominal:      General: Bowel sounds are normal. There is no distension.      Palpations: Abdomen is soft. There is no mass.      Tenderness: There is no abdominal tenderness. There is no guarding or rebound.      Hernia: No hernia is present.   Musculoskeletal:         General: No deformity. Normal range of  motion.      Cervical back: Normal range of motion and neck supple.   Lymphadenopathy:      Cervical: No cervical adenopathy.   Skin:     General: Skin is warm and dry.      Coloration: Skin is not pale.      Findings: No erythema or rash.   Neurological:      Mental Status: He is alert. He is disoriented.      Cranial Nerves: No cranial nerve deficit.      Motor: No abnormal muscle tone.      Coordination: Coordination normal.      Deep Tendon Reflexes: Reflexes are normal and symmetric. Reflexes normal.   Psychiatric:         Behavior: Behavior normal.         Cognition and Memory: Cognition is impaired. Memory is impaired.           Results Review:  I have reviewed the labs, radiology results, and diagnostic studies.    Laboratory Data:   Results from last 7 days   Lab Units 05/13/22  0547   WBC 10*3/mm3 4.51   HEMOGLOBIN g/dL 11.0*   HEMATOCRIT % 35.5*   PLATELETS 10*3/mm3 130*        Results from last 7 days   Lab Units 05/13/22  0547   SODIUM mmol/L 138   POTASSIUM mmol/L 3.7   CHLORIDE mmol/L 103   CO2 mmol/L 29.0   BUN mg/dL 16   CREATININE mg/dL 0.68*   CALCIUM mg/dL 8.3*   BILIRUBIN mg/dL 0.4   ALK PHOS U/L 56   ALT (SGPT) U/L 8   AST (SGOT) U/L 36   GLUCOSE mg/dL 94       Culture Data:   Blood Culture   Date Value Ref Range Status   05/10/2022 No growth at 3 days  Preliminary   05/10/2022 No growth at 3 days  Preliminary       Radiology Data:   Imaging Results (Last 24 Hours)     ** No results found for the last 24 hours. **          I have reviewed the patient's current medications.     Assessment/Plan     Active Hospital Problems    Diagnosis    • COVID-19 virus detected    • Atrial fibrillation (HCC)    • Generalized weakness    • Cachexia (HCC)    • Lewy body dementia without behavioral disturbance (HCC)        1.  Covid-19  -supportive care    2.  Parkinsonism  -Sinemet  -Neuro following  -supportive care    3.  Lewy Body Dementia  -Namenda  -Neuro following  -supportive care    4.   A-fib  -Metoprolol  -Eliquis              Discharge Planning: I expect the patient to be discharged to SNF in 2-3 days    Electronically signed by Storm Moran MD, 05/17/22, 13:37 CDT.

## 2022-05-17 NOTE — PLAN OF CARE
Goal Outcome Evaluation:  Plan of Care Reviewed With: patient        Progress: improving  Outcome Evaluation: Ntn follow up. He is on a soft texture, ground meat diet. Intake has shown some improvement, 63% of the last 4 documented meals consumed. He is receiving Boost Plus BID with meals. Pt does qualify for moderate malnutrition based on weight loss and intake. Unable to complete NFPE at this time d/t COVID isolation, but MD notes do indicate that pt is cachectic. See [facility] note for further details. SW is working on placement for pt. Cont to follow. All staff to encourage intake with any interraction. Will request new weight since there is no weight since 5/12.

## 2022-05-17 NOTE — PLAN OF CARE
Goal Outcome Evaluation:  Plan of Care Reviewed With: patient        Progress: improving  Outcome Evaluation: Pt. able to mobilize in room & feed self after tray set up at SBA/S.  Improved ability to follow commands & balance as compared to SOC.  OTR provided guided & graded challenges during ADLs to max his safety & improve indep.  Cont to rec 24/7 upon DC 2' dementia. Cont OT POT

## 2022-05-17 NOTE — THERAPY TREATMENT NOTE
Acute Care - Physical Therapy Treatment Note  [facility]     Patient Name: Edy Urbina  : 1940  MRN: [id number]  Today's Date: 2022      Visit Dx:     ICD-10-CM ICD-9-CM   1. Acute metabolic encephalopathy  G93.41 348.31   2. COVID-19  U07.1 079.89   3. Dysphagia, unspecified type  R13.10 787.20   4. Impaired mobility and ADLs  Z74.09 V49.89    Z78.9    5. Impaired mobility  Z74.09 799.89     Patient Active Problem List   Diagnosis   • Anemia   • Lewy body dementia without behavioral disturbance (HCC)   • Cachexia (HCC)   • Pneumonia of right middle lobe due to infectious organism   • Generalized weakness   • Elevated BUN   • Metabolic encephalopathy, acute, due to pnuemonia and elevated BUN   • Paroxysmal atrial fibrillation with rapid ventricular response (HCC)   • Acute metabolic encephalopathy   • COVID-19 virus detected   • Atrial fibrillation (HCC)     Past Medical History:   Diagnosis Date   • Cataracts, bilateral    • Lewy body dementia (HCC)    • Occasional tremors    • Prostate cancer (HCC) 2011    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    • Varicose vein of leg 2019   • Varicose veins of legs      Past Surgical History:   Procedure Laterality Date   • EYE SURGERY     • HERNIA REPAIR Left    • INGUINAL HERNIA REPAIR Right 2019    Procedure: OPEN RIGHT INGUINAL HERNIA REPAIR WITH MESH;  Surgeon: Alesia Mora MD;  Location: [facility];  Service: General   • PROSTATECTOMY  2011    RALP     PT Assessment (last 12 hours)     PT Evaluation and Treatment     Row Name 22 0836          Physical Therapy Time and Intention    Subjective Information no complaints  -KLAUS     Document Type therapy note (daily note)  -KLAUS     Mode of Treatment physical therapy  -KLAUS     Row Name 22 0836          General Information    Existing Precautions/Restrictions fall  -KLAUS     Row Name 22 0836          Pain    Pretreatment Pain Rating 0/10 - no pain  -KLAUS      Posttreatment Pain Rating 0/10 - no pain  -     Row Name 05/17/22 0836          Bed Mobility    Supine-Sit [place] (Bed Mobility) standby assist  -KLAUS     Sit-Supine [place] (Bed Mobility) standby assist  -KLAUS     Comment, (Bed Mobility) pt was able to follow command to get out of bed without extra cues  -     Row Name 05/17/22 0836          Transfers    Sit-Stand [place] (Transfers) standby assist  -KLAUS     Stand-Sit [place] (Transfers) standby assist  -KLAUS     Row Name 05/17/22 0836          Gait/Stairs (Locomotion)    [place] Level (Gait) standby assist  -KLAUS     Distance in Feet (Gait) 200' in the room  -KLAUS     Row Name 05/17/22 0836          Aerobic Exercise    Comment, Aerobic Exercise (Therapeutic Exercise) AROM BLE X 20  -KLAUS     Row Name 05/17/22 0836          Positioning and Restraints    Pre-Treatment Position in bed  -KLAUS     Post Treatment Position bed  -KLAUS     In Bed fowlers;call light within reach;encouraged to call for assist;side rails up x2  -KLAUS           User Key  (r) = Recorded By, (t) = Taken By, (c) = Cosigned By    Initials Name Provider Type    KLAUS Young Brito, PTA Physical Therapist Assistant                Physical Therapy Education                 Title: PT OT SLP Therapies (In Progress)     Topic: Physical Therapy (In Progress)     Point: Mobility training (In Progress)     Learning Progress Summary           Patient Acceptance, E, NR by MS at 5/12/2022 1058    Comment: role of PT in his care                   Point: Home exercise program (Not Started)     Learner Progress:  Not documented in this visit.          Point: Body mechanics (Not Started)     Learner Progress:  Not documented in this visit.          Point: Precautions (Not Started)     Learner Progress:  Not documented in this visit.                      User Key     Initials Effective Dates Name Provider Type Discipline    MS 06/19/18 -  Henrietta Hooks, PT, DPT, NCS Physical Therapist PT               PT Recommendation and Plan     Plan of Care Reviewed With: patient  Progress: improving  Outcome Evaluation: Pt is more alert, still has some confusion, stated he had ate breakfast, but it had not been brought to the room yet.  Able to perform bed mobility with and transfers with SBA.  Amb in the room with SBA.  Performed LE exercises to increase strength.   Outcome Measures     Row Name 05/16/22 0923             How much help from another person do you currently need...    Turning from your back to your side while in flat bed without using bedrails? 4  -KLAUS      Moving from lying on back to sitting on the side of a flat bed without bedrails? 4  -KLAUS      Moving to and from a bed to a chair (including a wheelchair)? 4  -KLAUS      Standing up from a chair using your arms (e.g., wheelchair, bedside chair)? 4  -KLAUS      Climbing 3-5 steps with a railing? 3  -KLAUS      To walk in hospital room? 3  -KLAUS      AM-PAC 6 Clicks Score (PT) 22  -KLAUS              Functional Assessment    Outcome Measure Options AM-PAC 6 Clicks Basic Mobility (PT)  -KLAUS            User Key  (r) = Recorded By, (t) = Taken By, (c) = Cosigned By    Initials Name Provider Type    Young Slater PTA Physical Therapist Assistant                 Time Calculation:    PT Charges     Row Name 05/17/22 0836             Time Calculation    Start Time 0836  -KLAUS      Stop Time 0859  -KLAUS      Time Calculation (min) 23 min  -KLAUS      PT Received On 05/17/22  -KLAUS              Time Calculation- PT    Total Timed Code Minutes- PT 23 minute(s)  -KLAUS              Timed Charges    12754 - PT Therapeutic Exercise Minutes 10  -KLAUS      01564 - Gait Training Minutes  13  -KLAUS              Total Minutes    Timed Charges Total Minutes 23  -KLAUS       Total Minutes 23  -KLAUS            User Key  (r) = Recorded By, (t) = Taken By, (c) = Cosigned By    Initials Name Provider Type    Young Slater PTA Physical Therapist Assistant              Therapy Charges for Today      Code Description Service Date Service Provider Modifiers Qty    00023910708 HC GAIT TRAINING EA 15 MIN 5/16/2022 Young Brito, PTA GP 1    36051177906 HC PT THER PROC EA 15 MIN 5/16/2022 Young Brito, PTA GP 1    80043383850 HC GAIT TRAINING EA 15 MIN 5/17/2022 Young Brito, PTA GP 1    95040576141 HC PT THER PROC EA 15 MIN 5/17/2022 Young Brito, PTA GP 1          PT G-Codes  Outcome Measure Options: AM-PAC 6 Clicks Daily Activity (OT)  AM-PAC 6 Clicks Score (PT): 22  AM-PAC 6 Clicks Score (OT): 18    Young Brito PTA  5/17/2022

## 2022-05-17 NOTE — PLAN OF CARE
Goal Outcome Evaluation:  Plan of Care Reviewed With: patient        Progress: improving  Outcome Evaluation: Pt is more alert, still has some confusion, stated he had ate breakfast, but it had not been brought to the room yet.  Able to perform bed mobility with and transfers with SBA.  Amb in the room with SBA.  Performed LE exercises to increase strength.

## 2022-05-17 NOTE — THERAPY TREATMENT NOTE
Patient Name: Edy Urbina  : 1940    MRN: [id number]                              Today's Date: 2022       Admit Date: 5/10/2022    Visit Dx:     ICD-10-CM ICD-9-CM   1. Acute metabolic encephalopathy  G93.41 348.31   2. COVID-19  U07.1 079.89   3. Dysphagia, unspecified type  R13.10 787.20   4. Impaired mobility and ADLs  Z74.09 V49.89    Z78.9    5. Impaired mobility  Z74.09 799.89     Patient Active Problem List   Diagnosis   • Anemia   • Lewy body dementia without behavioral disturbance (HCC)   • Cachexia (HCC)   • Pneumonia of right middle lobe due to infectious organism   • Generalized weakness   • Elevated BUN   • Metabolic encephalopathy, acute, due to pnuemonia and elevated BUN   • Paroxysmal atrial fibrillation with rapid ventricular response (HCC)   • Acute metabolic encephalopathy   • COVID-19 virus detected   • Atrial fibrillation (HCC)     Past Medical History:   Diagnosis Date   • Cataracts, bilateral    • Lewy body dementia (HCC)    • Occasional tremors    • Prostate cancer (HCC) 2011    t2c,[place]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    • Varicose vein of leg 2019   • Varicose veins of legs      Past Surgical History:   Procedure Laterality Date   • EYE SURGERY     • HERNIA REPAIR Left    • INGUINAL HERNIA REPAIR Right 2019    Procedure: OPEN RIGHT INGUINAL HERNIA REPAIR WITH MESH;  Surgeon: Alesia Mora MD;  Location: [facility];  Service: General   • PROSTATECTOMY  2011    RALP      General Information     Row Name 22 0845          OT Time and Intention    Document Type therapy note (daily note)  -CH     Mode of Treatment occupational therapy  -CH     Row Name 22 0845          General Information    Existing Precautions/Restrictions fall  -     Barriers to Rehab cognitive status  -CH     Row Name 22 0845          Safety Issues, Functional Mobility    Safety Issues Affecting Function (Mobility) at risk behavior  observed;impulsivity;judgment  -     Impairments Affecting Function (Mobility) balance;endurance/activity tolerance;strength  -     Cognitive Impairments, Mobility Safety/Performance insight into deficits/self-awareness;judgment  -           User Key  (r) = Recorded By, (t) = Taken By, (c) = Cosigned By    Initials Name Provider Type    Noemi Patel OTR/L Occupational Therapist                 Mobility/ADL's     Row Name 05/17/22 08          Bed Mobility    Bed Mobility supine-sit  -     Supine-Sit [place] (Bed Mobility) standby assist  -     Assistive Device (Bed Mobility) bed rails;head of bed elevated  -     Row Name 05/17/22 0845          Transfers    Transfers sit-stand transfer;stand-sit transfer  -     Sit-Stand [place] (Transfers) standby assist  -     Stand-Sit [place] (Transfers) standby assist  -     Row Name 05/17/22 08          Activities of Daily Living    BADL Assessment/Intervention feeding;upper body dressing  -     Row Name 05/17/22 [facility]          Self-Feeding Assessment/Training    [place] Level (Feeding) maximum assist (25% patient effort);prepare tray/open items;knife use;supervision;finger foods;liquids to mouth;scoop food and bring to mouth  -     Position (Self-Feeding) supported sitting  -     Row Name 05/17/22 [facility]          Upper Body Dressing Assessment/Training    [place] Level (Upper Body Dressing) minimum assist (75% patient effort);don;doff  -     Position (Upper Body Dressing) supported sitting  -     Comment, (Upper Body Dressing) hospital gown  -           User Key  (r) = Recorded By, (t) = Taken By, (c) = Cosigned By    Initials Name Provider Type    Noemi Patel OTR/L Occupational Therapist               Obj/Interventions     Row Name 05/17/22 08          Balance    Balance Interventions sitting;standing;sit to stand;supported;static;dynamic  -           User Key  (r) = Recorded By, (t) = Taken By, (c) =  Cosigned By    Initials Name Provider Type     Noemi Mckay, OTR/L Occupational Therapist               Goals/Plan     Row Name 05/17/22 0845          Self-Feeding Goal 1 (OT)    Activity/Device (Self-Feeding Goal 1, OT) self-feeding skills, all  -CH     [place] Level/Cues Needed (Self-Feeding Goal 1, OT) supervision required;set-up required;verbal cues required  -     Time Frame (Self-Feeding Goal 1, OT) long term goal (LTG);by discharge  -     Progress/Outcomes (Self-Feeding Goal 1, OT) goal met  -           User Key  (r) = Recorded By, (t) = Taken By, (c) = Cosigned By    Initials Name Provider Type    Noemi Patel OTR/L Occupational Therapist               Clinical Impression     Row Name 05/17/22 0845          Plan of Care Review    Plan of Care Reviewed With patient  -     Progress improving  -     Outcome Evaluation Pt. able to mobilize in room & feed self after tray set up at SBA/S.  Improved ability to follow commands & balance as compared to SOC.  OTR provided guided & graded challenges during ADLs to max his safety & improve indep.  Cont to rec 24/7 upon DC 2' dementia. Cont OT POT  -     Row Name 05/17/22 0845          Therapy Assessment/Plan (OT)    Rehab Potential (OT) good, to achieve stated therapy goals  -     Criteria for Skilled Therapeutic Interventions Met (OT) yes;skilled treatment is necessary  -     Therapy Frequency (OT) 5 times/wk  -     Row Name 05/17/22 0845          Therapy Plan Review/Discharge Plan (OT)    Anticipated Discharge Disposition (OT) skilled nursing facility;home with 24/7 care;home with home health  -     Row Name 05/17/22 0845          Positioning and Restraints    Pre-Treatment Position in bed  -     Post Treatment Position chair  -     In Chair sitting;call light within reach;encouraged to call for assist;exit alarm on;notified nsg  -           User Key  (r) = Recorded By, (t) = Taken By, (c) = Cosigned By    Initials Name  Provider Type    Noemi Patel OTR/L Occupational Therapist               Outcome Measures     Row Name 05/17/22 0845          How much help from another is currently needed...    Putting on and taking off regular lower body clothing? 3  -CH     Bathing (including washing, rinsing, and drying) 3  -CH     Toileting (which includes using toilet bed pan or urinal) 3  -CH     Putting on and taking off regular upper body clothing 3  -CH     Taking care of personal grooming (such as brushing teeth) 3  -CH     Eating meals 4  -CH     AM-PAC 6 Clicks Score (OT) 19  -CH     Row Name 05/17/22 0845          Functional Assessment    Outcome Measure Options AM-PAC 6 Clicks Daily Activity (OT)  -           User Key  (r) = Recorded By, (t) = Taken By, (c) = Cosigned By    Initials Name Provider Type    Noemi Patel OTR/L Occupational Therapist                Occupational Therapy Education                 Title: PT OT SLP Therapies (In Progress)     Topic: Occupational Therapy (In Progress)     Point: ADL training (Done)     Description:   Instruct learner(s) on proper safety adaptation and remediation techniques during self care or transfers.   Instruct in proper use of assistive devices.              Learning Progress Summary           Patient Acceptance, E, VU by  at 5/17/2022 1009    Acceptance, E,D, VU,NR by  at 5/16/2022 1558    Acceptance, E, NR by AO at 5/14/2022 1515    Acceptance, E,D, VU,NR by  at 5/12/2022 1159    Acceptance, E, NR by MM at 5/11/2022 0945    Comment: OT role, benefits, POC, need for assist                   Point: Home exercise program (Not Started)     Description:   Instruct learner(s) on appropriate technique for monitoring, assisting and/or progressing therapeutic exercises/activities.              Learner Progress:  Not documented in this visit.          Point: Precautions (Done)     Description:   Instruct learner(s) on prescribed precautions during self-care and functional  transfers.              Learning Progress Summary           Patient Acceptance, E, VU by  at 5/17/2022 1009    Acceptance, E, NR by MM at 5/11/2022 0945    Comment: OT role, benefits, POC, need for assist                   Point: Body mechanics (Done)     Description:   Instruct learner(s) on proper positioning and spine alignment during self-care, functional mobility activities and/or exercises.              Learning Progress Summary           Patient Acceptance, E, VU by  at 5/17/2022 1009    Acceptance, E,TB, VU,NR by  at 5/12/2022 0449    Acceptance, E, NR by MM at 5/11/2022 0945    Comment: OT role, benefits, POC, need for assist                               User Key     Initials Effective Dates Name Provider Type Discipline     06/16/21 -  Noemi Mckay, OTR/L Occupational Therapist OT     03/28/22 -  Caroline Skinner, RN Registered Nurse Nurse     06/16/21 -  Rick Herbert, OTR/L Occupational Therapist OT    EB 06/16/21 -  Tiffany Echeverria RN Registered Nurse Nurse              OT Recommendation and Plan  Therapy Frequency (OT): 5 times/wk  Plan of Care Review  Plan of Care Reviewed With: patient  Progress: improving  Outcome Evaluation: Pt. able to mobilize in room & feed self after tray set up at SBA/S.  Improved ability to follow commands & balance as compared to SOC.  OTR provided guided & graded challenges during ADLs to max his safety & improve indep.  Cont to rec 24/7 upon DC 2' dementia. Cont OT POT     Time Calculation:    Time Calculation- OT     Row Name 05/17/22 0845 05/17/22 0836          Time Calculation- OT    OT Start Time 0845  - --     OT Stop Time 0908  - --     OT Time Calculation (min) 23 min  - --     Total Timed Code Minutes- OT 23 minute(s)  - --     OT Received On 05/17/22  - --            Timed Charges    29163 - Gait Training Minutes  -- 13  -KLAUS     84361 - OT Self Care/Mgmt Minutes 23  - --            Total Minutes    Timed Charges Total Minutes 23  -  13  -KLAUS      Total Minutes 23  - 13  -KLAUS           User Key  (r) = Recorded By, (t) = Taken By, (c) = Cosigned By    Initials Name Provider Type     Noemi Mckay, OTR/L Occupational Therapist    Young Slater, PTA Physical Therapist Assistant              Therapy Charges for Today     Code Description Service Date Service Provider Modifiers Qty    91969489328 HC OT SELF CARE/MGMT/TRAIN EA 15 MIN 5/16/2022 Noemi Mckay OTR/L GO 2               UDAY Clarke/KURTIS  5/17/2022

## 2022-05-17 NOTE — CASE MANAGEMENT/SOCIAL WORK
Continued Stay Note  [facility]     Patient Name: Edy Urbina  MRN: [id number]  Today's Date: 5/17/2022    Admit Date: 5/10/2022     Discharge Plan     Row Name 05/17/22 1406       Plan    Plan Comments MICHELLE has spoken with PT's son Raul. He has been informed of the 50% out of pocket requirement at [facility]. Raul has advised that PT would not be able to afford that kind of expense. Raul states that he will rely on the recommendations of the therapists and physicians regarding what PT needs regarding rehab vs returning to [facility] with HH and DME. PT's son has advised that if PT returns to [facility], he would benefit from having a walker or even a rollator. If PT continues to need SNF, he will require pre-cert. That would likely take a day or two and by that time, PT would be very near if not at 10 days in isolation for COVID. Local SNF placement may now be more of an option.    Row Name 05/17/22 6876       Plan    Plan Comments MICHELLE has just spoken with Megan with [facility]. She has advised that they are prepared to make a bed offer; however; PT will have a significant out of pocket expense to go to this facility. He will be responsible for 50% for the first 100 days and will also be responsible for 50% of any therapy. He would require a pre-cert that would take at least a day if not two to approve and he would only require 1 or 2 more days of COVID isolation by that time. [facility] is prepared to offer, but believe that there are better options available that would not be so costly to PT. MICHELLE will call to speak with PT and discuss other options/plans.               Discharge Codes    No documentation.               Expected Discharge Date and Time     Expected Discharge Date Expected Discharge Time    May 18, 2022             SHEFALI Munoz

## 2022-05-18 ENCOUNTER — READMISSION MANAGEMENT (OUTPATIENT)
Dept: CALL CENTER | Facility: HOSPITAL | Age: 82
End: 2022-05-18

## 2022-05-18 VITALS
TEMPERATURE: 97.4 F | HEIGHT: 73 IN | OXYGEN SATURATION: 97 % | RESPIRATION RATE: 16 BRPM | BODY MASS INDEX: 17.15 KG/M2 | DIASTOLIC BLOOD PRESSURE: 72 MMHG | SYSTOLIC BLOOD PRESSURE: 118 MMHG | HEART RATE: 61 BPM | WEIGHT: 129.4 LBS

## 2022-05-18 PROBLEM — E44.0 MODERATE MALNUTRITION (HCC): Status: ACTIVE | Noted: 2022-05-18

## 2022-05-18 PROBLEM — D89.831 CYTOKINE RELEASE SYNDROME, GRADE 1: Status: ACTIVE | Noted: 2022-05-18

## 2022-05-18 PROCEDURE — G0378 HOSPITAL OBSERVATION PER HR: HCPCS

## 2022-05-18 PROCEDURE — 97116 GAIT TRAINING THERAPY: CPT

## 2022-05-18 RX ORDER — TAMSULOSIN HYDROCHLORIDE 0.4 MG/1
0.4 CAPSULE ORAL DAILY
Qty: 30 CAPSULE | Refills: 0 | Status: SHIPPED | OUTPATIENT
Start: 2022-05-19 | End: 2022-06-21 | Stop reason: SDUPTHER

## 2022-05-18 RX ADMIN — FAMOTIDINE 20 MG: 20 TABLET, FILM COATED ORAL at 08:38

## 2022-05-18 RX ADMIN — OXYCODONE HYDROCHLORIDE AND ACETAMINOPHEN 500 MG: 500 TABLET ORAL at 08:38

## 2022-05-18 RX ADMIN — Medication 10 ML: at 08:39

## 2022-05-18 RX ADMIN — Medication 1000 UNITS: at 08:38

## 2022-05-18 RX ADMIN — Medication 1 TABLET: at 08:39

## 2022-05-18 RX ADMIN — MEMANTINE HYDROCHLORIDE 10 MG: 5 TABLET, FILM COATED ORAL at 08:39

## 2022-05-18 RX ADMIN — ZINC SULFATE 220 MG (50 MG) CAPSULE 220 MG: CAPSULE at 08:38

## 2022-05-18 RX ADMIN — CARBIDOPA AND LEVODOPA 1 TABLET: 25; 100 TABLET ORAL at 06:03

## 2022-05-18 RX ADMIN — RISPERIDONE 0.25 MG: 0.25 TABLET, FILM COATED ORAL at 08:38

## 2022-05-18 RX ADMIN — TAMSULOSIN HYDROCHLORIDE 0.4 MG: 0.4 CAPSULE ORAL at 08:38

## 2022-05-18 RX ADMIN — METOPROLOL TARTRATE 75 MG: 50 TABLET, FILM COATED ORAL at 08:38

## 2022-05-18 RX ADMIN — APIXABAN 2.5 MG: 2.5 TABLET, FILM COATED ORAL at 08:38

## 2022-05-18 NOTE — CASE MANAGEMENT/SOCIAL WORK
Continued Stay Note   [place]     Patient Name: Edy Urbina  MRN: [id number]  Today's Date: 5/18/2022    Admit Date: 5/10/2022     Discharge Plan     Row Name 05/18/22 1238       Plan    Plan Home    Patient/Family in Agreement with Plan yes    Final Discharge Disposition Code 01 - home or self-care    Final Note Pt is walking 100-200 feet independently. Spoke with pt's son, Raul [phone]. Also, spoke with [facility]. Since pt is doing better physically, he can return to [facility]. He will still have his caretakers from there and they will quarantine him if he is showing Covid symptoms. Son is asking for a rolling walker in the event he will need one. Will order from [facility] and request they deliver it to [facility].    Deja LANDRUM is aware of d/c.                Discharge Codes    No documentation.               Expected Discharge Date and Time     Expected Discharge Date Expected Discharge Time    May 18, 2022             HUBER Ventura

## 2022-05-18 NOTE — THERAPY TREATMENT NOTE
Acute Care - Physical Therapy Treatment Note  [facility]     Patient Name: Edy Urbina  : 1940  MRN: [id number]  Today's Date: 2022      Visit Dx:     ICD-10-CM ICD-9-CM   1. Acute metabolic encephalopathy  G93.41 348.31   2. COVID-19  U07.1 079.89   3. Dysphagia, unspecified type  R13.10 787.20   4. Impaired mobility and ADLs  Z74.09 V49.89    Z78.9    5. Impaired mobility  Z74.09 799.89     Patient Active Problem List   Diagnosis   • Anemia   • Lewy body dementia without behavioral disturbance (HCC)   • Cachexia (HCC)   • Pneumonia of right middle lobe due to infectious organism   • Generalized weakness   • Elevated BUN   • Metabolic encephalopathy, acute, due to pnuemonia and elevated BUN   • Paroxysmal atrial fibrillation with rapid ventricular response (HCC)   • Acute metabolic encephalopathy   • COVID-19 virus detected   • Atrial fibrillation (HCC)     Past Medical History:   Diagnosis Date   • Cataracts, bilateral    • Lewy body dementia (HCC)    • Occasional tremors    • Prostate cancer (HCC) 2011    t2c,[place]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    • Varicose vein of leg 2019   • Varicose veins of legs      Past Surgical History:   Procedure Laterality Date   • EYE SURGERY     • HERNIA REPAIR Left    • INGUINAL HERNIA REPAIR Right 2019    Procedure: OPEN RIGHT INGUINAL HERNIA REPAIR WITH MESH;  Surgeon: Alesia Mora MD;  Location: [facility];  Service: General   • PROSTATECTOMY  2011    RALP     PT Assessment (last 12 hours)     PT Evaluation and Treatment     Row Name 22 1115          Physical Therapy Time and Intention    Subjective Information no complaints  -KJ     Document Type therapy note (daily note)  -KJ     Mode of Treatment physical therapy  -KJ     Patient Effort good  -KJ     Row Name 22 1115          General Information    Existing Precautions/Restrictions fall  -KJ     Row Name 22 1115          Bed Mobility    Supine-Sit  [place] (Bed Mobility) independent  -KJ     Sit-Supine [place] (Bed Mobility) independent  -KJ     Row Name 05/18/22 1115          Transfers    Sit-Stand [place] (Transfers) independent  -KJ     Row Name 05/18/22 1115          Gait/Stairs (Locomotion)    [place] Level (Gait) supervision  -KJ     Distance in Feet (Gait) 100  -KJ     Deviations/Abnormal Patterns (Gait) gait speed decreased  -KJ     Row Name 05/18/22 1115          Aerobic Exercise    Comment, Aerobic Exercise (Therapeutic Exercise) AROM BLE's  -KJ     Row Name 05/18/22 1115          Positioning and Restraints    Pre-Treatment Position in bed  -KJ     Post Treatment Position bed  -KJ     In Bed call light within reach  -KJ           User Key  (r) = Recorded By, (t) = Taken By, (c) = Cosigned By    Initials Name Provider Type    Colleen Norton, PTA Physical Therapist Assistant                Physical Therapy Education                 Title: PT OT SLP Therapies (In Progress)     Topic: Physical Therapy (In Progress)     Point: Mobility training (In Progress)     Learning Progress Summary           Patient Acceptance, E, NR by MS at 5/12/2022 1058    Comment: role of PT in his care                   Point: Home exercise program (Not Started)     Learner Progress:  Not documented in this visit.          Point: Body mechanics (Not Started)     Learner Progress:  Not documented in this visit.          Point: Precautions (Not Started)     Learner Progress:  Not documented in this visit.                      User Key     Initials Effective Dates Name Provider Type Discipline    MS 06/19/18 -  Henrietta Hooks, PT, DPT, NCS Physical Therapist PT              PT Recommendation and Plan     Plan of Care Reviewed With: patient  Progress: improving  Outcome Evaluation: Pt tx completed. Pt up at sailaja in room. I bed mobility and transfers. Amb >100' in room SBA. Mobizing well.   Outcome Measures     Row Name 05/16/22 0923             How much  help from another person do you currently need...    Turning from your back to your side while in flat bed without using bedrails? 4  -KLAUS      Moving from lying on back to sitting on the side of a flat bed without bedrails? 4  -KLAUS      Moving to and from a bed to a chair (including a wheelchair)? 4  -KLAUS      Standing up from a chair using your arms (e.g., wheelchair, bedside chair)? 4  -KLAUS      Climbing 3-5 steps with a railing? 3  -KLAUS      To walk in hospital room? 3  -KLAUS      AM-PAC 6 Clicks Score (PT) 22  -KLAUS              Functional Assessment    Outcome Measure Options AM-PAC 6 Clicks Basic Mobility (PT)  -KLAUS            User Key  (r) = Recorded By, (t) = Taken By, (c) = Cosigned By    Initials Name Provider Type    Young Slater PTA Physical Therapist Assistant                 Time Calculation:    PT Charges     Row Name 05/18/22 1139             Time Calculation    Start Time 1115  -KJ      Stop Time 1130  -KJ      Time Calculation (min) 15 min  -KJ      PT Received On 05/18/22  -KJ      PT Goal Re-Cert Due Date 05/22/22  -KJ              Time Calculation- PT    Total Timed Code Minutes- PT 15 minute(s)  -KJ            User Key  (r) = Recorded By, (t) = Taken By, (c) = Cosigned By    Initials Name Provider Type    Colleen Norton PTA Physical Therapist Assistant              Therapy Charges for Today     Code Description Service Date Service Provider Modifiers Qty    [id number] HC GAIT TRAINING EA 15 MIN 5/18/2022 Colleen Malik PTA GP 1          PT G-Codes  Outcome Measure Options: AM-PAC 6 Clicks Daily Activity (OT)  AM-PAC 6 Clicks Score (PT): 22  AM-PAC 6 Clicks Score (OT): 19    Colleen Malik PTA  5/18/2022

## 2022-05-18 NOTE — DISCHARGE PLACEMENT REQUEST
"Edy Ambriz (81 y.o. Male)             Date of Birth   1940    Social Security Number       [address]   [address] [facility] [address]    Home Phone   [phone]    MRN   [id number]       Islam   Mosque    Marital Status                               Admission Date   5/10/22    Admission Type   Emergency    Admitting Provider   Storm Moran MD    Attending Provider   Storm Moran MD    Department, Room/Bed   [address], [address]       Discharge Date       Discharge Disposition   Home or Self Care    Discharge Destination                               Attending Provider: Storm Moran MD    Allergies: No Known Allergies    Isolation: Enh Drop/Con   Infection: COVID (confirmed) (05/10/22)   Code Status: Prior   Advance Care Planning Activity    Ht: 185.4 cm (73\")   Wt: 58.7 kg (129 lb 6.4 oz)    Admission Cmt: None   Principal Problem: None                Active Insurance as of 5/10/2022     Primary Coverage     Payor Plan Insurance Group Employer/Plan Group    HUMANA MEDICARE REPLACEMENT HUMANA MEDICARE REPLACEMENT [id number]     Payor Plan Address Payor Plan Phone Number Payor Plan Fax Number Effective Dates    [address] [address] [phone]  2021 - None Entered    [facility] [address]       Subscriber Name Subscriber Birth Date Member ID       EDY AMBRIZ 1940 [id number]                 Emergency Contacts      (Rel.) Home Phone Work Phone Mobile Phone    OLEGARIO AMBRIZ (Son) -- -- [phone]    Jennyfer Ambriz (Daughter) -- -- [phone]    Amparo Dionne (Daughter) -- -- [phone]        [address] [address]  Dept. Phone:  [phone]  Dept. Fax:   Date Ordered: May 18, 2022         Patient:  Edy Ambriz MRN:  [id number]   [address]  [facility] [address] :  1940  SSN:    Phone: [phone] Sex:  M     Weight: 58.7 " "kg (129 lb 6.4 oz)         Ht Readings from Last 1 Encounters:   05/17/22 185.4 cm (73\")         Miscellaneous DME   (Order ID: [id number])    Diagnosis:  Impaired mobility and ADLs (Z74.09,Z78.9 [ICD-10-CM] V49.89 [ICD-9-CM])  Impaired mobility (Z74.09 [ICD-10-CM] 799.89 [ICD-9-CM])  Lewy body dementia without behavioral disturbance (HCC) (G31.83,F02.80 [ICD-10-CM] 331.82,294.10 [ICD-9-CM])   Quantity:  1     Type of DME: rollling walker  Length of Need (99 Months = Lifetime): 99 Months = Lifetime        Authorizing Provider's Phone: [phone]  Authorizing Provider:Storm Moran MD  Authorizing Provider's NPI: [id number]  Order Entered By: Storm Moran MD 5/18/2022  3:17 PM     Electronically signed by: Storm Moran MD 5/18/2022  3:17 PM       "

## 2022-05-18 NOTE — NURSING NOTE
Patient is current with [facility] and services will be resumed with discharge today. Since patient has remained observation status will not need resumption of care orders.

## 2022-05-18 NOTE — OUTREACH NOTE
Prep Survey    Flowsheet Row Responses   [facility] facility patient discharged from? [place]   Is LACE score < 7 ? No   Emergency Room discharge w/ pulse ox? No   Eligibility [facility]   Date of Admission 05/10/22   Date of Discharge 05/18/22   Discharge Disposition Home or Self Care   Discharge diagnosis Covid 19   Does the patient have one of the following disease processes/diagnoses(primary or secondary)? COVID-19   Does the patient have Home health ordered? Yes   What is the Home health agency?  [facility]   Is there a DME ordered? Yes   What DME was ordered? [employer] for oxygen   Prep survey completed? Yes          SHAI PAULINO - Registered Nurse

## 2022-05-18 NOTE — DISCHARGE SUMMARY
"    [facility] Medicine Services  DISCHARGE SUMMARY       Date of Admission: 5/10/2022  Date of Discharge:  5/18/2022  Primary Care Physician: Torey Amato DO    Presenting Problem/History of Present Illness:  Acute metabolic encephalopathy [G93.41]     Final Discharge Diagnoses:  Active Hospital Problems    Diagnosis    • COVID-19 virus detected    • Atrial fibrillation (HCC)    • Generalized weakness    • Cachexia (HCC)    • Lewy body dementia without behavioral disturbance (HCC)    1.  Covid-19  -supportive care     2.  Parkinsonism  -Sinemet  -Neuro following  -supportive care     3.  Lewy Body Dementia  -Namenda  -Neuro following  -supportive care     4.  A-fib  -Metoprolol  -Eliquis    Consults:   1.  Neurology    Procedures Performed: n/a    Pertinent Test Results: Incidentally positive for COVID-19      Chief Complaint on Day of Discharge: Feeling good    History of Present Illness on Day of Discharge:   Doing well.  No complaints or concerns.  Wants to go home.      Hospital Course:  The patient is a 81 y.o. male who presented to [facility] with AMS.  He was found to be positive for Covid-19.  He did not have evidence of covid-19 pneumonia.  He was without SOA and he was tolerating room air.  He received MAB infusion in the ED.  He was treated with supportive care and vitamin supplements.  He has remained afebrile.  He has not ever required oxygen.      Neurology was consulted to evaluate him for his Lewy Body dementia and Parksinon symptoms.  He was started on Sinemet.     He has worked well with PT and OT.  He is walking 200 feet with therapy.  He is felt appropriate for discharge.        Condition on Discharge:  stable    Physical Exam on Discharge:  /72 (BP Location: Left arm, Patient Position: Lying)   Pulse 61   Temp 97.4 °F (36.3 °C) (Oral)   Resp 16   Ht 185.4 cm (73\")   Wt 58.7 kg (129 lb 6.4 oz)   SpO2 97%   BMI 17.07 kg/m² "   Physical Exam  Vitals reviewed.   Constitutional:       Appearance: He is well-developed.   HENT:      Head: Normocephalic and atraumatic.      Right Ear: External ear normal.      Left Ear: External ear normal.      Nose: Nose normal.   Eyes:      General: No scleral icterus.        Right eye: No discharge.         Left eye: No discharge.      Conjunctiva/sclera: Conjunctivae normal.      Pupils: Pupils are equal, round, and reactive to light.   Neck:      Thyroid: No thyromegaly.      Trachea: No tracheal deviation.   Cardiovascular:      Rate and Rhythm: Normal rate and regular rhythm.      Heart sounds: Normal heart sounds. No murmur heard.    No friction rub. No gallop.   Pulmonary:      Effort: Pulmonary effort is normal. No respiratory distress.      Breath sounds: Normal breath sounds. No stridor. No wheezing or rales.   Chest:      Chest wall: No tenderness.   Abdominal:      General: Bowel sounds are normal. There is no distension.      Palpations: Abdomen is soft. There is no mass.      Tenderness: There is no abdominal tenderness. There is no guarding or rebound.      Hernia: No hernia is present.   Musculoskeletal:         General: No deformity. Normal range of motion.      Cervical back: Normal range of motion and neck supple.   Lymphadenopathy:      Cervical: No cervical adenopathy.   Skin:     General: Skin is warm and dry.      Coloration: Skin is not pale.      Findings: No erythema or rash.   Neurological:      Mental Status: He is alert and oriented to person, place, and time.      Cranial Nerves: No cranial nerve deficit.      Motor: No abnormal muscle tone.      Coordination: Coordination normal.      Deep Tendon Reflexes: Reflexes are normal and symmetric. Reflexes normal.   Psychiatric:         Behavior: Behavior normal.         Thought Content: Thought content normal.         Judgment: Judgment normal.           Discharge Disposition:  Home or Self Care    Discharge Medications:      Discharge Medications      New Medications      Instructions Start Date   carbidopa-levodopa  MG per tablet  Commonly known as: SINEMET   1 tablet, Oral, 3 Times Daily      tamsulosin 0.4 MG capsule 24 hr capsule  Commonly known as: FLOMAX   0.4 mg, Oral, Daily   Start Date: May 19, 2022        Continue These Medications      Instructions Start Date   apixaban 2.5 MG tablet tablet  Commonly known as: ELIQUIS   2.5 mg, Oral, Every 12 Hours Scheduled      memantine 10 MG tablet  Commonly known as: NAMENDA   10 mg, Oral, 2 Times Daily, TAKE 1 TABLET TWICE DAILY      Metoprolol Tartrate 75 MG tablet   75 mg, Oral, Every 12 Hours Scheduled      Multivitamin Men 50+ tablet tablet  Generic drug: multivitamin with minerals   1 tablet, Oral, Daily      risperiDONE 0.5 MG tablet  Commonly known as: risperDAL   0.5 mg, Oral, 2 Times Daily             Discharge Diet: regular    Activity at Discharge: as tolerated    Discharge Care Plan/Instructions:   Go to ER for fever or SOA    Follow-up Appointments:   Future Appointments   Date Time Provider Department Center   5/19/2022 To Be Determined Dalton Miranda SLP  [facility]   5/23/2022 To Be Determined Dalton Miranda SLP  [facility]   5/25/2022 To Be Determined Dalton Miranda SLP  [facility]   7/15/2022  2:30 PM Bright Lewis MD [facility]   9/26/2022 10:00 AM Torey Amato DO [facility]   9/27/2022  1:45 PM Dalton Laurent MD [facility]AD PAD       Test Results Pending at Discharge: n/a    Electronically signed by Storm Moran MD, 05/18/22, 13:09 CDT.    Time: 25 minutes

## 2022-05-18 NOTE — PLAN OF CARE
Goal Outcome Evaluation:  Plan of Care Reviewed With: patient        Progress: improving  Outcome Evaluation: Patient denies pain, voiding, tolerating diet, bed check on, disoriented except person, rm air, safety maintained.

## 2022-05-18 NOTE — PLAN OF CARE
Goal Outcome Evaluation:  Plan of Care Reviewed With: patient        Progress: improving  Outcome Evaluation: Pt tx completed. Pt up at sailaja in room. I bed mobility and transfers. Amb >100' in room SBA. Mobizing well.

## 2022-05-19 ENCOUNTER — TRANSITIONAL CARE MANAGEMENT TELEPHONE ENCOUNTER (OUTPATIENT)
Dept: CALL CENTER | Facility: HOSPITAL | Age: 82
End: 2022-05-19

## 2022-05-19 ENCOUNTER — HOME CARE VISIT (OUTPATIENT)
Dept: HOME HEALTH SERVICES | Facility: CLINIC | Age: 82
End: 2022-05-19

## 2022-05-19 VITALS
SYSTOLIC BLOOD PRESSURE: 126 MMHG | OXYGEN SATURATION: 96 % | HEART RATE: 66 BPM | RESPIRATION RATE: 16 BRPM | DIASTOLIC BLOOD PRESSURE: 78 MMHG | TEMPERATURE: 98.2 F

## 2022-05-19 PROCEDURE — G0153 HHCP-SVS OF S/L PATH,EA 15MN: HCPCS

## 2022-05-19 RX ORDER — METOPROLOL TARTRATE 75 MG/1
75 TABLET, FILM COATED ORAL EVERY 12 HOURS SCHEDULED
Qty: 180 TABLET | Refills: 3 | Status: SHIPPED | OUTPATIENT
Start: 2022-05-19

## 2022-05-19 NOTE — THERAPY DISCHARGE NOTE
Acute Care - Physical Therapy Discharge Summary  [facility]       Patient Name: Edy Urbina  : 1940  MRN: [id number]    Today's Date: 2022                 Admit Date: 5/10/2022      PT Recommendation and Plan    Visit Dx:    ICD-10-CM ICD-9-CM   1. Acute metabolic encephalopathy  G93.41 348.31   2. COVID-19  U07.1 079.89   3. Dysphagia, unspecified type  R13.10 787.20   4. Impaired mobility and ADLs  Z74.09 V49.89    Z78.9    5. Impaired mobility  Z74.09 799.89   6. Lewy body dementia without behavioral disturbance (HCC)  G31.83 331.82    F02.80 294.10                PT Rehab Goals     Row Name 22 1620             Bed Mobility Goal 1 (PT)    Activity/Assistive Device (Bed Mobility Goal 1, PT) bed mobility activities, all  -KLAUS      [place] Level/Cues Needed (Bed Mobility Goal 1, PT) modified independence  -KLAUS      Time Frame (Bed Mobility Goal 1, PT) long term goal (LTG);by discharge  -KLAUS      Progress/Outcomes (Bed Mobility Goal 1, PT) goal met  -KLAUS              Transfer Goal 1 (PT)    Activity/Assistive Device (Transfer Goal 1, PT) sit-to-stand/stand-to-sit;bed-to-chair/chair-to-bed  -KLAUS      [place] Level/Cues Needed (Transfer Goal 1, PT) contact guard required  -KLAUS      Time Frame (Transfer Goal 1, PT) long term goal (LTG);by discharge  -KLAUS      Progress/Outcome (Transfer Goal 1, PT) goal met  -KLAUS              Gait Training Goal 1 (PT)    Activity/Assistive Device (Gait Training Goal 1, PT) gait (walking locomotion);assistive device use;diminish gait deviation;improve balance and speed;increase endurance/gait distance;walker, rolling  -KLAUS      [place] Level (Gait Training Goal 1, PT) contact guard required;verbal cues required  -KLAUS      Distance (Gait Training Goal 1, PT) 50ft  -KLAUS      Time Frame (Gait Training Goal 1, PT) long term goal (LTG);by discharge  -KLAUS      Progress/Outcome (Gait Training Goal 1, PT) goal met  -KLAUS            User Key  (r) = Recorded By, (t) = Taken  By, (c) = Cosigned By    Initials Name Provider Type Discipline    Young Slater, PTA Physical Therapist Assistant PT                    PT Discharge Summary  Anticipated Discharge Disposition (PT): home  Reason for Discharge: Discharge from facility  Outcomes Achieved: Refer to plan of care for updates on goals achieved  Discharge Destination: Home      Young Brito PTA   5/19/2022

## 2022-05-19 NOTE — HOME HEALTH
COVID SCREENING: PT. D/C'D FROM HOSPITAL WITH NO ADDITIONAL COVID TESTING PER [facility] PT'S DTR.  SHE STATES THAT PT. IS CURRENTLY IN QUARANTINE AT [facility], HOWEVER, IS ASYMPTOMATIC.    FOCUS OF CARE/SKILLED NEED: SKILLED ST NECESSARY TO MAXIMIZE RELIABLE COGNITIVE COMMUNICATION AND IMPROVED SAFETY IN GILL ENVIRONMENT.    TEACHING/INTERVENTIONS: COGNITIVE STIMULATION TASKS    PROGRESS TOWARD GOALS: NO PROGRESS THIS VISIT DUE TO LEVEL OF CUES AND REDIRECTION REQUIRED FOR PARTICIPATION.     PHYSICIAN CONTACT:  NONE    INSURANCE CHANGES? NONE    FALLS SINCE LAST VISIT? DENIED FALLS    MEDICATION CHANGES SINCE LAST VISIT?  UNKNOWN PER PT.    PLAN FOR NEXT VISIT: COGNITIVE STIMULATION TASKS AND COGNITIVE CUEING HIERARCHY INSTRUCTION IF CG IS PRESENT

## 2022-05-19 NOTE — TELEPHONE ENCOUNTER
Caller: Jennyfer Urbina    Relationship: Emergency Contact    Best call back number: [phone]    Requested Prescriptions:   Requested Prescriptions     Pending Prescriptions Disp Refills   • apixaban (ELIQUIS) 2.5 MG tablet tablet 60 tablet 0     Sig: Take 1 tablet by mouth Every 12 (Twelve) Hours for 30 days. Indications: Atrial Fibrillation   • Metoprolol Tartrate 75 MG tablet 60 tablet 0     Sig: Take 75 mg by mouth Every 12 (Twelve) Hours for 30 days.        Pharmacy where request should be sent: [address] [phone] [facility] [phone] FX         Does the patient have less than a 3 day supply:  [x] Yes  [] No    Ryne Lamb Rep   05/19/22 09:10 CDT

## 2022-05-19 NOTE — TELEPHONE ENCOUNTER
Can we try to find a hospital follow-up spot on my schedule? Would like to manage that myself instead of Maria G's schedule.

## 2022-05-19 NOTE — THERAPY DISCHARGE NOTE
Acute Care - Occupational Therapy Discharge Summary  [facility]     Patient Name: Edy Urbina  : 1940  MRN: [id number]    Today's Date: 2022                 Admit Date: 5/10/2022        OT Recommendation and Plan    Visit Dx:    ICD-10-CM ICD-9-CM   1. Acute metabolic encephalopathy  G93.41 348.31   2. COVID-19  U07.1 079.89   3. Dysphagia, unspecified type  R13.10 787.20   4. Impaired mobility and ADLs  Z74.09 V49.89    Z78.9    5. Impaired mobility  Z74.09 799.89   6. Lewy body dementia without behavioral disturbance (HCC)  G31.83 331.82    F02.80 294.10                OT Rehab Goals     Row Name 22 0700             Dressing Goal 1 (OT)    Activity/Device (Dressing Goal 1, OT) upper body dressing  -TS      [place]/Cues Needed (Dressing Goal 1, OT) supervision required;set-up required;verbal cues required  -TS      Time Frame (Dressing Goal 1, OT) long term goal (LTG);by discharge  -TS      Progress/Outcome (Dressing Goal 1, OT) goal not met  -TS              Grooming Goal 1 (OT)    Activity/Device (Grooming Goal 1, OT) grooming skills, all  -TS      [place] (Grooming Goal 1, OT) supervision required;set-up required;verbal cues required  -TS      Time Frame (Grooming Goal 1, OT) long term goal (LTG);by discharge  -TS      Progress/Outcome (Grooming Goal 1, OT) goal not met  -TS              Self-Feeding Goal 1 (OT)    Activity/Device (Self-Feeding Goal 1, OT) self-feeding skills, all  -TS      [place] Level/Cues Needed (Self-Feeding Goal 1, OT) supervision required;set-up required;verbal cues required  -TS      Time Frame (Self-Feeding Goal 1, OT) long term goal (LTG);by discharge  -TS      Progress/Outcomes (Self-Feeding Goal 1, OT) goal met  -TS            User Key  (r) = Recorded By, (t) = Taken By, (c) = Cosigned By    Initials Name Provider Type Discipline    Tere Martin COTA Occupational Therapist Assistant OT                 Outcome Measures     Row Name  05/16/22 0923             How much help from another person do you currently need...    Turning from your back to your side while in flat bed without using bedrails? 4  -KLAUS      Moving from lying on back to sitting on the side of a flat bed without bedrails? 4  -KLAUS      Moving to and from a bed to a chair (including a wheelchair)? 4  -KLAUS      Standing up from a chair using your arms (e.g., wheelchair, bedside chair)? 4  -KLAUS      Climbing 3-5 steps with a railing? 3  -KLAUS      To walk in hospital room? 3  -KLAUS      AM-PAC 6 Clicks Score (PT) 22  -KLAUS              Functional Assessment    Outcome Measure Options AM-PAC 6 Clicks Basic Mobility (PT)  -KLAUS            User Key  (r) = Recorded By, (t) = Taken By, (c) = Cosigned By    Initials Name Provider Type    oYung Slater, PTA Physical Therapist Assistant                Timed Therapy Charges  Total Units: 2    Charges  Total Units: 2    Procedure Name Documented Minutes Units Code    HC OT SELF CARE/MGMT/TRAIN EA 15 MIN 23  2    28287 (CPT®)               Documented Minutes  Total Minutes: 23    Therapy Provided Minutes    17118 - OT Self Care/Mgmt Minutes 23                    OT Discharge Summary  Anticipated Discharge Disposition (OT): home with assist  Reason for Discharge: Discharge from facility  Outcomes Achieved: Refer to plan of care for updates on goals achieved  Discharge Destination: Assisted Living Facility      SCAR Patel  5/19/2022

## 2022-05-19 NOTE — OUTREACH NOTE
Call Center TCM Note    Flowsheet Row Responses   [facility] patient discharged from? [place]   Does the patient have one of the following disease processes/diagnoses(primary or secondary)? COVID-19   COVID-19 underlying condition? None   TCM attempt successful? Yes   Call start time 1405   Call end time 1411   Discharge diagnosis Covid 19   Person spoke with today (if not patient) and relationship Jennyfer, daughter   Meds reviewed with patient/caregiver? Yes   Is the patient having any side effects they believe may be caused by any medication additions or changes? No   Does the patient have all medications ordered at discharge? Yes   Is the patient taking all medications as directed (includes completed medication regime)? Yes   Does the patient have a primary care provider?  Yes   Comments regarding PCP hospital fu appt on 5/27/22 at 10:00 AM   Does the patient have an appointment with their PCP or specialist within 7 days of discharge? Greater than 7 days   Nursing Interventions Verified appointment date/time/provider   Has the patient kept scheduled appointments due by today? N/A   What is the Home health agency?  [facility]   Has home health visited the patient within 72 hours of discharge? Call prior to 72 hours   What DME was ordered? [employer] for oxygen   Has all DME been delivered? Yes   Psychosocial issues? No   Did the patient receive a copy of their discharge instructions? Yes   Did the patient receive a copy of COVID-19 specific instructions? Yes   Nursing interventions Reviewed instructions with patient   What is the patient's perception of their health status since discharge? Improving   Does the patient have any of the following symptoms? Cough, Shortness of breath   Nursing Interventions Nurse provided patient education   Pulse Ox monitoring None   Is the patient/caregiver able to teach back steps to recovery at home? Rest and rebuild strength, gradually increase activity, Eat a well-balance diet    If the patient is a current smoker, are they able to teach back resources for cessation? Not a smoker   Is the patient/caregiver able to teach back the hierarchy of who to call/visit for symptoms/problems? PCP, Specialist, Home health nurse, Urgent Care, ED, 911 Yes   TCM call completed? Yes   Wrap up additional comments Jennyfer, daughter, states pt is doing better, and reports some coughing/SOB. Pt was up at kitchen table eating. Jennyfer verified PCP hospital fu appt on 5/27/22. No questions/concerns.          Dee Maurer RN    5/19/2022, 14:13 CDT

## 2022-05-20 ENCOUNTER — READMISSION MANAGEMENT (OUTPATIENT)
Dept: CALL CENTER | Facility: HOSPITAL | Age: 82
End: 2022-05-20

## 2022-05-20 NOTE — OUTREACH NOTE
COVID-19 Week 1 Survey    Flowsheet Row Responses   [facility] facility patient discharged from? [place]   Does the patient have one of the following disease processes/diagnoses(primary or secondary)? COVID-19   COVID-19 underlying condition? None   Call Number Call 2   Week 1 Call successful? No   Discharge diagnosis Covid 19          KYE KRAUS - Registered Nurse

## 2022-05-21 ENCOUNTER — READMISSION MANAGEMENT (OUTPATIENT)
Dept: CALL CENTER | Facility: HOSPITAL | Age: 82
End: 2022-05-21

## 2022-05-21 NOTE — OUTREACH NOTE
COVID-19 Week 1 Survey    Flowsheet Row Responses   [facility] patient discharged from? [place]   Does the patient have one of the following disease processes/diagnoses(primary or secondary)? COVID-19   COVID-19 underlying condition? None   Call Number Call 3   Week 1 Call successful? Yes   Call start time 0948   Call end time 0952   Discharge diagnosis Covid 19   Meds reviewed with patient/caregiver? Yes   Is the patient having any side effects they believe may be caused by any medication additions or changes? No   Is the patient taking all medications as directed (includes completed medication regime)? Yes   Does the patient have a primary care provider?  Yes   Comments regarding [facility] fu appt on 5/27/22 at 10:00 AM   Has the patient kept scheduled appointments due by today? N/A   What is the Home health agency?  [facility]   Has home health visited the patient within 72 hours of discharge? Yes   What DME was ordered? [employer] for oxygen   Has all DME been delivered? Yes   DME comments wears oxygen prn   Psychosocial issues? No   What is the patient's perception of their health status since discharge? Improving   Does the patient have any of the following symptoms? None   Nursing Interventions Nurse provided patient education   Pulse Ox monitoring None   Is the patient/caregiver able to teach back steps to recovery at home? Set small, achievable goals for return to baseline health, Rest and rebuild strength, gradually increase activity, Eat a well-balance diet   Is the patient/caregiver able to teach back the hierarchy of who to call/visit for symptoms/problems? PCP, Specialist, Home health nurse, Urgent Care, ED, 911 Yes   COVID-19 call completed? Yes          FINA HALL - Registered Nurse

## 2022-05-22 ENCOUNTER — NURSE TRIAGE (OUTPATIENT)
Dept: CALL CENTER | Facility: HOSPITAL | Age: 82
End: 2022-05-22

## 2022-05-22 NOTE — TELEPHONE ENCOUNTER
"    Reason for Disposition  • General information question, no triage required and triager able to answer question    Additional Information  • Negative: [1] Caller is not with the adult (patient) AND [2] reporting urgent symptoms  • Negative: Lab result questions  • Negative: Medication questions  • Negative: Caller can't be reached by phone  • Negative: Caller has already spoken to PCP or another triager  • Negative: RN needs further essential information from caller in order to complete triage  • Negative: Requesting regular office appointment  • Negative: [1] Caller requesting NON-URGENT health information AND [2] PCP's office is the best resource  • Negative: Health Information question, no triage required and triager able to answer question    Answer Assessment - Initial Assessment Questions  1. REASON FOR CALL or QUESTION: \"What is your reason for calling today?\" or \"How can I best help you?\" or \"What question do you have that I can help answer?\"      Father was discharged back to his assisted living facility on Wednesday with caretakers.  Now he is no longer able to feed himself.  Family wants to bring him to the hospital and have him placed in a Nursing Home.    Protocols used: INFORMATION ONLY CALL - NO TRIAGE-ADULT-      "

## 2022-05-23 ENCOUNTER — HOME CARE VISIT (OUTPATIENT)
Dept: HOME HEALTH SERVICES | Facility: CLINIC | Age: 82
End: 2022-05-23

## 2022-05-23 VITALS
HEART RATE: 61 BPM | OXYGEN SATURATION: 97 % | RESPIRATION RATE: 16 BRPM | TEMPERATURE: 97.7 F | DIASTOLIC BLOOD PRESSURE: 62 MMHG | SYSTOLIC BLOOD PRESSURE: 110 MMHG

## 2022-05-23 PROCEDURE — G0153 HHCP-SVS OF S/L PATH,EA 15MN: HCPCS

## 2022-05-23 NOTE — HOME HEALTH
COVID SCREENING: NEGATIVE    FOCUS OF CARE/SKILLED NEED: SKILLED ST NECESSARY TO MAXIMIZE RELIABLE COGNITIVE COMMUNICATION AND IMPROVED SAFETY IN care home ENVIRONMENT.    TEACHING/INTERVENTIONS: COGNITIVE STIMULATION TASKS    PROGRESS TOWARD GOALS: LIMITED PROGRESS DUE TO SEVERITY OF COGNITIVE FUNCTION, HOWEVER, PT, IS COOPERATIVE AND GIVES GOOD ATTEMTPS.  SLP ANTICIPATED D/C FROM ST NEXT VISIT IF NO FURTHER PROGRESS NOTED.    PHYSICIAN CONTACT:  NONE    INSURANCE CHANGES? NONE    FALLS SINCE LAST VISIT? DENIED FALLS    MEDICATION CHANGES SINCE LAST VISIT?  UNKNOWN PER PT.    PLAN FOR NEXT VISIT: COGNITIVE STIMULATION TASKS AND COGNITIVE CUEING HIERARCHY INSTRUCTION IF CG IS PRESENT; REVIEW GOALS AND PROGRESS; D/C FROM ST

## 2022-05-24 ENCOUNTER — TELEPHONE (OUTPATIENT)
Dept: INTERNAL MEDICINE | Facility: CLINIC | Age: 82
End: 2022-05-24

## 2022-05-24 ENCOUNTER — HOME CARE VISIT (OUTPATIENT)
Dept: HOME HEALTH SERVICES | Facility: CLINIC | Age: 82
End: 2022-05-24

## 2022-05-24 ENCOUNTER — READMISSION MANAGEMENT (OUTPATIENT)
Dept: CALL CENTER | Facility: HOSPITAL | Age: 82
End: 2022-05-24

## 2022-05-24 VITALS
TEMPERATURE: 97.1 F | HEART RATE: 64 BPM | DIASTOLIC BLOOD PRESSURE: 66 MMHG | SYSTOLIC BLOOD PRESSURE: 100 MMHG | OXYGEN SATURATION: 96 % | RESPIRATION RATE: 16 BRPM

## 2022-05-24 PROCEDURE — G0153 HHCP-SVS OF S/L PATH,EA 15MN: HCPCS

## 2022-05-24 PROCEDURE — G0493 RN CARE EA 15 MIN HH/HOSPICE: HCPCS

## 2022-05-24 PROCEDURE — G0495 RN CARE TRAIN/EDU IN HH: HCPCS

## 2022-05-24 NOTE — HOME HEALTH
Per paid caregiver Leena from [facility], patient was in the hospital from May 10 th through May 18 th with COVID.  spoke with camilla clinical manager.  Patient only in observation for 8 days, no transfer oasis needed or resumption of care.  Paient is weak from being at the hospital and COVID, estuardougther is requesting physical therapy to evaluate since  During the SNV - at 1120 Called Dr. Amato office, spoke with Nia, For PT reevaluation and OT Reevaluation due to increased weakness due to Covid and daughter request and for SN to continue 1 a week for 3 weeks.  Per Leena before the patient was hospitalized for Pneumonia - he washed and dressed himself independently and the caregivers would call him to come down for meals and gave him his medications.  Daughter prefills medications.    Plan for next visit:  SN Assessment, Psychosocial assessment, Neuro checks

## 2022-05-24 NOTE — OUTREACH NOTE
Medical Week 2 Survey    Flowsheet Row Responses   [facility] patient discharged from? [place]   Does the patient have one of the following disease processes/diagnoses(primary or secondary)? COVID-19   Call start time 1343   General alerts for this patient Raul   Discharge diagnosis Covid 19   Call end time 1346   Is patient permission given to speak with other caregiver? Yes   Person spoke with today (if not patient) and relationship Raul   Meds reviewed with patient/caregiver? Yes   Is the patient having any side effects they believe may be caused by any medication additions or changes? No   Does the patient have all medications ordered at discharge? Yes   Is the patient taking all medications as directed (includes completed medication regime)? Yes   Does the patient have a primary care provider?  Yes   What is preventing the patient from scheduling follow up appointments within 7 days of discharge? Earlier appointment not available   Nursing Interventions Verified appointment date/time/provider   Has the patient kept scheduled appointments due by today? N/A   What is the Home health agency?  [facility]   Has home health visited the patient within 72 hours of discharge? Yes   What DME was ordered? [employer] for oxygen   Has all DME been delivered? Yes   DME comments wears oxygen prn   Psychosocial issues? No   Did the patient receive a copy of their discharge instructions? Yes   Nursing interventions Reviewed instructions with patient   What is the patient's perception of their health status since discharge? Improving   Is the patient/caregiver able to teach back the hierarchy of who to call/visit for symptoms/problems? PCP, Specialist, Home health nurse, Urgent Care, ED, 911 Yes   If the patient is a current smoker, are they able to teach back resources for cessation? Not a smoker   Wrap up additional comments Son says he is not doing as well but still going forward.           DOM RAMOS - Registered Nurse

## 2022-05-24 NOTE — HOME HEALTH
"SPEECH THERAPY DISCHARGE VISIT    SUBJECTIVE: PT. DRESSED AND ALONE IN APT. IN ASSISSTED LIVING FACILITY.  PT. HAS NO COMPLAINTS TO REPORT.     * JUST PRIOR TO END OF ST VISIT, PT. INFORMS SLP THAT HE HAS HAD A BOWEL MOVEMENT AND PROCEEDED TO TRASH CAN IN LIVING AREA AND ATTEMPTS TO \"EMPTY\" CONTENTS OF ADULT GARMENT. IN TO TRASH.  PT. IS UNABLE TO FOLLOW ADDITIONAL INSTRUCTION DUE TO CONFUSION, THEREFORE, SLP EXITED APT. AND NOTIFIED RECEPTION DESK OF SITUATION WHO STATED THEY WOULD NOTIFY KO WITH [facility] OF PT. NEEDING IMMEDIATE ASSISTANCE.     PAIN: NONE     EDEMA: NONE OBSERVED    WOUND / SKIN CONDITION: INTACT    TODAY'S INTERVENTIONS / EDUCATION / PATIENT'S RESPONSE / GOAL STATUS: COGNITIVE COMMUNICATION STIMULATION TASKS; REVIEW GOALS AND PROGRESS; D/C FROM  WITH MAX FUNCTIONAL GAIN AT THIS TIME.  PT. CONTINUES TO REQUIRES CUES AND REMINDERS, DAILY ASSISTANCE AND SUPERVISION DUE TO DECREASED COGNITIVE FUNCTIONING.     EXPLANATION OF UNMET GOALS: LEVEL AND SEVERITY OF COGNITIVE DEFICIT    DISCHARGE STATUS     LACK OF PROGRESS      DISCHARGE CONDITION   FAIR    DISCHARGE REASON    MAX FUNCTIONAL GAIN/LIMITED PROGRESS    INSTRUCTIONS HOME PROGRAM PROVIDED TO: PT/KO ([facility]) VISIT ONE          CONTENT: COGNITIVE STIMULATION TASK; COGNITIVE CUEING HIERARCHY          PATIENT CAREGIVER LEVEL OF COMPLIANCE: UNKNOWN    UNMET NEEDS: N/A    SERVICES CONTINUING: SN;  PT, OT POST HOSPITAL EVALUATIONS REQUESTED BY LIVAN RODRIGUEZ.    COMMUNICATION / CARE COORDINATION:  D/C SUMMARY TO MD/TEAM; CALL TO LIVAN VENTURA CLINICAL MANAGER REGARDING PT. SUPERVISION AND ASSISTANCE NEEDED.    PATIENT IS AGREEABLE WITH DISCHARGE PLAN: YES"

## 2022-05-24 NOTE — TELEPHONE ENCOUNTER
Caller: JENNIFER BRAVO    Relationship: NURSE: [facility]    Best call back number: [phone]    What orders are you requesting (i.e. lab or imaging):  PHYSICAL THERAPY RE-EVALUATION AND AN ORDER FOR SKILLED NURSING ( ONCE WEEKLY FOR THREE WEEKS)    In what timeframe would the patient need to come in: ASAP    Where will you receive your lab/imaging services: [facility]    Additional notes: JENNIFER BRAVO FROM [facility] STATES THAT THE PATIENT WAS HOSPITALIZED FOR  COVID AND NEEDS A PT RE-EVALUATION AND AN ORDER FOR SKILLED NURSING FOR ONCE WEEKLY FOR THREE WEEKS AT [facility].

## 2022-05-24 NOTE — TELEPHONE ENCOUNTER
JENNIFER WITH [facility] HAS BEEN CALLED, PER DR JOHNSON VERBAL ORDERS GIVEN THAT HE WILL FOLLOW PATIENT AND FOR RE-EVALUATION OF PT AND ORDER FOR SKILLED NURSING.

## 2022-05-25 NOTE — CASE COMMUNICATION
Good morning,  Saw patient for the first time yesterday for a Discipline Discharge visit for SN.  Arrived at apt.  Patient was came out of his bedroom without his walker, unsteady on his feet and in his underwear.  Water running in the kitchen sink.  Patient very disoriented, and able to have patient sit down at the table.  Noted a snv was not made last week, and trying to determine history of patient.  Caregiver Leena From [facility] came i nto the room and explained that the patient became ill and went to the hospital May 10th to May18th due to COVID and remained in observation at the hospital for that length of time.  Notified Cabrera who confirmed and did not need to do a resumption of care.  Per Leena, patient's baseline before the intial hospitalization when we recevied the referral is not as it is now.  Patient was able to bath and dress himself, followed direction/comm ands and they would call him to come down to breakfast and he would follow commands without difficulty.  Much different than the patient I met today.  Per Leena  patient is much weak and unsteady on his feet and needs much more assitance with ADL's and IADL's. Eugene Stern daughter has requested therapy to work with patient since he is more weak than before.  Telephone call to Dr. Amato office and obtained order from Jd for PT/OT and SN t o continue for several more weeks to monitor patient.  Here is the order written:  Physical Therapy Re-evaluation   Occupational Therapy Re-evaluation   SN additional visits starting on week 5/22/22 - additional snv 1w1 this week;   2w2, 1w1  For CP pulmonary skilled assessment, nutrition, hydration, endurance   Dr. Torey Amato/Jd/DARLING Rocha is aware

## 2022-05-26 ENCOUNTER — TELEPHONE (OUTPATIENT)
Dept: NEUROLOGY | Facility: CLINIC | Age: 82
End: 2022-05-26

## 2022-05-26 ENCOUNTER — DOCUMENTATION (OUTPATIENT)
Dept: NEUROLOGY | Facility: CLINIC | Age: 82
End: 2022-05-26

## 2022-05-26 NOTE — PROGRESS NOTES
Spoke with the patient daughter in regards to the patient's increase in confusion.   does want patient to come in for a follow up. I have scheduled him for June 7th at 3pm and the daughter is in agreement for this day and time.

## 2022-05-26 NOTE — TELEPHONE ENCOUNTER
Provider: SABA  Caller: XOCHITL AMBRIZ  Relationship to Patient: DAUGHTER  Pharmacy: [id number]  Phone Number: [phone]  Reason for Call: PATIENT DAUGHTER TELEPHONED TO ADVISE PATIENT WAS IN THE HOSPITAL DUE TO CONFUSION; HIGH TEMPERATURE; OVERHEATED.    PATIENTS' TREMORS HAVE GOTTEN WORSE AS WELL AS HIS CONFUSION SINCE HOSPITALIZATION.    PATIENT IS SCHEDULED FOR FOLLOW UP ON 9/27/22, BUT WOULD LIKE TO BE SEEN SOONER.    I AM NOT ABLE TO GET ANYTHING BEFORE THE END OF AUGUST; PLEASE REVIEW PROVIDER SCHEDULE TO SEE IF HE CAN BE WORKED IN.    PLEASE CALL & ADVISE.    THANK YOU.

## 2022-05-26 NOTE — TELEPHONE ENCOUNTER
----- Message from Dalton Laurent MD sent at 5/26/2022 11:17 AM CDT -----  Velia is to try to get it worked in soon as possible  ----- Message -----  From: Tavia Gramajo LPN  Sent: 5/26/2022  10:54 AM CDT  To: Dalton Laurent MD    Jennyfer (daughter) said Edy has increased confusion since he was discharged from the hospital.  His tremors are worse, he has difficulty feeding himself.  He has to use a walker if he walks very long and he has lost weight.  He was started on Sinemet and a heart medication while he was in the hospital.

## 2022-05-27 ENCOUNTER — TELEPHONE (OUTPATIENT)
Dept: INTERNAL MEDICINE | Facility: CLINIC | Age: 82
End: 2022-05-27

## 2022-05-27 VITALS
RESPIRATION RATE: 20 BRPM | TEMPERATURE: 97.9 F | HEART RATE: 76 BPM | SYSTOLIC BLOOD PRESSURE: 122 MMHG | DIASTOLIC BLOOD PRESSURE: 74 MMHG | OXYGEN SATURATION: 97 %

## 2022-05-27 NOTE — TELEPHONE ENCOUNTER
Patient's daughter said they will pay for the 30 day supply.  Patient's daughter informed the pharmacist stated they would prepare the 30 day supply for pick-up

## 2022-05-27 NOTE — TELEPHONE ENCOUNTER
I called the pharmacy and spoke to Ashlyn (pharmacist) and she stated the medication cost was originally $1784, but with patient's insurance, a 90 day supply is $141.  Per Ashlyn, they can fill a 30 day supply, which will cost $47, if that will work better for patient.     I tried calling patient's daughter, no answer.  I left her a message to call the office.

## 2022-05-27 NOTE — TELEPHONE ENCOUNTER
Caller: Jennyfer Urbina    Relationship to patient: Emergency Contact    Best call back number: [phone]    Patient is needing: Daughter said that they went to  medication apixaban (ELIQUIS) 2.5 MG tablet  and it was $141 - she is asking if a PA can be done so that his insurance will fully cover this or if there is something that would be more cost efficient that  This can be replaced with.       She is asking for this to be reviewed ASAP because he only has 3 doses left

## 2022-05-31 ENCOUNTER — READMISSION MANAGEMENT (OUTPATIENT)
Dept: CALL CENTER | Facility: HOSPITAL | Age: 82
End: 2022-05-31

## 2022-05-31 NOTE — OUTREACH NOTE
COVID-19 Week 3 Survey    Flowsheet Row Responses   [facility] [place] patient discharged from? [place]   Does the patient have one of the following disease processes/diagnoses(primary or secondary)? COVID-19   COVID-19 underlying condition? None   Call Number Call 1   COVID-19 Week 3: Call 1 attempt successful? Yes   Call start time 1342   Call end time 1349   Discharge diagnosis Covid 19   Is patient permission given to speak with other caregiver? Yes   Person spoke with today (if not patient) and relationship Daughter   Meds reviewed with patient/caregiver? Yes   Is the patient having any side effects they believe may be caused by any medication additions or changes? No   Does the patient have all medications ordered at discharge? Yes   Is the patient taking all medications as directed (includes completed medication regime)? Yes   Comments regarding PCP hospital f/u has been rescheduled.  June 2nd but will need to reschedule that per daughter due to not being able to get off work.  Mentioned to daughter the possibility of a televisit.  she will call office to request.   Does the patient have an appointment with their PCP or specialist within 7 days of discharge? Yes   Has the patient kept scheduled appointments due by today? Yes   What is the Home health agency?  [facility]   Home health comments speech   What DME was ordered? [employer] for walker   DME comments daughter states patient doesnt have oxygen   Psychosocial issues? No   Did the patient receive a copy of their discharge instructions? Yes   Did the patient receive a copy of COVID-19 specific instructions? Yes   What is the patient's perception of their health status since discharge? Improving   Pulse Ox monitoring None   Is the patient/caregiver able to teach back the hierarchy of who to call/visit for symptoms/problems? PCP, Specialist, Home health nurse, Urgent Care, ED, 911 Yes   COVID-19 call completed? Yes   Wrap up additional comments Daughter states son  (Raul) is currently in [place] and she is transporting patient to future appts.            SHAI PAULINO - Registered Nurse

## 2022-06-07 ENCOUNTER — READMISSION MANAGEMENT (OUTPATIENT)
Dept: CALL CENTER | Facility: HOSPITAL | Age: 82
End: 2022-06-07

## 2022-06-07 NOTE — OUTREACH NOTE
COVID-19 Week 4 Survey    Flowsheet Row Responses   [facility] facility patient discharged from? [place]   Does the patient have one of the following disease processes/diagnoses(primary or secondary)? COVID-19   COVID-19 underlying condition? None   Call Number Call 1   COVID-19 Week 4: Call 1 attempt successful? No   Discharge diagnosis Covid 19          STEVE KRAUS - Registered Nurse

## 2022-06-16 ENCOUNTER — HOME CARE VISIT (OUTPATIENT)
Dept: HOME HEALTH SERVICES | Facility: CLINIC | Age: 82
End: 2022-06-16

## 2022-06-16 VITALS
OXYGEN SATURATION: 97 % | TEMPERATURE: 97.6 F | WEIGHT: 130 LBS | HEIGHT: 72 IN | BODY MASS INDEX: 17.61 KG/M2 | HEART RATE: 62 BPM | RESPIRATION RATE: 18 BRPM | DIASTOLIC BLOOD PRESSURE: 62 MMHG | SYSTOLIC BLOOD PRESSURE: 124 MMHG

## 2022-06-16 PROCEDURE — G0493 RN CARE EA 15 MIN HH/HOSPICE: HCPCS

## 2022-06-20 NOTE — HOME HEALTH
FOCUS OF CARE/SKILLED NEED: SN Assessment, monitor pain management, medication instruction.  Sixty Day summary:  Recertification visit.  While in the home spoke with the daughter and apologized that we have not been out to see the patient since order received from MD for continued SNV and Re-evaluation visit for Physical Therapy and Occupation Therapy.  In the beginning of May, was in the hospital, not admitted only observation - for approximately 8 days, and tested positive for COVID.    When I saw the patient for the first time on 5/24 - patient was confused, and weak.  Caregiver Leena explained that this is not the patient's baseline.  Please refer to my note from 5/24.  Patient appeared better today, more coherent, however still very weak, and shakey on his feet.  SN assessment, CP, hydration and nutrition.  Daughter prefills the patient's medication planner weekly or caregiver Leena.    TEACHING/INTERVENTIONS: Patient and daughter agreeable to homecare recertification. Medication reconciliation completed and no issues found. Pt had no further questions regarding medication and/or plan of care.    PROGRESS TOWARD GOALS:  Ongoing    PHYSICIAN CONTACT: no    INSURANCE CHANGES?  no    FALLS SINCE LAST VISIT?  Patient denies falls    MEDICATION CHANGES?  No    PLAN FOR NEXT VISIT: SN Assessment.    PRE-SCREENED FOR COVID? Yes, No s/s of COVID. No recent exposure.

## 2022-06-21 NOTE — TELEPHONE ENCOUNTER
Caller: Jennyfer Urbina    Relationship: Emergency Contact    Best call back number: [phone]    Requested Prescriptions:   Requested Prescriptions     Pending Prescriptions Disp Refills   • tamsulosin (FLOMAX) 0.4 MG capsule 24 hr capsule 30 capsule 0     Sig: Take 1 capsule by mouth Daily.   • carbidopa-levodopa (SINEMET)  MG per tablet 90 tablet 0     Sig: Take 1 tablet by mouth 3 (Three) Times a Day.        Pharmacy where request should be sent: [address] [phone] [facility] [phone]      Additional details provided by patient: PLEASE CALL WHEN ORDERED.     Does the patient have less than a 3 day supply:  [x] Yes  [] No    Ryne Patricia Rep   06/21/22 09:19 CDT

## 2022-06-22 ENCOUNTER — HOME CARE VISIT (OUTPATIENT)
Dept: HOME HEALTH SERVICES | Facility: HOME HEALTHCARE | Age: 82
End: 2022-06-22

## 2022-06-22 PROCEDURE — G0300 HHS/HOSPICE OF LPN EA 15 MIN: HCPCS

## 2022-06-23 VITALS
HEART RATE: 60 BPM | DIASTOLIC BLOOD PRESSURE: 60 MMHG | RESPIRATION RATE: 16 BRPM | TEMPERATURE: 98.4 F | OXYGEN SATURATION: 97 % | SYSTOLIC BLOOD PRESSURE: 122 MMHG

## 2022-06-23 RX ORDER — TAMSULOSIN HYDROCHLORIDE 0.4 MG/1
0.4 CAPSULE ORAL DAILY
Qty: 30 CAPSULE | Refills: 0 | Status: SHIPPED | OUTPATIENT
Start: 2022-06-23 | End: 2022-07-22

## 2022-06-23 NOTE — HOME HEALTH
FOCUS OF CARE/SKILLED NEED: HOME SAFETY/FALL PREVENTION, MEDICATION EDUCATION, PAIN MANAGEMENT, PREVENTATIVE SKIN CARE, weight monitoring    TEACHING/INTERVENTIONS: FALL PREVENTION, MEDICATION EDUCATION, PAIN MANAGEMENT, PREVENTATIVE SKIN CARE, weight monitoring    PROGRESS TOWARDS GOALS: patient deneis any weight loss or falls, no wounds, denies pain    RECENT FALLS:  none    RECENT MEDICATION CHANGES:  none    D/C PLAN:  DISCHARGE WITH GOALS MET    PHYSICIAN CONTACT:   none    INSURANCE CHANGES:   none    PRE-SCREEN FOR COVID 19:  patient deneis any s/s of covid 19

## 2022-06-27 PROCEDURE — G0179 MD RECERTIFICATION HHA PT: HCPCS | Performed by: INTERNAL MEDICINE

## 2022-07-08 ENCOUNTER — OFFICE VISIT (OUTPATIENT)
Dept: INTERNAL MEDICINE | Facility: CLINIC | Age: 82
End: 2022-07-08

## 2022-07-08 VITALS
SYSTOLIC BLOOD PRESSURE: 118 MMHG | DIASTOLIC BLOOD PRESSURE: 70 MMHG | BODY MASS INDEX: 18.7 KG/M2 | OXYGEN SATURATION: 97 % | HEART RATE: 60 BPM | HEIGHT: 73 IN | WEIGHT: 141.1 LBS

## 2022-07-08 DIAGNOSIS — R60.0 BILATERAL LOWER EXTREMITY EDEMA: ICD-10-CM

## 2022-07-08 DIAGNOSIS — L08.9 SKIN INFECTION: Primary | ICD-10-CM

## 2022-07-08 PROCEDURE — 99214 OFFICE O/P EST MOD 30 MIN: CPT | Performed by: NURSE PRACTITIONER

## 2022-07-08 RX ORDER — SULFAMETHOXAZOLE AND TRIMETHOPRIM 800; 160 MG/1; MG/1
1 TABLET ORAL 2 TIMES DAILY
Qty: 14 TABLET | Refills: 0 | Status: SHIPPED | OUTPATIENT
Start: 2022-07-08 | End: 2022-09-09

## 2022-07-08 NOTE — PROGRESS NOTES
"Chief Complaint   Patient presents with   • Hospital Follow Up Visit     [facility] admission 5/10/22-5/18/22         History:  Edy Urbina is a 81 y.o. male who presents today for evaluation of the above problems.          Admitted with Covid 5/10-5/18.  Discharged home in stable condition.  Has finished PT at home.  Feels good, no problems today except \"pimple\" on nipple, which is tender.    Has chronic edema both ankles.  This is not bothering him or changing.  He defers diuretic for this.  Has compression stockings but does not have them on today.  Not short of breath.    Eating well, getting out and about with daughter.  Gets around home safely and without shortness of breath.  Not sure he's getting a lot of water to drink.  Drinks Ensure and coffee.      ROS:  Review of Systems  As above    No Known Allergies  Past Medical History:   Diagnosis Date   • Cataracts, bilateral    • Lewy body dementia (HCC)    • Occasional tremors    • Prostate cancer (HCC) 05/2011    t2c,Sommer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    • Varicose vein of leg 06/05/2019   • Varicose veins of legs      Past Surgical History:   Procedure Laterality Date   • EYE SURGERY     • HERNIA REPAIR Left    • INGUINAL HERNIA REPAIR Right 6/28/2019    Procedure: OPEN RIGHT INGUINAL HERNIA REPAIR WITH MESH;  Surgeon: Alesia Mora MD;  Location: [facility] OR;  Service: General   • PROSTATECTOMY  05/2011    [facility]     Family History   Problem Relation Age of Onset   • Stroke Mother      Edy  reports that he quit smoking about 20 years ago. His smoking use included cigarettes. He quit after 50.00 years of use. He has never used smokeless tobacco. He reports that he does not drink alcohol and does not use drugs.    I have reviewed and updated the above documentation (if necessary) including but not limited to chief complaint, ROS, PFSH, allergies and medications        Current Outpatient Medications:   •  apixaban (ELIQUIS) 2.5 MG tablet " "tablet, Take 1 tablet by mouth Every 12 (Twelve) Hours. Indications: Atrial Fibrillation, Disp: 180 tablet, Rfl: 3  •  carbidopa-levodopa (SINEMET)  MG per tablet, Take 1 tablet by mouth 3 (Three) Times a Day., Disp: 90 tablet, Rfl: 2  •  memantine (NAMENDA) 10 MG tablet, Take 1 tablet by mouth 2 (Two) Times a Day. TAKE 1 TABLET TWICE DAILY, Disp: 180 tablet, Rfl: 1  •  Metoprolol Tartrate 75 MG tablet, Take 75 mg by mouth Every 12 (Twelve) Hours., Disp: 180 tablet, Rfl: 3  •  Multiple Vitamins-Minerals (MULTIVITAMIN MEN 50+) tablet, Take 1 tablet by mouth Daily., Disp: , Rfl:   •  risperiDONE (risperDAL) 0.5 MG tablet, Take 1 tablet by mouth 2 (Two) Times a Day., Disp: 180 tablet, Rfl: 1  •  tamsulosin (FLOMAX) 0.4 MG capsule 24 hr capsule, Take 1 capsule by mouth Daily., Disp: 30 capsule, Rfl: 0  •  sulfamethoxazole-trimethoprim (Bactrim DS) 800-160 MG per tablet, Take 1 tablet by mouth 2 (Two) Times a Day., Disp: 14 tablet, Rfl: 0    OBJECTIVE:  Visit Vitals  /70 (BP Location: Left arm, Patient Position: Sitting, Cuff Size: Adult)   Pulse 60   Ht 185.4 cm (73\")   Wt 64 kg (141 lb 1.6 oz)   SpO2 97%   BMI 18.62 kg/m²      Physical Exam  Vitals and nursing note reviewed.   Constitutional:       General: He is not in acute distress.     Appearance: Normal appearance. He is not ill-appearing, toxic-appearing or diaphoretic.   HENT:      Head: Normocephalic and atraumatic.   Eyes:      General: No scleral icterus.  Cardiovascular:      Rate and Rhythm: Normal rate and regular rhythm.   Pulmonary:      Effort: Pulmonary effort is normal. No respiratory distress.      Breath sounds: Normal breath sounds. No wheezing or rales.   Abdominal:      General: There is no distension.      Palpations: Abdomen is soft. There is no mass.      Tenderness: There is no abdominal tenderness.   Musculoskeletal:      Cervical back: Neck supple.      Right lower leg: Edema (1+ pitting ankle) present.      Left lower leg: " Edema (1+ pitting ankle) present.   Skin:     General: Skin is warm and dry.             Comments: Underneath the right nipple there is a soft, tender raised area about the size of a dime.  No drainage, but it is very tender, does not feel solid.   Neurological:      Mental Status: He is alert and oriented to person, place, and time.   Psychiatric:         Mood and Affect: Mood normal.         Behavior: Behavior normal.         Thought Content: Thought content normal.         Judgment: Judgment normal.         MDM    Assessment/Plan    Diagnoses and all orders for this visit:    1. Skin infection (Primary)  -     sulfamethoxazole-trimethoprim (Bactrim DS) 800-160 MG per tablet; Take 1 tablet by mouth 2 (Two) Times a Day.  Dispense: 14 tablet; Refill: 0    2. Bilateral lower extremity edema    Will treat the tender raised area with antibiotics and have asked them to let us know if not improved in 2 weeks.     Increase fluid intake and wear compression stockings regularly for ankle edema. Let us know if worsening in any way.    BMI is within normal parameters. No other follow-up for BMI required.      Education materials and an After Visit Summary were printed and given to the patient at discharge.  Return for Next scheduled follow up.         DARLING Tam   16:55 CDT  7/8/2022

## 2022-07-22 RX ORDER — TAMSULOSIN HYDROCHLORIDE 0.4 MG/1
1 CAPSULE ORAL DAILY
Qty: 30 CAPSULE | Refills: 2 | Status: SHIPPED | OUTPATIENT
Start: 2022-07-22 | End: 2022-09-16 | Stop reason: SDUPTHER

## 2022-08-15 DIAGNOSIS — F02.818 DEMENTIA ASSOCIATED WITH OTHER UNDERLYING DISEASE WITH BEHAVIORAL DISTURBANCE: ICD-10-CM

## 2022-08-15 RX ORDER — RISPERIDONE 0.5 MG/1
0.5 TABLET ORAL 2 TIMES DAILY
Qty: 180 TABLET | Refills: 1 | Status: SHIPPED | OUTPATIENT
Start: 2022-08-15 | End: 2023-02-28 | Stop reason: SDUPTHER

## 2022-08-22 ENCOUNTER — HOME CARE VISIT (OUTPATIENT)
Dept: HOME HEALTH SERVICES | Facility: CLINIC | Age: 82
End: 2022-08-22

## 2022-09-09 ENCOUNTER — OFFICE VISIT (OUTPATIENT)
Dept: NEUROLOGY | Facility: CLINIC | Age: 82
End: 2022-09-09

## 2022-09-09 VITALS
BODY MASS INDEX: 18.29 KG/M2 | HEART RATE: 58 BPM | DIASTOLIC BLOOD PRESSURE: 68 MMHG | WEIGHT: 138 LBS | HEIGHT: 73 IN | RESPIRATION RATE: 18 BRPM | SYSTOLIC BLOOD PRESSURE: 120 MMHG

## 2022-09-09 DIAGNOSIS — R41.3 MEMORY DIFFICULTY: ICD-10-CM

## 2022-09-09 DIAGNOSIS — G20 PARKINSON'S DISEASE: Primary | ICD-10-CM

## 2022-09-09 PROCEDURE — 99214 OFFICE O/P EST MOD 30 MIN: CPT | Performed by: PSYCHIATRY & NEUROLOGY

## 2022-09-09 RX ORDER — MEMANTINE HYDROCHLORIDE 10 MG/1
10 TABLET ORAL 2 TIMES DAILY
Qty: 180 TABLET | Refills: 1 | Status: SHIPPED | OUTPATIENT
Start: 2022-09-09 | End: 2023-01-12 | Stop reason: SDUPTHER

## 2022-09-09 NOTE — PATIENT INSTRUCTIONS
Patient to get with PCP about low BMI and follow-up blood work.  Patient to continue safety precautions and fall precautions.  Patient to get to emergency room immediately if stroke symptoms occur.  Patient to follow-up with PCP about asymmetry of breast tissue and skin lesions.

## 2022-09-09 NOTE — PROGRESS NOTES
Subjective   Edy Urbina, 1940, is a male who is being seen today for   Chief Complaint   Patient presents with   • Memory Loss   • Tremors       HISTORY OF PRESENT ILLNESS: Patient seen for memory difficulty and Parkinson's.  Patient questioned in the past for Lewy body dementia without behavioral disturbance.  Patient's MMSE today is 20/30 and has been alternating but last time was 25/30 but the time before that 19/30.  Patient has been hospitalized twice since most recent visit.  Both times for confusion and the second time for COVID 19.  Patient has had CT head scans both hospitalizations with no acute process.  Patient did not have EEG.  Patient's last EGD and carotids 2019.  Patient denies any headaches.  Patient is on Sinemet 25/101 p.o. 3 times daily and Namenda 10 mg p.o. twice daily.  Last blood work show slight low calcium but normal renal function.  Patient could not tolerate Aricept.  According to patient's daughter patient is doing well with the Sinemet.  It has improved his tremor and gait.    REVIEW OF SYSTEMS:   GENERAL: Blood pressure today 132/72 left arm seated 120/68 left arm standing with pulse 58.  PULMONARY: No acute respiratory difficulties  CVS: No acute chest pain or palpitation  GASTROINTESTINAL: No acute GI distress  GENITOURINARY: No acute  distress  GYN: Not applicable  MUSCULOSKELETAL: As above  HEENT: No acute vision or hearing change  ENDOCRINE: Not diabetic.  PSYCHIATRIC: Patient is on respite all as per PCP  HEMATOLOGY: Moderate anemia to be followed by PCP  SKIN: No history of melanoma and I did skin survey.  There is 1.75 cm dark lesion in mid chest area.  Multiple smaller lesions on the back and chest patient also complains of a lump in his left breast area there it does seem to be more breast tissue on the left than the right and that is to be followed by PCP  Family history reviewed and otherwise noncontributory except for history of mother with stroke  Social  history: Patient denies smoking or drug or alcohol use    PHYSICAL EXAMINATION:    GENERAL: Patient has some rest tremor bilaterally upper extremities as well as cogwheeling/rigidity and festination of gait  CRANIUM: Normal cephalic/atraumatic and apparently has not had any falls  HEENT:        EYES: EOMs intact without nystagmus and fields full to confrontation.  Pupils equal round reactive to light.  No acute fundic abnormalities.       EARS: Tympanic membrane on the right obscured by wax but hears tuning fork bilaterally       THROAT: No acute oropharynx abnormalities and no swallowing difficulties by history       NECK: No bruits/no lymphadenopathy  CHEST: No acute cardiopulmonary normalities by auscultation  ABDOMEN: Nondistended  EXTREMITIES: Dorsalis pedis pulse symmetrical  NEURO: Patient has some difficulty following commands  SPEECH: Hoarse    CRANIAL NERVES: Motor/sensory about the face normal and symmetric     MOTOR STRENGTH: Motor strength upper and lower extremities normal  STATION AND GAIT: As above.  Romberg negative.  CEREBELLAR: Finger-nose and heel-to-shin normal  SENSORY: Decreased pin vibration distal to proximal in the lower extremities to the ankles bilaterally otherwise normal pin and vibration throughout  REFLEXES: Reflexes decreased at the ankle jerk versus knee jerk and biceps bilaterally without clonus or Babinski    ASSESSMENT AND PLAN: Patient with possible Parkinson's and memory difficulty.  Patient to continue Namenda and Sinemet unchanged.  Patient to get with PCP about low weight.  Fall precautions and safety precautions reviewed.  I spent 30 minutes with this patient with counseling exam and review of records.  Patient does not drive      Diagnoses and all orders for this visit:    1. Parkinson's disease (HCC) (Primary)    2. Memory difficulty        Dictated utilizing Dragon voice recognition software

## 2022-09-16 ENCOUNTER — OFFICE VISIT (OUTPATIENT)
Dept: INTERNAL MEDICINE | Facility: CLINIC | Age: 82
End: 2022-09-16

## 2022-09-16 VITALS
SYSTOLIC BLOOD PRESSURE: 122 MMHG | HEART RATE: 74 BPM | HEIGHT: 73 IN | RESPIRATION RATE: 16 BRPM | BODY MASS INDEX: 18.45 KG/M2 | TEMPERATURE: 95.4 F | OXYGEN SATURATION: 98 % | DIASTOLIC BLOOD PRESSURE: 61 MMHG | WEIGHT: 139.2 LBS

## 2022-09-16 DIAGNOSIS — I48.0 PAROXYSMAL ATRIAL FIBRILLATION: ICD-10-CM

## 2022-09-16 DIAGNOSIS — R23.9 SKIN CHANGE: ICD-10-CM

## 2022-09-16 DIAGNOSIS — N63.0 BREAST MASS IN MALE: ICD-10-CM

## 2022-09-16 DIAGNOSIS — N64.9 DISORDER OF BREAST, UNSPECIFIED: ICD-10-CM

## 2022-09-16 DIAGNOSIS — L98.9 FACIAL SKIN LESION: Primary | ICD-10-CM

## 2022-09-16 DIAGNOSIS — L81.9 PIGMENTED SKIN LESION OF UNCERTAIN BEHAVIOR OF HEAD: ICD-10-CM

## 2022-09-16 DIAGNOSIS — Z23 NEED FOR VACCINATION: ICD-10-CM

## 2022-09-16 DIAGNOSIS — N40.0 BENIGN PROSTATIC HYPERPLASIA WITHOUT LOWER URINARY TRACT SYMPTOMS: ICD-10-CM

## 2022-09-16 PROBLEM — D89.831 CYTOKINE RELEASE SYNDROME, GRADE 1: Status: RESOLVED | Noted: 2022-05-18 | Resolved: 2022-09-16

## 2022-09-16 PROBLEM — R79.9 ELEVATED BUN: Status: RESOLVED | Noted: 2022-04-14 | Resolved: 2022-09-16

## 2022-09-16 PROBLEM — U07.1 COVID-19 VIRUS DETECTED: Status: RESOLVED | Noted: 2022-05-10 | Resolved: 2022-09-16

## 2022-09-16 PROBLEM — R53.1 GENERALIZED WEAKNESS: Status: RESOLVED | Noted: 2022-04-14 | Resolved: 2022-09-16

## 2022-09-16 PROCEDURE — 99214 OFFICE O/P EST MOD 30 MIN: CPT | Performed by: INTERNAL MEDICINE

## 2022-09-16 PROCEDURE — G0008 ADMIN INFLUENZA VIRUS VAC: HCPCS | Performed by: INTERNAL MEDICINE

## 2022-09-16 PROCEDURE — 90662 IIV NO PRSV INCREASED AG IM: CPT | Performed by: INTERNAL MEDICINE

## 2022-09-16 RX ORDER — TAMSULOSIN HYDROCHLORIDE 0.4 MG/1
1 CAPSULE ORAL DAILY
Qty: 30 CAPSULE | Refills: 2 | Status: SHIPPED | OUTPATIENT
Start: 2022-09-16 | End: 2023-01-16

## 2022-09-16 NOTE — PROGRESS NOTES
"CC: skin lesion on face and chest    History:  Edy Urbina is a 81 y.o. male   He presents today with his daughter and they note that he has a skin lesion over his left temple that has come up in the last week and has never been there before. He has a somewhat similar lesion on his chest. THey also note enlargement of the left retroareolar area over the past month or so.        ROS:  Review of Systems   Respiratory: Negative for shortness of breath.    Cardiovascular: Negative for chest pain.   Skin: Positive for color change.        reports that he quit smoking about 20 years ago. His smoking use included cigarettes. He quit after 50.00 years of use. He has never used smokeless tobacco. He reports that he does not drink alcohol and does not use drugs.      Current Outpatient Medications:   •  tamsulosin (FLOMAX) 0.4 MG capsule 24 hr capsule, Take 1 capsule by mouth Daily., Disp: 30 capsule, Rfl: 2  •  apixaban (ELIQUIS) 2.5 MG tablet tablet, Take 1 tablet by mouth Every 12 (Twelve) Hours. Indications: Atrial Fibrillation, Disp: 180 tablet, Rfl: 3  •  carbidopa-levodopa (SINEMET)  MG per tablet, Take 1 tablet by mouth 3 (Three) Times a Day., Disp: 90 tablet, Rfl: 2  •  memantine (NAMENDA) 10 MG tablet, Take 1 tablet by mouth 2 (Two) Times a Day. TAKE 1 TABLET TWICE DAILY, Disp: 180 tablet, Rfl: 1  •  Metoprolol Tartrate 75 MG tablet, Take 75 mg by mouth Every 12 (Twelve) Hours., Disp: 180 tablet, Rfl: 3  •  Multiple Vitamins-Minerals (MULTIVITAMIN MEN 50+) tablet, Take 1 tablet by mouth Daily., Disp: , Rfl:   •  risperiDONE (risperDAL) 0.5 MG tablet, Take 1 tablet by mouth 2 (Two) Times a Day., Disp: 180 tablet, Rfl: 1    OBJECTIVE:  /61 (BP Location: Right arm, Patient Position: Sitting, Cuff Size: Adult)   Pulse 74   Temp 95.4 °F (35.2 °C)   Resp 16   Ht 185.4 cm (73\")   Wt 63.1 kg (139 lb 3.2 oz)   SpO2 98%   BMI 18.37 kg/m²    Physical Exam  Constitutional:       General: He is not in " acute distress.  Pulmonary:      Effort: Pulmonary effort is normal. No respiratory distress.   Chest:   Breasts:      Breasts are asymmetrical.      Right: No inverted nipple, mass, nipple discharge, skin change or tenderness.      Left: Mass present. No inverted nipple, nipple discharge, skin change or tenderness.       Neurological:      Mental Status: He is alert and oriented to person, place, and time.   Psychiatric:         Mood and Affect: Mood normal.         Behavior: Behavior normal.         Assessment/Plan     Diagnoses and all orders for this visit:    1. Facial skin lesion (Primary)  2. Skin change  3. Pigmented skin lesion of uncertain behavior of head  -     Ambulatory Referral to Dermatology  Referral to dermatology for consideration of excision. Differential includes SK, verrucous lesion, other neoplasm of either benign or malignant potential. GIven the rapid onset, will seek evaluation.     4. Disorder of breast, unspecified   5. Breast mass in male  -     Mammo Diagnostic Digital Tomosynthesis Bilateral With CAD; Future  -     US BREAST LEFT LIMITED; Future  Mammogram and US for further evaluation.     6. Benign prostatic hyperplasia without lower urinary tract symptoms  -     tamsulosin (FLOMAX) 0.4 MG capsule 24 hr capsule; Take 1 capsule by mouth Daily.  Dispense: 30 capsule; Refill: 2    7. Paroxysmal atrial fibrillation (HCC)  No symptoms at this time.     8. Need for vaccination  -     Fluzone High-Dose 65+yrs ([id number])    An After Visit Summary was printed and given to the patient at discharge.  Return in about 6 months (around 3/28/2023) for Medicare Wellness.          Torey Amato D.O. 9/18/2022   Electronically signed.

## 2023-01-12 ENCOUNTER — OFFICE VISIT (OUTPATIENT)
Dept: NEUROLOGY | Facility: CLINIC | Age: 83
End: 2023-01-12
Payer: MEDICARE

## 2023-01-12 VITALS
WEIGHT: 142 LBS | HEART RATE: 72 BPM | BODY MASS INDEX: 18.82 KG/M2 | HEIGHT: 73 IN | DIASTOLIC BLOOD PRESSURE: 62 MMHG | SYSTOLIC BLOOD PRESSURE: 110 MMHG | RESPIRATION RATE: 18 BRPM

## 2023-01-12 DIAGNOSIS — G20 PARKINSON'S DISEASE: ICD-10-CM

## 2023-01-12 DIAGNOSIS — R41.3 MEMORY DIFFICULTY: Primary | ICD-10-CM

## 2023-01-12 DIAGNOSIS — I95.1 ORTHOSTASIS: ICD-10-CM

## 2023-01-12 PROCEDURE — 99214 OFFICE O/P EST MOD 30 MIN: CPT | Performed by: PSYCHIATRY & NEUROLOGY

## 2023-01-12 RX ORDER — MEMANTINE HYDROCHLORIDE 10 MG/1
10 TABLET ORAL 2 TIMES DAILY
Qty: 180 TABLET | Refills: 1 | Status: SHIPPED | OUTPATIENT
Start: 2023-01-12

## 2023-01-12 NOTE — PATIENT INSTRUCTIONS
Continue safety and fall precautions and no driving.  Patient to get blood pressure checked sitting and standing at least daily and a diary For PCP if continues to drop his blood pressure.  Patient to follow-up with PCP about peripheral edema

## 2023-01-12 NOTE — PROGRESS NOTES
Subjective   Edy Urbina, 1940, is a male who is being seen today for   Chief Complaint   Patient presents with   • Memory Loss   • Parkinson's Disease       HISTORY OF PRESENT ILLNESS: Patient seen for memory difficulties and Parkinson's.  Patient lives at [facility] and has assistance.  Patient is mixing up daytime and nighttime needs help with ADLs.  Patient's daughter is with him and says that she gets phone calls between midnight and 4 AM frequently.  Patient does not drive.  Patient has had some increasing memory difficulties.  MMSE today is 16/30 versus 20/30 last visit.  Patient continues to want to put up his thermostat which gets him overheated.  Patient has not had a hospitalization since last seen.  Patient not had any falls.  But continues to be orthostatic.  Patient states he is not lightheaded however.  Patient denies any headaches.  Patient continues on Sinemet 25/100, 1 p.o. 3 times daily and Namenda 10 mg p.o. twice daily.  Patient has not been able to tolerate Aricept.  Patient is to get blood pressures checked daily sitting and standing and keep diary for PCP and contact PCP if continues to drop.  Patient to be on fall precautions especially with patient's use of anticoagulation.  Patient is also on risperidone    REVIEW OF SYSTEMS:   GENERAL: Blood pressure today 130/80 left arm seated and 110/60 left arm standing with pulse 72.  PULMONARY: No acute respiratory difficulties  CVS: As above  GASTROINTESTINAL: No acute GI distress  GENITOURINARY: No acute  distress  GYN: Not applicable  MUSCULOSKELETAL: As above  HEENT: No acute vision or hearing change  ENDOCRINE: Not diabetic  PSYCHIATRIC: Patient was previously diagnosed with possible Lewy body dementia without behavioral disturbance but difficult to make accurate diagnosis  HEMATOLOGY: History of anemia  SKIN: No melanoma.  Patient follows with a dermatologist.  Family history reviewed and mother has a history of stroke  Social  history: Patient/family denies smoking or drug or alcohol use    PHYSICAL EXAMINATION:    GENERAL: Patient has hyperkyphotic gait with minimal festination.  Minimal cogwheeling or rigidity.  There is mild rest tremor of the right upper extremity  CRANIUM: Normal cephalic/atraumatic  HEENT:       EYES: EOMs intact without nystagmus and fields full to confrontation.  Pupils equal round reactive to light.  No acute fundic abnormalities.       EARS: Tympanic membranes obscured by wax bilaterally and hears tuning fork bilaterally       THROAT: No acute oropharynx abnormalities and apparently eating satisfactorily and no swallowing difficulties by history.       NECK: No bruits/no lymphadenopathy  CHEST: No acute cardiopulmonary abnormalities by auscultation  ABDOMEN: Nondistended  EXTREMITIES: Patient wears support hose but is having 2+ peripheral edema and is to get with PCP about that  NEURO: As above.  Patient has some difficulty following commands  SPEECH: Hoarse    CRANIAL NERVES: Motor/sensory about the face normal and symmetric  EXTREMITIES: Dorsalis pedis pulse symmetrical  MOTOR STRENGTH: Motor strength upper and lower extremities grossly intact  STATION AND GAIT: Romberg negative  CEREBELLAR: Finger-nose and heel-to-shin normal  SENSORY: Decreased pin and vibration distal to proximal lower extremities to ankles bilaterally otherwise normal pin and vibration throughout  REFLEXES: Reflexes decreased at the ankle jerk versus knee jerk and biceps bilateral without clonus or Babinski      ASSESSMENT AND PLAN: Patient with Parkinson features and memory difficulty.  Patient to continue Namenda and Sinemet unchanged.  I spent 35 minutes with this patient with counseling exam and review of records.  Patient does not drive.  Safety precautions were reviewed and fall precautions.  BMI in normal range      Diagnoses and all orders for this visit:    1. Memory difficulty (Primary)  -     memantine (NAMENDA) 10 MG tablet;  Take 1 tablet by mouth 2 (Two) Times a Day. TAKE 1 TABLET TWICE DAILY  Dispense: 180 tablet; Refill: 1  -     CBC & Differential  -     Comprehensive Metabolic Panel    2. Parkinson's disease (HCC)  -     CBC & Differential  -     Comprehensive Metabolic Panel    3. Orthostasis    Other orders  -     carbidopa-levodopa (SINEMET)  MG per tablet; Take 1 tablet by mouth 3 (Three) Times a Day.  Dispense: 270 tablet; Refill: 1        Dictated utilizing Dragon voice recognition software

## 2023-01-15 DIAGNOSIS — N40.0 BENIGN PROSTATIC HYPERPLASIA WITHOUT LOWER URINARY TRACT SYMPTOMS: ICD-10-CM

## 2023-01-16 RX ORDER — TAMSULOSIN HYDROCHLORIDE 0.4 MG/1
1 CAPSULE ORAL DAILY
Qty: 30 CAPSULE | Refills: 2 | Status: SHIPPED | OUTPATIENT
Start: 2023-01-16

## 2023-01-26 DIAGNOSIS — R41.3 MEMORY DIFFICULTY: ICD-10-CM

## 2023-01-26 RX ORDER — MEMANTINE HYDROCHLORIDE 10 MG/1
TABLET ORAL
Qty: 180 TABLET | Refills: 1 | OUTPATIENT
Start: 2023-01-26

## 2023-02-05 ENCOUNTER — APPOINTMENT (OUTPATIENT)
Dept: CT IMAGING | Facility: HOSPITAL | Age: 83
End: 2023-02-05
Payer: MEDICARE

## 2023-02-05 ENCOUNTER — APPOINTMENT (OUTPATIENT)
Dept: GENERAL RADIOLOGY | Facility: HOSPITAL | Age: 83
End: 2023-02-05
Payer: MEDICARE

## 2023-02-05 ENCOUNTER — HOSPITAL ENCOUNTER (EMERGENCY)
Facility: HOSPITAL | Age: 83
Discharge: HOME | End: 2023-02-05
Attending: EMERGENCY MEDICINE | Admitting: EMERGENCY MEDICINE
Payer: MEDICARE

## 2023-02-05 VITALS
BODY MASS INDEX: 18.82 KG/M2 | RESPIRATION RATE: 18 BRPM | WEIGHT: 142 LBS | DIASTOLIC BLOOD PRESSURE: 74 MMHG | HEART RATE: 78 BPM | OXYGEN SATURATION: 99 % | SYSTOLIC BLOOD PRESSURE: 138 MMHG | TEMPERATURE: 97.8 F | HEIGHT: 73 IN

## 2023-02-05 DIAGNOSIS — Z74.09 IMPAIRED MOBILITY: ICD-10-CM

## 2023-02-05 DIAGNOSIS — R41.82 ALTERED MENTAL STATUS, UNSPECIFIED ALTERED MENTAL STATUS TYPE: ICD-10-CM

## 2023-02-05 DIAGNOSIS — R79.89 TSH ELEVATION: ICD-10-CM

## 2023-02-05 DIAGNOSIS — F03.90 DEMENTIA, UNSPECIFIED DEMENTIA SEVERITY, UNSPECIFIED DEMENTIA TYPE, UNSPECIFIED WHETHER BEHAVIORAL, PSYCHOTIC, OR MOOD DISTURBANCE OR ANXIETY: Primary | ICD-10-CM

## 2023-02-05 LAB
ALBUMIN SERPL-MCNC: 3.9 G/DL (ref 3.5–5.2)
ALBUMIN/GLOB SERPL: 1.3 G/DL
ALP SERPL-CCNC: 70 U/L (ref 39–117)
ALT SERPL W P-5'-P-CCNC: 16 U/L (ref 1–41)
ANION GAP SERPL CALCULATED.3IONS-SCNC: 9 MMOL/L (ref 5–15)
ARTERIAL PATENCY WRIST A: POSITIVE
AST SERPL-CCNC: 20 U/L (ref 1–40)
ATMOSPHERIC PRESS: 752 MMHG
BACTERIA UR QL AUTO: ABNORMAL /HPF
BASE EXCESS BLDA CALC-SCNC: 4.4 MMOL/L (ref 0–2)
BASOPHILS # BLD AUTO: 0.05 10*3/MM3 (ref 0–0.2)
BASOPHILS NFR BLD AUTO: 0.6 % (ref 0–1.5)
BDY SITE: ABNORMAL
BILIRUB SERPL-MCNC: 0.7 MG/DL (ref 0–1.2)
BILIRUB UR QL STRIP: NEGATIVE
BODY TEMPERATURE: 37 C
BUN SERPL-MCNC: 15 MG/DL (ref 8–23)
BUN/CREAT SERPL: 20 (ref 7–25)
CALCIUM SPEC-SCNC: 8.8 MG/DL (ref 8.6–10.5)
CHLORIDE SERPL-SCNC: 101 MMOL/L (ref 98–107)
CLARITY UR: CLEAR
CO2 SERPL-SCNC: 30 MMOL/L (ref 22–29)
COLOR UR: YELLOW
CREAT SERPL-MCNC: 0.75 MG/DL (ref 0.76–1.27)
DEPRECATED RDW RBC AUTO: 43.4 FL (ref 37–54)
EGFRCR SERPLBLD CKD-EPI 2021: 90.7 ML/MIN/1.73
EOSINOPHIL # BLD AUTO: 0.16 10*3/MM3 (ref 0–0.4)
EOSINOPHIL NFR BLD AUTO: 1.9 % (ref 0.3–6.2)
ERYTHROCYTE [DISTWIDTH] IN BLOOD BY AUTOMATED COUNT: 13.2 % (ref 12.3–15.4)
GLOBULIN UR ELPH-MCNC: 3 GM/DL
GLUCOSE SERPL-MCNC: 98 MG/DL (ref 65–99)
GLUCOSE UR STRIP-MCNC: NEGATIVE MG/DL
HCO3 BLDA-SCNC: 28.9 MMOL/L (ref 20–26)
HCT VFR BLD AUTO: 41.5 % (ref 37.5–51)
HGB BLD-MCNC: 13.1 G/DL (ref 13–17.7)
HGB UR QL STRIP.AUTO: ABNORMAL
HYALINE CASTS UR QL AUTO: ABNORMAL /LPF
IMM GRANULOCYTES # BLD AUTO: 0.07 10*3/MM3 (ref 0–0.05)
IMM GRANULOCYTES NFR BLD AUTO: 0.8 % (ref 0–0.5)
KETONES UR QL STRIP: NEGATIVE
LEUKOCYTE ESTERASE UR QL STRIP.AUTO: NEGATIVE
LYMPHOCYTES # BLD AUTO: 1.38 10*3/MM3 (ref 0.7–3.1)
LYMPHOCYTES NFR BLD AUTO: 16 % (ref 19.6–45.3)
Lab: ABNORMAL
MAGNESIUM SERPL-MCNC: 2 MG/DL (ref 1.6–2.4)
MCH RBC QN AUTO: 28.2 PG (ref 26.6–33)
MCHC RBC AUTO-ENTMCNC: 31.6 G/DL (ref 31.5–35.7)
MCV RBC AUTO: 89.4 FL (ref 79–97)
MODALITY: ABNORMAL
MONOCYTES # BLD AUTO: 0.99 10*3/MM3 (ref 0.1–0.9)
MONOCYTES NFR BLD AUTO: 11.5 % (ref 5–12)
NEUTROPHILS NFR BLD AUTO: 5.99 10*3/MM3 (ref 1.7–7)
NEUTROPHILS NFR BLD AUTO: 69.2 % (ref 42.7–76)
NITRITE UR QL STRIP: NEGATIVE
NRBC BLD AUTO-RTO: 0 /100 WBC (ref 0–0.2)
PCO2 BLDA: 41.9 MM HG (ref 35–45)
PCO2 TEMP ADJ BLD: 41.9 MM HG (ref 35–45)
PH BLDA: 7.45 PH UNITS (ref 7.35–7.45)
PH UR STRIP.AUTO: 6.5 [PH] (ref 5–8)
PH, TEMP CORRECTED: 7.45 PH UNITS (ref 7.35–7.45)
PLATELET # BLD AUTO: 206 10*3/MM3 (ref 140–450)
PMV BLD AUTO: 9.8 FL (ref 6–12)
PO2 BLDA: 85.9 MM HG (ref 83–108)
PO2 TEMP ADJ BLD: 85.9 MM HG (ref 83–108)
POTASSIUM SERPL-SCNC: 4.6 MMOL/L (ref 3.5–5.2)
PROT SERPL-MCNC: 6.9 G/DL (ref 6–8.5)
PROT UR QL STRIP: NEGATIVE
RBC # BLD AUTO: 4.64 10*6/MM3 (ref 4.14–5.8)
RBC # UR STRIP: ABNORMAL /HPF
REF LAB TEST METHOD: ABNORMAL
SAO2 % BLDCOA: 97.9 % (ref 94–99)
SODIUM SERPL-SCNC: 140 MMOL/L (ref 136–145)
SP GR UR STRIP: 1.01 (ref 1–1.03)
SQUAMOUS #/AREA URNS HPF: ABNORMAL /HPF
TSH SERPL DL<=0.05 MIU/L-ACNC: 5.01 UIU/ML (ref 0.27–4.2)
UROBILINOGEN UR QL STRIP: ABNORMAL
VENTILATOR MODE: ABNORMAL
WBC # UR STRIP: ABNORMAL /HPF
WBC NRBC COR # BLD: 8.64 10*3/MM3 (ref 3.4–10.8)

## 2023-02-05 PROCEDURE — 83735 ASSAY OF MAGNESIUM: CPT | Performed by: EMERGENCY MEDICINE

## 2023-02-05 PROCEDURE — 97166 OT EVAL MOD COMPLEX 45 MIN: CPT | Performed by: OCCUPATIONAL THERAPIST

## 2023-02-05 PROCEDURE — 99284 EMERGENCY DEPT VISIT MOD MDM: CPT

## 2023-02-05 PROCEDURE — 80053 COMPREHEN METABOLIC PANEL: CPT | Performed by: EMERGENCY MEDICINE

## 2023-02-05 PROCEDURE — 97162 PT EVAL MOD COMPLEX 30 MIN: CPT

## 2023-02-05 PROCEDURE — 82803 BLOOD GASES ANY COMBINATION: CPT

## 2023-02-05 PROCEDURE — 71045 X-RAY EXAM CHEST 1 VIEW: CPT

## 2023-02-05 PROCEDURE — 85025 COMPLETE CBC W/AUTO DIFF WBC: CPT | Performed by: EMERGENCY MEDICINE

## 2023-02-05 PROCEDURE — 84443 ASSAY THYROID STIM HORMONE: CPT | Performed by: EMERGENCY MEDICINE

## 2023-02-05 PROCEDURE — 96365 THER/PROPH/DIAG IV INF INIT: CPT

## 2023-02-05 PROCEDURE — 93010 ELECTROCARDIOGRAM REPORT: CPT | Performed by: INTERNAL MEDICINE

## 2023-02-05 PROCEDURE — 81001 URINALYSIS AUTO W/SCOPE: CPT | Performed by: EMERGENCY MEDICINE

## 2023-02-05 PROCEDURE — 25010000002 THIAMINE PER 100 MG: Performed by: EMERGENCY MEDICINE

## 2023-02-05 PROCEDURE — 93005 ELECTROCARDIOGRAM TRACING: CPT | Performed by: EMERGENCY MEDICINE

## 2023-02-05 PROCEDURE — 70450 CT HEAD/BRAIN W/O DYE: CPT

## 2023-02-05 PROCEDURE — 36600 WITHDRAWAL OF ARTERIAL BLOOD: CPT

## 2023-02-05 RX ADMIN — THIAMINE HYDROCHLORIDE 200 MG: 100 INJECTION, SOLUTION INTRAMUSCULAR; INTRAVENOUS at 11:31

## 2023-02-05 NOTE — ED NOTES
Physical therapy contacted and made aware of the consult at this time and that it needed to be done before the patient is discharged

## 2023-02-05 NOTE — THERAPY DISCHARGE NOTE
Acute Care - Occupational Therapy Discharge  [facility]    Patient Name: Edy Urbina  : 1940    MRN: [id number]                              Today's Date: 2023       Admit Date: 2023    Visit Dx:     ICD-10-CM ICD-9-CM   1. Dementia, unspecified dementia severity, unspecified dementia type, unspecified whether behavioral, psychotic, or mood disturbance or anxiety (HCC)  F03.90 294.20   2. Impaired mobility  Z74.09 799.89   3. Altered mental status, unspecified altered mental status type  R41.82 780.97   4. TSH elevation  R79.89 794.5     Patient Active Problem List   Diagnosis   • Anemia   • Lewy body dementia without behavioral disturbance (HCC)   • Cachexia (HCC)   • Pneumonia of right middle lobe due to infectious organism   • Metabolic encephalopathy, acute, due to pnuemonia and elevated BUN   • Paroxysmal atrial fibrillation with rapid ventricular response (HCC)   • Acute metabolic encephalopathy   • Atrial fibrillation (HCC)   • Moderate malnutrition (HCC)     Past Medical History:   Diagnosis Date   • Cataracts, bilateral    • Lewy body dementia (HCC)    • Occasional tremors    • Prostate cancer (HCC) 2011    t2c,Sommer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    • Varicose vein of leg 2019   • Varicose veins of legs      Past Surgical History:   Procedure Laterality Date   • EYE SURGERY     • HERNIA REPAIR Left    • INGUINAL HERNIA REPAIR Right 2019    Procedure: OPEN RIGHT INGUINAL HERNIA REPAIR WITH MESH;  Surgeon: Alesia Mora MD;  Location: [facility];  Service: General   • PROSTATECTOMY  2011    RALP      General Information     Row Name 23 1255          OT Time and Intention    Document Type evaluation  decreased mental status ~3-4 weeks per dtr, urinating in inapproriate places  -JJ     Mode of Treatment occupational therapy  -JJ     Row Name 23 1255          General Information    Patient Profile Reviewed yes  -JJ     Prior Level of  Function independent:;all household mobility;transfer;ADL's  -     Existing Precautions/Restrictions fall  -J     Barriers to Rehab cognitive status;previous functional deficit;physical barrier  -     Row Name 02/05/23 1255          Living Environment    People in Home alone  [facility]  -     Row Name 02/05/23 1255          Home Main Entrance    Number of Stairs, Main Entrance none  -J     Stair Railings, Main Entrance none  -     Row Name 02/05/23 1255          Stairs Within Home, Primary    Number of Stairs, Within Home, Primary none  -J     Stair Railings, Within Home, Primary none  -J     Row Name 02/05/23 1255          Cognition    Orientation Status (Cognition) oriented to;person  -     Row Name 02/05/23 1255          Safety Issues, Functional Mobility    Safety Issues Affecting Function (Mobility) ability to follow commands;at risk behavior observed;awareness of need for assistance;insight into deficits/self-awareness;judgment;problem-solving;safety precaution awareness;safety precautions follow-through/compliance  -     Impairments Affecting Function (Mobility) balance;cognition;strength  -     Cognitive Impairments, Mobility Safety/Performance attention;awareness, need for assistance;insight into deficits/self-awareness;judgment;problem-solving/reasoning;safety precaution awareness;safety precaution follow-through  -           User Key  (r) = Recorded By, (t) = Taken By, (c) = Cosigned By    Initials Name Provider Type    Jannie Isaacs OTR/L, CSRS Occupational Therapist               Mobility/ADL's     Row Name 02/05/23 1255          Bed Mobility    Bed Mobility supine-sit;sit-supine  -     Supine-Sit [place] (Bed Mobility) standby assist  -     Sit-Supine [place] (Bed Mobility) standby assist  -     Assistive Device (Bed Mobility) head of bed elevated  -     Row Name 02/05/23 1255          Transfers    Transfers sit-stand transfer;stand-sit  transfer  -[facility] Name 02/05/23 1255          Sit-Stand Transfer    Sit-Stand Thor (Transfers) verbal cues;minimum assist (75% patient effort)  -[facility] Name 02/05/23 1255          Stand-Sit Transfer    Stand-Sit Thor (Transfers) minimum assist (75% patient effort);verbal cues  -JJ     Row Name 02/05/23 1255          Functional Mobility    Functional Mobility- Ind. Level minimum assist (75% patient effort);2 person assist required  -     Functional Mobility- Safety Issues step length decreased;balance decreased during turns  -     Functional Mobility- Comment amb in hallway. Multiple LOB  -[facility] Name 02/05/23 1255          Activities of Daily Living    BADL Assessment/Intervention toileting  -JJ     Row Name 02/05/23 1255          Toileting Assessment/Training    Thor Level (Toileting) adjust/manage clothing;minimum assist (75% patient effort);verbal cues  -     Assistive Devices (Toileting) urinal  -     Position (Toileting) supported standing  -     Comment, (Toileting) required Max A for positioning and holding urinal.  -           User Key  (r) = Recorded By, (t) = Taken By, (c) = Cosigned By    Initials Name Provider Type    Jannie Oakley, KINGR/L, CSRS Occupational Therapist               Obj/Interventions     [facility] Name 02/05/23 1255          Sensory Assessment (Somatosensory)    Sensory Assessment (Somatosensory) unable/difficult to assess  -JJ     Row Name 02/05/23 1255          Vision Assessment/Intervention    Vision Assessment Comment pt mostly kept eyes closed throughout session.  -[facility] Name 02/05/23 1255          Range of Motion Comprehensive    General Range of Motion bilateral upper extremity ROM WFL;bilateral lower extremity ROM WFL  -[facility] Name 02/05/23 1255          Strength Comprehensive (MMT)    Comment, General Manual Muscle Testing (MMT) Assessment B UE strength grossly 4-/5  -[facility] Name 02/05/23 1255          Motor Skills     Motor Skills neuro-muscular function  -     Neuromuscular Function tremor, resting;tremor, intention;bilateral;upper extremity  -     Row Name 02/05/23 1252          Balance    Balance Assessment sitting static balance;sitting dynamic balance;standing static balance;standing dynamic balance  -     Static Sitting Balance standby assist  -     Dynamic Sitting Balance contact guard  -     Position, Sitting Balance supported;sitting edge of bed  -     Static Standing Balance minimal assist  -     Dynamic Standing Balance minimal assist;2-person assist  -     Position/Device Used, Standing Balance supported  -           User Key  (r) = Recorded By, (t) = Taken By, (c) = Cosigned By    Initials Name Provider Type    Jannie Isaacs, OTR/L, CSRS Occupational Therapist               Goals/Plan    No documentation.                Clinical Impression     Row Name 02/05/23 125          Pain Assessment    Additional Documentation Pain Scale: FACES Pre/Post-Treatment (Group)  -     Row Name 02/05/23 1258          Pain Scale: FACES Pre/Post-Treatment    Pain: FACES Scale, Pretreatment 0-->no hurt  -     Posttreatment Pain Rating 0-->no hurt  -     Row Name 02/05/23 1258          Plan of Care Review    Plan of Care Reviewed With patient;daughter  -     Outcome Evaluation OT eval completed. Pt presents alert and oriented to self and place only. He remained lethargic throughout session and mostly kept eyes closed even when amb. He required vc and tactile cues with decreased initation demonstrated. SBA to come to sitting at EOB with vcs. Voiced need to use urinal, required Min A for clothing mgt and then Max A to manage urinal d/t continuous tremors in B UEs. He requires increased time complete all tasks. He is at a high risk for falls d/t decreased balance, strength and his cognitive status. Recommend d/c to SNF.  -     Row Name 02/05/23 1251          Therapy Assessment/Plan (OT)    Criteria for  Skilled Therapeutic Interventions Met (OT) no;other (see comments)  d/c same day  -JJ     Therapy Frequency (OT) evaluation only  -     Row Name 02/05/23 1255          Therapy Plan Review/Discharge Plan (OT)    Anticipated Discharge Disposition (OT) [facility]  -     Row Name 02/05/23 1255          Positioning and Restraints    Pre-Treatment Position in bed  -JJ     Post Treatment Position bed  -JJ     In Bed notified nsg;fowlers;call light within reach;encouraged to call for assist;patient within staff view;with family/caregiver;side rails up x2  -JJ           User Key  (r) = Recorded By, (t) = Taken By, (c) = Cosigned By    Initials Name Provider Type    Jannie Isaacs, OTR/L, CSRS Occupational Therapist               Outcome Measures     Row Name 02/05/23 1255          How much help from another is currently needed...    Putting on and taking off regular lower body clothing? 2  -JJ     Bathing (including washing, rinsing, and drying) 2  -JJ     Toileting (which includes using toilet bed pan or urinal) 2  -JJ     Putting on and taking off regular upper body clothing 2  -JJ     Taking care of personal grooming (such as brushing teeth) 2  -JJ     Eating meals 2  -JJ     AM-PAC 6 Clicks Score (OT) 12  -JJ     Row Name 02/05/23 1300          How much help from another person do you currently need...    Turning from your back to your side while in flat bed without using bedrails? 2  -LH     Moving from lying on back to sitting on the side of a flat bed without bedrails? 2  -LH     Moving to and from a bed to a chair (including a wheelchair)? 2  -LH     Standing up from a chair using your arms (e.g., wheelchair, bedside chair)? 2  -LH     Climbing 3-5 steps with a railing? 2  -LH     To walk in hospital room? 2  -LH     AM-PAC 6 Clicks Score (PT) 12  -LH     Highest level of mobility 4 --> Transferred to chair/commode  -     Row Name 02/05/23 1300 02/05/23 1255       Functional Assessment     Outcome Measure Options AM-PAC 6 Clicks Basic Mobility (PT)  - AM-PAC 6 Clicks Daily Activity (OT)  -JJ          User Key  (r) = Recorded By, (t) = Taken By, (c) = Cosigned By    Initials Name Provider Type    LH Josue Boss, PT Physical Therapist    Jannie Isaacs, OTR/L, CSRS Occupational Therapist                OT Recommendation and Plan  Therapy Frequency (OT): evaluation only  Plan of Care Review  Plan of Care Reviewed With: patient, daughter  Outcome Evaluation: OT eval completed. Pt presents alert and oriented to self and place only. He remained lethargic throughout session and mostly kept eyes closed even when amb. He required vc and tactile cues with decreased initation demonstrated. SBA to come to sitting at EOB with vcs. Voiced need to use urinal, required Min A for clothing mgt and then Max A to manage urinal d/t continuous tremors in B UEs. He requires increased time complete all tasks. He is at a high risk for falls d/t decreased balance, strength and his cognitive status. Recommend d/c to SNF.  Plan of Care Reviewed With: patient, daughter  Outcome Evaluation: OT eval completed. Pt presents alert and oriented to self and place only. He remained lethargic throughout session and mostly kept eyes closed even when amb. He required vc and tactile cues with decreased initation demonstrated. SBA to come to sitting at EOB with vcs. Voiced need to use urinal, required Min A for clothing mgt and then Max A to manage urinal d/t continuous tremors in B UEs. He requires increased time complete all tasks. He is at a high risk for falls d/t decreased balance, strength and his cognitive status. Recommend d/c to SNF.     Time Calculation:    Time Calculation- OT     Row Name 02/05/23 1255             Time Calculation- OT    OT Start Time 1255  -JJ      OT Stop Time 1320  -JJ      OT Time Calculation (min) 25 min  -JJ      OT Received On 02/05/23  -J            User Key  (r) = Recorded By, (t) = Taken By,  (c) = Cosigned By    Initials Name Provider Type    Jannie Isaacs OTR/L, CSRS Occupational Therapist              Therapy Charges for Today     Code Description Service Date Service Provider Modifiers Qty    22962176020 HC OT EVAL MOD COMPLEXITY 2 2/5/2023 Jannie Hernandez OTR/L, JACKIES GO 1             OT Discharge Summary  Anticipated Discharge Disposition (OT): skilled nursing facility    BLAYNE Vinson, ALISA  2/5/2023

## 2023-02-05 NOTE — ED PROVIDER NOTES
Subjective   History of Present Illness  Patient is a 82-year-old gentleman who lives in assisted living he is got history of Lewy body dementia and also has parkinsonism.  Over the last 3 to 4 weeks his family has noted progressive decline in his mental status and increasing confusion and forgetfulness.  He is not able to feed himself very well.  And recently has been urinating in inappropriate places at times.  He is more forgetful and less able to take care of himself.  His daughter that brought him to the ED is pregnant and is supposed to deliver today and there is no other family members available to take care of him.    Altered Mental Status  Presenting symptoms: confusion and disorientation    Severity:  Moderate  Most recent episode:  More than 2 days ago  Episode history:  Single  Timing:  Constant  Progression:  Worsening  Chronicity:  Chronic  Context: dementia    Context: not alcohol use, not drug use, not head injury, taking medications as prescribed, not nursing home resident, not recent change in medication and not recent illness    Associated symptoms: abnormal movement, decreased appetite and weakness    Associated symptoms: no abdominal pain, no agitation, no bladder incontinence, no depression, no difficulty breathing, no fever, no hallucinations, no headaches, no light-headedness, no nausea, no seizures, no slurred speech and no vomiting        Review of Systems   Constitutional: Positive for decreased appetite. Negative for fever.   HENT: Negative.    Gastrointestinal: Negative.  Negative for abdominal distention, abdominal pain, nausea and vomiting.   Endocrine: Negative.    Genitourinary: Negative.  Negative for bladder incontinence.   Musculoskeletal: Negative.  Negative for back pain and neck pain.   Skin: Negative for color change and pallor.   Neurological: Positive for weakness. Negative for seizures, syncope, light-headedness, numbness and headaches.   Hematological: Negative.  Does not  bruise/bleed easily.   Psychiatric/Behavioral: Positive for confusion. Negative for agitation and hallucinations.   All other systems reviewed and are negative.      Past Medical History:   Diagnosis Date   • Cataracts, bilateral    • Lewy body dementia (HCC)    • Occasional tremors    • Prostate cancer (HCC) 2011    t2c,Sommer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    • Varicose vein of leg 2019   • Varicose veins of legs        No Known Allergies    Past Surgical History:   Procedure Laterality Date   • EYE SURGERY     • HERNIA REPAIR Left    • INGUINAL HERNIA REPAIR Right 2019    Procedure: OPEN RIGHT INGUINAL HERNIA REPAIR WITH MESH;  Surgeon: Alesia Mora MD;  Location[facility] OR;  Service: General   • PROSTATECTOMY  2011    RALP       Family History   Problem Relation Age of Onset   • Stroke Mother        Social History     Socioeconomic History   • Marital status:    Tobacco Use   • Smoking status: Former     Years: 50.00     Types: Cigarettes     Quit date: 2002     Years since quittin.6   • Smokeless tobacco: Never   Vaping Use   • Vaping Use: Never used   Substance and Sexual Activity   • Alcohol use: No     Comment: QUIT DRINKING IN    • Drug use: No   • Sexual activity: Defer           Objective   Physical Exam  Vitals and nursing note reviewed. Exam conducted with a chaperone present.   Constitutional:       General: He is not in acute distress.     Appearance: He is well-developed and underweight. He is not toxic-appearing.   HENT:      Head: Normocephalic and atraumatic.      Nose: Nose normal.      Mouth/Throat:      Mouth: Mucous membranes are moist.      Pharynx: Uvula midline.   Eyes:      General: Lids are normal. Lids are everted, no foreign bodies appreciated.      Conjunctiva/sclera: Conjunctivae normal.      Pupils: Pupils are equal, round, and reactive to light.   Neck:      Vascular: Normal carotid pulses. No carotid bruit or JVD.       Trachea: Trachea and phonation normal. No tracheal deviation.   Cardiovascular:      Rate and Rhythm: Normal rate and regular rhythm.      Chest Wall: PMI is not displaced.      Pulses: Normal pulses.      Heart sounds: Normal heart sounds.     No gallop.   Pulmonary:      Effort: Pulmonary effort is normal. No tachypnea, accessory muscle usage or respiratory distress.      Breath sounds: Normal breath sounds. No stridor. No decreased breath sounds, wheezing, rhonchi or rales.   Abdominal:      General: Bowel sounds are normal. There is no distension.      Palpations: Abdomen is soft.      Tenderness: There is no abdominal tenderness.   Musculoskeletal:         General: No swelling. Normal range of motion.      Cervical back: Full passive range of motion without pain, normal range of motion and neck supple. No rigidity.      Comments: Lower extremity exam bilaterally is unremarkable.  There is no right or left calf tenderness .  There is no palpable venous cord.  No obvious difference in the size of the legs.  No pitting edema.  The dorsalis pedis and posterior tibial femoral and popliteal pulses are palpable and +2 bilaterally.  Homans sign is negative   Skin:     General: Skin is warm and dry.      Capillary Refill: Capillary refill takes 2 to 3 seconds.      Coloration: Skin is pale and sallow. Skin is not jaundiced.      Nails: There is no clubbing.   Neurological:      General: No focal deficit present.      Mental Status: He is disoriented and confused.      GCS: GCS eye subscore is 4. GCS verbal subscore is 4. GCS motor subscore is 5.      Cranial Nerves: Cranial nerves 2-12 are intact. No cranial nerve deficit or facial asymmetry.      Motor: Motor function is intact. No weakness, tremor or atrophy.      Deep Tendon Reflexes: Reflexes are normal and symmetric. Reflexes normal.      Comments: Patient has got essential tremor with pill-rolling movement and cogwheel rigidity.   Psychiatric:         Speech:  Speech normal.         Behavior: Behavior normal.         Procedures           ED Course  ED Course as of 02/05/23 1427   Sun Feb 05, 2023   1151 BRADYCARDIA [TS]   1422 Patient has dementia was brought to the ED because his symptoms have worsened.  And he appears to be more confused.  His work-up essentially is unremarkable had the  reviewed the chart and discussed with the patient's family I do not have any clinical indication to admit him to the hospital.   after review of the case talk with the family and they have made referrals for nursing home placement the family has arranged for the sitters to be with the patient at his assisted living for the next 48 hours and they are comfortable in discharging from the ER and going home.  I have recommended for the combatively of any worsening symptoms the patient was discharged home. [TS]      ED Course User Index  [TS] Jeff Siu MD                                           Medical Decision Making  Differential Diagnosis:  I considered toxic-metabolic etiology, hypoglycemia, hyperglycemia, diabetic ketoacidosis, drug overdose, ethanol intoxication, thiamine deficiency, hypothermia, hyponatremia, hypernatremia, organ failure, liver failure, kidney failure, thyroid failure, adrenal failure, hypoxia, hypercarbia, ischemic stroke, intracranial bleed, subarachnoid hemorrhage, closed head injury, subdural hematoma, seizure activity, syncopal episode, infectious etiology, hypertensive encephalopathy, vasculitis, thrombotic thrombocytopenic purpura and disseminated intravascular coagulation as a possible cause of altered mental status in this patient. This is a partial list of diagnoses considered.           Altered mental status, unspecified altered mental status type: chronic illness or injury     Details: Has underlying altered mental status secondary to dementia which is chronic but some worsening.  No electrolyte abnormality no evidence of any  infection CT of the head is negative discussed with  and with the patient's family the patient was discharged back to assisted living with sitters.  Dementia, unspecified dementia severity, unspecified dementia type, unspecified whether behavioral, psychotic, or mood disturbance or anxiety (HCC): chronic illness or injury  TSH elevation: undiagnosed new problem with uncertain prognosis     Details: Patient had minimal elevation of TSH related to be rechecked as an outpatient.  Amount and/or Complexity of Data Reviewed  Labs: ordered.     Details: Lab work-up electrolytes are negative urinalysis negative  Radiology: ordered.     Details: CT of the head is negative no evidence for urinary pathology.  ECG/medicine tests: ordered.      Risk  Risk Details: Patient with underlying dementia with some worsening confusion.  No focal neurological deficits able to ambulate.  Currently at his baseline as far as sleep wake cycle etc. is concerned just more confused and forgetful.  Possibly of Parkinson's dementia decline.  We will require nursing home placement eventually referrals were made by the  for that.  The patient was discharged home the family is agreeable and comfortable with this plan of care.        Final diagnoses:   Dementia, unspecified dementia severity, unspecified dementia type, unspecified whether behavioral, psychotic, or mood disturbance or anxiety (HCC)   Altered mental status, unspecified altered mental status type   TSH elevation       ED Disposition  ED Disposition     ED Disposition   Discharge    Condition   Stable    Comment   --             Torey Amato, DO  [address] [facility] [address] [address]  [phone]    In 1 day           Medication List      No changes were made to your prescriptions during this visit.          Jeff Siu MD  02/05/23 1101       Jeff Siu MD  02/05/23 1427       Jeff Siu MD  02/05/23 1425

## 2023-02-05 NOTE — PLAN OF CARE
Goal Outcome Evaluation:  Plan of Care Reviewed With: patient, daughter           Outcome Evaluation: OT eval completed. Pt presents alert and oriented to self and place only. He remained lethargic throughout session and mostly kept eyes closed even when amb. He required vc and tactile cues with decreased initation demonstrated. SBA to come to sitting at EOB with vcs. Voiced need to use urinal, required Min A for clothing mgt and then Max A to manage urinal d/t continuous tremors in B UEs. He requires increased time complete all tasks. He is at a high risk for falls d/t decreased balance, strength and his cognitive status. Recommend d/c to SNF.

## 2023-02-05 NOTE — ED NOTES
Pt presents with daughter from [facility] (assisted living). Pt has history of dementia with baseline confusion, but is a/o x 4 and can perform ADL's.     Family reports that for the past 5 days, pt has been experiencing increased confusion and decreased mental status. She reports he urinated in the sink last night, thinking it was the toilet and has been falling asleep while eating.     Denies any recent falls or med changes.     Pt able to follow simple commands appropriately, but sluggish.   Pupils pinppoint, but reactive to light.

## 2023-02-05 NOTE — THERAPY DISCHARGE NOTE
Patient Name: Edy Urbina  : 1940    MRN: [id number]                              Today's Date: 2023       Admit Date: 2023    Visit Dx:     ICD-10-CM ICD-9-CM   1. Impaired mobility  Z74.09 799.89     Patient Active Problem List   Diagnosis   • Anemia   • Lewy body dementia without behavioral disturbance (HCC)   • Cachexia (HCC)   • Pneumonia of right middle lobe due to infectious organism   • Metabolic encephalopathy, acute, due to pnuemonia and elevated BUN   • Paroxysmal atrial fibrillation with rapid ventricular response (HCC)   • Acute metabolic encephalopathy   • Atrial fibrillation (HCC)   • Moderate malnutrition (HCC)     Past Medical History:   Diagnosis Date   • Cataracts, bilateral    • Lewy body dementia (HCC)    • Occasional tremors    • Prostate cancer (HCC) 2011    t2c,[place]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    • Varicose vein of leg 2019   • Varicose veins of legs      Past Surgical History:   Procedure Laterality Date   • EYE SURGERY     • HERNIA REPAIR Left    • INGUINAL HERNIA REPAIR Right 2019    Procedure: OPEN RIGHT INGUINAL HERNIA REPAIR WITH MESH;  Surgeon: Alesia Mora MD;  Location: [facility] OR;  Service: General   • PROSTATECTOMY  2011    RALP      General Information     Row Name 23 1300          Physical Therapy Time and Intention    Document Type evaluation  decreased mental status ~3-4 weeks per dtr, urinating in inapproriate places  -     Mode of Treatment co-treatment;physical therapy  -     Row Name 23 1300          General Information    Patient Profile Reviewed yes  -     Prior Level of Function independent:;all household mobility;ADL's  DME:  rw  -     Existing Precautions/Restrictions fall  -     Barriers to Rehab previous functional deficit;cognitive status;physical barrier  -     Row Name 23 1300          Living Environment    People in Home alone  [facility]  -     Row Name  02/05/23 1300          Home Main Entrance    Number of Stairs, Main Entrance none  -[facility] Name 02/05/23 1300          Stairs Within Home, Primary    Number of Stairs, Within Home, Primary none  -[facility] Name 02/05/23 1300          Cognition    Orientation Status (Cognition) oriented to;person  -[facility] Name 02/05/23 1300          Safety Issues, Functional Mobility    Safety Issues Affecting Function (Mobility) ability to follow commands;at risk behavior observed;awareness of need for assistance;insight into deficits/self-awareness;judgment;safety precautions follow-through/compliance;safety precaution awareness;problem-solving  -     Impairments Affecting Function (Mobility) balance;cognition;strength  -           User Key  (r) = Recorded By, (t) = Taken By, (c) = Cosigned By    Initials Name Provider Type     Josue Boss, PT Physical Therapist               Mobility     Row Name 02/05/23 1300          Bed Mobility    Bed Mobility supine-sit;sit-supine  -     Supine-Sit [place] (Bed Mobility) standby assist  -     Sit-Supine [place] (Bed Mobility) standby assist  -     Assistive Device (Bed Mobility) head of bed elevated  -[facility] Name 02/05/23 1300          Sit-Stand Transfer    Sit-Stand [place] (Transfers) verbal cues;minimum assist (75% patient effort)  -[facility] Name 02/05/23 1300          Gait/Stairs (Locomotion)    [place] Level (Gait) verbal cues;moderate assist (50% patient effort)  -     Distance in Feet (Gait) 20  -     Deviations/Abnormal Patterns (Gait) base of support, narrow;gait speed decreased;stride length decreased;festinating/shuffling;weight shifting decreased  -     Bilateral Gait Deviations decreased arm swing;forward flexed posture  -           User Key  (r) = Recorded By, (t) = Taken By, (c) = Cosigned By    Initials Name Provider Type     Josue Boss, PT Physical Therapist               Obj/Interventions     Row Name 02/05/23  1300          Range of Motion Comprehensive    General Range of Motion bilateral upper extremity ROM WFL;bilateral lower extremity ROM WFL  -     Row Name 02/05/23 1300          Strength Comprehensive (MMT)    General Manual Muscle Testing (MMT) Assessment upper extremity strength deficits identified;lower extremity strength deficits identified  -           User Key  (r) = Recorded By, (t) = Taken By, (c) = Cosigned By    Initials Name Provider Type     Josue Boss, PT Physical Therapist               Goals/Plan    No documentation.                Clinical Impression     [facility] Name 02/05/23 1300          Pain    Pain Intervention(s) Medication (See MAR)  -     Additional Documentation Pain Scale: FACES Pre/Post-Treatment (Group)  -[facility] Name 02/05/23 1300          Pain Scale: FACES Pre/Post-Treatment    Pain: FACES Scale, Pretreatment 0-->no hurt  -     Posttreatment Pain Rating 0-->no hurt  -[facility] Name 02/05/23 1300          Plan of Care Review    Plan of Care Reviewed With patient;daughter  -     Outcome Evaluation PT IE complete.  Pt is oriented to person only.  No c/o pain.  Pt is a high fall risk and unable to live alone in A living.  Mod A to balance and ambulate.  Pt closes his eyes when ambulating.  Dtr reports he closes his eyes frequently when ambulating.  Recommend SNF at [place].  Thank you for referral.  -[facility] Name 02/05/23 1300          Therapy Assessment/Plan (PT)    Criteria for Skilled Interventions Met (PT) other (see comments)  DC from   -     Therapy Frequency (PT) evaluation only  -[facility] Name 02/05/23 1300          Positioning and Restraints    Pre-Treatment Position in bed  -     Post Treatment Position bed  -     In Bed fowlers;call light within reach;encouraged to call for assist;side rails up x2;with family/caregiver  -           User Key  (r) = Recorded By, (t) = Taken By, (c) = Cosigned By    Initials Name Provider Type     Josue Boss, PT Physical  Therapist               Outcome Measures     Row Name 02/05/23 1300          How much help from another person do you currently need...    Turning from your back to your side while in flat bed without using bedrails? 2  -LH     Moving from lying on back to sitting on the side of a flat bed without bedrails? 2  -LH     Moving to and from a bed to a chair (including a wheelchair)? 2  -LH     Standing up from a chair using your arms (e.g., wheelchair, bedside chair)? 2  -LH     Climbing 3-5 steps with a railing? 2  -LH     To walk in hospital room? 2  -LH     AM-PAC 6 Clicks Score (PT) 12  -     Highest level of mobility 4 --> Transferred to chair/commode  -     Row Name 02/05/23 1300          Functional Assessment    Outcome Measure Options AM-PAC 6 Clicks Basic Mobility (PT)  -           User Key  (r) = Recorded By, (t) = Taken By, (c) = Cosigned By    Initials Name Provider Type    Josue Baldwin, PT Physical Therapist                PT Recommendation and Plan     Plan of Care Reviewed With: patient, daughter  Outcome Evaluation: PT IE complete.  Pt is oriented to person only.  No c/o pain.  Pt is a high fall risk and unable to live alone in A living.  Mod A to balance and ambulate.  Pt closes his eyes when ambulating.  Dtr reports he closes his eyes frequently when ambulating.  Recommend SNF at [place].  Thank you for referral.     Time Calculation:    PT Charges     Row Name 02/05/23 1346             Time Calculation    Start Time 1300  -      Stop Time 1330  -      Time Calculation (min) 30 min  -      PT Received On 02/05/23  -         Untimed Charges    PT Eval/Re-eval Minutes 30  -LH         Total Minutes    Untimed Charges Total Minutes 30  -LH       Total Minutes 30  -LH            User Key  (r) = Recorded By, (t) = Taken By, (c) = Cosigned By    Initials Name Provider Type    Josue Baldwin, PT Physical Therapist              Therapy Charges for Today     Code Description Service Date Service  Provider Modifiers Qty    [id number] HC PT EVAL MOD COMPLEXITY 2 2/5/2023 Josue Boss, PT GP 1          PT G-Codes  Outcome Measure Options: AM-PAC 6 Clicks Basic Mobility (PT)  AM-PAC 6 Clicks Score (PT): 12    PT Discharge Summary  Anticipated Discharge Disposition (PT): skilled nursing facility    Josue Boss, PT  2/5/2023

## 2023-02-05 NOTE — CASE MANAGEMENT/SOCIAL WORK
Continued Stay Note  [facility]     Patient Name: Edy Urbina  MRN: [id number]  Today's Date: 2/5/2023    Admit Date: 2/5/2023    Plan: Referrals to SNF   Discharge Plan     Row Name 02/05/23 1243       Plan    Plan Referrals to SNF    Plan Comments Family has requested referrals to SNF's in [place].  Patient's insurance requires precert and facilities will not be able to contact insurance company until Monday.  Referrals made to all four SNF's in [place].  Patient not being admitted per .  SNF admissions will follow up directly with family.               Discharge Codes    No documentation.                     ASHANTI Still

## 2023-02-05 NOTE — PLAN OF CARE
Goal Outcome Evaluation:  Plan of Care Reviewed With: patient, daughter           Outcome Evaluation: PT IE complete.  Pt is oriented to person only.  No c/o pain.  Pt is a high fall risk and unable to live alone in A living.  Mod A to balance and ambulate.  Pt closes his eyes when ambulating.  Dtr reports he closes his eyes frequently when ambulating.  Recommend SNF at [place].  Thank you for referral.

## 2023-02-05 NOTE — CASE MANAGEMENT/SOCIAL WORK
Was asked to speak with patients daughter about possible SNF placement. Daughter tells me that she is scheduled to be induced tonight and that she is his caregiver and she will not be able to care for him. We discussed at length different options and the fact that the patient does not meet critiria for admission. Daughter has requested referrals to SNF. She is okay with any in [place]. Patient has a MCR which doesn't require a hospitalization. I explained to daughter the process and she is agreeable to taking him back to the assisted living facility where he lives until he gets a bed offer from nursing home. Physical therapy eval ordered as this is required by his insurance. Patients daughter provided with resources for possible sitters until he can go to a nursing home.

## 2023-02-07 LAB
QT INTERVAL: 432 MS
QTC INTERVAL: 413 MS

## 2023-02-22 ENCOUNTER — READMISSION MANAGEMENT (OUTPATIENT)
Dept: CALL CENTER | Facility: HOSPITAL | Age: 83
End: 2023-02-22
Payer: MEDICARE

## 2023-02-22 NOTE — OUTREACH NOTE
Prep Survey    Flowsheet Row Responses   [facility] facility patient discharged from? Non-BH   Is LACE score < 7 ? Non-BH Discharge   Eligibility [facility]    Date of Discharge 02/22/23   Discharge Disposition Home-Health Care Sv   Discharge diagnosis Dementia with Lewy bodies   Does the patient have one of the following disease processes/diagnoses(primary or secondary)? Other   Does the patient have Home health ordered? Yes   What is the Home health agency?  home with [facility]   Prep survey completed? Yes          Jennyfer HALL - Registered Nurse

## 2023-02-23 ENCOUNTER — TRANSITIONAL CARE MANAGEMENT TELEPHONE ENCOUNTER (OUTPATIENT)
Dept: CALL CENTER | Facility: HOSPITAL | Age: 83
End: 2023-02-23
Payer: MEDICARE

## 2023-02-23 NOTE — OUTREACH NOTE
Call Center TCM Note    Flowsheet Row Responses   [facility] [place] by [facility] patient discharged from? Non-   Does the patient have one of the following disease processes/diagnoses(primary or secondary)? Other   TCM attempt successful? Yes   Call start time 0950   Call end time 0952   General alerts for this patient Maury Cottrell [place]    Discharge diagnosis Dementia with Lewy bodies   Person spoke with today (if not patient) and relationship Jennyfer-daughter    Meds reviewed with patient/caregiver? Yes   Does the patient have all medications ordered at discharge? Yes   Is the patient taking all medications as directed (includes completed medication regime)? Yes   Comments Hospital d/c f/u appt is on 2/28/23 at 10:00 AM    Does the patient have an appointment with their PCP within 7 days of discharge? Yes   Psychosocial issues? No   Did the patient receive a copy of their discharge instructions? Yes   Nursing interventions Reviewed instructions with patient   What is the patient's perception of their health status since discharge? Improving   Is the patient/caregiver able to teach back signs and symptoms related to disease process for when to call PCP? Yes   Is the patient/caregiver able to teach back signs and symptoms related to disease process for when to call 911? Yes   Is the patient/caregiver able to teach back the hierarchy of who to call/visit for symptoms/problems? PCP, Specialist, Home health nurse, Urgent Care, ED, 911 Yes   TCM call completed? Yes   Call end time 0952   Would this patient benefit from a Referral to Amb Social Work? No   Is the patient interested in additional calls from an ambulatory ?  NOTE:  applies to high risk patients requiring additional follow-up. No          Astrid Castillo RN    2/23/2023, 09:52 CST

## 2023-02-28 ENCOUNTER — OFFICE VISIT (OUTPATIENT)
Dept: INTERNAL MEDICINE | Facility: CLINIC | Age: 83
End: 2023-02-28
Payer: MEDICARE

## 2023-02-28 VITALS
OXYGEN SATURATION: 92 % | BODY MASS INDEX: 17.83 KG/M2 | DIASTOLIC BLOOD PRESSURE: 42 MMHG | RESPIRATION RATE: 16 BRPM | SYSTOLIC BLOOD PRESSURE: 96 MMHG | WEIGHT: 134.5 LBS | TEMPERATURE: 96.4 F | HEIGHT: 73 IN | HEART RATE: 70 BPM

## 2023-02-28 DIAGNOSIS — E44.0 MODERATE MALNUTRITION: ICD-10-CM

## 2023-02-28 DIAGNOSIS — G31.83 DEMENTIA, LEWY BODY WITH BEHAVIOR DISTURBANCE: Primary | ICD-10-CM

## 2023-02-28 DIAGNOSIS — F02.818 DEMENTIA, LEWY BODY WITH BEHAVIOR DISTURBANCE: Primary | ICD-10-CM

## 2023-02-28 DIAGNOSIS — F02.818 DEMENTIA ASSOCIATED WITH OTHER UNDERLYING DISEASE WITH BEHAVIORAL DISTURBANCE: ICD-10-CM

## 2023-02-28 PROCEDURE — 99214 OFFICE O/P EST MOD 30 MIN: CPT | Performed by: INTERNAL MEDICINE

## 2023-02-28 RX ORDER — RISPERIDONE 1 MG/1
1 TABLET ORAL EVERY EVENING
Qty: 90 TABLET | Refills: 1 | Status: SHIPPED | OUTPATIENT
Start: 2023-02-28

## 2023-02-28 NOTE — PROGRESS NOTES
CC: f/u dementia    History:  Edy Urbina is a 82 y.o. male    he presents today with his daughter after going to the ER earlier this month with worsening mental status and functional capacity.  He did not have any focal findings on examination or laboratories and subsequently went to [facility] for some rehab for about 10 days with improvement in his strength.  He has had increased sleeping throughout the day and has not been sleeping in bed in the correct position.  He has caregivers that help with bathing, dressing, but they have noticed some decrease in his ability to perform his ADLs.      ROS:  Review of Systems   Psychiatric/Behavioral: Positive for confusion and sleep disturbance. Negative for dysphoric mood and hallucinations.        reports that he quit smoking about 20 years ago. His smoking use included cigarettes. He has been exposed to tobacco smoke. He has never used smokeless tobacco. He reports that he does not drink alcohol and does not use drugs.      Current Outpatient Medications:   •  apixaban (ELIQUIS) 2.5 MG tablet tablet, Take 1 tablet by mouth Every 12 (Twelve) Hours. Indications: Atrial Fibrillation, Disp: 180 tablet, Rfl: 3  •  carbidopa-levodopa (SINEMET)  MG per tablet, Take 1 tablet by mouth 3 (Three) Times a Day., Disp: 270 tablet, Rfl: 1  •  memantine (NAMENDA) 10 MG tablet, Take 1 tablet by mouth 2 (Two) Times a Day. TAKE 1 TABLET TWICE DAILY, Disp: 180 tablet, Rfl: 1  •  Metoprolol Tartrate 75 MG tablet, Take 75 mg by mouth Every 12 (Twelve) Hours., Disp: 180 tablet, Rfl: 3  •  Multiple Vitamins-Minerals (MULTIVITAMIN MEN 50+) tablet, Take 1 tablet by mouth Daily., Disp: , Rfl:   •  risperiDONE (risperDAL) 1 MG tablet, Take 1 tablet by mouth Every Evening., Disp: 90 tablet, Rfl: 1  •  tamsulosin (FLOMAX) 0.4 MG capsule 24 hr capsule, Take 1 capsule by mouth Daily., Disp: 30 capsule, Rfl: 2    OBJECTIVE:  BP 96/42 (BP Location: Left arm, Patient Position: Sitting, Cuff Size:  "Adult)   Pulse 70   Temp 96.4 °F (35.8 °C)   Resp 16   Ht 185.4 cm (73\")   Wt 61 kg (134 lb 8 oz)   SpO2 92%   BMI 17.75 kg/m²    Physical Exam  Constitutional:       General: He is not in acute distress.  Pulmonary:      Effort: Pulmonary effort is normal. No respiratory distress.   Neurological:      Mental Status: He is alert.   Psychiatric:         Attention and Perception: He is inattentive.         Mood and Affect: Mood normal.         Behavior: Behavior normal.      Comments: He drifts to sleep multiple times, slumping in his chair during our encounter.      ED with Jeff Siu MD (02/05/2023)       Assessment/Plan     Diagnoses and all orders for this visit:    1. Lewy body dementia with behavioral disturbance (HCC) (Primary)  2. Dementia associated with other underlying disease with behavioral disturbance  -     risperiDONE (risperDAL) 1 MG tablet; Take 1 tablet by mouth Every Evening.  Dispense: 90 tablet; Refill: 1  Given his difficulty sleeping, we will change risperdal to evening only to avoid fatigue and drowsiness through the day. However, he has reversed sleep/wake schedule and they are recommended to try to provide as much stimulation throughout the day. However, we also discussed this representing a probable progression of his dementia that likely will not improve much.     3. Moderate malnutrition (HCC)  He notes his appetite has been doing well and his daughter corroborates that he has been eating reasonably well.       An After Visit Summary was printed and given to the patient at discharge.  Return for Next scheduled follow up.      Torey Amato D.O. 2/28/2023   Electronically signed.  "

## 2023-03-28 ENCOUNTER — OFFICE VISIT (OUTPATIENT)
Dept: INTERNAL MEDICINE | Facility: CLINIC | Age: 83
End: 2023-03-28
Payer: MEDICARE

## 2023-03-28 VITALS
WEIGHT: 143.5 LBS | SYSTOLIC BLOOD PRESSURE: 104 MMHG | RESPIRATION RATE: 16 BRPM | OXYGEN SATURATION: 91 % | BODY MASS INDEX: 19.02 KG/M2 | DIASTOLIC BLOOD PRESSURE: 72 MMHG | HEIGHT: 73 IN | HEART RATE: 74 BPM | TEMPERATURE: 97.6 F

## 2023-03-28 DIAGNOSIS — I48.20 CHRONIC ATRIAL FIBRILLATION WITH RAPID VENTRICULAR RESPONSE: Primary | ICD-10-CM

## 2023-03-28 DIAGNOSIS — G20 PARKINSON'S DISEASE: ICD-10-CM

## 2023-03-28 DIAGNOSIS — F02.80 LEWY BODY DEMENTIA WITHOUT BEHAVIORAL DISTURBANCE: ICD-10-CM

## 2023-03-28 DIAGNOSIS — R60.0 BILATERAL LOWER EXTREMITY EDEMA: ICD-10-CM

## 2023-03-28 DIAGNOSIS — G31.83 LEWY BODY DEMENTIA WITHOUT BEHAVIORAL DISTURBANCE: ICD-10-CM

## 2023-03-28 PROBLEM — J18.9 PNEUMONIA OF RIGHT MIDDLE LOBE DUE TO INFECTIOUS ORGANISM: Status: RESOLVED | Noted: 2022-04-14 | Resolved: 2023-03-28

## 2023-03-28 PROBLEM — G20.A1 PARKINSON'S DISEASE: Status: ACTIVE | Noted: 2023-03-28

## 2023-03-28 PROBLEM — G93.41 METABOLIC ENCEPHALOPATHY: Status: RESOLVED | Noted: 2022-04-15 | Resolved: 2023-03-28

## 2023-03-28 NOTE — PROGRESS NOTES
The ABCs of the Annual Wellness Visit  Subsequent Medicare Wellness Visit    Subjective    Edy Urbina is a 82 y.o. male who presents for a Subsequent Medicare Wellness Visit.    The following portions of the patient's history were reviewed and   updated as appropriate: allergies, current medications, past family history, past medical history, past social history, past surgical history and problem list.    Compared to one year ago, the patient feels his physical   health is the same.    Compared to one year ago, the patient feels his mental   health is better.    Recent Hospitalizations:  This patient has had a [facility] admission record on file within the last 365 days.    Current Medical Providers:  Patient Care Team:  Torey Amato DO as PCP - General (Internal Medicine)  Yuriy Spears MD as Consulting Physician (Urology)  Dalton Laurent MD as Consulting Physician (Neurology)    Outpatient Medications Prior to Visit   Medication Sig Dispense Refill   • apixaban (ELIQUIS) 2.5 MG tablet tablet Take 1 tablet by mouth Every 12 (Twelve) Hours. Indications: Atrial Fibrillation 180 tablet 3   • carbidopa-levodopa (SINEMET)  MG per tablet Take 1 tablet by mouth 3 (Three) Times a Day. 270 tablet 1   • memantine (NAMENDA) 10 MG tablet Take 1 tablet by mouth 2 (Two) Times a Day. TAKE 1 TABLET TWICE DAILY 180 tablet 1   • Metoprolol Tartrate 75 MG tablet Take 75 mg by mouth Every 12 (Twelve) Hours. 180 tablet 3   • Multiple Vitamins-Minerals (MULTIVITAMIN MEN 50+) tablet Take 1 tablet by mouth Daily.     • risperiDONE (risperDAL) 1 MG tablet Take 1 tablet by mouth Every Evening. 90 tablet 1   • tamsulosin (FLOMAX) 0.4 MG capsule 24 hr capsule Take 1 capsule by mouth Daily. 30 capsule 2     No facility-administered medications prior to visit.       No opioid medication identified on active medication list. I have reviewed chart for other potential  high risk medication/s and harmful drug  "interactions in the elderly.          Aspirin is not on active medication list.  Aspirin use is not indicated based on review of current medical condition/s. Risk of harm outweighs potential benefits.  .    Patient Active Problem List   Diagnosis   • Anemia   • Lewy body dementia without behavioral disturbance (HCC)   • Cachexia (HCC)   • Paroxysmal atrial fibrillation with rapid ventricular response (HCC)   • Acute metabolic encephalopathy   • Atrial fibrillation (HCC)   • Moderate malnutrition (HCC)   • Parkinson's disease (HCC)     Advance Care Planning  Advance Directive is not on file.  ACP discussion was held with the patient during this visit. Patient has an advance directive (not in EMR), copy requested.     Objective    Vitals:    23 0855   BP: 104/72   BP Location: Left arm   Patient Position: Sitting   Cuff Size: Adult   Pulse: 74   Resp: 16   Temp: 97.6 °F (36.4 °C)   SpO2: 91%   Weight: 65.1 kg (143 lb 8 oz)   Height: 185.4 cm (73\")     Estimated body mass index is 18.93 kg/m² as calculated from the following:    Height as of this encounter: 185.4 cm (73\").    Weight as of this encounter: 65.1 kg (143 lb 8 oz).    BMI is within normal parameters. No other follow-up for BMI required.      Does the patient have evidence of cognitive impairment? Yes    Lab Results   Component Value Date    TRIG 47 2023    HDL 52 2023    LDL 76 2023    VLDL 11 2023    HGBA1C 5.50 2023        HEALTH RISK ASSESSMENT    Smoking Status:  Social History     Tobacco Use   Smoking Status Former   • Years: 50.00   • Types: Cigarettes   • Quit date: 2002   • Years since quittin.8   • Passive exposure: Past   Smokeless Tobacco Never     Alcohol Consumption:  Social History     Substance and Sexual Activity   Alcohol Use No    Comment: QUIT DRINKING IN      Fall Risk Screen:    KWADWO Fall Risk Assessment was completed, and patient is at LOW risk for falls.Assessment completed " on:3/28/2023    Depression Screening:  PHQ-2/PHQ-9 Depression Screening 3/28/2023   Little Interest or Pleasure in Doing Things 0-->not at all   Feeling Down, Depressed or Hopeless 0-->not at all   PHQ-9: Brief Depression Severity Measure Score 0       Health Habits and Functional and Cognitive Screening:  Functional & Cognitive Status 3/28/2023   Do you have difficulty preparing food and eating? Yes   Do you have difficulty bathing yourself, getting dressed or grooming yourself? Yes   Do you have difficulty using the toilet? No   Do you have difficulty moving around from place to place? No   Do you have trouble with steps or getting out of a bed or a chair? No   Current Diet Unhealthy Diet   Dental Exam Up to date        Dental Exam Comment -   Eye Exam Up to date   Exercise (times per week) 0 times per week   Current Exercises Include No Regular Exercise   Current Exercise Activities Include -   Do you need help using the phone?  No   Are you deaf or do you have serious difficulty hearing?  Yes   Do you need help with transportation? Yes   Do you need help shopping? No   Do you need help preparing meals?  Yes   Do you need help with housework?  Yes   Do you need help with laundry? Yes   Do you need help taking your medications? Yes   Do you need help managing money? Yes   Do you ever drive or ride in a car without wearing a seat belt? No   Have you felt unusual stress, anger or loneliness in the last month? No   Who do you live with? Other   If you need help, do you have trouble finding someone available to you? No   Have you been bothered in the last four weeks by sexual problems? No   Do you have difficulty concentrating, remembering or making decisions? Yes       Age-appropriate Screening Schedule:  Refer to the list below for future screening recommendations based on patient's age, sex and/or medical conditions. Orders for these recommended tests are listed in the plan section. The patient has been provided  "with a written plan.    Health Maintenance   Topic Date Due   • TDAP/TD VACCINES (1 - Tdap) Never done   • ZOSTER VACCINE (1 of 2) Never done   • COVID-19 Vaccine (2 - Moderna series) 09/21/2021   • ANNUAL WELLNESS VISIT  03/25/2023   • INFLUENZA VACCINE  Completed   • Pneumococcal Vaccine 65+  Completed   • COLORECTAL CANCER SCREENING  Discontinued                CMS Preventative Services Quick Reference  Risk Factors Identified During Encounter  Fall Risk-High or Moderate: Discussed Fall Prevention in the home  Immunizations Discussed/Encouraged: Tdap, Shingrix and COVID19  The above risks/problems have been discussed with the patient.  Pertinent information has been shared with the patient in the After Visit Summary.  An After Visit Summary and PPPS were made available to the patient.    Follow Up:   Next Medicare Wellness visit to be scheduled in 1 year.       Additional E&M Note during same encounter follows:  Patient has multiple medical problems which are significant and separately identifiable that require additional work above and beyond the Medicare Wellness Visit.      Chief Complaint  Leg Swelling    Subjective        HPI  Edy Urbina is also being seen today for follow-up of dementia. He has been much more alert in the day and with improved rest at night since changing risperdal only to nighttime. He has swelling in his legs that is not bothering him, but is more pronounced of late.     Review of Systems   Respiratory: Negative for shortness of breath.    Cardiovascular: Positive for leg swelling. Negative for chest pain and palpitations.   Neurological: Positive for confusion.   Psychiatric/Behavioral: Negative for agitation, behavioral problems and dysphoric mood.       Objective   Vital Signs:  /72 (BP Location: Left arm, Patient Position: Sitting, Cuff Size: Adult)   Pulse 74   Temp 97.6 °F (36.4 °C)   Resp 16   Ht 185.4 cm (73\")   Wt 65.1 kg (143 lb 8 oz)   SpO2 91%   BMI 18.93 " kg/m²     Physical Exam  Constitutional:       General: He is not in acute distress.  Cardiovascular:      Rate and Rhythm: Normal rate and regular rhythm.      Heart sounds: Normal heart sounds. No murmur heard.  Pulmonary:      Effort: Pulmonary effort is normal.      Breath sounds: Normal breath sounds. No wheezing.   Musculoskeletal:      Right lower leg: Edema (3+) present.      Left lower leg: Edema (3+) present.   Neurological:      Mental Status: He is alert and oriented to person, place, and time.      Gait: Gait normal.   Psychiatric:         Mood and Affect: Mood normal.         Behavior: Behavior normal.                         Assessment and Plan   Diagnoses and all orders for this visit:    1. Paroxysmal atrial fibrillation with rapid ventricular response (HCC) (Primary)  2. Bilateral lower extremity edema  -     Adult Transthoracic Echo Complete W/ Cont if Necessary Per Protocol; Future  Given persistent and worsening swelling, will evaluation with Echo, but consider vascular insufficiency also.     3. Parkinson's disease (HCC)  4. Lewy body dementia without behavioral disturbance (HCC)  Movement, mentation and drowsiness improved with decrease in risperdal. Could consider switch to benzo.            Follow Up   Return in about 4 months (around 7/28/2023) for Recheck.  Patient was given instructions and counseling regarding his condition or for health maintenance advice. Please see specific information pulled into the AVS if appropriate.

## 2023-04-14 ENCOUNTER — HOSPITAL ENCOUNTER (OUTPATIENT)
Dept: CARDIOLOGY | Facility: HOSPITAL | Age: 83
Discharge: HOME | End: 2023-04-14
Admitting: INTERNAL MEDICINE
Payer: MEDICARE

## 2023-04-14 DIAGNOSIS — I48.20 CHRONIC ATRIAL FIBRILLATION WITH RAPID VENTRICULAR RESPONSE: ICD-10-CM

## 2023-04-14 DIAGNOSIS — R60.0 BILATERAL LOWER EXTREMITY EDEMA: ICD-10-CM

## 2023-04-14 LAB
BH CV ECHO MEAS - AO MAX PG: 4.5 MMHG
BH CV ECHO MEAS - AO MEAN PG: 2 MMHG
BH CV ECHO MEAS - AO ROOT DIAM: 3.4 CM
BH CV ECHO MEAS - AO V2 MAX: 106 CM/SEC
BH CV ECHO MEAS - AO V2 VTI: 26.5 CM
BH CV ECHO MEAS - AVA(I,D): 2.7 CM2
BH CV ECHO MEAS - EDV(CUBED): 97.3 ML
BH CV ECHO MEAS - EDV(MOD-SP2): 50 ML
BH CV ECHO MEAS - EDV(MOD-SP4): 83 ML
BH CV ECHO MEAS - EF(MOD-BP): 65 %
BH CV ECHO MEAS - EF(MOD-SP2): 68 %
BH CV ECHO MEAS - EF(MOD-SP4): 62.7 %
BH CV ECHO MEAS - ESV(CUBED): 17.6 ML
BH CV ECHO MEAS - ESV(MOD-SP2): 16 ML
BH CV ECHO MEAS - ESV(MOD-SP4): 31 ML
BH CV ECHO MEAS - FS: 43.5 %
BH CV ECHO MEAS - IVS/LVPW: 0.7 CM
BH CV ECHO MEAS - IVSD: 0.7 CM
BH CV ECHO MEAS - LA DIMENSION: 2.8 CM
BH CV ECHO MEAS - LAT PEAK E' VEL: 13.6 CM/SEC
BH CV ECHO MEAS - LV DIASTOLIC VOL/BSA (35-75): 44.5 CM2
BH CV ECHO MEAS - LV MASS(C)D: 127.7 GRAMS
BH CV ECHO MEAS - LV MAX PG: 3.7 MMHG
BH CV ECHO MEAS - LV MEAN PG: 2 MMHG
BH CV ECHO MEAS - LV SYSTOLIC VOL/BSA (12-30): 16.6 CM2
BH CV ECHO MEAS - LV V1 MAX: 96.6 CM/SEC
BH CV ECHO MEAS - LV V1 VTI: 20.5 CM
BH CV ECHO MEAS - LVIDD: 4.6 CM
BH CV ECHO MEAS - LVIDS: 2.6 CM
BH CV ECHO MEAS - LVOT AREA: 3.5 CM2
BH CV ECHO MEAS - LVOT DIAM: 2.1 CM
BH CV ECHO MEAS - LVPWD: 1 CM
BH CV ECHO MEAS - MED PEAK E' VEL: 7.9 CM/SEC
BH CV ECHO MEAS - MV A MAX VEL: 49.7 CM/SEC
BH CV ECHO MEAS - MV DEC TIME: 0.35 MSEC
BH CV ECHO MEAS - MV E MAX VEL: 72.3 CM/SEC
BH CV ECHO MEAS - MV E/A: 1.45
BH CV ECHO MEAS - SI(MOD-SP2): 18.2 ML/M2
BH CV ECHO MEAS - SI(MOD-SP4): 27.9 ML/M2
BH CV ECHO MEAS - SV(LVOT): 71 ML
BH CV ECHO MEAS - SV(MOD-SP2): 34 ML
BH CV ECHO MEAS - SV(MOD-SP4): 52 ML
BH CV ECHO MEAS - TR MAX PG: 21 MMHG
BH CV ECHO MEAS - TR MAX VEL: 229 CM/SEC
BH CV ECHO MEASUREMENTS AVERAGE E/E' RATIO: 6.73
LEFT ATRIUM VOLUME INDEX: 26.7 ML/M2
LEFT ATRIUM VOLUME: 50 ML
MAXIMAL PREDICTED HEART RATE: 139 BPM
STRESS TARGET HR: 118 BPM

## 2023-04-14 PROCEDURE — 93306 TTE W/DOPPLER COMPLETE: CPT | Performed by: INTERNAL MEDICINE

## 2023-04-14 PROCEDURE — 93306 TTE W/DOPPLER COMPLETE: CPT

## 2023-04-19 DIAGNOSIS — F02.818 DEMENTIA ASSOCIATED WITH OTHER UNDERLYING DISEASE WITH BEHAVIORAL DISTURBANCE: ICD-10-CM

## 2023-04-19 RX ORDER — RISPERIDONE 0.5 MG/1
0.5 TABLET ORAL 2 TIMES DAILY
Qty: 180 TABLET | Refills: 1 | Status: SHIPPED | OUTPATIENT
Start: 2023-04-19

## 2023-05-22 ENCOUNTER — TELEPHONE (OUTPATIENT)
Dept: INTERNAL MEDICINE | Facility: CLINIC | Age: 83
End: 2023-05-22

## 2023-05-22 NOTE — TELEPHONE ENCOUNTER
The last we prescribed, we were using 0.5mg in the morning and afternoon with a 1mg dose in the evening/night.

## 2023-05-22 NOTE — TELEPHONE ENCOUNTER
Caller: Jennyfer Urbina    Relationship: Emergency Contact    Best call back number: [phone]        Who are you requesting to speak with (clinical staff, provider,  specific staff member): CLINICAL STAFF    Do you know the name of the person who called: DAUGHTER    What was the call regarding: PATIENT'S DAUGHTER WAS CALLING WITH QUESTION ABOUT THE CORRECT DOSE OF RISPERIDONE.    Do you require a callback: YES

## 2023-05-24 ENCOUNTER — DOCUMENTATION (OUTPATIENT)
Dept: NEUROLOGY | Facility: CLINIC | Age: 83
End: 2023-05-24
Payer: MEDICARE

## 2023-05-24 NOTE — PROGRESS NOTES
Called pt to remind them of upcoming appt.  Spoke with patient's daughter.  Appointment confirmed.

## 2023-05-26 ENCOUNTER — OFFICE VISIT (OUTPATIENT)
Dept: NEUROLOGY | Facility: CLINIC | Age: 83
End: 2023-05-26
Payer: MEDICARE

## 2023-05-26 VITALS — OXYGEN SATURATION: 98 % | WEIGHT: 148 LBS | HEIGHT: 73 IN | BODY MASS INDEX: 19.61 KG/M2

## 2023-05-26 DIAGNOSIS — R41.3 MEMORY DIFFICULTY: ICD-10-CM

## 2023-05-26 DIAGNOSIS — G20 PARKINSON'S DISEASE: Primary | ICD-10-CM

## 2023-05-26 PROCEDURE — 1159F MED LIST DOCD IN RCRD: CPT | Performed by: PSYCHIATRY & NEUROLOGY

## 2023-05-26 PROCEDURE — 99213 OFFICE O/P EST LOW 20 MIN: CPT | Performed by: PSYCHIATRY & NEUROLOGY

## 2023-05-26 PROCEDURE — 1160F RVW MEDS BY RX/DR IN RCRD: CPT | Performed by: PSYCHIATRY & NEUROLOGY

## 2023-05-26 RX ORDER — MEMANTINE HYDROCHLORIDE 10 MG/1
10 TABLET ORAL 2 TIMES DAILY
Qty: 180 TABLET | Refills: 1 | Status: SHIPPED | OUTPATIENT
Start: 2023-05-26

## 2023-05-26 NOTE — PROGRESS NOTES
Subjective   Edy Urbina, 1940, is a male who is being seen today for   Chief Complaint   Patient presents with    Follow-up     Pt. Here for 3 month follow up on memory loss and Parkinsons  Pt. Daughter stated that he has had to have more help with getting dressed and being reminded to eat       HISTORY OF PRESENT ILLNESS: Extended follow-up.  Patient seen for Parkinson's and memory difficulty.  Patient's MMSE is 16/30 which it was previously.  Patient continues the Namenda 10 mg twice daily.  Patient could not tolerate the Aricept.  Patient has had no falls since last seen.  Patient for the Parkinson's and is on Sinemet 25/100, 1 p.o. 3 times daily.  Patient had some orthostasis the last visit but that is improved apparently no new changes in medications or health problems.  Patient denies any falls since last seen in denies any headaches    REVIEW OF SYSTEMS:   GENERAL: Blood pressure 124/48 standing left arm and sitting 128/50 left arm pulse 60.  PULMONARY: No acute respiratory difficulties  CVS: No acute chest pain or palpitation.  Patient is on Eliquis for paroxysmal atrial fibrillation  GASTROINTESTINAL: No acute distress  GENITOURINARY: No acute  distress  GYN: Not applicable  MUSCULOSKELETAL: No acute musculoskeletal symptoms other than as previously noted  HEENT: No acute vision or hearing change.  ENDOCRINE: Not diabetic  PSYCHIATRIC: No acute psychiatric symptoms  HEMATOLOGY: Patient is anemic and patient follows with PCP regarding blood work  SKIN: Patient has a laceration at the top of the left foot which the family had not seen in their check with PCP regarding that to keep it get from getting infected.  No melanoma needed skin review.  Patient follows with dermatologist  Family history positive for stroke    Social history: Patient denies smoking or drug or alcohol use        PHYSICAL EXAMINATION:    GENERAL: Patient has hyperkyphotic gait with minimal festination.  Minimal cogwheeling or  rigidity.  Rest tremor noted in upper extremities right greater than left  CRANIUM: Normal cephalic/atraumatic  HEENT: No acute fundic abnormalities       EYES: No acute fundic abnormalities.  Pupils equal round reactive to light.   fields full to confrontation and pupils equal round reactive       EARS: Tympanic membrane on the right obscured by wax but hears tuning fork bilaterally       THROAT: No acute oropharynx abnormalities and no swallowing difficulties by history       NECK: No bruits/no lymphadenopathy  CHEST: No acute cardiopulmonary abnormalities by auscultation  ABDOMEN: Nondistended  EXTREMITIES: Dorsalis pedis pulse symmetrical  NEURO: Patient alert and follows commands without difficulty  SPEECH: Normal    CRANIAL NERVES: Motor/sensory about the face normal and symmetric.  Patient is somewhat hoarse    MOTOR STRENGTH: Motor strength upper and lower extremities normal  STATION AND GAIT: As above.  Romberg negative  CEREBELLAR: Finger-nose and heel-to-shin normal with some intentional tremor seen  SENSORY: Decreased pin and vibration distal to proximal from the lower extremities to ankles bilaterally otherwise normal pin and vibration throughout  REFLEXES: Decreased ankle jerk versus knee jerk and biceps bilaterally without clonus or [place]      ASSESSMENT AND PLAN: Patient with history of Parkinson's and memory difficulty.  Patient seems stable on both at this point  Continue present treatment plan Sinemet 25/100, 1 p.o. 3 times daily and Namenda 10 mg p.o. twice daily.  Patient does not drive.  Safety precautions and fall precautions reviewed.  I spent 25 minutes with this patient with counseling exam and review of records.      Diagnoses and all orders for this visit:    1. Memory difficulty        Dictated utilizing Dragon voice recognition software

## 2023-05-26 NOTE — PATIENT INSTRUCTIONS
Patient to continue fall precautions and safety precautions as discussed.  Patient working around hot fire/stove/grill/water.  Patient not to be climbing or using sharp cutting tools.  Patient to follow-up with dermatologist regarding skin lesions.  Patient to get with PCP about following blood work

## 2023-05-30 ENCOUNTER — OFFICE VISIT (OUTPATIENT)
Dept: INTERNAL MEDICINE | Facility: CLINIC | Age: 83
End: 2023-05-30

## 2023-05-30 VITALS
SYSTOLIC BLOOD PRESSURE: 104 MMHG | BODY MASS INDEX: 19.12 KG/M2 | HEIGHT: 73 IN | DIASTOLIC BLOOD PRESSURE: 50 MMHG | HEART RATE: 50 BPM | TEMPERATURE: 97.5 F | WEIGHT: 144.3 LBS | OXYGEN SATURATION: 98 %

## 2023-05-30 DIAGNOSIS — R23.4 FISSURE IN SKIN OF FOOT: ICD-10-CM

## 2023-05-30 DIAGNOSIS — I87.2 VENOUS INSUFFICIENCY: ICD-10-CM

## 2023-05-30 DIAGNOSIS — L08.9 SKIN INFECTION: ICD-10-CM

## 2023-05-30 DIAGNOSIS — R60.0 LOWER EXTREMITY EDEMA: Primary | ICD-10-CM

## 2023-05-30 PROCEDURE — 99213 OFFICE O/P EST LOW 20 MIN: CPT | Performed by: NURSE PRACTITIONER

## 2023-05-30 RX ORDER — MUPIROCIN CALCIUM 20 MG/G
1 CREAM TOPICAL 3 TIMES DAILY
Qty: 30 G | Refills: 0 | Status: SHIPPED | OUTPATIENT
Start: 2023-05-30

## 2023-05-30 RX ORDER — DOXYCYCLINE HYCLATE 100 MG/1
100 CAPSULE ORAL 2 TIMES DAILY
Qty: 14 CAPSULE | Refills: 0 | Status: SHIPPED | OUTPATIENT
Start: 2023-05-30 | End: 2023-06-06

## 2023-05-30 NOTE — PROGRESS NOTES
Chief Complaint   Patient presents with    Foot Swelling     Swelling gradually getting worse and his feet have cuts in the creases where the swelling is.         HPI     Edy Urbina is a 82 y.o. male presents for lower extremity edema. His daughter is with him and is concerned as lower extremity edema has worsened over the last few months. She also notes he has a skin wound to left lower extremity. He is unable to wear compression stockings due to edema.          ROS:  Review of Systems   Constitutional:  Negative for fatigue and fever.   Respiratory:  Negative for cough and shortness of breath.    Cardiovascular:  Positive for leg swelling. Negative for chest pain and palpitations.        reports that he quit smoking about 20 years ago. His smoking use included cigarettes. He has been exposed to tobacco smoke. He has never used smokeless tobacco. He reports that he does not drink alcohol and does not use drugs.    Current Outpatient Medications:     apixaban (ELIQUIS) 2.5 MG tablet tablet, Take 1 tablet by mouth Every 12 (Twelve) Hours. Indications: Atrial Fibrillation, Disp: 180 tablet, Rfl: 3    carbidopa-levodopa (SINEMET)  MG per tablet, Take 1 tablet by mouth 3 (Three) Times a Day., Disp: 270 tablet, Rfl: 1    memantine (NAMENDA) 10 MG tablet, Take 1 tablet by mouth 2 (Two) Times a Day. TAKE 1 TABLET TWICE DAILY, Disp: 180 tablet, Rfl: 1    Metoprolol Tartrate 75 MG tablet, Take 75 mg by mouth Every 12 (Twelve) Hours., Disp: 180 tablet, Rfl: 3    Multiple Vitamins-Minerals (MULTIVITAMIN MEN 50+) tablet, Take 1 tablet by mouth Daily., Disp: , Rfl:     risperiDONE (risperDAL) 0.5 MG tablet, Take 1 tablet by mouth 2 (Two) Times a Day., Disp: 180 tablet, Rfl: 1    risperiDONE (risperDAL) 1 MG tablet, Take 1 tablet by mouth Every Evening., Disp: 90 tablet, Rfl: 1    tamsulosin (FLOMAX) 0.4 MG capsule 24 hr capsule, Take 1 capsule by mouth Daily., Disp: 30 capsule, Rfl: 2    doxycycline (VIBRAMYCIN) 100  "MG capsule, Take 1 capsule by mouth 2 (Two) Times a Day for 7 days., Disp: 14 capsule, Rfl: 0    mupirocin (BACTROBAN) 2 % cream, Apply 1 application topically to the appropriate area as directed 3 (Three) Times a Day., Disp: 30 g, Rfl: 0    OBJECTIVE:  /50 (BP Location: Left arm, Patient Position: Sitting, Cuff Size: Adult)   Pulse 50   Temp 97.5 °F (36.4 °C) (Tympanic)   Ht 185.4 cm (72.99\")   Wt 65.5 kg (144 lb 4.8 oz)   SpO2 98%   BMI 19.04 kg/m²    Physical Exam  Constitutional:       General: He is not in acute distress.  Cardiovascular:      Rate and Rhythm: Normal rate and regular rhythm.      Pulses:           Dorsalis pedis pulses are 1+ on the right side and 1+ on the left side.      Heart sounds: Normal heart sounds.   Pulmonary:      Effort: Pulmonary effort is normal.      Breath sounds: Normal breath sounds.   Musculoskeletal:      Right lower leg: 3+ Edema present.      Left lower leg: 3+ Edema present.        Feet:    Feet:      Left foot:      Skin integrity: Dry skin and fissure present.      Comments: Linear skin fissure about 7 cm in length at skin fold from foot to anterior aspect of shin, mild erythema localized to wound which is scabbed over, no abscess or edema   Renal Function Panel (02/07/2023 09:00)  Adult Transthoracic Echo Complete W/ Cont if Necessary Per Protocol (04/14/2023 13:51)    ASSESSMENT/PLAN:     Diagnoses and all orders for this visit:    1. Lower extremity edema (Primary)  2. Venous insufficiency  -     US venous doppler lower extremity bilateral (duplex); Future  -     Compression Stockings       -      Ambulatory Referral to Vascular Surgery   Given worsening edema, obtain ultrasound and refer to Vascular. Orders for compression stockings sent to [facility] as able. Will not prescribe diuretics given history of orthostatic hypotension and low blood pressure today in the office. Recent labs and echo reviewed.     3. Skin infection  4. " Fissure in skin of foot  -     doxycycline (VIBRAMYCIN) 100 MG capsule; Take 1 capsule by mouth 2 (Two) Times a Day for 7 days.  Dispense: 14 capsule; Refill: 0  -     mupirocin (BACTROBAN) 2 % cream; Apply 1 application topically to the appropriate area as directed 3 (Three) Times a Day.  Dispense: 30 g; Refill: 0   Does not apprear to be cellulitis although given concerns for vascular insufficiency, will prescribe antibiotics. Discussed the importance of good skin care.     BMI is within normal parameters. No other follow-up for BMI required.    An After Visit Summary was printed and given to the patient at discharge.  Return if symptoms worsen or fail to improve.          DARLING Arora 5/30/2023   Electronically signed.

## 2023-06-02 ENCOUNTER — HOSPITAL ENCOUNTER (OUTPATIENT)
Dept: ULTRASOUND IMAGING | Facility: HOSPITAL | Age: 83
Discharge: HOME | End: 2023-06-02

## 2023-06-02 ENCOUNTER — TELEPHONE (OUTPATIENT)
Dept: VASCULAR SURGERY | Facility: CLINIC | Age: 83
End: 2023-06-02

## 2023-06-02 DIAGNOSIS — I87.2 VENOUS INSUFFICIENCY: ICD-10-CM

## 2023-06-02 DIAGNOSIS — R60.0 LOWER EXTREMITY EDEMA: ICD-10-CM

## 2023-06-02 PROCEDURE — 93970 EXTREMITY STUDY: CPT

## 2023-06-08 ENCOUNTER — TELEPHONE (OUTPATIENT)
Dept: PODIATRY | Facility: CLINIC | Age: 83
End: 2023-06-08
Payer: MEDICARE

## 2023-06-08 NOTE — TELEPHONE ENCOUNTER
Called patient to confirmed appointment for 06/09 @ 1400 with Dr. Lewis. I had to leave a voicemail for patient to call back to confirm or to reschedule if needed.   Imaging was done and in epic

## 2023-06-09 ENCOUNTER — OFFICE VISIT (OUTPATIENT)
Dept: VASCULAR SURGERY | Facility: CLINIC | Age: 83
End: 2023-06-09
Payer: MEDICARE

## 2023-06-09 VITALS
HEART RATE: 64 BPM | OXYGEN SATURATION: 96 % | DIASTOLIC BLOOD PRESSURE: 64 MMHG | SYSTOLIC BLOOD PRESSURE: 116 MMHG | BODY MASS INDEX: 18.9 KG/M2 | RESPIRATION RATE: 16 BRPM | HEIGHT: 73 IN | WEIGHT: 142.6 LBS

## 2023-06-09 DIAGNOSIS — R60.0 EDEMA OF BOTH LOWER EXTREMITIES: Primary | ICD-10-CM

## 2023-06-09 DIAGNOSIS — I10 ESSENTIAL HYPERTENSION: ICD-10-CM

## 2023-06-09 DIAGNOSIS — I87.323 CHRONIC VENOUS HYPERTENSION WITH INFLAMMATION INVOLVING BOTH SIDES: ICD-10-CM

## 2023-06-09 DIAGNOSIS — Z79.01 CHRONIC ANTICOAGULATION: ICD-10-CM

## 2023-06-09 DIAGNOSIS — I48.0 PAROXYSMAL ATRIAL FIBRILLATION: ICD-10-CM

## 2023-06-09 DIAGNOSIS — I87.2 VENOUS (PERIPHERAL) INSUFFICIENCY: ICD-10-CM

## 2023-06-09 NOTE — PROGRESS NOTES
06/09/2023      Torey Amato DO  [address]  [facility] [address]        Edy Urbina  1940    Chief Complaint   Patient presents with   • Leg Swelling     New patient        Dear Torey Amato DO:    HPI     I had the pleasure of seeing your patient in the office today for follow up.  As you recall, the patient is a 82 y.o. male who we are currently following for history of lower extremity edema and venous insufficiency.  He had been following here previously and was last seen in the office in July 2021.  Previous venous duplex had shown significant reflux in the bilateral greater saphenous veins.  However with conservative management including compression, leg elevation, and exercise his symptoms have been well controlled and he was doing well.  Unfortunately he has not followed up here in the office since July 2021.  Since that time he stopped wearing his compression and has also been spending a lot of time seated with his feet in dependent position and so his edema has worsened.  He notes chronic edema that extends from ankle to knee.  It is somewhat improved with leg elevation and lying supine.  More recently he has restarted wearing compression in the last few days and notes that this does somewhat help his edema but the compression socks he was wearing had been causing some areas of constriction around the crease of his ankle with some skin irritation.  He otherwise denies any arterial symptoms with no claudication or rest pain.  He has no other acute complaints today.      Review of Systems   Constitutional: Negative.  Negative for activity change, appetite change, chills, diaphoresis, fatigue and fever.   HENT: Negative.  Negative for congestion, sneezing, sore throat and trouble swallowing.    Eyes: Negative.  Negative for visual disturbance.   Respiratory: Negative.  Negative for chest tightness and shortness of breath.    Cardiovascular: Positive for  "leg swelling (Significantly improved with the continued use of compression.). Negative for chest pain and palpitations.   Gastrointestinal: Negative.  Negative for abdominal distention, abdominal pain, nausea and vomiting.   Endocrine: Negative.    Genitourinary: Negative.    Musculoskeletal: Negative.    Skin: Negative.         Varicose veins more so on the right than the left lower leg.   Allergic/Immunologic: Negative.    Neurological: Negative.    Hematological: Negative.    Psychiatric/Behavioral: Negative.        /64 (BP Location: Left arm, Patient Position: Sitting, Cuff Size: Adult)   Pulse 64   Resp 16   Ht 185.4 cm (72.99\")   Wt 64.7 kg (142 lb 9.6 oz)   SpO2 96%   BMI 18.82 kg/m²   Physical Exam  Vitals reviewed.   Constitutional:       Appearance: Normal appearance.   HENT:      Head: Normocephalic and atraumatic.      Nose: Nose normal.      Mouth/Throat:      Mouth: Mucous membranes are moist.   Eyes:      Extraocular Movements: Extraocular movements intact.      Pupils: Pupils are equal, round, and reactive to light.   Cardiovascular:      Rate and Rhythm: Normal rate and regular rhythm.      Pulses: Normal pulses.           Carotid pulses are 2+ on the right side and 2+ on the left side.       Radial pulses are 2+ on the right side and 2+ on the left side.        Femoral pulses are 2+ on the right side and 2+ on the left side.       Popliteal pulses are 2+ on the right side and 2+ on the left side.        Dorsalis pedis pulses are 2+ on the right side and 2+ on the left side.        Posterior tibial pulses are 2+ on the right side and 2+ on the left side.      Comments: Edema of the bilateral lower extremities from ankle to knee.  He does have multiple small varicosities and telangiectasias to the bilateral lower legs.  Pulmonary:      Effort: Pulmonary effort is normal. No respiratory distress.   Abdominal:      General: There is no distension.      Palpations: Abdomen is soft. There is " no mass.      Tenderness: There is no abdominal tenderness.   Musculoskeletal:         General: No swelling. Normal range of motion.      Cervical back: Normal range of motion and neck supple.      Right lower leg: Edema present.      Left lower leg: Edema present.      Comments: Minimal bilateral lower extremity edema from ankle to knee.   Skin:     General: Skin is warm and dry.      Capillary Refill: Capillary refill takes less than 2 seconds.   Neurological:      General: No focal deficit present.      Mental Status: He is alert and oriented to person, place, and time.   Psychiatric:         Mood and Affect: Mood normal.         Behavior: Behavior normal.         Thought Content: Thought content normal.         Judgment: Judgment normal.         DIAGNOSTIC DATA:    US Venous Doppler Lower Extremity Bilateral (duplex)    Result Date: 6/2/2023  Narrative: US VENOUS DOPPLER LOWER EXTREMITY BILATERAL (DUPLEX)- 6/2/2023 9:48 AM CDT  HISTORY:  lower extremity edema; R60.0-Localized edema; I87.2-Venous insufficiency (chronic) (peripheral)  COMPARISON: NONE  FINDINGS:  Transverse and longitudinal grayscale and color Doppler sonographic images of the bilateral lower extremities were obtained. Spectral analysis was also obtained.  There is normal phasic flow and compressibility of the common femoral, superficial femoral, deep femoral and popliteal veins with expected augmentation response. Greater saphenous veins are decompressed at the level of the saphenofemoral junctions. The peroneal, anterior tibial, and posterior tibial veins in the calves appear patent and free of thrombus.  Soft tissue edema is noted in both lower extremities. No soft tissue abscess on the provided images.      Impression:  1.  No evidence of DVT in either lower extremity. 2.  Bilateral lower extremity tissue edema without evidence of abscess. This report was finalized on 06/02/2023 10:38 by Dr. Jessee Lopez MD.      Patient Active Problem List    Diagnosis   • Anemia   • Lewy body dementia without behavioral disturbance   • Cachexia   • Chronic atrial fibrillation with rapid ventricular response   • Acute metabolic encephalopathy   • Atrial fibrillation   • Moderate malnutrition   • Parkinson's disease         ICD-10-CM ICD-9-CM   1. Edema of both lower extremities  R60.0 782.3   2. Chronic venous hypertension with inflammation involving both sides  I87.323 459.32   3. Venous (peripheral) insufficiency  I87.2 459.81   4. Essential hypertension  I10 401.9   5. Paroxysmal atrial fibrillation  I48.0 427.31   6. Chronic anticoagulation  Z79.01 V58.61       Lab Frequency Next Occurrence   US Venous Doppler Lower Extremity Bilateral (duplex) Once 06/23/2023       PLAN: After thoroughly evaluating Edy Urbina, I believe the best course of action is to initially remain conservative from a vascular standpoint.  He has known history of venous insufficiency which had previously been fairly well controlled with the daily use of compression, leg elevation, and exercise.  However he stopped following here in the office and had stopped wearing his compression.  He has now had recurrence of his lower extremity edema which is worse with prolonged standing and somewhat improved with leg elevation and lying supine.  Recent lower extremity venous duplex did not show any evidence of DVT.  He has started to wear compression again recently and notes some mild improvement in his lower extremity edema.  However his daughter with him today does note that he sits mainly with his feet in dependent position and does not elevate his legs.  I have ordered a venous duplex with insufficiency study for further evaluation as we had not had 1 in about 2 years.  I will see him back here in 2 weeks with that completed to review it and make further recommendations.  Otherwise in the meantime I recommend he continue with compression in the 20 to 30 mmHg range and also leg elevation is much  as possible to further help reduce edema.  The patient is to continue taking their medications as previously discussed.   I did discuss vascular risk factors as they pertain to the progression of vascular disease including controlling hypertension.  This is controlled on his current regimen.  BMI is within normal parameters. No other follow-up for BMI required. This was all discussed in full with complete understanding. Thank you for allowing me to participate in the care of your patient.  Please do not hesitate to call with any questions or concerns.  We will keep you aware of any further encounters with Edy Urbina.      Sincerely Yours,      Bright Lewis MD

## 2023-06-10 DIAGNOSIS — N40.0 BENIGN PROSTATIC HYPERPLASIA WITHOUT LOWER URINARY TRACT SYMPTOMS: ICD-10-CM

## 2023-06-12 RX ORDER — TAMSULOSIN HYDROCHLORIDE 0.4 MG/1
1 CAPSULE ORAL DAILY
Qty: 30 CAPSULE | Refills: 1 | Status: SHIPPED | OUTPATIENT
Start: 2023-06-12

## 2023-07-27 ENCOUNTER — TELEPHONE (OUTPATIENT)
Dept: VASCULAR SURGERY | Facility: CLINIC | Age: 83
End: 2023-07-27
Payer: MEDICARE

## 2023-07-27 NOTE — TELEPHONE ENCOUNTER
I left a voicemail for patient stating I was calling to reschedule appt with Dr. Lewis and that he could call back anytime to r/s it.

## 2023-07-28 ENCOUNTER — OFFICE VISIT (OUTPATIENT)
Dept: INTERNAL MEDICINE | Facility: CLINIC | Age: 83
End: 2023-07-28
Payer: MEDICARE

## 2023-07-28 VITALS
WEIGHT: 141 LBS | DIASTOLIC BLOOD PRESSURE: 47 MMHG | RESPIRATION RATE: 16 BRPM | HEART RATE: 48 BPM | BODY MASS INDEX: 18.69 KG/M2 | SYSTOLIC BLOOD PRESSURE: 97 MMHG | OXYGEN SATURATION: 97 % | HEIGHT: 73 IN

## 2023-07-28 DIAGNOSIS — R00.1 BRADYCARDIA: ICD-10-CM

## 2023-07-28 DIAGNOSIS — R23.4 FISSURE IN SKIN OF FOOT: ICD-10-CM

## 2023-07-28 DIAGNOSIS — N40.0 BENIGN PROSTATIC HYPERPLASIA WITHOUT LOWER URINARY TRACT SYMPTOMS: ICD-10-CM

## 2023-07-28 DIAGNOSIS — I48.0 PAROXYSMAL ATRIAL FIBRILLATION: Primary | ICD-10-CM

## 2023-07-28 DIAGNOSIS — R60.0 LOWER EXTREMITY EDEMA: ICD-10-CM

## 2023-07-28 DIAGNOSIS — I87.2 VENOUS INSUFFICIENCY: ICD-10-CM

## 2023-07-28 DIAGNOSIS — L08.9 SKIN INFECTION: ICD-10-CM

## 2023-07-28 PROCEDURE — 99214 OFFICE O/P EST MOD 30 MIN: CPT | Performed by: NURSE PRACTITIONER

## 2023-07-28 RX ORDER — TAMSULOSIN HYDROCHLORIDE 0.4 MG/1
1 CAPSULE ORAL DAILY
Qty: 30 CAPSULE | Refills: 3 | Status: SHIPPED | OUTPATIENT
Start: 2023-07-28

## 2023-07-28 RX ORDER — METOPROLOL TARTRATE 50 MG/1
50 TABLET, FILM COATED ORAL 2 TIMES DAILY
Qty: 60 TABLET | Refills: 3 | Status: SHIPPED | OUTPATIENT
Start: 2023-07-28

## 2023-07-28 RX ORDER — MUPIROCIN CALCIUM 20 MG/G
1 CREAM TOPICAL 3 TIMES DAILY
Qty: 30 G | Refills: 3 | Status: SHIPPED | OUTPATIENT
Start: 2023-07-28 | End: 2023-07-31

## 2023-07-28 NOTE — PROGRESS NOTES
Chief Complaint   Patient presents with    Foot Swelling    Sore     Top of left foot          HPI     Edy Urbina is a 82 y.o. male presents for follow up. He is doing well overall. Edema has improved some and he wears compression stocks daily. He does not elevate legs as much as he could.   He continues metoprolol BID. His heart rate is lower today and he appears tired though he denies symptoms.          ROS:  Review of Systems   Constitutional:  Positive for fatigue. Negative for fever.   Respiratory: Negative.     Cardiovascular: Negative.  Positive for leg swelling. Negative for chest pain and palpitations.        reports that he quit smoking about 21 years ago. His smoking use included cigarettes. He has been exposed to tobacco smoke. He has never used smokeless tobacco. He reports that he does not drink alcohol and does not use drugs.    Current Outpatient Medications:     apixaban (Eliquis) 2.5 MG tablet tablet, Take 1 tablet by mouth Every 12 (Twelve) Hours., Disp: 180 tablet, Rfl: 3    carbidopa-levodopa (SINEMET)  MG per tablet, Take 1 tablet by mouth 3 (Three) Times a Day., Disp: 270 tablet, Rfl: 1    memantine (NAMENDA) 10 MG tablet, Take 1 tablet by mouth 2 (Two) Times a Day. TAKE 1 TABLET TWICE DAILY, Disp: 180 tablet, Rfl: 1    Multiple Vitamins-Minerals (MULTIVITAMIN MEN 50+) tablet, Take 1 tablet by mouth Daily., Disp: , Rfl:     mupirocin (BACTROBAN) 2 % cream, Apply 1 application  topically to the appropriate area as directed 3 (Three) Times a Day., Disp: 30 g, Rfl: 3    risperiDONE (risperDAL) 0.5 MG tablet, Take 1 tablet by mouth 2 (Two) Times a Day., Disp: 180 tablet, Rfl: 1    risperiDONE (risperDAL) 1 MG tablet, Take 1 tablet by mouth Every Evening., Disp: 90 tablet, Rfl: 1    tamsulosin (FLOMAX) 0.4 MG capsule 24 hr capsule, Take 1 capsule by mouth Daily., Disp: 30 capsule, Rfl: 3    metoprolol tartrate (LOPRESSOR) 50 MG tablet, Take 1 tablet by mouth 2 (Two) Times a Day., Disp:  "60 tablet, Rfl: 3    OBJECTIVE:  BP 97/47 (BP Location: Left arm, Patient Position: Sitting, Cuff Size: Other (Comment)) Comment (Cuff Size): automated  Pulse (!) 48   Resp 16   Ht 185.4 cm (72.99\")   Wt 64 kg (141 lb)   SpO2 97%   BMI 18.61 kg/m²    Physical Exam  Constitutional:       General: He is not in acute distress.  Cardiovascular:      Rate and Rhythm: Regular rhythm. Bradycardia present.      Heart sounds: Normal heart sounds.   Pulmonary:      Effort: Pulmonary effort is normal.      Breath sounds: Normal breath sounds.   Musculoskeletal:      Right lower leg: Edema present.      Left lower leg: Edema (improved) present.       ASSESSMENT/PLAN:     Diagnoses and all orders for this visit:    1. Paroxysmal atrial fibrillation (Primary)  2. Bradycardia  -     metoprolol tartrate (LOPRESSOR) 50 MG tablet; Take 1 tablet by mouth 2 (Two) Times a Day.  Dispense: 60 tablet; Refill: 3  Decrease metoprolol dose to 50 mg BID and return in 7-10 days for recheck.     3. Skin infection  4. Fissure in skin of foot  -     mupirocin (BACTROBAN) 2 % cream; Apply 1 application  topically to the appropriate area as directed 3 (Three) Times a Day.  Dispense: 30 g; Refill: 3  Improved with mupirocin    5. Benign prostatic hyperplasia without lower urinary tract symptoms  -     tamsulosin (FLOMAX) 0.4 MG capsule 24 hr capsule; Take 1 capsule by mouth Daily.  Dispense: 30 capsule; Refill: 3    6. Lower extremity edema   7. Venous Insufficiency   Improving with compression stocking. Seen by vascular recently and ultrasound shows venous insufficiency without DVT. Continues lifestyle modifications at this time.            BMI is within normal parameters. No other follow-up for BMI required.       An After Visit Summary was printed and given to the patient at discharge.  Return in about 10 days (around 8/7/2023) for Recheck with me .          DARLING Arora 7/28/2023   Electronically signed.  "

## 2023-07-31 ENCOUNTER — TELEPHONE (OUTPATIENT)
Dept: INTERNAL MEDICINE | Facility: CLINIC | Age: 83
End: 2023-07-31
Payer: MEDICARE

## 2023-07-31 DIAGNOSIS — R23.4 FISSURE IN SKIN OF FOOT: Primary | ICD-10-CM

## 2023-08-21 ENCOUNTER — HOSPITAL ENCOUNTER (EMERGENCY)
Facility: HOSPITAL | Age: 83
Discharge: HOME | End: 2023-08-21
Attending: EMERGENCY MEDICINE | Admitting: EMERGENCY MEDICINE
Payer: MEDICARE

## 2023-08-21 VITALS
SYSTOLIC BLOOD PRESSURE: 112 MMHG | HEIGHT: 73 IN | HEART RATE: 62 BPM | DIASTOLIC BLOOD PRESSURE: 62 MMHG | OXYGEN SATURATION: 98 % | RESPIRATION RATE: 18 BRPM | TEMPERATURE: 98.5 F | WEIGHT: 142 LBS | BODY MASS INDEX: 18.82 KG/M2

## 2023-08-21 DIAGNOSIS — R53.1 GENERALIZED WEAKNESS: Primary | ICD-10-CM

## 2023-08-21 LAB
ALBUMIN SERPL-MCNC: 5.1 G/DL (ref 3.5–5.2)
ALBUMIN/GLOB SERPL: 3.4 G/DL
ALP SERPL-CCNC: 59 U/L (ref 39–117)
ALT SERPL W P-5'-P-CCNC: 12 U/L (ref 1–41)
ANION GAP SERPL CALCULATED.3IONS-SCNC: 9 MMOL/L (ref 5–15)
AST SERPL-CCNC: 34 U/L (ref 1–40)
BASOPHILS # BLD AUTO: 0.06 10*3/MM3 (ref 0–0.2)
BASOPHILS NFR BLD AUTO: 1 % (ref 0–1.5)
BILIRUB SERPL-MCNC: 0.7 MG/DL (ref 0–1.2)
BILIRUB UR QL STRIP: NEGATIVE
BUN SERPL-MCNC: 20 MG/DL (ref 8–23)
BUN/CREAT SERPL: 24.4 (ref 7–25)
CALCIUM SPEC-SCNC: 9.6 MG/DL (ref 8.6–10.5)
CHLORIDE SERPL-SCNC: 102 MMOL/L (ref 98–107)
CLARITY UR: CLEAR
CO2 SERPL-SCNC: 29 MMOL/L (ref 22–29)
COLOR UR: YELLOW
CREAT SERPL-MCNC: 0.82 MG/DL (ref 0.76–1.27)
DEPRECATED RDW RBC AUTO: 45.1 FL (ref 37–54)
EGFRCR SERPLBLD CKD-EPI 2021: 88.3 ML/MIN/1.73
EOSINOPHIL # BLD AUTO: 0.12 10*3/MM3 (ref 0–0.4)
EOSINOPHIL NFR BLD AUTO: 2 % (ref 0.3–6.2)
ERYTHROCYTE [DISTWIDTH] IN BLOOD BY AUTOMATED COUNT: 13.8 % (ref 12.3–15.4)
GLOBULIN UR ELPH-MCNC: 1.5 GM/DL
GLUCOSE SERPL-MCNC: 82 MG/DL (ref 65–99)
GLUCOSE UR STRIP-MCNC: NEGATIVE MG/DL
HCT VFR BLD AUTO: 39.1 % (ref 37.5–51)
HGB BLD-MCNC: 11.8 G/DL (ref 13–17.7)
HGB UR QL STRIP.AUTO: NEGATIVE
HOLD SPECIMEN: NORMAL
IMM GRANULOCYTES # BLD AUTO: 0.02 10*3/MM3 (ref 0–0.05)
IMM GRANULOCYTES NFR BLD AUTO: 0.3 % (ref 0–0.5)
KETONES UR QL STRIP: ABNORMAL
LEUKOCYTE ESTERASE UR QL STRIP.AUTO: NEGATIVE
LYMPHOCYTES # BLD AUTO: 1.32 10*3/MM3 (ref 0.7–3.1)
LYMPHOCYTES NFR BLD AUTO: 22.4 % (ref 19.6–45.3)
MCH RBC QN AUTO: 26.7 PG (ref 26.6–33)
MCHC RBC AUTO-ENTMCNC: 30.2 G/DL (ref 31.5–35.7)
MCV RBC AUTO: 88.5 FL (ref 79–97)
MONOCYTES # BLD AUTO: 0.68 10*3/MM3 (ref 0.1–0.9)
MONOCYTES NFR BLD AUTO: 11.5 % (ref 5–12)
NEUTROPHILS NFR BLD AUTO: 3.69 10*3/MM3 (ref 1.7–7)
NEUTROPHILS NFR BLD AUTO: 62.8 % (ref 42.7–76)
NITRITE UR QL STRIP: NEGATIVE
NRBC BLD AUTO-RTO: 0 /100 WBC (ref 0–0.2)
PH UR STRIP.AUTO: 7 [PH] (ref 5–8)
PLATELET # BLD AUTO: 188 10*3/MM3 (ref 140–450)
PMV BLD AUTO: 10.3 FL (ref 6–12)
POTASSIUM SERPL-SCNC: 4.1 MMOL/L (ref 3.5–5.2)
PROT SERPL-MCNC: 6.6 G/DL (ref 6–8.5)
PROT UR QL STRIP: NEGATIVE
RBC # BLD AUTO: 4.42 10*6/MM3 (ref 4.14–5.8)
SODIUM SERPL-SCNC: 140 MMOL/L (ref 136–145)
SP GR UR STRIP: 1.02 (ref 1–1.03)
UROBILINOGEN UR QL STRIP: ABNORMAL
WBC NRBC COR # BLD: 5.89 10*3/MM3 (ref 3.4–10.8)
WHOLE BLOOD HOLD COAG: NORMAL
WHOLE BLOOD HOLD SPECIMEN: NORMAL

## 2023-08-21 PROCEDURE — 81003 URINALYSIS AUTO W/O SCOPE: CPT | Performed by: EMERGENCY MEDICINE

## 2023-08-21 PROCEDURE — 80053 COMPREHEN METABOLIC PANEL: CPT | Performed by: EMERGENCY MEDICINE

## 2023-08-21 PROCEDURE — 99283 EMERGENCY DEPT VISIT LOW MDM: CPT

## 2023-08-21 PROCEDURE — 85025 COMPLETE CBC W/AUTO DIFF WBC: CPT | Performed by: EMERGENCY MEDICINE

## 2023-08-21 RX ADMIN — SODIUM CHLORIDE 1000 ML: 9 INJECTION, SOLUTION INTRAVENOUS at 15:46

## 2023-08-21 NOTE — ED PROVIDER NOTES
Subjective   History of Present Illness  Patient is 82 years old who came the ER complaint of generalized weakness and fatigue.    Weakness - Generalized  Severity:  Moderate  Onset quality:  Gradual  Duration:  5 days  Timing:  Constant  Progression:  Worsening  Chronicity:  New  Context: not alcohol use, not allergies, not change in medication, not drug use, not increased activity, not stress and not urinary tract infection    Relieved by:  Nothing  Worsened by:  Nothing  Ineffective treatments:  None tried  Associated symptoms: lethargy and nausea    Associated symptoms: no abdominal pain, no anorexia, no arthralgias, no ataxia, no chest pain, no cough, no difficulty walking, no dizziness, no dysphagia, no numbness in extremities, no falls, no frequency, no headaches, no loss of consciousness, no seizures, no stroke symptoms, no vision change and no vomiting    Risk factors: no anemia, no congestive heart failure and no family hx of stroke      Review of Systems   Constitutional: Negative.    HENT: Negative.     Respiratory:  Negative for cough.    Cardiovascular:  Negative for chest pain.   Gastrointestinal:  Positive for nausea. Negative for abdominal distention, abdominal pain, anorexia, dysphagia and vomiting.   Endocrine: Negative.    Genitourinary: Negative.  Negative for frequency.   Musculoskeletal: Negative.  Negative for arthralgias, back pain, falls and neck pain.   Skin:  Negative for color change and pallor.   Neurological:  Negative for dizziness, seizures, loss of consciousness, syncope, weakness, light-headedness, numbness and headaches.   Hematological: Negative.  Does not bruise/bleed easily.   All other systems reviewed and are negative.    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     Varicose vein of leg 06/05/2019    Varicose veins  of legs        No Known Allergies    Past Surgical History:   Procedure Laterality Date    EYE SURGERY      HERNIA REPAIR Left     INGUINAL HERNIA REPAIR Right 2019    Procedure: OPEN RIGHT INGUINAL HERNIA REPAIR WITH MESH;  Surgeon: Alesia Mora MD;  Location: [facility] OR;  Service: General    PROSTATECTOMY  2011    RALP       Family History   Problem Relation Age of Onset    Stroke Mother     Cancer Father     Heart disease Son        Social History     Socioeconomic History    Marital status:    Tobacco Use    Smoking status: Former     Years: 50.00     Types: Cigarettes     Quit date: 2002     Years since quittin.2     Passive exposure: Past    Smokeless tobacco: Never   Vaping Use    Vaping Use: Never used   Substance and Sexual Activity    Alcohol use: No     Comment: QUIT DRINKING IN     Drug use: No    Sexual activity: Defer           Objective   Physical Exam  Vitals and nursing note reviewed. Exam conducted with a chaperone present.   Constitutional:       General: He is awake. He is not in acute distress.     Appearance: Normal appearance. He is well-developed. He is not toxic-appearing.   HENT:      Head: Normocephalic and atraumatic.      Nose:      Right Nostril: No epistaxis.      Left Nostril: No epistaxis.   Eyes:      General: Lids are normal. No scleral icterus.     Conjunctiva/sclera: Conjunctivae normal.      Pupils: Pupils are equal, round, and reactive to light.   Neck:      Vascular: No hepatojugular reflux or JVD.   Cardiovascular:      Rate and Rhythm: Normal rate and regular rhythm.      Chest Wall: PMI is not displaced.      Pulses: Normal pulses. No decreased pulses.      Heart sounds: Normal heart sounds. No murmur heard.  Pulmonary:      Effort: Pulmonary effort is normal. No accessory muscle usage or respiratory distress.      Breath sounds: Normal breath sounds. No decreased breath sounds or wheezing.   Abdominal:      General: Abdomen is flat. Bowel  sounds are normal. There is no distension or abdominal bruit.      Palpations: Abdomen is soft. There is no shifting dullness, fluid wave, mass or pulsatile mass.      Tenderness: There is no abdominal tenderness. There is no right CVA tenderness, left CVA tenderness, guarding or rebound.      Hernia: No hernia is present.   Musculoskeletal:         General: Normal range of motion.      Cervical back: Normal range of motion and neck supple. No rigidity.      Right lower leg: No edema.      Left lower leg: No edema.   Skin:     General: Skin is warm and dry.      Capillary Refill: Capillary refill takes less than 2 seconds.      Coloration: Skin is not cyanotic, jaundiced, mottled or pale.   Neurological:      General: No focal deficit present.      Mental Status: He is oriented to person, place, and time. Mental status is at baseline. He is confused.      GCS: GCS eye subscore is 4. GCS verbal subscore is 5. GCS motor subscore is 6.      Cranial Nerves: Cranial nerves 2-12 are intact. No cranial nerve deficit.      Sensory: Sensation is intact.      Motor: Motor function is intact. No weakness or atrophy.      Deep Tendon Reflexes: Reflexes are normal and symmetric.      Comments: Patient follows commands and moves extremities but slowly.  Goes back to sleep.   Psychiatric:         Behavior: Behavior normal. Behavior is cooperative.       Procedures           ED Course  ED Course as of 08/21/23 1731   Mon Aug 21, 2023   1727 Patient with generalized weakness is got underlying history of dementia.  Moving all 4 extremities does not any focal deficits just feels weak and gait is disturbed because of his ongoing disease process the family states that he lives in an assisted living and walks with a walker.  I have told him we will have  look into getting physical therapy arranged talk to charge nurse about that.  Otherwise we will discharge him home today there is no clinical indication admitted to the  hospital today. [TS]      ED Course User Index  [TS] Jeff Siu MD                                           Medical Decision Making  Generalized weakness differential could be neuromuscular weakness which could be upper motor neuron versus lower motor neuron including CVAs strokes spinal cord problems spinal cord diseases like infection trauma inflammation etc.  Other things that can cause generalized weakness are  peripheral nerve disease with toxins ,neuropathies, and endocrine abnormalities.  Muscle diseases like dermatomyositis rhabdomyolysis and alcoholic myopathy can give rise to weakness  Non  neuromuscular diseases like coronary syndromes, arrhythmias, infection, hypoglycemia, thyroid disease and respiratory failure can cause generalized weakness also hypokalemia, hypomagnesemia, hypo or hypernatremia ,polypharmacy ,hypothyroidism and malignancies can cause generalized weakness      Problems Addressed:  Generalized weakness:     Details: Generalized weakness negative work-up no evidence of UTI and no focal neurological deficits.    Amount and/or Complexity of Data Reviewed  Labs: ordered.     Details: Lab work-up essentially unremarkable.  Discussion of management or test interpretation with external provider(s): I have discussed this with the patient's family we will discharge him home he will require case management as an outpatient for possible physical therapy and nursing home placement.    Risk  Risk Details: This patient came to the ED with history of generalized weakness/fatigue hemodynamically stable with no lateralizing neurological deficits or focal motor or sensory deficits.  There is low suspicion for toxic-metabolic etiology, considering normal lab work-up no evidence of acidosis no history of any new medications and no exposure to environmental toxins .  There is low suspicion for hypoglycemia, hyperglycemia, diabetic ketoacidosis, drug overdose, ethanol intoxication, since the patient's  chemistries are within normal limits and clinically the patient does not appear to be intoxicated.  Low clinical risk for thiamine deficiency with no nystagmus and no history of malnutrition or starvation or alcoholism.  Low risk for hypothermia, hyponatremia, hypernatremia, organ failure, liver failure, kidney failure, as a cause of generalized weakness especially since the chemistries are within normal limits there is no clinical evidence of endorgan damage and the vitals are stable .  Low clinical suspicion of thyroid failure, adrenal failure, with no clinical or physical findings attributable to the syndromes.  There is no clinical evidence of hypoxia, hypercarbia,.  Low clinical suspicion of ischemic stroke, intracranial bleed, subarachnoid hemorrhage, closed head injury, subdural hematoma, with a normal neuro exam with no headaches .  Low suspicion of seizure activity, syncopal episode, since there is no clinical evidence or history consistent with this. Low suspicion for ACS as the patient does not have any associated chest pain or shortness of breath and has a nonsuspicious history of present illness.  Low suspicion of a neurological etiology since there is no gait disturbance no diplopia no dysarthria or dysphagia on exam and there is no hypo or hyperreflexia or sensory deficits and no recent history of viral infections and. NOT associated with  central dizziness or ataxia or vomiting There is low suspicion for meningitis encephalitis as there is no fever no nuchal rigidity and no confusion and negative inflammatory markers and normal lab work-up. Low suspicion of infectious etiology, hypertensive encephalopathy, vasculitis, thrombotic thrombocytopenic purpura and disseminated intravascular coagulation as a possible cause of generalized weakness in this patien With normal or easily controllable blood pressure no history of vasculitis normal CBC count and a normal platelet count and or normal inflammatory  markers      .         Final diagnoses:   Generalized weakness       ED Disposition  ED Disposition       ED Disposition   Discharge    Condition   Stable    Comment   --               No follow-up provider specified.       Medication List      No changes were made to your prescriptions during this visit.            Jeff Siu MD  08/21/23 9619

## 2023-08-21 NOTE — CASE MANAGEMENT/SOCIAL WORK
Continued Stay Note   Siva     Patient Name: Edy Urbina  MRN: [id number]  Today's Date: 8/21/2023    Admit Date: 8/21/2023    Plan: Return to [facility]   Discharge Plan       Row Name 08/21/23 1803       Plan    Plan Return to [facility]    Patient/Family in Agreement with Plan yes    Plan Comments SW on call received call from ER staff advising family was inquiring about nursing home placement for patient.  Patient currently resides [facility].  MICHELLE spoke with patient's daughter, Jennyfer Urbina [phone], who requested SW contact patient's son, Raul Urbina [phone].  MICHELLE spoke with Raul Urbina and explained that patient's insurance requires prior approval to go to SNF.  In order to obtain insurance authorization for SNF patient would need a physical therapy evaluation.  MICHELLE explained to son this would need to be ordered by patient's regular physician.  Son was under the understanding that the hospital decided who could go to SNF and the doctor put in orders for this to happen.  MICHELLE explained that SNF's are independent companies that decide who they admit and the hospital has nothing to do with that decision.  Also, at this time of evening patient's insurance is closed for the day as well as [facility] admissions.  [facility] is the facility patient went to previously and the facility family is requesting admittance into again.  Patient's son also advised patient has [facility] insurance.  The [facility] is also closed for the day (MICHELLE staff) and benefits would not be able to be verified and the [facility] SN[facility] facilities admissions staff are gone for the day.  Son understands that patient will need to return to [facility] upon discharge from the ER.  MICHELLE will notify [facility] admissions tomorrow, August 22, of family's interest in patient returning.     8/22/23 8:30 am:  MICHELLE informed Astrid Santana with [facility] admissions of referral.  Astrid will follow up directly  with family.                   Discharge Codes    No documentation.                       NICK StillW

## 2023-08-22 ENCOUNTER — TELEPHONE (OUTPATIENT)
Dept: INTERNAL MEDICINE | Facility: CLINIC | Age: 83
End: 2023-08-22
Payer: MEDICARE

## 2023-08-22 NOTE — TELEPHONE ENCOUNTER
PATIENTS DAUGHTER HAS RETURNED CALL, GIVEN MESSAGE AND HAD TO RESCHEDULE APPOINTMENT TO BE ABLE TO BRING PATIENT.

## 2023-08-22 NOTE — TELEPHONE ENCOUNTER
PATIENTS DAUGHTER HAS CALLED, SHE STATED THAT HE IS NEEDING AN ORDER FOR HOME HEALTH FOR PATIENT.   PATIENT IS ASK [facility].   SHE IS REQUESTING [facility].    SHE STATED THAT HIS MOBILITY HAS GONE DOWN, MORE CONFUSION.      [phone]

## 2023-08-29 ENCOUNTER — OFFICE VISIT (OUTPATIENT)
Dept: INTERNAL MEDICINE | Facility: CLINIC | Age: 83
End: 2023-08-29
Payer: MEDICARE

## 2023-08-29 VITALS
HEART RATE: 59 BPM | SYSTOLIC BLOOD PRESSURE: 105 MMHG | BODY MASS INDEX: 18.42 KG/M2 | WEIGHT: 139 LBS | OXYGEN SATURATION: 99 % | TEMPERATURE: 98 F | HEIGHT: 73 IN | RESPIRATION RATE: 16 BRPM | DIASTOLIC BLOOD PRESSURE: 55 MMHG

## 2023-08-29 DIAGNOSIS — F02.818 DEMENTIA ASSOCIATED WITH OTHER UNDERLYING DISEASE WITH BEHAVIORAL DISTURBANCE: Primary | ICD-10-CM

## 2023-08-29 DIAGNOSIS — Z91.81 AT HIGH RISK FOR FALLS: ICD-10-CM

## 2023-08-29 DIAGNOSIS — R53.83 OTHER FATIGUE: ICD-10-CM

## 2023-08-29 DIAGNOSIS — G20 PARKINSON'S DISEASE: ICD-10-CM

## 2023-08-29 DIAGNOSIS — R29.898 WEAKNESS OF BOTH LOWER EXTREMITIES: ICD-10-CM

## 2023-08-29 PROCEDURE — 99214 OFFICE O/P EST MOD 30 MIN: CPT | Performed by: NURSE PRACTITIONER

## 2023-08-29 NOTE — PROGRESS NOTES
"Chief Complaint   Patient presents with    Immobility     Pt would like a referral for OT r/t to mobility issues    Altered Mental Status     Daughter states patient's \"confusion has gotten worse.\"        HPI     Edy Urbina is a 82 y.o. male presents for follow up. He was seen in ER recently for weakness and fatigue. His labs were normal at the time. He is now ambulating with a walker due to weakness in his legs. He has had near falls but no actual falls recently. He has caregivers who sit and check in on him throughout the day. His daughter is with him and says he is eating well he just seems tired, falling asleep more often. She feels his dementia is worsening.     ROS:  Review of Systems   Constitutional:  Negative for fever.   Respiratory: Negative.     Cardiovascular: Negative.    Neurological:  Positive for weakness.   Psychiatric/Behavioral:  Positive for confusion.         reports that he quit smoking about 21 years ago. His smoking use included cigarettes. He has been exposed to tobacco smoke. He has never used smokeless tobacco. He reports that he does not drink alcohol and does not use drugs.    Current Outpatient Medications:     apixaban (Eliquis) 2.5 MG tablet tablet, Take 1 tablet by mouth Every 12 (Twelve) Hours., Disp: 180 tablet, Rfl: 3    carbidopa-levodopa (SINEMET)  MG per tablet, Take 1 tablet by mouth 3 (Three) Times a Day., Disp: 270 tablet, Rfl: 1    memantine (NAMENDA) 10 MG tablet, Take 1 tablet by mouth 2 (Two) Times a Day. TAKE 1 TABLET TWICE DAILY, Disp: 180 tablet, Rfl: 1    metoprolol tartrate (LOPRESSOR) 50 MG tablet, Take 1 tablet by mouth 2 (Two) Times a Day., Disp: 60 tablet, Rfl: 3    Multiple Vitamins-Minerals (MULTIVITAMIN MEN 50+) tablet, Take 1 tablet by mouth Daily., Disp: , Rfl:     mupirocin (BACTROBAN) 2 % ointment, Apply 1 application  topically to the appropriate area as directed 3 (Three) Times a Day., Disp: 30 g, Rfl: 2    risperiDONE (risperDAL) 0.5 " "MG tablet, Take 1 tablet by mouth 2 (Two) Times a Day., Disp: 180 tablet, Rfl: 1    risperiDONE (risperDAL) 1 MG tablet, Take 1 tablet by mouth Every Evening., Disp: 90 tablet, Rfl: 1    tamsulosin (FLOMAX) 0.4 MG capsule 24 hr capsule, Take 1 capsule by mouth Daily., Disp: 30 capsule, Rfl: 3    OBJECTIVE:  /55 (BP Location: Left arm, Patient Position: Sitting, Cuff Size: Adult)   Pulse 59   Temp 98 øF (36.7 øC) (Temporal)   Resp 16   Ht 185.4 cm (73\")   Wt 63 kg (139 lb)   SpO2 99%   BMI 18.34 kg/mý    Physical Exam  Constitutional:       General: He is not in acute distress.     Appearance: He is ill-appearing.   Cardiovascular:      Rate and Rhythm: Normal rate and regular rhythm.      Heart sounds: Normal heart sounds.   Pulmonary:      Effort: Pulmonary effort is normal.      Breath sounds: Normal breath sounds.   Neurological:      Motor: Weakness present.   Psychiatric:         Mood and Affect: Mood normal.         Behavior: Behavior normal.         Thought Content: Thought content normal.         Judgment: Judgment normal.       ASSESSMENT/PLAN:     Diagnoses and all orders for this visit:    1. Dementia associated with other underlying disease with behavioral disturbance (Primary)  -     Ambulatory Referral to Home Health  He is currently at [facility] assisted living with paid caregivers. Discussed considering long-term care facilities that specialize is memory loss. For now they would prefer to keep him at [facility]. Will refer to Home health PT/OT to help improve strength and mobility to prevent falls. He has follow up with Neurology.     2. Parkinson's disease  -     Ambulatory Referral to Home Health    3. Weakness of both lower extremities  -     Ambulatory Referral to Home Health    4. At high risk for falls  -     Ambulatory Referral to Home Health    5. Fatigue  Likely multifactorial, dementia or possibly medications. Continue to monitor heart rate and blood pressure which has " improved at lower dose of metoprolol.     An After Visit Summary was printed and given to the patient at discharge.  Return in about 3 months (around 11/29/2023) for Recheck with Dr. Amato .          Rosemary Ortiz, DARLING 8/29/2023   Electronically signed.

## 2023-09-01 ENCOUNTER — TELEPHONE (OUTPATIENT)
Dept: INTERNAL MEDICINE | Facility: CLINIC | Age: 83
End: 2023-09-01
Payer: MEDICARE

## 2023-09-01 DIAGNOSIS — I48.0 PAROXYSMAL ATRIAL FIBRILLATION: Primary | ICD-10-CM

## 2023-09-01 NOTE — TELEPHONE ENCOUNTER
Called Tereza regarding the instructions she states to call the daughter, tried calling his daughter no vm is full.

## 2023-09-01 NOTE — TELEPHONE ENCOUNTER
YULIET PHYSICAL THERAPIST  WITH [facility] HAS CALLED AND STATED PATIENTS BP IS   SITTING 90/42  HR 60  LEFT ARM  STANDING 92/49  HR 58  LEFT ARM  SITTING 102/48  IN RIGHT ARM   PATIENT HAS TAKEN HIS MEDICATION THIS MORNING.        [phone]

## 2023-09-01 NOTE — TELEPHONE ENCOUNTER
YULIET, PHYSICAL THERAPIST WITH [facility]  HAS BEEN CALLED  GIVEN MESSAGE AND  UNDERSTANDING VOICED.

## 2023-09-01 NOTE — TELEPHONE ENCOUNTER
Recommend he decrease metoprolol to 25mg twice per day (0.5 tablet BID). I have sent refill to [facility] of this dose as well.

## 2023-09-11 ENCOUNTER — TELEPHONE (OUTPATIENT)
Dept: INTERNAL MEDICINE | Facility: CLINIC | Age: 83
End: 2023-09-11

## 2023-09-11 NOTE — TELEPHONE ENCOUNTER
"Tereza, who works with PT and is the , stated patient \"is not doing great\" physically, cognitively, and he has issues with mobility.  She said patient is at [facility] and they do not provide assistance with those things.  Per Tereza, patient has a private aide who \"pops in and out,\" but she feels a social work consult is needed to determine if patient should be in an assisted living facility or memory unit.  She said the  should determine the most appropriate level of care needed.  "

## 2023-09-11 NOTE — TELEPHONE ENCOUNTER
Caller: YULIET    Relationship: Other    Best call back number: [phone]     What is the best time to reach you: ANY    Who are you requesting to speak with (clinical staff, provider,  specific staff member): CLINICAL    Do you know the name of the person who called: [facility] WORKER    What was the call regarding: WOULD LIKE TO GET A SOCIAL WORK CONSULT FOR PLACEMENT AT A HIGHER LEVEL OF CARE PER PATIENT'S SON REQUEST

## 2023-09-15 ENCOUNTER — TELEPHONE (OUTPATIENT)
Dept: INTERNAL MEDICINE | Facility: CLINIC | Age: 83
End: 2023-09-15

## 2023-09-15 DIAGNOSIS — F02.C3 SEVERE LEWY BODY DEMENTIA WITH MOOD DISTURBANCE: ICD-10-CM

## 2023-09-15 DIAGNOSIS — R64 CACHEXIA: ICD-10-CM

## 2023-09-15 DIAGNOSIS — G31.83 SEVERE LEWY BODY DEMENTIA WITH MOOD DISTURBANCE: ICD-10-CM

## 2023-09-15 DIAGNOSIS — G20.A1 PARKINSON'S DISEASE: Primary | ICD-10-CM

## 2023-09-15 NOTE — TELEPHONE ENCOUNTER
Caller: OLEGARIO AMBRIZ    Relationship: Emergency Contact    Best call back number: [phone]     What is the best time to reach you: ANYTIME    Who are you requesting to speak with (clinical staff, provider,  specific staff member): CLINICAL    What was the call regarding: SON WANTS TO DISCUSS POSSIBLY GETTING PATIENT REFERRED TO HOSPICE TO GET EVALUATED. PLEASE ADVISE.     Is it okay if the provider responds through MyChart: NO

## 2023-09-15 NOTE — TELEPHONE ENCOUNTER
We have sent a referral to Hospice. They will contact you over the next 2-3 days to arrange a time to come out and evaluate.

## 2024-01-12 ENCOUNTER — TELEPHONE (OUTPATIENT)
Dept: VASCULAR SURGERY | Facility: CLINIC | Age: 84
End: 2024-01-12
Payer: MEDICARE

## 2024-01-12 NOTE — TELEPHONE ENCOUNTER
CALLED PATIENT TO ASK IF HE WOULD LIKE TO BE R/S WITH DIFFERENT PROVIDER BEING THAT [facility] IS NOT LONGER HERE. PATIENT'S SON STATED THAT HE IS UNDER HOSPICE CARE.

## (undated) DEVICE — ANTIBACTERIAL UNDYED BRAIDED (POLYGLACTIN 910), SYNTHETIC ABSORBABLE SUTURE: Brand: COATED VICRYL

## (undated) DEVICE — PK TURNOVER RM ADV

## (undated) DEVICE — 3M™ STERI-STRIP™ REINFORCED ADHESIVE SKIN CLOSURES, R1546, 1/4 IN X 4 IN (6 MM X 100 MM), 10 STRIPS/ENVELOPE: Brand: 3M™ STERI-STRIP™

## (undated) DEVICE — SPNG GZ STRL 2S 4X4 12PLY

## (undated) DEVICE — TRY PREP SCRB VAG PVP

## (undated) DEVICE — SUT PROLN 0 MO6 30IN 8418H

## (undated) DEVICE — SYR LUERLOK 20CC BX/50

## (undated) DEVICE — PAD MINOR UNIVERSAL: Brand: MEDLINE INDUSTRIES, INC.

## (undated) DEVICE — PAD GRND REM POLYHESIVE A/ DISP

## (undated) DEVICE — ADHS LIQ MASTISOL 2/3ML

## (undated) DEVICE — DRN PENRS 5/8X18IN LTX

## (undated) DEVICE — 3M™ WARMING BLANKET, UPPER BODY, 10 PER CASE, 42268: Brand: BAIR HUGGER™

## (undated) DEVICE — MARKR SKIN W/RULR AND LBL

## (undated) DEVICE — SUT PROLN 2/0 CT2 30IN 8411H

## (undated) DEVICE — DRSNG SURESITE WNDW 4X4.5

## (undated) DEVICE — GLV SURG BIOGEL M LTX PF 7 1/2

## (undated) DEVICE — SUT SILK 2/0 SUTUPAK TIES 24IN SA75H

## (undated) DEVICE — NDL HYPO PRECISIONGLIDE REG 22G 1 1/2